# Patient Record
Sex: MALE | Race: WHITE | NOT HISPANIC OR LATINO
[De-identification: names, ages, dates, MRNs, and addresses within clinical notes are randomized per-mention and may not be internally consistent; named-entity substitution may affect disease eponyms.]

---

## 2020-10-28 ENCOUNTER — APPOINTMENT (OUTPATIENT)
Dept: SPINE | Facility: CLINIC | Age: 40
End: 2020-10-28
Payer: COMMERCIAL

## 2020-10-28 VITALS
HEART RATE: 101 BPM | HEIGHT: 65 IN | WEIGHT: 165 LBS | DIASTOLIC BLOOD PRESSURE: 85 MMHG | BODY MASS INDEX: 27.49 KG/M2 | OXYGEN SATURATION: 97 % | TEMPERATURE: 97.3 F | SYSTOLIC BLOOD PRESSURE: 129 MMHG

## 2020-10-28 DIAGNOSIS — Z82.49 FAMILY HISTORY OF ISCHEMIC HEART DISEASE AND OTHER DISEASES OF THE CIRCULATORY SYSTEM: ICD-10-CM

## 2020-10-28 DIAGNOSIS — Z87.891 PERSONAL HISTORY OF NICOTINE DEPENDENCE: ICD-10-CM

## 2020-10-28 DIAGNOSIS — Z86.39 PERSONAL HISTORY OF OTHER ENDOCRINE, NUTRITIONAL AND METABOLIC DISEASE: ICD-10-CM

## 2020-10-28 DIAGNOSIS — Z72.89 OTHER PROBLEMS RELATED TO LIFESTYLE: ICD-10-CM

## 2020-10-28 PROBLEM — Z00.00 ENCOUNTER FOR PREVENTIVE HEALTH EXAMINATION: Status: ACTIVE | Noted: 2020-10-28

## 2020-10-28 PROCEDURE — 99204 OFFICE O/P NEW MOD 45 MIN: CPT

## 2020-10-28 PROCEDURE — 99072 ADDL SUPL MATRL&STAF TM PHE: CPT

## 2020-10-28 RX ORDER — ROSUVASTATIN CALCIUM 10 MG/1
10 TABLET, FILM COATED ORAL
Refills: 0 | Status: ACTIVE | COMMUNITY

## 2020-11-11 ENCOUNTER — TRANSCRIPTION ENCOUNTER (OUTPATIENT)
Age: 40
End: 2020-11-11

## 2020-11-13 ENCOUNTER — NON-APPOINTMENT (OUTPATIENT)
Age: 40
End: 2020-11-13

## 2020-11-13 ENCOUNTER — APPOINTMENT (OUTPATIENT)
Dept: OPHTHALMOLOGY | Facility: CLINIC | Age: 40
End: 2020-11-13
Payer: COMMERCIAL

## 2020-11-13 PROCEDURE — 92004 COMPRE OPH EXAM NEW PT 1/>: CPT

## 2020-11-13 PROCEDURE — 92083 EXTENDED VISUAL FIELD XM: CPT

## 2020-11-13 PROCEDURE — 99072 ADDL SUPL MATRL&STAF TM PHE: CPT

## 2020-11-13 PROCEDURE — 92133 CPTRZD OPH DX IMG PST SGM ON: CPT

## 2020-12-14 ENCOUNTER — APPOINTMENT (OUTPATIENT)
Dept: SPINE | Facility: CLINIC | Age: 40
End: 2020-12-14
Payer: COMMERCIAL

## 2020-12-14 DIAGNOSIS — G89.29 HEADACHE, UNSPECIFIED: ICD-10-CM

## 2020-12-14 DIAGNOSIS — R51.9 HEADACHE, UNSPECIFIED: ICD-10-CM

## 2020-12-14 PROCEDURE — 99213 OFFICE O/P EST LOW 20 MIN: CPT | Mod: 95

## 2020-12-15 NOTE — CONSULT LETTER
[Courtesy Letter:] : I had the pleasure of seeing your patient, [unfilled], in my office today. [FreeTextEntry2] : Dr Allen Phillip [FreeTextEntry1] : Nina Em, MS, ANP-BC, SCRN\par Adult Nurse Practitioner on Behalf of Dr. Virgilio Conway, Neurosurgeon\par Department Neurosurgery\par

## 2020-12-15 NOTE — ASSESSMENT
[FreeTextEntry1] : 40-year-old man comprehensive work-up\par \par Dr. Conway will review images w/ Neuroradiologist\par \par In the meantime patient will need a CTA of the head with and without contrast to rule out cerebral aneurysm.\par Patient will also need an MRA of the head with and without contrast to assess cerebral blood vessels for vascular malformation.\par \par Patient will follow up after studies are completed for additional recommendation

## 2020-12-15 NOTE — HISTORY OF PRESENT ILLNESS
[Home] : at home, [unfilled] , at the time of the visit. [Medical Office: ([facility])___] : at the medical office located in  [Verbal consent obtained from patient] : the patient, [unfilled] [FreeTextEntry4] : Vinayak Em

## 2020-12-15 NOTE — REASON FOR VISIT
[Follow-Up: _____] : a [unfilled] follow-up visit [FreeTextEntry1] : This is a TELEHEALTH VISIT for review of. 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

## 2020-12-30 ENCOUNTER — APPOINTMENT (OUTPATIENT)
Dept: MRI IMAGING | Facility: IMAGING CENTER | Age: 40
End: 2020-12-30
Payer: COMMERCIAL

## 2020-12-30 ENCOUNTER — OUTPATIENT (OUTPATIENT)
Dept: OUTPATIENT SERVICES | Facility: HOSPITAL | Age: 40
LOS: 1 days | End: 2020-12-30
Payer: COMMERCIAL

## 2020-12-30 ENCOUNTER — RESULT REVIEW (OUTPATIENT)
Age: 40
End: 2020-12-30

## 2020-12-30 ENCOUNTER — APPOINTMENT (OUTPATIENT)
Dept: CT IMAGING | Facility: IMAGING CENTER | Age: 40
End: 2020-12-30
Payer: COMMERCIAL

## 2020-12-30 DIAGNOSIS — D35.2 BENIGN NEOPLASM OF PITUITARY GLAND: ICD-10-CM

## 2020-12-30 DIAGNOSIS — R51.9 HEADACHE, UNSPECIFIED: ICD-10-CM

## 2020-12-30 DIAGNOSIS — R93.3 ABNORMAL FINDINGS ON DIAGNOSTIC IMAGING OF OTHER PARTS OF DIGESTIVE TRACT: ICD-10-CM

## 2020-12-30 PROCEDURE — 70546 MR ANGIOGRAPH HEAD W/O&W/DYE: CPT | Mod: 26

## 2020-12-30 PROCEDURE — A9585: CPT

## 2020-12-30 PROCEDURE — 70496 CT ANGIOGRAPHY HEAD: CPT

## 2020-12-30 PROCEDURE — 70496 CT ANGIOGRAPHY HEAD: CPT | Mod: 26

## 2020-12-30 PROCEDURE — 70546 MR ANGIOGRAPH HEAD W/O&W/DYE: CPT

## 2021-01-04 ENCOUNTER — APPOINTMENT (OUTPATIENT)
Dept: SPINE | Facility: CLINIC | Age: 41
End: 2021-01-04
Payer: COMMERCIAL

## 2021-01-04 PROCEDURE — 99213 OFFICE O/P EST LOW 20 MIN: CPT | Mod: 95

## 2021-01-05 NOTE — REASON FOR VISIT
[Follow-Up: _____] : a [unfilled] follow-up visit [FreeTextEntry1] : The patient has a new MRI of the brain and sella with and without contrast.

## 2021-01-05 NOTE — RESULTS/DATA
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 The CT angiogram of the intracranial is normal. There is no evidence of aneurysm.  The MRA reveals that the evaluation of the [place] appears unremarkable.

## 2021-01-05 NOTE — ASSESSMENT
[FreeTextEntry1] : These images and findings were reviewed and discussed with the patient.  There is no evidence of aneurysm.  It was recommended that he have a new MRI of the brain and sella with and without contrast in February for surveillance of the sella mass.

## 2021-01-05 NOTE — DATA REVIEWED
[de-identified] : CTA Angio of the brain w and w/o contrast done on 12/30/20 at [facility] [de-identified] : Brain and sella with and without contrast [de-identified] : Brain with and without contrast done on 12/30/2020 at [facility].

## 2021-01-05 NOTE — HISTORY OF PRESENT ILLNESS
[Home] : at home, [unfilled] , at the time of the visit. [Medical Office: ([facility])___] : at the medical office located in  [Verbal consent obtained from patient] : the patient, [unfilled] [FreeTextEntry4] : Jackie Sierra [FreeTextEntry1] : JANNETTE GONZALEZ is a 40 year old gentleman was being evaluated for headaches and as a part of that had an MRI that showed a cell mass. He had a workup by Dr. Miner but which was normal. He saw Dr. Alex Farah and was found to have full visual fields. His MRI of the brain and sella which was viewed on December 14 revealed what appeared to be a low signal flow void in the upper right portion of the mass and which was thought may have been associated with the A1 segment of the anterior cerebral artery. 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  The patient stated that his headaches are getting better. He gets them about twice a week.\par

## 2021-01-05 NOTE — PHYSICAL EXAM
[FreeTextEntry1] : The exam is limited due to this being a telehealth appointment.  The patient is alert and oriented to person, place and time.  Higher cortical functions are intact.  Face is symmetrical and speech is clear.  \par \par \par \par \par \par \par \par \par

## 2021-02-08 ENCOUNTER — APPOINTMENT (OUTPATIENT)
Dept: SPINE | Facility: CLINIC | Age: 41
End: 2021-02-08
Payer: COMMERCIAL

## 2021-02-08 ENCOUNTER — APPOINTMENT (OUTPATIENT)
Dept: SPINE | Facility: CLINIC | Age: 41
End: 2021-02-08

## 2021-02-08 PROCEDURE — 99212 OFFICE O/P EST SF 10 MIN: CPT | Mod: 95

## 2021-02-08 NOTE — PHYSICAL EXAM
[FreeTextEntry1] : The neurological exam is limited due to this being a telehealth appointment.  The patient is alert and oriented to person, place and time.  Higher cortical functions are intact.  His face is symmetrical and speech is clear and fluent.  The patient appears to move all extremities well.  \par

## 2021-02-08 NOTE — REASON FOR VISIT
[Follow-Up: _____] : a [unfilled] follow-up visit [FreeTextEntry1] : The patient has a new MRI of the brain and sella with and without contrast for review.

## 2021-02-08 NOTE — DATA REVIEWED
[de-identified] : Brain and sella with and without contrast done at on 2/3/2021 and compared to images done on 11/24/2020.

## 2021-02-08 NOTE — HISTORY OF PRESENT ILLNESS
[Home] : at home, [unfilled] , at the time of the visit. [Medical Office: ([facility])___] : at the medical office located in  [Verbal consent obtained from patient] : the patient, [unfilled] [FreeTextEntry4] : SHANTHI Hinson. 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  He had a work up by endocrinologist, Dr. Isidra Dao which was normal.  He also had a neuro opthalmic workup by Dr. Alex Farah and was found to have full visual fields.  He has headaches about once a week and is being followed by Dr. Allen Phillip.  The patient is here with a new MRI of the brain and sella with and without contrast for review.\par

## 2021-02-08 NOTE — ASSESSMENT
[FreeTextEntry1] : The images and findings were reviewed and discussed with the patient.  He is to return to the office with a new MRI of the brain with and without contrast in November for review.  It was also advised that he follow up with Dr. Alex Paredes for a new visual field exam.

## 2021-03-13 ENCOUNTER — OUTPATIENT (OUTPATIENT)
Dept: OUTPATIENT SERVICES | Facility: HOSPITAL | Age: 41
LOS: 1 days | End: 2021-03-13
Payer: COMMERCIAL

## 2021-03-13 DIAGNOSIS — Z11.52 ENCOUNTER FOR SCREENING FOR COVID-19: ICD-10-CM

## 2021-03-13 LAB — SARS-COV-2 RNA SPEC QL NAA+PROBE: SIGNIFICANT CHANGE UP

## 2021-03-13 PROCEDURE — U0003: CPT

## 2021-03-13 PROCEDURE — C9803: CPT

## 2021-03-13 PROCEDURE — U0005: CPT

## 2021-03-31 ENCOUNTER — APPOINTMENT (OUTPATIENT)
Dept: DISASTER EMERGENCY | Facility: OTHER | Age: 41
End: 2021-03-31
Payer: COMMERCIAL

## 2021-03-31 PROCEDURE — 0001A: CPT

## 2021-04-21 ENCOUNTER — APPOINTMENT (OUTPATIENT)
Dept: DISASTER EMERGENCY | Facility: OTHER | Age: 41
End: 2021-04-21
Payer: COMMERCIAL

## 2021-04-21 PROCEDURE — 0002A: CPT

## 2021-10-18 ENCOUNTER — APPOINTMENT (OUTPATIENT)
Dept: OPHTHALMOLOGY | Facility: CLINIC | Age: 41
End: 2021-10-18
Payer: COMMERCIAL

## 2021-10-18 ENCOUNTER — NON-APPOINTMENT (OUTPATIENT)
Age: 41
End: 2021-10-18

## 2021-10-18 PROCEDURE — 92083 EXTENDED VISUAL FIELD XM: CPT

## 2021-10-18 PROCEDURE — 92015 DETERMINE REFRACTIVE STATE: CPT

## 2021-10-18 PROCEDURE — 92133 CPTRZD OPH DX IMG PST SGM ON: CPT

## 2021-10-18 PROCEDURE — 99214 OFFICE O/P EST MOD 30 MIN: CPT

## 2021-11-03 ENCOUNTER — APPOINTMENT (OUTPATIENT)
Dept: SPINE | Facility: CLINIC | Age: 41
End: 2021-11-03
Payer: COMMERCIAL

## 2021-11-03 VITALS
DIASTOLIC BLOOD PRESSURE: 76 MMHG | WEIGHT: 180 LBS | HEIGHT: 65 IN | SYSTOLIC BLOOD PRESSURE: 107 MMHG | OXYGEN SATURATION: 99 % | BODY MASS INDEX: 29.99 KG/M2 | HEART RATE: 75 BPM

## 2021-11-03 PROCEDURE — 99213 OFFICE O/P EST LOW 20 MIN: CPT

## 2021-11-03 RX ORDER — NORTRIPTYLINE HYDROCHLORIDE 25 MG/1
25 CAPSULE ORAL
Refills: 0 | Status: DISCONTINUED | COMMUNITY
End: 2021-11-03

## 2022-10-18 ENCOUNTER — NON-APPOINTMENT (OUTPATIENT)
Age: 42
End: 2022-10-18

## 2022-10-18 ENCOUNTER — APPOINTMENT (OUTPATIENT)
Dept: OPHTHALMOLOGY | Facility: CLINIC | Age: 42
End: 2022-10-18

## 2022-10-18 PROCEDURE — 92083 EXTENDED VISUAL FIELD XM: CPT

## 2022-10-18 PROCEDURE — 99214 OFFICE O/P EST MOD 30 MIN: CPT

## 2022-10-18 PROCEDURE — 92133 CPTRZD OPH DX IMG PST SGM ON: CPT

## 2022-12-07 ENCOUNTER — APPOINTMENT (OUTPATIENT)
Dept: SPINE | Facility: CLINIC | Age: 42
End: 2022-12-07

## 2022-12-07 VITALS
HEIGHT: 65 IN | WEIGHT: 180 LBS | OXYGEN SATURATION: 98 % | SYSTOLIC BLOOD PRESSURE: 102 MMHG | HEART RATE: 90 BPM | BODY MASS INDEX: 29.99 KG/M2 | DIASTOLIC BLOOD PRESSURE: 68 MMHG

## 2022-12-07 PROCEDURE — 99213 OFFICE O/P EST LOW 20 MIN: CPT

## 2023-10-17 ENCOUNTER — APPOINTMENT (OUTPATIENT)
Dept: OPHTHALMOLOGY | Facility: CLINIC | Age: 43
End: 2023-10-17
Payer: COMMERCIAL

## 2023-10-17 ENCOUNTER — NON-APPOINTMENT (OUTPATIENT)
Age: 43
End: 2023-10-17

## 2023-10-17 PROCEDURE — 92133 CPTRZD OPH DX IMG PST SGM ON: CPT

## 2023-10-17 PROCEDURE — 99214 OFFICE O/P EST MOD 30 MIN: CPT

## 2023-10-17 PROCEDURE — 92083 EXTENDED VISUAL FIELD XM: CPT

## 2023-12-13 ENCOUNTER — APPOINTMENT (OUTPATIENT)
Dept: SPINE | Facility: CLINIC | Age: 43
End: 2023-12-13
Payer: COMMERCIAL

## 2023-12-13 VITALS
BODY MASS INDEX: 29.66 KG/M2 | OXYGEN SATURATION: 98 % | HEIGHT: 65 IN | HEART RATE: 75 BPM | DIASTOLIC BLOOD PRESSURE: 88 MMHG | WEIGHT: 178 LBS | SYSTOLIC BLOOD PRESSURE: 127 MMHG

## 2023-12-13 PROCEDURE — 99213 OFFICE O/P EST LOW 20 MIN: CPT

## 2024-01-05 ENCOUNTER — OUTPATIENT (OUTPATIENT)
Dept: OUTPATIENT SERVICES | Facility: HOSPITAL | Age: 44
LOS: 1 days | End: 2024-01-05
Payer: COMMERCIAL

## 2024-01-05 ENCOUNTER — APPOINTMENT (OUTPATIENT)
Dept: CT IMAGING | Facility: IMAGING CENTER | Age: 44
End: 2024-01-05
Payer: COMMERCIAL

## 2024-01-05 ENCOUNTER — APPOINTMENT (OUTPATIENT)
Dept: MRI IMAGING | Facility: IMAGING CENTER | Age: 44
End: 2024-01-05
Payer: COMMERCIAL

## 2024-01-05 DIAGNOSIS — D35.2 BENIGN NEOPLASM OF PITUITARY GLAND: ICD-10-CM

## 2024-01-05 PROCEDURE — 70553 MRI BRAIN STEM W/O & W/DYE: CPT | Mod: 26

## 2024-01-05 PROCEDURE — A9585: CPT

## 2024-01-05 PROCEDURE — 70450 CT HEAD/BRAIN W/O DYE: CPT | Mod: 26

## 2024-01-05 PROCEDURE — 70450 CT HEAD/BRAIN W/O DYE: CPT

## 2024-01-05 PROCEDURE — 70553 MRI BRAIN STEM W/O & W/DYE: CPT

## 2024-01-18 ENCOUNTER — OUTPATIENT (OUTPATIENT)
Dept: OUTPATIENT SERVICES | Facility: HOSPITAL | Age: 44
LOS: 1 days | End: 2024-01-18
Payer: COMMERCIAL

## 2024-01-18 VITALS
HEIGHT: 65 IN | OXYGEN SATURATION: 99 % | DIASTOLIC BLOOD PRESSURE: 88 MMHG | SYSTOLIC BLOOD PRESSURE: 122 MMHG | RESPIRATION RATE: 18 BRPM | TEMPERATURE: 98 F | WEIGHT: 179.02 LBS | HEART RATE: 84 BPM

## 2024-01-18 DIAGNOSIS — Z01.818 ENCOUNTER FOR OTHER PREPROCEDURAL EXAMINATION: ICD-10-CM

## 2024-01-18 DIAGNOSIS — Z92.89 PERSONAL HISTORY OF OTHER MEDICAL TREATMENT: Chronic | ICD-10-CM

## 2024-01-18 DIAGNOSIS — D44.4 NEOPLASM OF UNCERTAIN BEHAVIOR OF CRANIOPHARYNGEAL DUCT: ICD-10-CM

## 2024-01-18 DIAGNOSIS — Z29.9 ENCOUNTER FOR PROPHYLACTIC MEASURES, UNSPECIFIED: ICD-10-CM

## 2024-01-18 DIAGNOSIS — D49.7 NEOPLASM OF UNSPECIFIED BEHAVIOR OF ENDOCRINE GLANDS AND OTHER PARTS OF NERVOUS SYSTEM: ICD-10-CM

## 2024-01-18 LAB
ANION GAP SERPL CALC-SCNC: 11 MMOL/L — SIGNIFICANT CHANGE UP (ref 5–17)
BLD GP AB SCN SERPL QL: NEGATIVE — SIGNIFICANT CHANGE UP
BUN SERPL-MCNC: 13 MG/DL — SIGNIFICANT CHANGE UP (ref 7–23)
CALCIUM SERPL-MCNC: 10.1 MG/DL — SIGNIFICANT CHANGE UP (ref 8.4–10.5)
CHLORIDE SERPL-SCNC: 101 MMOL/L — SIGNIFICANT CHANGE UP (ref 96–108)
CO2 SERPL-SCNC: 27 MMOL/L — SIGNIFICANT CHANGE UP (ref 22–31)
CREAT SERPL-MCNC: 0.93 MG/DL — SIGNIFICANT CHANGE UP (ref 0.5–1.3)
EGFR: 104 ML/MIN/1.73M2 — SIGNIFICANT CHANGE UP
GLUCOSE SERPL-MCNC: 85 MG/DL — SIGNIFICANT CHANGE UP (ref 70–99)
HCT VFR BLD CALC: 42.9 % — SIGNIFICANT CHANGE UP (ref 39–50)
HGB BLD-MCNC: 14.6 G/DL — SIGNIFICANT CHANGE UP (ref 13–17)
MCHC RBC-ENTMCNC: 28.9 PG — SIGNIFICANT CHANGE UP (ref 27–34)
MCHC RBC-ENTMCNC: 34 GM/DL — SIGNIFICANT CHANGE UP (ref 32–36)
MCV RBC AUTO: 84.8 FL — SIGNIFICANT CHANGE UP (ref 80–100)
NRBC # BLD: 0 /100 WBCS — SIGNIFICANT CHANGE UP (ref 0–0)
PLATELET # BLD AUTO: 226 K/UL — SIGNIFICANT CHANGE UP (ref 150–400)
POTASSIUM SERPL-MCNC: 3.9 MMOL/L — SIGNIFICANT CHANGE UP (ref 3.5–5.3)
POTASSIUM SERPL-SCNC: 3.9 MMOL/L — SIGNIFICANT CHANGE UP (ref 3.5–5.3)
RBC # BLD: 5.06 M/UL — SIGNIFICANT CHANGE UP (ref 4.2–5.8)
RBC # FLD: 13.1 % — SIGNIFICANT CHANGE UP (ref 10.3–14.5)
RH IG SCN BLD-IMP: POSITIVE — SIGNIFICANT CHANGE UP
SODIUM SERPL-SCNC: 139 MMOL/L — SIGNIFICANT CHANGE UP (ref 135–145)
WBC # BLD: 6.62 K/UL — SIGNIFICANT CHANGE UP (ref 3.8–10.5)
WBC # FLD AUTO: 6.62 K/UL — SIGNIFICANT CHANGE UP (ref 3.8–10.5)

## 2024-01-18 PROCEDURE — 86850 RBC ANTIBODY SCREEN: CPT

## 2024-01-18 PROCEDURE — 86900 BLOOD TYPING SEROLOGIC ABO: CPT

## 2024-01-18 PROCEDURE — 80048 BASIC METABOLIC PNL TOTAL CA: CPT

## 2024-01-18 PROCEDURE — 85027 COMPLETE CBC AUTOMATED: CPT

## 2024-01-18 PROCEDURE — G0463: CPT

## 2024-01-18 PROCEDURE — 86901 BLOOD TYPING SEROLOGIC RH(D): CPT

## 2024-01-18 NOTE — H&P PST ADULT - HISTORY OF PRESENT ILLNESS
This is a 43 year old male with PMH HLD. 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 Today presenting to [facility] for scheduled Endoscopic transphenoidal removal craniopharyngioma on 2/1/24 with Dr. Conway.

## 2024-01-18 NOTE — H&P PST ADULT - ASSESSMENT
DASI Score: 9  DASI Activity: able to go up one flight of stairs or walk 1-2 blocks with out difficulty  Loose or removable teeth: denies    CAPRINI SCORE    AGE RELATED RISK FACTORS                                                       MOBILITY RELATED FACTORS  [x ] Age 41-60 years                                            (1 Point)                  [ ] Bed rest                                                        (1 Point)  [ ] Age: 61-74 years                                           (2 Points)                [ ] Plaster cast                                                   (2 Points)  [ ] Age= 75 years                                              (3 Points)                 [ ] Bed bound for more than 72 hours                   (2 Points)    DISEASE RELATED RISK FACTORS                                               GENDER SPECIFIC FACTORS  [ ] Edema in the lower extremities                       (1 Point)                  [ ] Pregnancy                                                     (1 Point)  [ ] Varicose veins                                               (1 Point)                  [ ] Post-partum < 6 weeks                                   (1 Point)             [ ] BMI > 25 Kg/m2                                            (1 Point)                  [ ] Hormonal therapy  or oral contraception            (1 Point)                 [ ] Sepsis (in the previous month)                        (1 Point)                  [ ] History of pregnancy complications  [ ] Pneumonia or serious lung disease                                               [ ] Unexplained or recurrent                       (1 Point)           (in the previous month)                               (1 Point)  [ ] Abnormal pulmonary function test                     (1 Point)                 SURGERY RELATED RISK FACTORS  [ ] Acute myocardial infarction                              (1 Point)                 [ ]  Section                                            (1 Point)  [ ] Congestive heart failure (in the previous month)  (1 Point)                 [ ] Minor surgery                                                 (1 Point)   [ ] Inflammatory bowel disease                             (1 Point)                 [ ] Arthroscopic surgery                                        (2 Points)  [ ] Central venous access                                    (2 Points)                [x ] General surgery lasting more than 45 minutes   (2 Points)       [ ] Stroke (in the previous month)                          (5 Points)               [ ] Elective arthroplasty                                        (5 Points)                                                                                                                                               HEMATOLOGY RELATED FACTORS                                                 TRAUMA RELATED RISK FACTORS  [ ] Prior episodes of VTE                                     (3 Points)                 [ ] Fracture of the hip, pelvis, or leg                       (5 Points)  [ ] Positive family history for VTE                         (3 Points)                 [ ] Acute spinal cord injury (in the previous month)  (5 Points)  [ ] Prothrombin 90884 A                                      (3 Points)                 [ ] Paralysis  (less than 1 month)                          (5 Points)  [ ] Factor V Leiden                                             (3 Points)                 [ ] Multiple Trauma within 1 month                         (5 Points)  [ ] Lupus anticoagulants                                     (3 Points)                                                           [ ] Anticardiolipin antibodies                                (3 Points)                                                       [ ] High homocysteine in the blood                      (3 Points)                                             [ ] Other congenital or acquired thrombophilia       (3 Points)                                                [ ] Heparin induced thrombocytopenia                  (3 Points)                                          Total Score [ 4  ]

## 2024-01-22 ENCOUNTER — APPOINTMENT (OUTPATIENT)
Dept: OTOLARYNGOLOGY | Facility: CLINIC | Age: 44
End: 2024-01-22
Payer: COMMERCIAL

## 2024-01-22 VITALS
WEIGHT: 176 LBS | OXYGEN SATURATION: 99 % | DIASTOLIC BLOOD PRESSURE: 79 MMHG | BODY MASS INDEX: 29.32 KG/M2 | SYSTOLIC BLOOD PRESSURE: 113 MMHG | HEIGHT: 65 IN | HEART RATE: 76 BPM

## 2024-01-22 PROCEDURE — 99205 OFFICE O/P NEW HI 60 MIN: CPT | Mod: 25

## 2024-01-22 PROCEDURE — 31231 NASAL ENDOSCOPY DX: CPT

## 2024-01-22 NOTE — END OF VISIT
[FreeTextEntry3] : I personally saw and examined Mr. JANNETTE GONZALEZ  in detail this visit today. I personally reviewed the HPI, PMH, FH, SH, ROS and medications/allergies. I have spoken to JAQCUELINE Oconnor regarding the history and have personally determined the assessment and plan of care, and documented this myself. I was present and participated in all key portions of the encounter and all procedures noted above. I have made changes in the body of the note where appropriate.  Attesting Faculty: See Attending Signature Below

## 2024-01-22 NOTE — ASSESSMENT
[FreeTextEntry1] : Mr. GONZALEZ 43 year M presents for presurgical consult for TSRP with Dr. Conway. Complains of nasal congestion no relief with sinus rinse.   DNS/ Nasal congestion: - MRI Brain showed small right maxillary sinus polyp, explained to patient that its an incidental finding, no intervention needed at this time.  Pituitary Macroadenoma: - discussed my role in the surgery including the nasal and sinus procedures that I will be performing in order to provide the neurosurgeon access to the pituitary tumor - expected post-op hospital course discussed including need for septal flap for repair of spinal fluid leak and close observation for about a week - post-op care consisting of nasal saline irrigations was reviewed, and he and his wife are familiar with Neilmed sinus rinse - discussed post-operative issues including nasal congestion, loss of sense of smell which should be temporary but can be permanent, bloody nasal drainage, facial pressure/headaches, bleeding, and infection - all questions answered and patient will call if any further concerns - plan to see patient on AM of surgery

## 2024-01-22 NOTE — PROCEDURE
[FreeTextEntry6] : Flexible scope #38 was used. Right nasal passage with hypertrophy of inferior, middle and superior turbinates. Nasal passage patent with clear middle meatus and sphenoethmoid recess. Left nasal passage with hypertrophy of inferior, middle and superior turbinates. Nasal passage was patent with clear middle meatus and sphenoethmoid recess. No mucopurulence or polyps appreciated. Nasopharynx clear. Right Deviated nasal septum

## 2024-01-22 NOTE — CONSULT LETTER
[Dear  ___] : Dear  [unfilled], [Consult Letter:] : I had the pleasure of evaluating your patient, [unfilled]. [Please see my note below.] : Please see my note below. [Consult Closing:] : Thank you very much for allowing me to participate in the care of this patient.  If you have any questions, please do not hesitate to contact me. [Sincerely,] : Sincerely, [FreeTextEntry3] : Nery Brown MD Otolaryngology and Cranial Base Surgery Attending Physician - Department of Otolaryngology and Head & Neck Surgery [facility]  [employer] [facility]  [DrHarshil  ___] : Dr. IVEY

## 2024-01-22 NOTE — HISTORY OF PRESENT ILLNESS
[de-identified] : Patient presents for presurgical consult for endoscopic craniopharyngioma resection with Dr. Conway. He complains of chronic nasal congestion aways has difficulty breathing from the nose. He had MRI of the brain; it showed an incidental finding of right maxillary sinus polyp. He denies hx of sinus infections, sinus pain and pressure.

## 2024-01-22 NOTE — PHYSICAL EXAM
[Nasal Endoscopy Performed] : nasal endoscopy was performed, see procedure section for findings [] : septum deviated to the right [Normal] : mucosa is normal [Midline] : trachea located in midline position

## 2024-01-31 ENCOUNTER — TRANSCRIPTION ENCOUNTER (OUTPATIENT)
Age: 44
End: 2024-01-31

## 2024-02-01 ENCOUNTER — APPOINTMENT (OUTPATIENT)
Dept: SPINE | Facility: HOSPITAL | Age: 44
End: 2024-02-01

## 2024-02-01 ENCOUNTER — TRANSCRIPTION ENCOUNTER (OUTPATIENT)
Age: 44
End: 2024-02-01

## 2024-02-01 ENCOUNTER — RESULT REVIEW (OUTPATIENT)
Age: 44
End: 2024-02-01

## 2024-02-01 ENCOUNTER — APPOINTMENT (OUTPATIENT)
Dept: OTOLARYNGOLOGY | Facility: HOSPITAL | Age: 44
End: 2024-02-01

## 2024-02-01 ENCOUNTER — INPATIENT (INPATIENT)
Facility: HOSPITAL | Age: 44
LOS: 6 days | Discharge: HOME | DRG: 614 | End: 2024-02-08
Attending: NEUROLOGICAL SURGERY | Admitting: NEUROLOGICAL SURGERY
Payer: COMMERCIAL

## 2024-02-01 VITALS
OXYGEN SATURATION: 97 % | SYSTOLIC BLOOD PRESSURE: 113 MMHG | WEIGHT: 179.02 LBS | HEART RATE: 80 BPM | RESPIRATION RATE: 18 BRPM | HEIGHT: 65 IN | TEMPERATURE: 98 F | DIASTOLIC BLOOD PRESSURE: 75 MMHG

## 2024-02-01 DIAGNOSIS — Z92.89 PERSONAL HISTORY OF OTHER MEDICAL TREATMENT: Chronic | ICD-10-CM

## 2024-02-01 DIAGNOSIS — D44.4 NEOPLASM OF UNCERTAIN BEHAVIOR OF CRANIOPHARYNGEAL DUCT: ICD-10-CM

## 2024-02-01 LAB
ANION GAP SERPL CALC-SCNC: 12 MMOL/L — SIGNIFICANT CHANGE UP (ref 5–17)
ANION GAP SERPL CALC-SCNC: 15 MMOL/L — SIGNIFICANT CHANGE UP (ref 5–17)
BUN SERPL-MCNC: 11 MG/DL — SIGNIFICANT CHANGE UP (ref 7–23)
BUN SERPL-MCNC: 8 MG/DL — SIGNIFICANT CHANGE UP (ref 7–23)
CALCIUM SERPL-MCNC: 9.1 MG/DL — SIGNIFICANT CHANGE UP (ref 8.4–10.5)
CALCIUM SERPL-MCNC: 9.2 MG/DL — SIGNIFICANT CHANGE UP (ref 8.4–10.5)
CHLORIDE SERPL-SCNC: 112 MMOL/L — HIGH (ref 96–108)
CHLORIDE SERPL-SCNC: 116 MMOL/L — HIGH (ref 96–108)
CO2 SERPL-SCNC: 21 MMOL/L — LOW (ref 22–31)
CO2 SERPL-SCNC: 22 MMOL/L — SIGNIFICANT CHANGE UP (ref 22–31)
CREAT SERPL-MCNC: 0.76 MG/DL — SIGNIFICANT CHANGE UP (ref 0.5–1.3)
CREAT SERPL-MCNC: 0.79 MG/DL — SIGNIFICANT CHANGE UP (ref 0.5–1.3)
EGFR: 113 ML/MIN/1.73M2 — SIGNIFICANT CHANGE UP
EGFR: 114 ML/MIN/1.73M2 — SIGNIFICANT CHANGE UP
GLUCOSE SERPL-MCNC: 154 MG/DL — HIGH (ref 70–99)
GLUCOSE SERPL-MCNC: 205 MG/DL — HIGH (ref 70–99)
HCT VFR BLD CALC: 41.6 % — SIGNIFICANT CHANGE UP (ref 39–50)
HGB BLD-MCNC: 14.4 G/DL — SIGNIFICANT CHANGE UP (ref 13–17)
MAGNESIUM SERPL-MCNC: 2.2 MG/DL — SIGNIFICANT CHANGE UP (ref 1.6–2.6)
MAGNESIUM SERPL-MCNC: 2.4 MG/DL — SIGNIFICANT CHANGE UP (ref 1.6–2.6)
MCHC RBC-ENTMCNC: 29 PG — SIGNIFICANT CHANGE UP (ref 27–34)
MCHC RBC-ENTMCNC: 34.6 GM/DL — SIGNIFICANT CHANGE UP (ref 32–36)
MCV RBC AUTO: 83.9 FL — SIGNIFICANT CHANGE UP (ref 80–100)
NRBC # BLD: 0 /100 WBCS — SIGNIFICANT CHANGE UP (ref 0–0)
OSMOLALITY SERPL: 313 MOSMOL/KG — HIGH (ref 275–300)
OSMOLALITY UR: 84 MOS/KG — LOW (ref 300–900)
PHOSPHATE SERPL-MCNC: 2.7 MG/DL — SIGNIFICANT CHANGE UP (ref 2.5–4.5)
PHOSPHATE SERPL-MCNC: 3.3 MG/DL — SIGNIFICANT CHANGE UP (ref 2.5–4.5)
PLATELET # BLD AUTO: 289 K/UL — SIGNIFICANT CHANGE UP (ref 150–400)
POTASSIUM SERPL-MCNC: 3.5 MMOL/L — SIGNIFICANT CHANGE UP (ref 3.5–5.3)
POTASSIUM SERPL-MCNC: 3.9 MMOL/L — SIGNIFICANT CHANGE UP (ref 3.5–5.3)
POTASSIUM SERPL-SCNC: 3.5 MMOL/L — SIGNIFICANT CHANGE UP (ref 3.5–5.3)
POTASSIUM SERPL-SCNC: 3.9 MMOL/L — SIGNIFICANT CHANGE UP (ref 3.5–5.3)
RBC # BLD: 4.96 M/UL — SIGNIFICANT CHANGE UP (ref 4.2–5.8)
RBC # FLD: 12.8 % — SIGNIFICANT CHANGE UP (ref 10.3–14.5)
RH IG SCN BLD-IMP: POSITIVE — SIGNIFICANT CHANGE UP
SODIUM SERPL-SCNC: 148 MMOL/L — HIGH (ref 135–145)
SODIUM SERPL-SCNC: 150 MMOL/L — HIGH (ref 135–145)
SP GR UR STRIP: <1.005 — LOW (ref 1–1.03)
WBC # BLD: 11.84 K/UL — HIGH (ref 3.8–10.5)
WBC # FLD AUTO: 11.84 K/UL — HIGH (ref 3.8–10.5)

## 2024-02-01 PROCEDURE — 93970 EXTREMITY STUDY: CPT | Mod: 26

## 2024-02-01 PROCEDURE — 62165 REMOVE PITUIT TUMOR W/SCOPE: CPT | Mod: 62

## 2024-02-01 PROCEDURE — 61782 SCAN PROC CRANIAL EXTRA: CPT

## 2024-02-01 PROCEDURE — 61781 SCAN PROC CRANIAL INTRA: CPT

## 2024-02-01 PROCEDURE — 15740 ISLAND PEDICLE FLAP GRAFT: CPT

## 2024-02-01 PROCEDURE — 88307 TISSUE EXAM BY PATHOLOGIST: CPT | Mod: 26

## 2024-02-01 PROCEDURE — 88342 IMHCHEM/IMCYTCHM 1ST ANTB: CPT | Mod: 26

## 2024-02-01 PROCEDURE — 99232 SBSQ HOSP IP/OBS MODERATE 35: CPT

## 2024-02-01 PROCEDURE — 88311 DECALCIFY TISSUE: CPT | Mod: 26

## 2024-02-01 PROCEDURE — 99291 CRITICAL CARE FIRST HOUR: CPT

## 2024-02-01 PROCEDURE — 62272 THER SPI PNXR DRG CSF: CPT

## 2024-02-01 DEVICE — SURGICEL 2 X 14": Type: IMPLANTABLE DEVICE | Status: FUNCTIONAL

## 2024-02-01 DEVICE — CATH EDM LUMBAR CLOSED TIP BARIUM IMPREGNATED 80CM: Type: IMPLANTABLE DEVICE | Status: FUNCTIONAL

## 2024-02-01 DEVICE — KIT CATH RADIAL ARTERY: Type: IMPLANTABLE DEVICE | Status: FUNCTIONAL

## 2024-02-01 DEVICE — SURGIFOAM PAD 8CM X 12.5CM X 10MM (100): Type: IMPLANTABLE DEVICE | Status: FUNCTIONAL

## 2024-02-01 DEVICE — SURGIFLO MATRIX WITH THROMBIN KIT: Type: IMPLANTABLE DEVICE | Status: FUNCTIONAL

## 2024-02-01 DEVICE — TACHOSIL 9.5 X 4.8CM: Type: IMPLANTABLE DEVICE | Status: FUNCTIONAL

## 2024-02-01 DEVICE — MAYFIELD SKULL PIN ADULT STEEL: Type: IMPLANTABLE DEVICE | Status: FUNCTIONAL

## 2024-02-01 DEVICE — DVC ADHERUS AUTOSPRAY W/ DURAL SEALANT EXT TIP: Type: IMPLANTABLE DEVICE | Status: FUNCTIONAL

## 2024-02-01 RX ORDER — ONDANSETRON 8 MG/1
4 TABLET, FILM COATED ORAL ONCE
Refills: 0 | Status: COMPLETED | OUTPATIENT
Start: 2024-02-01 | End: 2024-02-02

## 2024-02-01 RX ORDER — DESMOPRESSIN ACETATE 0.1 MG/1
1 TABLET ORAL ONCE
Refills: 0 | Status: COMPLETED | OUTPATIENT
Start: 2024-02-01 | End: 2024-02-01

## 2024-02-01 RX ORDER — ROSUVASTATIN CALCIUM 5 MG/1
1 TABLET ORAL
Refills: 0 | DISCHARGE

## 2024-02-01 RX ORDER — OXYCODONE HYDROCHLORIDE 5 MG/1
5 TABLET ORAL ONCE
Refills: 0 | Status: DISCONTINUED | OUTPATIENT
Start: 2024-02-01 | End: 2024-02-01

## 2024-02-01 RX ORDER — POTASSIUM CHLORIDE 20 MEQ
40 PACKET (EA) ORAL EVERY 4 HOURS
Refills: 0 | Status: DISCONTINUED | OUTPATIENT
Start: 2024-02-01 | End: 2024-02-01

## 2024-02-01 RX ORDER — SODIUM CHLORIDE 9 MG/ML
1000 INJECTION, SOLUTION INTRAVENOUS
Refills: 0 | Status: DISCONTINUED | OUTPATIENT
Start: 2024-02-01 | End: 2024-02-02

## 2024-02-01 RX ORDER — SODIUM CHLORIDE 9 MG/ML
3 INJECTION INTRAMUSCULAR; INTRAVENOUS; SUBCUTANEOUS EVERY 8 HOURS
Refills: 0 | Status: DISCONTINUED | OUTPATIENT
Start: 2024-02-01 | End: 2024-02-01

## 2024-02-01 RX ORDER — ACETAMINOPHEN 500 MG
1000 TABLET ORAL ONCE
Refills: 0 | Status: COMPLETED | OUTPATIENT
Start: 2024-02-01 | End: 2024-02-01

## 2024-02-01 RX ORDER — HYDROMORPHONE HYDROCHLORIDE 2 MG/ML
0.5 INJECTION INTRAMUSCULAR; INTRAVENOUS; SUBCUTANEOUS
Refills: 0 | Status: DISCONTINUED | OUTPATIENT
Start: 2024-02-01 | End: 2024-02-01

## 2024-02-01 RX ORDER — SODIUM CHLORIDE 9 MG/ML
500 INJECTION, SOLUTION INTRAVENOUS
Refills: 0 | Status: DISCONTINUED | OUTPATIENT
Start: 2024-02-01 | End: 2024-02-02

## 2024-02-01 RX ORDER — POLYETHYLENE GLYCOL 3350 17 G/17G
17 POWDER, FOR SOLUTION ORAL DAILY
Refills: 0 | Status: DISCONTINUED | OUTPATIENT
Start: 2024-02-01 | End: 2024-02-04

## 2024-02-01 RX ORDER — SENNA PLUS 8.6 MG/1
2 TABLET ORAL AT BEDTIME
Refills: 0 | Status: DISCONTINUED | OUTPATIENT
Start: 2024-02-01 | End: 2024-02-08

## 2024-02-01 RX ORDER — DESMOPRESSIN ACETATE 0.1 MG/1
0.05 TABLET ORAL EVERY 12 HOURS
Refills: 0 | Status: DISCONTINUED | OUTPATIENT
Start: 2024-02-01 | End: 2024-02-02

## 2024-02-01 RX ORDER — OXYCODONE HYDROCHLORIDE 5 MG/1
5 TABLET ORAL EVERY 4 HOURS
Refills: 0 | Status: DISCONTINUED | OUTPATIENT
Start: 2024-02-01 | End: 2024-02-03

## 2024-02-01 RX ORDER — METOCLOPRAMIDE HCL 10 MG
10 TABLET ORAL ONCE
Refills: 0 | Status: COMPLETED | OUTPATIENT
Start: 2024-02-01 | End: 2024-02-01

## 2024-02-01 RX ORDER — CEFAZOLIN SODIUM 1 G
2000 VIAL (EA) INJECTION EVERY 8 HOURS
Refills: 0 | Status: COMPLETED | OUTPATIENT
Start: 2024-02-01 | End: 2024-02-02

## 2024-02-01 RX ORDER — ACETAMINOPHEN 500 MG
650 TABLET ORAL EVERY 6 HOURS
Refills: 0 | Status: DISCONTINUED | OUTPATIENT
Start: 2024-02-01 | End: 2024-02-01

## 2024-02-01 RX ORDER — ATORVASTATIN CALCIUM 80 MG/1
40 TABLET, FILM COATED ORAL AT BEDTIME
Refills: 0 | Status: DISCONTINUED | OUTPATIENT
Start: 2024-02-01 | End: 2024-02-08

## 2024-02-01 RX ORDER — HYDROMORPHONE HYDROCHLORIDE 2 MG/ML
0.5 INJECTION INTRAMUSCULAR; INTRAVENOUS; SUBCUTANEOUS ONCE
Refills: 0 | Status: DISCONTINUED | OUTPATIENT
Start: 2024-02-01 | End: 2024-02-01

## 2024-02-01 RX ORDER — CEFAZOLIN SODIUM 1 G
2000 VIAL (EA) INJECTION ONCE
Refills: 0 | Status: COMPLETED | OUTPATIENT
Start: 2024-02-01 | End: 2024-02-01

## 2024-02-01 RX ORDER — POTASSIUM CHLORIDE 20 MEQ
10 PACKET (EA) ORAL
Refills: 0 | Status: DISCONTINUED | OUTPATIENT
Start: 2024-02-01 | End: 2024-02-01

## 2024-02-01 RX ORDER — SODIUM CHLORIDE 9 MG/ML
1000 INJECTION INTRAMUSCULAR; INTRAVENOUS; SUBCUTANEOUS
Refills: 0 | Status: DISCONTINUED | OUTPATIENT
Start: 2024-02-01 | End: 2024-02-01

## 2024-02-01 RX ORDER — ERGOCALCIFEROL 1.25 MG/1
1 CAPSULE ORAL
Refills: 0 | DISCHARGE

## 2024-02-01 RX ORDER — CEFAZOLIN SODIUM 1 G
2000 VIAL (EA) INJECTION EVERY 8 HOURS
Refills: 0 | Status: DISCONTINUED | OUTPATIENT
Start: 2024-02-01 | End: 2024-02-01

## 2024-02-01 RX ORDER — LIDOCAINE HCL 20 MG/ML
0.2 VIAL (ML) INJECTION ONCE
Refills: 0 | Status: DISCONTINUED | OUTPATIENT
Start: 2024-02-01 | End: 2024-02-01

## 2024-02-01 RX ORDER — SODIUM CHLORIDE 0.65 %
2 AEROSOL, SPRAY (ML) NASAL
Refills: 0 | Status: DISCONTINUED | OUTPATIENT
Start: 2024-02-02 | End: 2024-02-08

## 2024-02-01 RX ORDER — OXYCODONE HYDROCHLORIDE 5 MG/1
10 TABLET ORAL EVERY 4 HOURS
Refills: 0 | Status: DISCONTINUED | OUTPATIENT
Start: 2024-02-01 | End: 2024-02-03

## 2024-02-01 RX ORDER — SODIUM,POTASSIUM PHOSPHATES 278-250MG
2 POWDER IN PACKET (EA) ORAL ONCE
Refills: 0 | Status: COMPLETED | OUTPATIENT
Start: 2024-02-01 | End: 2024-02-01

## 2024-02-01 RX ADMIN — OXYCODONE HYDROCHLORIDE 5 MILLIGRAM(S): 5 TABLET ORAL at 17:20

## 2024-02-01 RX ADMIN — SODIUM CHLORIDE 75 MILLILITER(S): 9 INJECTION INTRAMUSCULAR; INTRAVENOUS; SUBCUTANEOUS at 13:44

## 2024-02-01 RX ADMIN — OXYCODONE HYDROCHLORIDE 5 MILLIGRAM(S): 5 TABLET ORAL at 15:45

## 2024-02-01 RX ADMIN — Medication 400 MILLIGRAM(S): at 18:15

## 2024-02-01 RX ADMIN — OXYCODONE HYDROCHLORIDE 10 MILLIGRAM(S): 5 TABLET ORAL at 23:27

## 2024-02-01 RX ADMIN — DESMOPRESSIN ACETATE 1 MICROGRAM(S): 0.1 TABLET ORAL at 21:31

## 2024-02-01 RX ADMIN — Medication 10 MILLIGRAM(S): at 14:07

## 2024-02-01 RX ADMIN — Medication 1000 MILLIGRAM(S): at 18:30

## 2024-02-01 RX ADMIN — DESMOPRESSIN ACETATE 1 MICROGRAM(S): 0.1 TABLET ORAL at 16:38

## 2024-02-01 RX ADMIN — OXYCODONE HYDROCHLORIDE 10 MILLIGRAM(S): 5 TABLET ORAL at 23:57

## 2024-02-01 RX ADMIN — HYDROMORPHONE HYDROCHLORIDE 0.5 MILLIGRAM(S): 2 INJECTION INTRAMUSCULAR; INTRAVENOUS; SUBCUTANEOUS at 13:30

## 2024-02-01 RX ADMIN — Medication 100 MILLIGRAM(S): at 21:30

## 2024-02-01 RX ADMIN — HYDROMORPHONE HYDROCHLORIDE 0.5 MILLIGRAM(S): 2 INJECTION INTRAMUSCULAR; INTRAVENOUS; SUBCUTANEOUS at 13:45

## 2024-02-01 RX ADMIN — OXYCODONE HYDROCHLORIDE 5 MILLIGRAM(S): 5 TABLET ORAL at 15:15

## 2024-02-01 RX ADMIN — SENNA PLUS 2 TABLET(S): 8.6 TABLET ORAL at 21:31

## 2024-02-01 RX ADMIN — OXYCODONE HYDROCHLORIDE 5 MILLIGRAM(S): 5 TABLET ORAL at 16:50

## 2024-02-01 RX ADMIN — OXYCODONE HYDROCHLORIDE 5 MILLIGRAM(S): 5 TABLET ORAL at 22:49

## 2024-02-01 RX ADMIN — OXYCODONE HYDROCHLORIDE 5 MILLIGRAM(S): 5 TABLET ORAL at 22:19

## 2024-02-01 NOTE — PROGRESS NOTE ADULT - SUBJECTIVE AND OBJECTIVE BOX
ENT ISSUE/POD: s/p endoscopic transphenoidal removal craniopharyngioma POD #0    HPI: 43 year old male with PMH HLD, s/p endoscopic transphenoidal removal craniopharyngioma POD #0. Complaining of headache relieved with pain meds, minimal bleeding. Denies salty or metallic taste.       PAST MEDICAL & SURGICAL HISTORY:  HLD (hyperlipidemia)      History of pituitary tumor      Deviated septum      History of dental surgery        Allergies    Allergy Status Unknown    Intolerances      MEDICATIONS  (STANDING):  atorvastatin 40 milliGRAM(s) Oral at bedtime  ceFAZolin   IVPB 2000 milliGRAM(s) IV Intermittent every 8 hours  desmopressin 0.05 milliGRAM(s) Oral every 12 hours  desmopressin Injectable 1 MICROGram(s) IV Push once  polyethylene glycol 3350 17 Gram(s) Oral daily  potassium chloride    Tablet ER 40 milliEquivalent(s) Oral every 4 hours  potassium phosphate / sodium phosphate Powder (PHOS-NaK) 2 Packet(s) Oral once  senna 2 Tablet(s) Oral at bedtime  sodium chloride 0.9%. 1000 milliLiter(s) (75 mL/Hr) IV Continuous <Continuous>    MEDICATIONS  (PRN):  ondansetron Injectable 4 milliGRAM(s) IV Push once PRN Nausea and/or Vomiting  oxyCODONE    IR 10 milliGRAM(s) Oral every 4 hours PRN Severe Pain (7 - 10)  oxyCODONE    IR 5 milliGRAM(s) Oral every 4 hours PRN Moderate Pain (4 - 6)      Social History: see consult    Family history: see consult    ROS:   ENT: all negative except as noted in HPI   Pulm: denies SOB, cough, hemoptysis  Neuro: denies numbness/tingling, loss of sensation  Endo: denies heat/cold intolerance, excessive sweating      Vital Signs Last 24 Hrs  T(C): 36.5 (01 Feb 2024 15:00), Max: 36.9 (01 Feb 2024 06:24)  T(F): 97.7 (01 Feb 2024 15:00), Max: 98.4 (01 Feb 2024 06:24)  HR: 92 (01 Feb 2024 20:00) (71 - 103)  BP: 113/75 (01 Feb 2024 07:15) (113/75 - 113/75)  BP(mean): --  RR: 14 (01 Feb 2024 20:00) (12 - 18)  SpO2: 96% (01 Feb 2024 20:00) (94% - 100%)    Parameters below as of 01 Feb 2024 13:20  Patient On (Oxygen Delivery Method): room air                              14.4   11.84 )-----------( 289      ( 01 Feb 2024 15:03 )             41.6    02-01    148<H>  |  112<H>  |  11  ----------------------------<  205<H>  3.5   |  21<L>  |  0.79    Ca    9.2      01 Feb 2024 15:03  Phos  2.7     02-01  Mg     2.4     02-01         PHYSICAL EXAM:  Gen: NAD  Skin: No rashes, bruises, or lesions  Head: Normocephalic, Atraumatic  Face: no edema, erythema, or fluctuance. Parotid glands soft without mass  Eyes: no scleral injection  Nose: B/L nasopore in place, R nare oozing.   Mouth: Minimal dry blood in posterior pharynx. No Stridor / Drooling / Trismus.  Mucosa moist, tongue/uvula midline, oropharynx clear  Neck: Flat, supple, no lymphadenopathy, trachea midline, no masses  Lymphatic: No lymphadenopathy  Resp: breathing easily, no stridor  Neuro: facial nerve intact, no facial droop

## 2024-02-01 NOTE — PRE-ANESTHESIA EVALUATION ADULT - BSA (M2)
Notified of results, would like script sent to [facility]-, she will have labs done here in 6 weeks. She was instructed on the correct way to take her Synthroid. 1.89

## 2024-02-01 NOTE — PROGRESS NOTE ADULT - ASSESSMENT
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, POD 0    NEURO:  CT Head in AM  MRI post-op  Tylenol and oxycodone for pain   LD per neurosurgery drain 5cc/hr  Activity: [x] OOB as tolerated [] Bedrest [x] PT [x] OT [] PMNR    PULM:  Pituitary precautions (no incentive spirometry, no straws, no BIPAP)    CV:  -160mmHg  d/c a-line in AM if hemodynamically stable    RENAL:  Nunez for strict I+Os  IVF until good PO intake  Drink to thirst    GI:  Diet: Dysphagia screen and then advance diet as tolerated  GI prophylaxis [x] not indicated [] PPI [] other:  Bowel regimen [x] senna [x] other: miralax     ENDO:   Goal euglycemia (-180)  Pituitary labs  Monitor for DI    HEME/ONC:  VTE prophylaxis: [x] SCDs [] chemoprophylaxis [x] hold chemoprophylaxis due to: fresh post op [] high risk of DVT/PE on admission due to:    ID:  Oanh-op antibiotics    MISC:    SOCIAL/FAMILY:  [x] awaiting [] updated at bedside [] family meeting    CODE STATUS:  [x] Full Code [] DNR [] DNI [] Palliative/Comfort Care    DISPOSITION:  [x] ICU [] Stroke Unit [] Floor [] EMU [] RCU [] PCU    [x] Patient is at high risk of neurologic deterioration/death due to: post op hemorrhage, DI   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, POD 0    NEURO:  neuro checks q1 h   CT Head in AM  MRI post-op  Tylenol and oxycodone for pain   LD per neurosurgery drain 5cc/hr  Activity: [x] OOB as tolerated [] Bedrest [x] PT [x] OT [] PMNR    PULM:  Pituitary precautions (no incentive spirometry, no straws, no BIPAP)    CV:  -160mmHg  d/c a-line in AM if hemodynamically stable    RENAL:  Nunez for strict I+Os  IVF until good PO intake  Drink to thirst    GI:  Diet: Dysphagia screen and then advance diet as tolerated  GI prophylaxis [x] not indicated [] PPI [] other:  Bowel regimen [x] senna [x] other: miralax     ENDO:   Goal euglycemia (-180)  Pituitary labs  Monitor for DI    HEME/ONC:  VTE prophylaxis: [x] SCDs [] chemoprophylaxis [x] hold chemoprophylaxis due to: fresh post op [] high risk of DVT/PE on admission due to:    ID:  Oanh-op antibiotics    MISC:    SOCIAL/FAMILY:  [x] awaiting [] updated at bedside [] family meeting    CODE STATUS:  [x] Full Code [] DNR [] DNI [] Palliative/Comfort Care    DISPOSITION:  [x] ICU [] Stroke Unit [] Floor [] EMU [] RCU [] PCU    [x] Patient is at high risk of neurologic deterioration/death due to: post op hemorrhage, DI

## 2024-02-01 NOTE — PRE-OP CHECKLIST - TYPE OF SOLUTION
[facility] [address] [place]           lock

## 2024-02-01 NOTE — PROGRESS NOTE ADULT - ASSESSMENT
43 year old male with PMH HLD, s/p endoscopic transphenoidal removal craniopharyngioma POD #0. Complaining of headache relieved with pain meds. Denies salty or metallic taste. LD in place. B/L nasopore in place, minimal oozing from R nare.

## 2024-02-01 NOTE — PROGRESS NOTE ADULT - PROBLEM SELECTOR PLAN 1
- TSRP precautions (no straws, incentive spirometry, bipap)   - Monitor for CSF leak / epistaxis   - Start nasal saline tomorrow  - LD per NSCU  - ENT will continue to follow  - Call with questions or concerns

## 2024-02-01 NOTE — PROGRESS NOTE ADULT - SUBJECTIVE AND OBJECTIVE BOX
HOSPITAL COURSE:  This is a 43 year old man with PMH HLD. 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     Adm NSCU s/p TSP for tumor resection, expected CSF leak, LD in place       Allergies    Allergy Status Unknown    Intolerances        REVIEW OF SYSTEMS: [ ] Unable to Assess due to neurologic exam   [ x] All ROS addressed below are non-contributory, except:  Neuro: [ x] Headache [ ] Back pain [ ] Numbness [ ] Weakness [ ] Ataxia [ ] Dizziness [ ] Aphasia [ ] Dysarthria [ ] Visual disturbance  Resp: [ ] Shortness of breath/dyspnea, [ ] Orthopnea [ ] Cough  CV: [ ] Chest pain [ ] Palpitation [ ] Lightheadedness [ ] Syncope  Renal: [ ] Thirst [ ] Edema  GI: [x ] Nausea [ ] Emesis [ ] Abdominal pain [ ] Constipation [ ] Diarrhea  Hem: [ ] Hematemesis [ ] bright red blood per rectum  ID: [ ] Fever [ ] Chills [ ] Dysuria  ENT: [ ] Rhinorrhea      DEVICES:   [ ] Restraints [ ] ET tube [ ] central line [ ] arterial line [ ] carlson [ ] NGT/OGT [ ] EVD [x ] LD [ ] LUCINDA/HMV [ ] Trach [ ] PEG [ ] Chest Tube     VITALS:   Vital Signs Last 24 Hrs  T(C): 36 (01 Feb 2024 13:20), Max: 36.9 (01 Feb 2024 06:24)  T(F): 96.8 (01 Feb 2024 13:20), Max: 98.4 (01 Feb 2024 06:24)  HR: 90 (01 Feb 2024 14:00) (78 - 90)  BP: 113/75 (01 Feb 2024 07:15) (113/75 - 113/75)  BP(mean): --  RR: 12 (01 Feb 2024 14:00) (12 - 18)  SpO2: 94% (01 Feb 2024 14:00) (94% - 100%)    Parameters below as of 01 Feb 2024 13:20  Patient On (Oxygen Delivery Method): room air      CAPILLARY BLOOD GLUCOSE      I&O's Summary    01 Feb 2024 07:01  -  01 Feb 2024 14:18  --------------------------------------------------------  IN: 150 mL / OUT: 555 mL / NET: -405 mL    IVF FLUIDS/MEDICATIONS:   MEDICATIONS  (STANDING):  atorvastatin 40 milliGRAM(s) Oral at bedtime  ceFAZolin   IVPB 2000 milliGRAM(s) IV Intermittent every 8 hours  polyethylene glycol 3350 17 Gram(s) Oral daily  senna 2 Tablet(s) Oral at bedtime  sodium chloride 0.9%. 1000 milliLiter(s) (75 mL/Hr) IV Continuous <Continuous>    MEDICATIONS  (PRN):  acetaminophen     Tablet .. 650 milliGRAM(s) Oral every 6 hours PRN Temp greater or equal to 38C (100.4F), Mild Pain (1 - 3)  ondansetron Injectable 4 milliGRAM(s) IV Push once PRN Nausea and/or Vomiting  oxyCODONE    IR 5 milliGRAM(s) Oral every 4 hours PRN Moderate Pain (4 - 6)  oxyCODONE    IR 10 milliGRAM(s) Oral every 4 hours PRN Severe Pain (7 - 10)    IMAGING:    PHYSICAL EXAM:    Constitutional: minimal distress 2/2 pain    Neurological: alert, c/o HA, nausea, but able to participate in exam, fully oriented, EOMI, VF grossly intact, FS, full strength throughout without drift     Pulmonary: Clear to Auscultation, No rales, No rhonchi, No wheezes     Cardiovascular: S1, S2, Regular rate and rhythm     Gastrointestinal: Soft, Non-tender, Non-distended     Extremities: No calf tenderness     Incision:

## 2024-02-01 NOTE — PROGRESS NOTE ADULT - SUBJECTIVE AND OBJECTIVE BOX
HOSPITAL COURSE:  This is a 43 year old man with PMH HLD. 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     Adm NSCU s/p TSP for tumor resection on 2/1/24, expected CSF leak, LD in place       Allergies    Allergy Status Unknown    Intolerances        REVIEW OF SYSTEMS: [ ] Unable to Assess due to neurologic exam   [ x] All ROS addressed below are non-contributory, except:  Neuro: [ x] Headache [ ] Back pain [ ] Numbness [ ] Weakness [ ] Ataxia [ ] Dizziness [ ] Aphasia [ ] Dysarthria [ ] Visual disturbance  Resp: [ ] Shortness of breath/dyspnea, [ ] Orthopnea [ ] Cough  CV: [ ] Chest pain [ ] Palpitation [ ] Lightheadedness [ ] Syncope  Renal: [ ] Thirst [ ] Edema  GI: [x ] Nausea [ ] Emesis [ ] Abdominal pain [ ] Constipation [ ] Diarrhea  Hem: [ ] Hematemesis [ ] bright red blood per rectum  ID: [ ] Fever [ ] Chills [ ] Dysuria  ENT: [ ] Rhinorrhea      DEVICES:   [ ] Restraints [ ] ET tube [ ] central line [ ] arterial line [ ] carlson [ ] NGT/OGT [ ] EVD [x ] LD [ ] LUCINDA/HMV [ ] Trach [ ] PEG [ ] Chest Tube     VITALS:   ICU Vital Signs Last 24 Hrs  T(C): 36.5 (01 Feb 2024 15:00), Max: 36.9 (01 Feb 2024 06:24)  T(F): 97.7 (01 Feb 2024 15:00), Max: 98.4 (01 Feb 2024 06:24)  HR: 97 (01 Feb 2024 18:00) (71 - 103)  BP: 113/75 (01 Feb 2024 07:15) (113/75 - 113/75)  BP(mean): --  ABP: 131/73 (01 Feb 2024 18:00) (108/61 - 143/80)  ABP(mean): 94 (01 Feb 2024 18:00) (79 - 106)  RR: 13 (01 Feb 2024 18:00) (12 - 18)  SpO2: 96% (01 Feb 2024 18:00) (94% - 100%)    O2 Parameters below as of 01 Feb 2024 13:20  Patient On (Oxygen Delivery Method): room air      I&O's Summary    01 Feb 2024 07:01  -  01 Feb 2024 19:43  --------------------------------------------------------  IN: 615 mL / OUT: 2871 mL / NET: -2256 mL        MEDICATIONS  (STANDING):  atorvastatin 40 milliGRAM(s) Oral at bedtime  ceFAZolin   IVPB 2000 milliGRAM(s) IV Intermittent every 8 hours  desmopressin 0.05 milliGRAM(s) Oral every 12 hours  desmopressin Injectable 1 MICROGram(s) IV Push once  polyethylene glycol 3350 17 Gram(s) Oral daily  senna 2 Tablet(s) Oral at bedtime  sodium chloride 0.9%. 1000 milliLiter(s) (75 mL/Hr) IV Continuous <Continuous>    MEDICATIONS  (PRN):  ondansetron Injectable 4 milliGRAM(s) IV Push once PRN Nausea and/or Vomiting  oxyCODONE    IR 5 milliGRAM(s) Oral every 4 hours PRN Moderate Pain (4 - 6)  oxyCODONE    IR 10 milliGRAM(s) Oral every 4 hours PRN Severe Pain (7 - 10)      PHYSICAL EXAM:    Constitutional: minimal distress 2/2 pain    Neurological: alert, c/o HA, nausea, but able to participate in exam, fully oriented, EOMI, VF grossly intact, FS, full strength throughout without drift     Pulmonary: Clear to Auscultation, No rales, No rhonchi, No wheezes     Cardiovascular: S1, S2, Regular rate and rhythm     Gastrointestinal: Soft, Non-tender, Non-distended     Extremities: No calf tenderness      HOSPITAL COURSE:  This is a 43 year old man with PMH HLD. 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     Adm NSCU s/p TSP for tumor resection on 2/1/24, expected CSF leak, LD in place       Allergies    Allergy Status Unknown    Intolerances        REVIEW OF SYSTEMS: [ ] Unable to Assess due to neurologic exam   [ x] All ROS addressed below are non-contributory, except:  Neuro: [ x] Headache [ ] Back pain [ ] Numbness [ ] Weakness [ ] Ataxia [ ] Dizziness [ ] Aphasia [ ] Dysarthria [ ] Visual disturbance  Resp: [ ] Shortness of breath/dyspnea, [ ] Orthopnea [ ] Cough  CV: [ ] Chest pain [ ] Palpitation [ ] Lightheadedness [ ] Syncope  Renal: [ ] Thirst [ ] Edema  GI: [x ] Nausea [ ] Emesis [ ] Abdominal pain [ ] Constipation [ ] Diarrhea  Hem: [ ] Hematemesis [ ] bright red blood per rectum  ID: [ ] Fever [ ] Chills [ ] Dysuria  ENT: [ ] Rhinorrhea      DEVICES:   [ ] Restraints [ ] ET tube [ ] central line [x] arterial line [ ] carlson [ ] NGT/OGT [ ] EVD [x ] LD [ ] LUCINDA/HMV [ ] Trach [ ] PEG [ ] Chest Tube     VITALS:   ICU Vital Signs Last 24 Hrs  T(C): 36.5 (01 Feb 2024 15:00), Max: 36.9 (01 Feb 2024 06:24)  T(F): 97.7 (01 Feb 2024 15:00), Max: 98.4 (01 Feb 2024 06:24)  HR: 97 (01 Feb 2024 18:00) (71 - 103)  BP: 113/75 (01 Feb 2024 07:15) (113/75 - 113/75)  BP(mean): --  ABP: 131/73 (01 Feb 2024 18:00) (108/61 - 143/80)  ABP(mean): 94 (01 Feb 2024 18:00) (79 - 106)  RR: 13 (01 Feb 2024 18:00) (12 - 18)  SpO2: 96% (01 Feb 2024 18:00) (94% - 100%)    O2 Parameters below as of 01 Feb 2024 13:20  Patient On (Oxygen Delivery Method): room air      I&O's Summary    01 Feb 2024 07:01  -  01 Feb 2024 19:43  --------------------------------------------------------  IN: 615 mL / OUT: 2871 mL / NET: -2256 mL        MEDICATIONS  (STANDING):  atorvastatin 40 milliGRAM(s) Oral at bedtime  ceFAZolin   IVPB 2000 milliGRAM(s) IV Intermittent every 8 hours  desmopressin 0.05 milliGRAM(s) Oral every 12 hours  desmopressin Injectable 1 MICROGram(s) IV Push once  polyethylene glycol 3350 17 Gram(s) Oral daily  senna 2 Tablet(s) Oral at bedtime  sodium chloride 0.9%. 1000 milliLiter(s) (75 mL/Hr) IV Continuous <Continuous>    MEDICATIONS  (PRN):  ondansetron Injectable 4 milliGRAM(s) IV Push once PRN Nausea and/or Vomiting  oxyCODONE    IR 5 milliGRAM(s) Oral every 4 hours PRN Moderate Pain (4 - 6)  oxyCODONE    IR 10 milliGRAM(s) Oral every 4 hours PRN Severe Pain (7 - 10)      PHYSICAL EXAM:    Constitutional: minimal distress 2/2 pain    Neurological: alert, c/o HA, nausea, but able to participate in exam, fully oriented, EOMI, VF grossly intact, FS, full strength throughout without drift     Pulmonary: Clear to Auscultation, No rales, No rhonchi, No wheezes     Cardiovascular: S1, S2, Regular rate and rhythm     Gastrointestinal: Soft, Non-tender, Non-distended     Extremities: No calf tenderness      HOSPITAL COURSE:  This is a 43 year old man with PMH HLD. 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     Adm NSCU s/p TSP for tumor resection on 2/1/24, expected CSF leak, LD in place       Allergies    Allergy Status Unknown    Intolerances        REVIEW OF SYSTEMS: [ ] Unable to Assess due to neurologic exam   [ x] All ROS addressed below are non-contributory, except:  Neuro: [ x] Headache [ ] Back pain [ ] Numbness [ ] Weakness [ ] Ataxia [ ] Dizziness [ ] Aphasia [ ] Dysarthria [ ] Visual disturbance  Resp: [ ] Shortness of breath/dyspnea, [ ] Orthopnea [ ] Cough  CV: [ ] Chest pain [ ] Palpitation [ ] Lightheadedness [ ] Syncope  Renal: [ ] Thirst [ ] Edema  GI: [] Nausea [ ] Emesis [ ] Abdominal pain [ ] Constipation [ ] Diarrhea  Hem: [ ] Hematemesis [ ] bright red blood per rectum  ID: [ ] Fever [ ] Chills [ ] Dysuria  ENT: [ ] Rhinorrhea      DEVICES:   [ ] Restraints [ ] ET tube [ ] central line [x] arterial line [ ] carlson [ ] NGT/OGT [ ] EVD [x ] LD [ ] LUCINDA/HMV [ ] Trach [ ] PEG [ ] Chest Tube     VITALS:   ICU Vital Signs Last 24 Hrs  T(C): 36.5 (01 Feb 2024 15:00), Max: 36.9 (01 Feb 2024 06:24)  T(F): 97.7 (01 Feb 2024 15:00), Max: 98.4 (01 Feb 2024 06:24)  HR: 97 (01 Feb 2024 18:00) (71 - 103)  BP: 113/75 (01 Feb 2024 07:15) (113/75 - 113/75)  BP(mean): --  ABP: 131/73 (01 Feb 2024 18:00) (108/61 - 143/80)  ABP(mean): 94 (01 Feb 2024 18:00) (79 - 106)  RR: 13 (01 Feb 2024 18:00) (12 - 18)  SpO2: 96% (01 Feb 2024 18:00) (94% - 100%)    O2 Parameters below as of 01 Feb 2024 13:20  Patient On (Oxygen Delivery Method): room air      I&O's Summary    01 Feb 2024 07:01  -  01 Feb 2024 19:43  --------------------------------------------------------  IN: 615 mL / OUT: 2871 mL / NET: -2256 mL        MEDICATIONS  (STANDING):  atorvastatin 40 milliGRAM(s) Oral at bedtime  ceFAZolin   IVPB 2000 milliGRAM(s) IV Intermittent every 8 hours  desmopressin 0.05 milliGRAM(s) Oral every 12 hours  desmopressin Injectable 1 MICROGram(s) IV Push once  polyethylene glycol 3350 17 Gram(s) Oral daily  senna 2 Tablet(s) Oral at bedtime  sodium chloride 0.9%. 1000 milliLiter(s) (75 mL/Hr) IV Continuous <Continuous>    MEDICATIONS  (PRN):  ondansetron Injectable 4 milliGRAM(s) IV Push once PRN Nausea and/or Vomiting  oxyCODONE    IR 5 milliGRAM(s) Oral every 4 hours PRN Moderate Pain (4 - 6)  oxyCODONE    IR 10 milliGRAM(s) Oral every 4 hours PRN Severe Pain (7 - 10)      PHYSICAL EXAM:    Constitutional: no acute distress    Neurological: alert, o x 3, EOMI, PERRL VF grossly intact, FC, full strength throughout without drift, SILT    Pulmonary: Clear to Auscultation, No rales, No rhonchi, No wheezes     Cardiovascular: S1, S2, Regular rate and rhythm     Gastrointestinal: Soft, Non-tender, Non-distended     Extremities: No calf tenderness

## 2024-02-01 NOTE — PROGRESS NOTE ADULT - ASSESSMENT
ASSESSMENT: TSP resection of pituitary adenoma, POD 0    NEURO:  CT Head in AM  MRI post-op  Tylenol and oxycodone for pain   Activity: [x] OOB as tolerated [] Bedrest [x] PT x[] OT [] PMNR  a1c:  lipid:    PULM:  Pituitary precautions (no incentive spirometry, no straws, no BIPAP)    CV:  -160mmHg  d/c a-line in AM if hemodynamically stable    RENAL:  Nunez for strict I+Os  IVF until good PO intake  Drink to thirst    GI:  Diet: Dysphagia screen and then advance diet as tolerated  GI prophylaxis [x] not indicated [] PPI [] other:  Bowel regimen [x] senna [x] other: miralax     ENDO:   Goal euglycemia (-180)  Pituitary labs  Monitor for DI    HEME/ONC:  VTE prophylaxis: [x] SCDs [] chemoprophylaxis [x] hold chemoprophylaxis due to: fresh post op [] high risk of DVT/PE on admission due to:    ID:  Oanh-op antibiotics    MISC:    SOCIAL/FAMILY:  [x] awaiting [] updated at bedside [] family meeting    CODE STATUS:  [x] Full Code [] DNR [] DNI [] Palliative/Comfort Care    DISPOSITION:  [x] ICU [] Stroke Unit [] Floor [] EMU [] RCU [] PCU    [x] Patient is at high risk of neurologic deterioration/death due to: post op hemorrhage, DI   ASSESSMENT: TSP resection of craniopharyngioma, POD 0    NEURO:  CT Head in AM  MRI post-op  Tylenol and oxycodone for pain   LD per neurosurgery drain 5cc/hr  Activity: [x] OOB as tolerated [] Bedrest [x] PT x[] OT [] PMNR  a1c:  lipid:    PULM:  Pituitary precautions (no incentive spirometry, no straws, no BIPAP)    CV:  -160mmHg  d/c a-line in AM if hemodynamically stable    RENAL:  Nunez for strict I+Os  IVF until good PO intake  Drink to thirst    GI:  Diet: Dysphagia screen and then advance diet as tolerated  GI prophylaxis [x] not indicated [] PPI [] other:  Bowel regimen [x] senna [x] other: miralax     ENDO:   Goal euglycemia (-180)  Pituitary labs  Monitor for DI    HEME/ONC:  VTE prophylaxis: [x] SCDs [] chemoprophylaxis [x] hold chemoprophylaxis due to: fresh post op [] high risk of DVT/PE on admission due to:    ID:  Oanh-op antibiotics    MISC:    SOCIAL/FAMILY:  [x] awaiting [] updated at bedside [] family meeting    CODE STATUS:  [x] Full Code [] DNR [] DNI [] Palliative/Comfort Care    DISPOSITION:  [x] ICU [] Stroke Unit [] Floor [] EMU [] RCU [] PCU    [x] Patient is at high risk of neurologic deterioration/death due to: post op hemorrhage, DI

## 2024-02-01 NOTE — BRIEF OPERATIVE NOTE - NSICDXBRIEFPROCEDURE_GEN_ALL_CORE_FT
PROCEDURES:  Endoscopic transphenoidal or transnasal resection of pituitary tumor 01-Feb-2024 12:33:27  Rogelio Morley

## 2024-02-02 DIAGNOSIS — E29.1 TESTICULAR HYPOFUNCTION: ICD-10-CM

## 2024-02-02 DIAGNOSIS — D44.4 NEOPLASM OF UNCERTAIN BEHAVIOR OF CRANIOPHARYNGEAL DUCT: ICD-10-CM

## 2024-02-02 DIAGNOSIS — E23.2 DIABETES INSIPIDUS: ICD-10-CM

## 2024-02-02 LAB
ACTH SER-ACNC: 3.5 PG/ML — LOW (ref 7.2–63.3)
ANION GAP SERPL CALC-SCNC: 11 MMOL/L — SIGNIFICANT CHANGE UP (ref 5–17)
ANION GAP SERPL CALC-SCNC: 12 MMOL/L — SIGNIFICANT CHANGE UP (ref 5–17)
BUN SERPL-MCNC: 7 MG/DL — SIGNIFICANT CHANGE UP (ref 7–23)
BUN SERPL-MCNC: 8 MG/DL — SIGNIFICANT CHANGE UP (ref 7–23)
CALCIUM SERPL-MCNC: 8.8 MG/DL — SIGNIFICANT CHANGE UP (ref 8.4–10.5)
CALCIUM SERPL-MCNC: 8.9 MG/DL — SIGNIFICANT CHANGE UP (ref 8.4–10.5)
CHLORIDE SERPL-SCNC: 105 MMOL/L — SIGNIFICANT CHANGE UP (ref 96–108)
CHLORIDE SERPL-SCNC: 111 MMOL/L — HIGH (ref 96–108)
CO2 SERPL-SCNC: 23 MMOL/L — SIGNIFICANT CHANGE UP (ref 22–31)
CO2 SERPL-SCNC: 23 MMOL/L — SIGNIFICANT CHANGE UP (ref 22–31)
CREAT SERPL-MCNC: 0.69 MG/DL — SIGNIFICANT CHANGE UP (ref 0.5–1.3)
CREAT SERPL-MCNC: 0.78 MG/DL — SIGNIFICANT CHANGE UP (ref 0.5–1.3)
EGFR: 113 ML/MIN/1.73M2 — SIGNIFICANT CHANGE UP
EGFR: 118 ML/MIN/1.73M2 — SIGNIFICANT CHANGE UP
FSH SERPL-MCNC: 1.2 IU/L — LOW (ref 1.5–12.4)
GLUCOSE SERPL-MCNC: 118 MG/DL — HIGH (ref 70–99)
GLUCOSE SERPL-MCNC: 145 MG/DL — HIGH (ref 70–99)
HCT VFR BLD CALC: 37.5 % — LOW (ref 39–50)
HGB BLD-MCNC: 12.8 G/DL — LOW (ref 13–17)
LH SERPL-ACNC: 0.4 IU/L — SIGNIFICANT CHANGE UP
MAGNESIUM SERPL-MCNC: 2.2 MG/DL — SIGNIFICANT CHANGE UP (ref 1.6–2.6)
MAGNESIUM SERPL-MCNC: 2.2 MG/DL — SIGNIFICANT CHANGE UP (ref 1.6–2.6)
MCHC RBC-ENTMCNC: 29.4 PG — SIGNIFICANT CHANGE UP (ref 27–34)
MCHC RBC-ENTMCNC: 34.1 GM/DL — SIGNIFICANT CHANGE UP (ref 32–36)
MCV RBC AUTO: 86 FL — SIGNIFICANT CHANGE UP (ref 80–100)
NRBC # BLD: 0 /100 WBCS — SIGNIFICANT CHANGE UP (ref 0–0)
OSMOLALITY SERPL: 303 MOSMOL/KG — HIGH (ref 275–300)
OSMOLALITY SERPL: 308 MOSMOL/KG — HIGH (ref 275–300)
OSMOLALITY UR: 368 MOS/KG — SIGNIFICANT CHANGE UP (ref 300–900)
OSMOLALITY UR: 801 MOS/KG — SIGNIFICANT CHANGE UP (ref 300–900)
PHOSPHATE SERPL-MCNC: 2.5 MG/DL — SIGNIFICANT CHANGE UP (ref 2.5–4.5)
PHOSPHATE SERPL-MCNC: 3.4 MG/DL — SIGNIFICANT CHANGE UP (ref 2.5–4.5)
PLATELET # BLD AUTO: 236 K/UL — SIGNIFICANT CHANGE UP (ref 150–400)
POTASSIUM SERPL-MCNC: 3.6 MMOL/L — SIGNIFICANT CHANGE UP (ref 3.5–5.3)
POTASSIUM SERPL-MCNC: 3.8 MMOL/L — SIGNIFICANT CHANGE UP (ref 3.5–5.3)
POTASSIUM SERPL-SCNC: 3.6 MMOL/L — SIGNIFICANT CHANGE UP (ref 3.5–5.3)
POTASSIUM SERPL-SCNC: 3.8 MMOL/L — SIGNIFICANT CHANGE UP (ref 3.5–5.3)
RBC # BLD: 4.36 M/UL — SIGNIFICANT CHANGE UP (ref 4.2–5.8)
RBC # FLD: 13.2 % — SIGNIFICANT CHANGE UP (ref 10.3–14.5)
SODIUM SERPL-SCNC: 140 MMOL/L — SIGNIFICANT CHANGE UP (ref 135–145)
SODIUM SERPL-SCNC: 145 MMOL/L — SIGNIFICANT CHANGE UP (ref 135–145)
SP GR UR STRIP: 1.01 — SIGNIFICANT CHANGE UP (ref 1–1.03)
SP GR UR STRIP: 1.03 — SIGNIFICANT CHANGE UP (ref 1–1.03)
T3 SERPL-MCNC: 54 NG/DL — LOW (ref 80–200)
T4 AB SER-ACNC: 5.2 UG/DL — SIGNIFICANT CHANGE UP (ref 4.6–12)
TSH SERPL-MCNC: 0.27 UIU/ML — SIGNIFICANT CHANGE UP (ref 0.27–4.2)
WBC # BLD: 14.95 K/UL — HIGH (ref 3.8–10.5)
WBC # FLD AUTO: 14.95 K/UL — HIGH (ref 3.8–10.5)

## 2024-02-02 PROCEDURE — 99222 1ST HOSP IP/OBS MODERATE 55: CPT

## 2024-02-02 PROCEDURE — 99233 SBSQ HOSP IP/OBS HIGH 50: CPT

## 2024-02-02 PROCEDURE — 70450 CT HEAD/BRAIN W/O DYE: CPT | Mod: 26

## 2024-02-02 PROCEDURE — 99232 SBSQ HOSP IP/OBS MODERATE 35: CPT

## 2024-02-02 PROCEDURE — 99221 1ST HOSP IP/OBS SF/LOW 40: CPT

## 2024-02-02 RX ORDER — CHLORHEXIDINE GLUCONATE 213 G/1000ML
1 SOLUTION TOPICAL EVERY 24 HOURS
Refills: 0 | Status: DISCONTINUED | OUTPATIENT
Start: 2024-02-02 | End: 2024-02-08

## 2024-02-02 RX ORDER — ACETAMINOPHEN 500 MG
1000 TABLET ORAL ONCE
Refills: 0 | Status: COMPLETED | OUTPATIENT
Start: 2024-02-02 | End: 2024-02-02

## 2024-02-02 RX ORDER — SALIVA SUBSTITUTE COMB NO.11 351 MG
15 POWDER IN PACKET (EA) MUCOUS MEMBRANE ONCE
Refills: 0 | Status: COMPLETED | OUTPATIENT
Start: 2024-02-02 | End: 2024-02-02

## 2024-02-02 RX ORDER — POTASSIUM CHLORIDE 20 MEQ
10 PACKET (EA) ORAL
Refills: 0 | Status: COMPLETED | OUTPATIENT
Start: 2024-02-02 | End: 2024-02-02

## 2024-02-02 RX ORDER — ENOXAPARIN SODIUM 100 MG/ML
40 INJECTION SUBCUTANEOUS
Refills: 0 | Status: DISCONTINUED | OUTPATIENT
Start: 2024-02-02 | End: 2024-02-08

## 2024-02-02 RX ORDER — DESMOPRESSIN ACETATE 0.1 MG/1
0.05 TABLET ORAL DAILY
Refills: 0 | Status: DISCONTINUED | OUTPATIENT
Start: 2024-02-02 | End: 2024-02-02

## 2024-02-02 RX ORDER — POTASSIUM CHLORIDE 20 MEQ
40 PACKET (EA) ORAL ONCE
Refills: 0 | Status: COMPLETED | OUTPATIENT
Start: 2024-02-02 | End: 2024-02-02

## 2024-02-02 RX ORDER — SODIUM CHLORIDE 9 MG/ML
500 INJECTION, SOLUTION INTRAVENOUS
Refills: 0 | Status: COMPLETED | OUTPATIENT
Start: 2024-02-02 | End: 2024-02-02

## 2024-02-02 RX ORDER — SODIUM CHLORIDE 9 MG/ML
1000 INJECTION, SOLUTION INTRAVENOUS ONCE
Refills: 0 | Status: COMPLETED | OUTPATIENT
Start: 2024-02-02 | End: 2024-02-02

## 2024-02-02 RX ORDER — POTASSIUM PHOSPHATE, MONOBASIC POTASSIUM PHOSPHATE, DIBASIC 236; 224 MG/ML; MG/ML
30 INJECTION, SOLUTION INTRAVENOUS ONCE
Refills: 0 | Status: COMPLETED | OUTPATIENT
Start: 2024-02-02 | End: 2024-02-02

## 2024-02-02 RX ORDER — HYDROMORPHONE HYDROCHLORIDE 2 MG/ML
0.5 INJECTION INTRAMUSCULAR; INTRAVENOUS; SUBCUTANEOUS ONCE
Refills: 0 | Status: DISCONTINUED | OUTPATIENT
Start: 2024-02-02 | End: 2024-02-02

## 2024-02-02 RX ORDER — DESMOPRESSIN ACETATE 0.1 MG/1
0.05 TABLET ORAL
Refills: 0 | Status: DISCONTINUED | OUTPATIENT
Start: 2024-02-02 | End: 2024-02-04

## 2024-02-02 RX ORDER — TRAMADOL HYDROCHLORIDE 50 MG/1
25 TABLET ORAL ONCE
Refills: 0 | Status: DISCONTINUED | OUTPATIENT
Start: 2024-02-02 | End: 2024-02-02

## 2024-02-02 RX ADMIN — Medication 40 MILLIEQUIVALENT(S): at 12:07

## 2024-02-02 RX ADMIN — Medication 2 SPRAY(S): at 17:51

## 2024-02-02 RX ADMIN — CHLORHEXIDINE GLUCONATE 1 APPLICATION(S): 213 SOLUTION TOPICAL at 12:14

## 2024-02-02 RX ADMIN — HYDROMORPHONE HYDROCHLORIDE 0.5 MILLIGRAM(S): 2 INJECTION INTRAMUSCULAR; INTRAVENOUS; SUBCUTANEOUS at 16:23

## 2024-02-02 RX ADMIN — DESMOPRESSIN ACETATE 0.05 MILLIGRAM(S): 0.1 TABLET ORAL at 05:41

## 2024-02-02 RX ADMIN — Medication 2 SPRAY(S): at 12:06

## 2024-02-02 RX ADMIN — SENNA PLUS 2 TABLET(S): 8.6 TABLET ORAL at 21:36

## 2024-02-02 RX ADMIN — Medication 400 MILLIGRAM(S): at 12:05

## 2024-02-02 RX ADMIN — Medication 1000 MILLIGRAM(S): at 02:04

## 2024-02-02 RX ADMIN — Medication 400 MILLIGRAM(S): at 21:36

## 2024-02-02 RX ADMIN — Medication 1000 MILLIGRAM(S): at 22:06

## 2024-02-02 RX ADMIN — ONDANSETRON 4 MILLIGRAM(S): 8 TABLET, FILM COATED ORAL at 08:15

## 2024-02-02 RX ADMIN — DESMOPRESSIN ACETATE 0.05 MILLIGRAM(S): 0.1 TABLET ORAL at 17:01

## 2024-02-02 RX ADMIN — Medication 40 MILLIEQUIVALENT(S): at 17:01

## 2024-02-02 RX ADMIN — ATORVASTATIN CALCIUM 40 MILLIGRAM(S): 80 TABLET, FILM COATED ORAL at 21:35

## 2024-02-02 RX ADMIN — OXYCODONE HYDROCHLORIDE 5 MILLIGRAM(S): 5 TABLET ORAL at 08:30

## 2024-02-02 RX ADMIN — POTASSIUM PHOSPHATE, MONOBASIC POTASSIUM PHOSPHATE, DIBASIC 83.33 MILLIMOLE(S): 236; 224 INJECTION, SOLUTION INTRAVENOUS at 17:00

## 2024-02-02 RX ADMIN — OXYCODONE HYDROCHLORIDE 5 MILLIGRAM(S): 5 TABLET ORAL at 08:00

## 2024-02-02 RX ADMIN — POLYETHYLENE GLYCOL 3350 17 GRAM(S): 17 POWDER, FOR SOLUTION ORAL at 12:05

## 2024-02-02 RX ADMIN — SODIUM CHLORIDE 500 MILLILITER(S): 9 INJECTION, SOLUTION INTRAVENOUS at 02:00

## 2024-02-02 RX ADMIN — SODIUM CHLORIDE 1000 MILLILITER(S): 9 INJECTION, SOLUTION INTRAVENOUS at 12:05

## 2024-02-02 RX ADMIN — Medication 100 MILLIGRAM(S): at 04:10

## 2024-02-02 RX ADMIN — TRAMADOL HYDROCHLORIDE 25 MILLIGRAM(S): 50 TABLET ORAL at 05:41

## 2024-02-02 RX ADMIN — HYDROMORPHONE HYDROCHLORIDE 0.5 MILLIGRAM(S): 2 INJECTION INTRAMUSCULAR; INTRAVENOUS; SUBCUTANEOUS at 16:40

## 2024-02-02 RX ADMIN — Medication 1000 MILLIGRAM(S): at 12:15

## 2024-02-02 RX ADMIN — ENOXAPARIN SODIUM 40 MILLIGRAM(S): 100 INJECTION SUBCUTANEOUS at 17:51

## 2024-02-02 RX ADMIN — Medication 400 MILLIGRAM(S): at 01:34

## 2024-02-02 RX ADMIN — TRAMADOL HYDROCHLORIDE 25 MILLIGRAM(S): 50 TABLET ORAL at 06:11

## 2024-02-02 RX ADMIN — Medication 100 MILLIEQUIVALENT(S): at 01:34

## 2024-02-02 NOTE — PHYSICAL THERAPY INITIAL EVALUATION ADULT - PERTINENT HX OF CURRENT PROBLEM, REHAB EVAL
PMHx: HLD. 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 s/p TSP for tumor resection (craniopharyngioma), expected CSF leak, LD in place 2/1

## 2024-02-02 NOTE — PROGRESS NOTE ADULT - ASSESSMENT
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, POD 0    NEURO:  neuro checks q4h   CT Head expected postop changes  MRI post-op  Tylenol and oxycodone for pain   LD per neurosurgery drain 5cc/hr  Pituitary precautions (no incentive spirometry, no straws, no BIPAP)  Activity: [x] OOB as tolerated [] Bedrest [x] PT [x] OT [] PMNR    PULM:  RA     CV:  -160mmHg  d/c a-line after repeat labs    RENAL:  Nunez for strict I+Os  IVF until good PO intake  Drink to thirst  BMP q6h     GI:  Diet: regular diet  GI prophylaxis [x] not indicated [] PPI [] other:  Bowel regimen [x] senna [x] other: miralax   Last BM PTA    ENDO:   Goal euglycemia (-180)  am cortisol   Monitor for DI  stading ddavp 0.05 po   endocrine consult     HEME/ONC:  VTE prophylaxis: [x] SCDs [x] chemoprophylaxis    ID:  Aonh-op antibiotics    MISC:    SOCIAL/FAMILY:  [] awaiting [x] updated at bedside [] family meeting    CODE STATUS:  [x] Full Code [] DNR [] DNI [] Palliative/Comfort Care    DISPOSITION:  [x] ICU [] Stroke Unit [] Floor [] EMU [] RCU [] PCU    [x] Patient is at high risk of neurologic deterioration/death due to: post op hemorrhage, DI

## 2024-02-02 NOTE — PHYSICAL THERAPY INITIAL EVALUATION ADULT - GENERAL OBSERVATIONS, REHAB EVAL
received supine with head of bed elevated +iv, +james, +carlson, +mustache bandage c/d/i, +bilateral sequential compression devices, wife at bedside

## 2024-02-02 NOTE — CONSULT NOTE ADULT - SUBJECTIVE AND OBJECTIVE BOX
Gautam Swann MD   |   PGY-4  Endocrinology Fellow  Available on [employer] Teams    HPI:  This is a 43 year old male with PMH HLD. 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 Today presenting to [facility] for scheduled Endoscopic transphenoidal removal craniopharyngioma on 2/1/24 with Dr. Conway.        (18 Jan 2024 08:29)      Endocrine History:  42 y/o male. PMH HLD hx of suprasellar cystic mass since 2020, on surveillance imaging since found tumor has increased in size s/p TSP on 2/1/24 c/b hypernatremia started on DDDAVP. Endocrine consulted for DI monitoring and r/o panhypopit.    2020 labs: TSH 2.5, FT4 0.8, FSH 2.5, LH 1.3, prolactin 4.8, ACTH 9, GH <0.1, IGF1 99, Testosterone 13 (low)     MRI 1/2024: Abnormal enhancing lesion involving the suprasellar region measures approximately 2.1 x 2.3 cm and previously measured approximately 2.1 x 1.4 cm.     Per patient, pt had an endocrine workup in the past with Dr. Isidra Dao, says he was told his testosterone was low but was not offered treatment. Pt does report hx of low libido, decreased erection, decreased in muscle mass.      Before surgery patient noted increased urination, headaches and low energy. After surgery patient endorses decreased energy, good appetite, +N/V and less thirsty than normal. Urinary output has increased with desmopressin.           PAST MEDICAL & SURGICAL HISTORY:  HLD (hyperlipidemia)      History of pituitary tumor      Deviated septum      History of dental surgery          FAMILY HISTORY:  Family history of breast cancer in mother        Home Medications:  Crestor 10 mg oral tablet: 1 tab(s) orally once a day (at bedtime) (01 Feb 2024 06:01)  ergocalciferol 1.25 mg (50,000 intl units) oral capsule: 1 cap(s) orally every 7 days (01 Feb 2024 06:01)  ibuprofen 400 mg oral tablet: 1 tab(s) orally once a day as needed for  headache (01 Feb 2024 06:01)      MEDICATIONS  (STANDING):  atorvastatin 40 milliGRAM(s) Oral at bedtime  chlorhexidine 4% Liquid 1 Application(s) Topical every 24 hours  desmopressin 0.05 milliGRAM(s) Oral daily  enoxaparin Injectable 40 milliGRAM(s) SubCutaneous <User Schedule>  polyethylene glycol 3350 17 Gram(s) Oral daily  potassium chloride    Tablet ER 40 milliEquivalent(s) Oral once  potassium phosphate IVPB 30 milliMole(s) IV Intermittent once  senna 2 Tablet(s) Oral at bedtime  sodium chloride 0.65% Nasal 2 Spray(s) Both Nostrils four times a day    MEDICATIONS  (PRN):  oxyCODONE    IR 10 milliGRAM(s) Oral every 4 hours PRN Severe Pain (7 - 10)  oxyCODONE    IR 5 milliGRAM(s) Oral every 4 hours PRN Moderate Pain (4 - 6)      Allergies    No Known Allergies    Intolerances      Review of Systems:  Constitutional: No fever, +fatigue  Eyes: No blurry vision  Neuro: No tremors  HEENT: No pain  Cardiovascular: No chest pain, palpitations  Respiratory: No SOB, no cough  GI: +nausea, +vomiting  : No dysuria  Skin: no rash  Psych: no depression  Endocrine: no polyuria, polydipsia  Hem/lymph: no swelling  Osteoporosis: no fractures    ===================PHYSICAL EXAM=======================  VITALS: T(C): 37.2 (02-02-24 @ 15:00)  T(F): 99 (02-02-24 @ 15:00), Max: 99 (02-02-24 @ 15:00)  HR: 61 (02-02-24 @ 15:00) (60 - 108)  BP: --  RR:  (12 - 17)  SpO2:  (93% - 100%)  Wt(kg): --  GENERAL: NAD  EYES: No proptosis, no lid lag, anicteric  THYROID: Normal size, no palpable nodules  RESPIRATORY: Clear to auscultation bilaterally  CARDIOVASCULAR: Regular rate and rhythm  GI: Soft, nontender, non distended  EXT: b/l feet without wounds; 2+ pulses  PSYCH: Alert and oriented x 3, reactive mood  ======================================================                            12.8   14.95 )-----------( 236      ( 02 Feb 2024 04:26 )             37.5       02-02    140  |  105  |  7   ----------------------------<  118<H>  3.8   |  23  |  0.69    eGFR: 118    Ca    8.8      02-02  Mg     2.2     02-02  Phos  2.5     02-02        Thyroid Function Tests:              Radiology:

## 2024-02-02 NOTE — PHYSICAL THERAPY INITIAL EVALUATION ADULT - ADDITIONAL COMMENTS
lives with family in private house with 3 steps to enter with bilateral rails (far apart), 1 flight inside with 1 rail, wears glasses for distance, right hand dominant

## 2024-02-02 NOTE — PROGRESS NOTE ADULT - REASON FOR ADMISSION
Endoscopic transphenoidal or transnasal resection of pituitary tumor Endoscopic transphenoidal or transnasal resection of pituitary tumor on 2/1/24

## 2024-02-02 NOTE — PHYSICAL THERAPY INITIAL EVALUATION ADULT - GAIT DEVIATIONS NOTED, PT EVAL
wide base of support and increased lateral sway due to irritation from carlson/decreased adam/decreased step length

## 2024-02-02 NOTE — PROGRESS NOTE ADULT - SUBJECTIVE AND OBJECTIVE BOX
Patient seen and examined at bedside.    --Anticoagulation--    T(C): 36.5 (02-01-24 @ 15:00), Max: 36.9 (02-01-24 @ 06:24)  HR: 97 (02-01-24 @ 23:00) (71 - 108)  BP: 113/75 (02-01-24 @ 07:15) (113/75 - 113/75)  RR: 15 (02-01-24 @ 23:00) (12 - 18)  SpO2: 94% (02-01-24 @ 23:00) (94% - 100%)  Wt(kg): --    Exam: AOx3, PERRL, EOMI, VFF, no facial, no drift, RED 5/5, SILT

## 2024-02-02 NOTE — CONSULT NOTE ADULT - ATTENDING COMMENTS
Agree with assessment and plan as above by Dr. Swann. Reviewed all pertinent labs, glucose values, and imaging studies. Modifications made as indicated above. Pt. with enlarging craniopharyngioma now s/p resection with secondary hypogonadism prior to surgery now with post-op DI started on DDAVP BID. Will monitor urine output, serum sodium, and urine osm. Check Free T4 r/o secondary hypothyroidism. Check am cortisol and ACTH. Can address low testosterone as outpatient. Follow-up pathology. Outpatient optho follow-up.    Lazaro Hicks D.O  [phone]

## 2024-02-02 NOTE — PHYSICAL THERAPY INITIAL EVALUATION ADULT - PRECAUTIONS/LIMITATIONS, REHAB EVAL
no use of straws, do not blow nose, do not use incentive spirometer, no excessive bending forward, no COVID nasal swabs/fall precautions

## 2024-02-02 NOTE — PROGRESS NOTE ADULT - SUBJECTIVE AND OBJECTIVE BOX
ENT ISSUE/POD: s/p endoscopic transphenoidal removal craniopharyngioma POD #1    HPI: 43 year old male with PMH HLD, s/p endoscopic transphenoidal removal craniopharyngioma POD #1. Pt seen and examined at bedside. No acute events overnight. Pt c/o mild headache relieved with pain meds, minimal bleeding. Pt denies salty/metallic taste, fever, n/v, SOB, dysphagia, odynophagia, hemoptysis, hoarseness.      PAST MEDICAL & SURGICAL HISTORY:  HLD (hyperlipidemia)      History of pituitary tumor      Deviated septum      History of dental surgery        Allergies    Allergy Status Unknown    Intolerances      MEDICATIONS  (STANDING):  atorvastatin 40 milliGRAM(s) Oral at bedtime  desmopressin 0.05 milliGRAM(s) Oral every 12 hours  polyethylene glycol 3350 17 Gram(s) Oral daily  senna 2 Tablet(s) Oral at bedtime  sodium chloride 0.65% Nasal 2 Spray(s) Both Nostrils four times a day    MEDICATIONS  (PRN):  ondansetron Injectable 4 milliGRAM(s) IV Push once PRN Nausea and/or Vomiting  oxyCODONE    IR 5 milliGRAM(s) Oral every 4 hours PRN Moderate Pain (4 - 6)  oxyCODONE    IR 10 milliGRAM(s) Oral every 4 hours PRN Severe Pain (7 - 10)      Social History: see consult note    Family history: see consult note    ROS:   ENT: all negative except as noted in HPI   Pulm: denies SOB, cough, hemoptysis  Neuro: denies numbness/tingling, loss of sensation  Endo: denies heat/cold intolerance, excessive sweating      Vital Signs Last 24 Hrs  T(C): 37.2 (02 Feb 2024 03:00), Max: 37.2 (02 Feb 2024 03:00)  T(F): 98.9 (02 Feb 2024 03:00), Max: 98.9 (02 Feb 2024 03:00)  HR: 79 (02 Feb 2024 06:00) (71 - 108)  BP: 113/75 (01 Feb 2024 07:15) (113/75 - 113/75)  BP(mean): --  RR: 14 (02 Feb 2024 06:00) (12 - 18)  SpO2: 96% (02 Feb 2024 06:00) (93% - 100%)    Parameters below as of 01 Feb 2024 13:20  Patient On (Oxygen Delivery Method): room air                              12.8   14.95 )-----------( 236      ( 02 Feb 2024 04:26 )             37.5    02-02    145  |  111<H>  |  8   ----------------------------<  145<H>  3.6   |  23  |  0.78    Ca    8.9      02 Feb 2024 04:26  Phos  3.4     02-02  Mg     2.2     02-02         PHYSICAL EXAM:  Gen: NAD  Skin: No rashes, bruises, or lesions  Head: Normocephalic, Atraumatic  Face: no edema, erythema, or fluctuance. Parotid glands soft without mass  Eyes: no scleral injection  Nose: B/L nasopore in place, serosanguinous secretions from right nare, no active bleeding.   Mouth: Minimal dry blood in posterior pharynx. No Stridor / Drooling / Trismus.  Mucosa moist, tongue/uvula midline, oropharynx clear  Neck: Flat, supple, no lymphadenopathy, trachea midline, no masses  Lymphatic: No lymphadenopathy  Resp: breathing easily, no stridor  Neuro: facial nerve intact, no facial droop

## 2024-02-02 NOTE — PROGRESS NOTE ADULT - ASSESSMENT
43 year old male with PMH HLD, s/p endoscopic transphenoidal removal craniopharyngioma POD #1. Pt seen and examined at bedside. No acute events overnight. Pt c/o mild headache relieved with pain meds, minimal bleeding. Pt denies salty/metallic taste, fever, n/v, SOB, dysphagia, odynophagia, hemoptysis, hoarseness. 43 year old male with PMH HLD, s/p endoscopic transphenoidal removal craniopharyngioma POD #1. Pt c/o mild headache relieved with pain meds, minimal bleeding. Pt denies salty/metallic taste, fever, n/v, SOB, dysphagia, odynophagia, hemoptysis, hoarseness. No evidence of CSF leak or epistaxis on exam.

## 2024-02-02 NOTE — PROGRESS NOTE ADULT - SUBJECTIVE AND OBJECTIVE BOX
HOSPITAL COURSE:  This is a 43 year old man with PMH HLD. 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     Adm NSCU s/p TSP for tumor resection on 2/1/24, expected CSF leak, LD in place     Last 12h   blood tinged CSF, positional headache, LD clamped at 6 PM.    Allergies    Allergy Status Unknown    Intolerances        REVIEW OF SYSTEMS: [ ] Unable to Assess due to neurologic exam   [ x] All ROS addressed below are non-contributory, except:  Neuro: [ x] Headache [ ] Back pain [ ] Numbness [ ] Weakness [ ] Ataxia [ ] Dizziness [ ] Aphasia [ ] Dysarthria [ ] Visual disturbance  Resp: [ ] Shortness of breath/dyspnea, [ ] Orthopnea [ ] Cough  CV: [ ] Chest pain [ ] Palpitation [ ] Lightheadedness [ ] Syncope  Renal: [ ] Thirst [ ] Edema  GI: [] Nausea [ ] Emesis [ ] Abdominal pain [ ] Constipation [ ] Diarrhea  Hem: [ ] Hematemesis [ ] bright red blood per rectum  ID: [ ] Fever [ ] Chills [ ] Dysuria  ENT: [ ] Rhinorrhea      DEVICES:   [ ] Restraints [ ] ET tube [ ] central line [x] arterial line [ ] carlson [ ] NGT/OGT [ ] EVD [x ] LD [ ] LUCINDA/HMV [ ] Trach [ ] PEG [ ] Chest Tube     PHYSICAL EXAM:    Constitutional: no acute distress    Neurological: alert, o x 3, EOMI, PERRL VF grossly intact, FC, full strength throughout without drift with exception to R hand HG 4/5 due to a-line, SILT    Pulmonary: Clear to Auscultation, No rales, No rhonchi, No wheezes     Cardiovascular: S1, S2, Regular rate and rhythm     Gastrointestinal: Soft, Non-tender, Non-distended     Extremities: No calf tenderness     ICU Vital Signs Last 24 Hrs  T(C): 36.6 (02 Feb 2024 19:00), Max: 37.2 (02 Feb 2024 03:00)  T(F): 97.9 (02 Feb 2024 19:00), Max: 99 (02 Feb 2024 15:00)  HR: 60 (02 Feb 2024 19:00) (60 - 108)  BP: --  BP(mean): --  ABP: 126/70 (02 Feb 2024 19:00) (114/63 - 133/72)  ABP(mean): 93 (02 Feb 2024 19:00) (81 - 96)  RR: 14 (02 Feb 2024 19:00) (12 - 17)  SpO2: 98% (02 Feb 2024 19:00) (93% - 100%)    O2 Parameters below as of 02 Feb 2024 13:10  Patient On (Oxygen Delivery Method): room air      I&O's Summary    01 Feb 2024 07:01  -  02 Feb 2024 07:00  --------------------------------------------------------  IN: 3055 mL / OUT: 4186 mL / NET: -1131 mL    02 Feb 2024 07:01  -  02 Feb 2024 21:10  --------------------------------------------------------  IN: 1300 mL / OUT: 1180 mL / NET: 120 mL        MEDICATIONS  (STANDING):  atorvastatin 40 milliGRAM(s) Oral at bedtime  chlorhexidine 4% Liquid 1 Application(s) Topical every 24 hours  desmopressin 0.05 milliGRAM(s) Oral <User Schedule>  enoxaparin Injectable 40 milliGRAM(s) SubCutaneous <User Schedule>  polyethylene glycol 3350 17 Gram(s) Oral daily  senna 2 Tablet(s) Oral at bedtime  sodium chloride 0.65% Nasal 2 Spray(s) Both Nostrils four times a day    MEDICATIONS  (PRN):  oxyCODONE    IR 5 milliGRAM(s) Oral every 4 hours PRN Moderate Pain (4 - 6)  oxyCODONE    IR 10 milliGRAM(s) Oral every 4 hours PRN Severe Pain (7 - 10)

## 2024-02-02 NOTE — PROGRESS NOTE ADULT - PROBLEM SELECTOR PLAN 1
- TSRP precautions (no straws, incentive spirometry, bipap)   - Monitor for CSF leak / epistaxis   - Start nasal saline   - LD per NSCU  - ENT will continue to follow  - Call with questions or concerns. - TSRP precautions (no straws, incentive spirometry, bipap)   - Monitor for CSF leak / epistaxis   - Start nasal saline 2 sprays to b/l nares tid  - LD per NSCU  - ENT will continue to follow  - Call with questions or concerns.

## 2024-02-02 NOTE — CONSULT NOTE ADULT - ASSESSMENT
42 y/o male. PMH HLD hx of suprasellar cystic mass since 2020, on surveillance imaging since found tumor has increased in size s/p TSP on 2/1/24 c/b hypernatremia started on DDDAVP. Endocrine consulted for DI monitoring and r/o panhypopit.     #Central DI  #R/o Panhypopit  #Central hypogonadism  - likely Central DI post op since UOP >500cc/hr, USG and UOsm low and hypernatremia  PLAN  - agree with treatment with desmopressin, patient ucrrently on DDAVP 0.05mg daily  - please check these labs at 8am tomorrow: ACTH, cortisol AM, TSH, Free T4  - patient will follow up with endocrinologist Dr. Isidra Dao outpatient  - strict I&Os, daily BMPs  *DI WATCH*:  - Goal Na 140-145  - DI is suspected if UOP is >250cc/hr x 2 consecutive hours or if >500cc/hr x 1 hr - if this occurs please check STAT serum Na, urine osm, urine specific gravity  - if specific gravity <1.005/Urine Osm <300 and Na is rising - likely DI, please dose DDAVP 0.05mg PO x1 (or DDAVP 0.5mcg IV if no PO access) and bolus 1/2 NS to match half the output (for example, if net negative 2Liters can give 1Liter 1/2NS if pt is unable to keep up with output with PO intake) - continue DI watch  - If Na continues to increase despite a a DDAVP dose and 1/2NS fluid bolus please inform endocrine   - Please make sure pt has access to water and encourage her to drink to thirst     Gautam Swann MD  Endocrine Fellow  Can be reached via [employer] teams.    For follow up questions, discharge recommendations, or new consults, please email [email or web address] ([facility]) or [email or web address] ([email or web address]) or call answering service at [phone] (weekdays); [phone] (nights/weekends).  For emergiencies please page fellow on call.

## 2024-02-02 NOTE — PROVIDER CONTACT NOTE (OTHER) - ACTION/TREATMENT ORDERED:
JACQUELINE Edwards to call neurosurgery, MD Sanchez assessed at bedside. Do not drain for 4 hours from 1800 per MD Sanchez (neurosurgery) JACQUELINE Edwards to call neurosurgery, MD Sanchez assessed at bedside. Do not drain and keep clamped until morning. per MD Millard and MD Sanchez (neurosurgery)

## 2024-02-02 NOTE — PROGRESS NOTE ADULT - SUBJECTIVE AND OBJECTIVE BOX
HOSPITAL COURSE:  This is a 43 year old man with PMH HLD. 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     Adm NSCU s/p TSP for tumor resection on 2/1/24, expected CSF leak, LD in place     Last 12h   received ddavp overnight for increased UOP     Allergies    Allergy Status Unknown    Intolerances        REVIEW OF SYSTEMS: [ ] Unable to Assess due to neurologic exam   [ x] All ROS addressed below are non-contributory, except:  Neuro: [ x] Headache [ ] Back pain [ ] Numbness [ ] Weakness [ ] Ataxia [ ] Dizziness [ ] Aphasia [ ] Dysarthria [ ] Visual disturbance  Resp: [ ] Shortness of breath/dyspnea, [ ] Orthopnea [ ] Cough  CV: [ ] Chest pain [ ] Palpitation [ ] Lightheadedness [ ] Syncope  Renal: [ ] Thirst [ ] Edema  GI: [] Nausea [ ] Emesis [ ] Abdominal pain [ ] Constipation [ ] Diarrhea  Hem: [ ] Hematemesis [ ] bright red blood per rectum  ID: [ ] Fever [ ] Chills [ ] Dysuria  ENT: [ ] Rhinorrhea      DEVICES:   [ ] Restraints [ ] ET tube [ ] central line [x] arterial line [ ] carlson [ ] NGT/OGT [ ] EVD [x ] LD [ ] LUCINDA/HMV [ ] Trach [ ] PEG [ ] Chest Tube     PHYSICAL EXAM:    Constitutional: no acute distress    Neurological: alert, o x 3, EOMI, PERRL VF grossly intact, FC, full strength throughout without drift, SILT    Pulmonary: Clear to Auscultation, No rales, No rhonchi, No wheezes     Cardiovascular: S1, S2, Regular rate and rhythm     Gastrointestinal: Soft, Non-tender, Non-distended     Extremities: No calf tenderness     ICU Vital Signs Last 24 Hrs  T(C): 37.1 (02 Feb 2024 07:00), Max: 37.2 (02 Feb 2024 03:00)  T(F): 98.7 (02 Feb 2024 07:00), Max: 98.9 (02 Feb 2024 03:00)  HR: 74 (02 Feb 2024 07:00) (71 - 108)  BP: --  BP(mean): --  ABP: 122/69 (02 Feb 2024 07:00) (108/61 - 143/80)  ABP(mean): 87 (02 Feb 2024 07:00) (79 - 106)  RR: 13 (02 Feb 2024 07:00) (12 - 17)  SpO2: 96% (02 Feb 2024 07:00) (93% - 100%)    O2 Parameters below as of 02 Feb 2024 07:00  Patient On (Oxygen Delivery Method): ventilator            I&O's Summary    01 Feb 2024 07:01  -  02 Feb 2024 07:00  --------------------------------------------------------  IN: 3055 mL / OUT: 4181 mL / NET: -1126 mL    02 Feb 2024 07:01  -  02 Feb 2024 11:08  --------------------------------------------------------  IN: 300 mL / OUT: 200 mL / NET: 100 mL    MEDICATIONS  (STANDING):  atorvastatin 40 milliGRAM(s) Oral at bedtime  desmopressin 0.05 milliGRAM(s) Oral every 12 hours  polyethylene glycol 3350 17 Gram(s) Oral daily  potassium chloride    Tablet ER 40 milliEquivalent(s) Oral once  senna 2 Tablet(s) Oral at bedtime  sodium chloride 0.65% Nasal 2 Spray(s) Both Nostrils four times a day    MEDICATIONS  (PRN):  ondansetron Injectable 4 milliGRAM(s) IV Push once PRN Nausea and/or Vomiting  oxyCODONE    IR 5 milliGRAM(s) Oral every 4 hours PRN Moderate Pain (4 - 6)  oxyCODONE    IR 10 milliGRAM(s) Oral every 4 hours PRN Severe Pain (7 - 10)

## 2024-02-02 NOTE — PROGRESS NOTE ADULT - ASSESSMENT
-160  Was in crista DI 1mcg DDAVPx2, urine output has slowed precipitously, repeat BMP w/Na 150, 500cc plasmalyte given 75 maintenance will likely need to increase following repeat BMP  CTH AM   Pituitary labs in AM ( Cortisol, LH, FSH, ACTH, Thyroid panel)  MRI w&w/o when LD is dc'd   LED - p  Endo following

## 2024-02-02 NOTE — PROGRESS NOTE ADULT - ASSESSMENT
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, POD 1    NEURO:  neuro checks q4h   CT Head expected postop changes  MRI post-op  Tylenol and oxycodone for pain   LD clamped until AM  Pituitary precautions (no incentive spirometry, no straws, no BIPAP)  Activity: [x] OOB as tolerated [] Bedrest [x] PT [x] OT [] PMNR    PULM:  RA     CV:  -160mmHg      RENAL:  Nunez removed, voiding on his own  Drink to thirst      GI:  Diet: regular diet  GI prophylaxis [x] not indicated [] PPI [] other:  Bowel regimen [x] senna [x] other: miralax   Last BM PTA    ENDO:   Goal euglycemia (-180)  am cortisol, acth, tsh, and free T4  Monitor for DI  standing ddavp 0.05 qd   endocrine consult     HEME/ONC:  VTE prophylaxis: [x] SCDs [x] chemoprophylaxis  lovenox 40 mg    ID:  afebrile    MISC:    SOCIAL/FAMILY:  [] awaiting [x] updated at bedside [] family meeting    CODE STATUS:  [x] Full Code [] DNR [] DNI [] Palliative/Comfort Care    DISPOSITION:  [x] ICU [] Stroke Unit [] Floor [] EMU [] RCU [] PCU    [x] Patient is at high risk of neurologic deterioration/death due to: post op hemorrhage, DI   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, POD 1    NEURO:  neuro checks q4h   CT Head expected postop changes  MRI post-op  Tylenol and oxycodone for pain   LD clamped until AM  Pituitary precautions (no incentive spirometry, no straws, no BIPAP)  Activity: [x] OOB as tolerated [] Bedrest [x] PT [x] OT [] PMNR    PULM:  RA   O2 sat >92    CV:  -160mmHg      RENAL:  Nunez removed, voiding on his own  Drink to thirst      GI:  Diet: regular diet  GI prophylaxis [x] not indicated [] PPI [] other:  Bowel regimen [x] senna [x] other: miralax   Last BM PTA    ENDO:   Goal euglycemia (-180)  am cortisol, acth, tsh, and free T4  Monitor for DI  standing ddavp 0.05 qd   endocrine consult     HEME/ONC:  VTE prophylaxis: [x] SCDs [x] chemoprophylaxis  lovenox 40 mg    ID:  afebrile    MISC:    SOCIAL/FAMILY:  [] awaiting [x] updated at bedside [] family meeting    CODE STATUS:  [x] Full Code [] DNR [] DNI [] Palliative/Comfort Care    DISPOSITION:  [x] ICU [] Stroke Unit [] Floor [] EMU [] RCU [] PCU    [x] Patient is at high risk of neurologic deterioration/death due to: post op hemorrhage, DI

## 2024-02-03 DIAGNOSIS — E27.49 OTHER ADRENOCORTICAL INSUFFICIENCY: ICD-10-CM

## 2024-02-03 LAB
ACTH SER-ACNC: 2.3 PG/ML — LOW (ref 7.2–63.3)
ANION GAP SERPL CALC-SCNC: 10 MMOL/L — SIGNIFICANT CHANGE UP (ref 5–17)
ANION GAP SERPL CALC-SCNC: 12 MMOL/L — SIGNIFICANT CHANGE UP (ref 5–17)
BUN SERPL-MCNC: 8 MG/DL — SIGNIFICANT CHANGE UP (ref 7–23)
BUN SERPL-MCNC: 8 MG/DL — SIGNIFICANT CHANGE UP (ref 7–23)
CALCIUM SERPL-MCNC: 8.9 MG/DL — SIGNIFICANT CHANGE UP (ref 8.4–10.5)
CALCIUM SERPL-MCNC: 9.1 MG/DL — SIGNIFICANT CHANGE UP (ref 8.4–10.5)
CHLORIDE SERPL-SCNC: 102 MMOL/L — SIGNIFICANT CHANGE UP (ref 96–108)
CHLORIDE SERPL-SCNC: 97 MMOL/L — SIGNIFICANT CHANGE UP (ref 96–108)
CO2 SERPL-SCNC: 24 MMOL/L — SIGNIFICANT CHANGE UP (ref 22–31)
CO2 SERPL-SCNC: 26 MMOL/L — SIGNIFICANT CHANGE UP (ref 22–31)
CORTIS AM PEAK SERPL-MCNC: 0.5 UG/DL — LOW (ref 6–18.4)
CREAT SERPL-MCNC: 0.63 MG/DL — SIGNIFICANT CHANGE UP (ref 0.5–1.3)
CREAT SERPL-MCNC: 0.66 MG/DL — SIGNIFICANT CHANGE UP (ref 0.5–1.3)
EGFR: 119 ML/MIN/1.73M2 — SIGNIFICANT CHANGE UP
EGFR: 121 ML/MIN/1.73M2 — SIGNIFICANT CHANGE UP
GLUCOSE SERPL-MCNC: 100 MG/DL — HIGH (ref 70–99)
GLUCOSE SERPL-MCNC: 95 MG/DL — SIGNIFICANT CHANGE UP (ref 70–99)
MAGNESIUM SERPL-MCNC: 1.9 MG/DL — SIGNIFICANT CHANGE UP (ref 1.6–2.6)
MAGNESIUM SERPL-MCNC: 2.1 MG/DL — SIGNIFICANT CHANGE UP (ref 1.6–2.6)
PHOSPHATE SERPL-MCNC: 3.1 MG/DL — SIGNIFICANT CHANGE UP (ref 2.5–4.5)
PHOSPHATE SERPL-MCNC: 3.2 MG/DL — SIGNIFICANT CHANGE UP (ref 2.5–4.5)
POTASSIUM SERPL-MCNC: 3.8 MMOL/L — SIGNIFICANT CHANGE UP (ref 3.5–5.3)
POTASSIUM SERPL-MCNC: 3.8 MMOL/L — SIGNIFICANT CHANGE UP (ref 3.5–5.3)
POTASSIUM SERPL-SCNC: 3.8 MMOL/L — SIGNIFICANT CHANGE UP (ref 3.5–5.3)
POTASSIUM SERPL-SCNC: 3.8 MMOL/L — SIGNIFICANT CHANGE UP (ref 3.5–5.3)
SODIUM SERPL-SCNC: 133 MMOL/L — LOW (ref 135–145)
SODIUM SERPL-SCNC: 138 MMOL/L — SIGNIFICANT CHANGE UP (ref 135–145)
T4 FREE SERPL-MCNC: 0.8 NG/DL — LOW (ref 0.9–1.8)
TSH SERPL-MCNC: 0.15 UIU/ML — LOW (ref 0.27–4.2)

## 2024-02-03 PROCEDURE — 99232 SBSQ HOSP IP/OBS MODERATE 35: CPT

## 2024-02-03 RX ORDER — HYDROCORTISONE 20 MG
10 TABLET ORAL ONCE
Refills: 0 | Status: COMPLETED | OUTPATIENT
Start: 2024-02-03 | End: 2024-02-03

## 2024-02-03 RX ORDER — HYDROCORTISONE 20 MG
5 TABLET ORAL
Refills: 0 | Status: COMPLETED | OUTPATIENT
Start: 2024-02-03 | End: 2024-02-03

## 2024-02-03 RX ORDER — LEVOTHYROXINE SODIUM 125 MCG
50 TABLET ORAL DAILY
Refills: 0 | Status: DISCONTINUED | OUTPATIENT
Start: 2024-02-03 | End: 2024-02-08

## 2024-02-03 RX ORDER — HYDROCORTISONE 20 MG
10 TABLET ORAL
Refills: 0 | Status: DISCONTINUED | OUTPATIENT
Start: 2024-02-04 | End: 2024-02-05

## 2024-02-03 RX ORDER — ACETAMINOPHEN 500 MG
975 TABLET ORAL EVERY 6 HOURS
Refills: 0 | Status: DISCONTINUED | OUTPATIENT
Start: 2024-02-03 | End: 2024-02-08

## 2024-02-03 RX ORDER — POTASSIUM CHLORIDE 20 MEQ
40 PACKET (EA) ORAL ONCE
Refills: 0 | Status: COMPLETED | OUTPATIENT
Start: 2024-02-03 | End: 2024-02-03

## 2024-02-03 RX ORDER — MAGNESIUM SULFATE 500 MG/ML
1 VIAL (ML) INJECTION ONCE
Refills: 0 | Status: COMPLETED | OUTPATIENT
Start: 2024-02-03 | End: 2024-02-03

## 2024-02-03 RX ORDER — TRAMADOL HYDROCHLORIDE 50 MG/1
25 TABLET ORAL EVERY 4 HOURS
Refills: 0 | Status: DISCONTINUED | OUTPATIENT
Start: 2024-02-03 | End: 2024-02-04

## 2024-02-03 RX ORDER — HYDROMORPHONE HYDROCHLORIDE 2 MG/ML
0.25 INJECTION INTRAMUSCULAR; INTRAVENOUS; SUBCUTANEOUS ONCE
Refills: 0 | Status: DISCONTINUED | OUTPATIENT
Start: 2024-02-03 | End: 2024-02-03

## 2024-02-03 RX ORDER — POTASSIUM CHLORIDE 20 MEQ
20 PACKET (EA) ORAL ONCE
Refills: 0 | Status: COMPLETED | OUTPATIENT
Start: 2024-02-03 | End: 2024-02-03

## 2024-02-03 RX ORDER — ACETAMINOPHEN 500 MG
1000 TABLET ORAL ONCE
Refills: 0 | Status: COMPLETED | OUTPATIENT
Start: 2024-02-03 | End: 2024-02-03

## 2024-02-03 RX ORDER — HYDROCORTISONE 20 MG
5 TABLET ORAL
Refills: 0 | Status: DISCONTINUED | OUTPATIENT
Start: 2024-02-03 | End: 2024-02-03

## 2024-02-03 RX ORDER — HYDROCORTISONE 20 MG
5 TABLET ORAL
Refills: 0 | Status: DISCONTINUED | OUTPATIENT
Start: 2024-02-04 | End: 2024-02-05

## 2024-02-03 RX ADMIN — ENOXAPARIN SODIUM 40 MILLIGRAM(S): 100 INJECTION SUBCUTANEOUS at 17:10

## 2024-02-03 RX ADMIN — OXYCODONE HYDROCHLORIDE 10 MILLIGRAM(S): 5 TABLET ORAL at 01:50

## 2024-02-03 RX ADMIN — SENNA PLUS 2 TABLET(S): 8.6 TABLET ORAL at 21:36

## 2024-02-03 RX ADMIN — OXYCODONE HYDROCHLORIDE 10 MILLIGRAM(S): 5 TABLET ORAL at 02:20

## 2024-02-03 RX ADMIN — Medication 20 MILLIEQUIVALENT(S): at 17:10

## 2024-02-03 RX ADMIN — Medication 50 MICROGRAM(S): at 14:57

## 2024-02-03 RX ADMIN — OXYCODONE HYDROCHLORIDE 10 MILLIGRAM(S): 5 TABLET ORAL at 08:45

## 2024-02-03 RX ADMIN — OXYCODONE HYDROCHLORIDE 10 MILLIGRAM(S): 5 TABLET ORAL at 08:00

## 2024-02-03 RX ADMIN — HYDROMORPHONE HYDROCHLORIDE 0.25 MILLIGRAM(S): 2 INJECTION INTRAMUSCULAR; INTRAVENOUS; SUBCUTANEOUS at 06:30

## 2024-02-03 RX ADMIN — TRAMADOL HYDROCHLORIDE 25 MILLIGRAM(S): 50 TABLET ORAL at 20:53

## 2024-02-03 RX ADMIN — TRAMADOL HYDROCHLORIDE 25 MILLIGRAM(S): 50 TABLET ORAL at 20:23

## 2024-02-03 RX ADMIN — Medication 5 MILLIGRAM(S): at 18:02

## 2024-02-03 RX ADMIN — Medication 40 MILLIEQUIVALENT(S): at 06:00

## 2024-02-03 RX ADMIN — Medication 2 SPRAY(S): at 13:11

## 2024-02-03 RX ADMIN — Medication 2 SPRAY(S): at 17:09

## 2024-02-03 RX ADMIN — Medication 1000 MILLIGRAM(S): at 22:06

## 2024-02-03 RX ADMIN — ATORVASTATIN CALCIUM 40 MILLIGRAM(S): 80 TABLET, FILM COATED ORAL at 21:36

## 2024-02-03 RX ADMIN — Medication 975 MILLIGRAM(S): at 14:00

## 2024-02-03 RX ADMIN — Medication 975 MILLIGRAM(S): at 15:04

## 2024-02-03 RX ADMIN — Medication 102 GRAM(S): at 17:10

## 2024-02-03 RX ADMIN — CHLORHEXIDINE GLUCONATE 1 APPLICATION(S): 213 SOLUTION TOPICAL at 13:11

## 2024-02-03 RX ADMIN — OXYCODONE HYDROCHLORIDE 10 MILLIGRAM(S): 5 TABLET ORAL at 13:10

## 2024-02-03 RX ADMIN — OXYCODONE HYDROCHLORIDE 10 MILLIGRAM(S): 5 TABLET ORAL at 13:55

## 2024-02-03 RX ADMIN — HYDROMORPHONE HYDROCHLORIDE 0.25 MILLIGRAM(S): 2 INJECTION INTRAMUSCULAR; INTRAVENOUS; SUBCUTANEOUS at 02:25

## 2024-02-03 RX ADMIN — HYDROMORPHONE HYDROCHLORIDE 0.25 MILLIGRAM(S): 2 INJECTION INTRAMUSCULAR; INTRAVENOUS; SUBCUTANEOUS at 02:55

## 2024-02-03 RX ADMIN — Medication 400 MILLIGRAM(S): at 21:36

## 2024-02-03 RX ADMIN — Medication 10 MILLIGRAM(S): at 14:53

## 2024-02-03 RX ADMIN — Medication 2 SPRAY(S): at 06:00

## 2024-02-03 RX ADMIN — HYDROMORPHONE HYDROCHLORIDE 0.25 MILLIGRAM(S): 2 INJECTION INTRAMUSCULAR; INTRAVENOUS; SUBCUTANEOUS at 06:00

## 2024-02-03 NOTE — PROGRESS NOTE ADULT - SUBJECTIVE AND OBJECTIVE BOX
HOSPITAL COURSE:  This is a 43 year old man with PMH HLD. 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     Adm NSCU s/p TSP for tumor resection on 2/1/24, expected CSF leak, LD in place     Interval Events  No acute interval events    Allergies    Allergy Status Unknown    Intolerances    REVIEW OF SYSTEMS: [ ] Unable to Assess due to neurologic exam   [ x] All ROS addressed below are non-contributory, except:  Neuro: [ x] Headache [ ] Back pain [ ] Numbness [ ] Weakness [ ] Ataxia [ ] Dizziness [ ] Aphasia [ ] Dysarthria [ ] Visual disturbance  Resp: [ ] Shortness of breath/dyspnea, [ ] Orthopnea [ ] Cough  CV: [ ] Chest pain [ ] Palpitation [ ] Lightheadedness [ ] Syncope  Renal: [ ] Thirst [ ] Edema  GI: [] Nausea [ ] Emesis [ ] Abdominal pain [ ] Constipation [ ] Diarrhea  Hem: [ ] Hematemesis [ ] bright red blood per rectum  ID: [ ] Fever [ ] Chills [ ] Dysuria  ENT: [ ] Rhinorrhea    ICU Vital Signs Last 24 Hrs  T(C): 36.4 (03 Feb 2024 15:00), Max: 36.8 (02 Feb 2024 23:00)  T(F): 97.5 (03 Feb 2024 15:00), Max: 98.3 (02 Feb 2024 23:00)  HR: 55 (03 Feb 2024 15:00) (53 - 63)  BP: --  BP(mean): --  ABP: 128/77 (03 Feb 2024 15:00) (118/65 - 142/83)  ABP(mean): 97 (03 Feb 2024 15:00) (85 - 108)  RR: 10 (03 Feb 2024 15:00) (10 - 20)  SpO2: 97% (03 Feb 2024 15:00) (97% - 99%)    O2 Parameters below as of 03 Feb 2024 09:45  Patient On (Oxygen Delivery Method): room air      I&O's Summary    02 Feb 2024 07:01  -  03 Feb 2024 07:00  --------------------------------------------------------  IN: 1500 mL / OUT: 2230 mL / NET: -730 mL    03 Feb 2024 07:01  -  03 Feb 2024 18:50  --------------------------------------------------------  IN: 460 mL / OUT: 1575 mL / NET: -1115 mL      MEDICATIONS  (STANDING):  atorvastatin 40 milliGRAM(s) Oral at bedtime  chlorhexidine 4% Liquid 1 Application(s) Topical every 24 hours  desmopressin 0.05 milliGRAM(s) Oral <User Schedule>  enoxaparin Injectable 40 milliGRAM(s) SubCutaneous <User Schedule>  levothyroxine 50 MICROGram(s) Oral daily  polyethylene glycol 3350 17 Gram(s) Oral daily  senna 2 Tablet(s) Oral at bedtime  sodium chloride 0.65% Nasal 2 Spray(s) Both Nostrils four times a day    MEDICATIONS  (PRN):  acetaminophen     Tablet .. 975 milliGRAM(s) Oral every 6 hours PRN Temp greater or equal to 38C (100.4F), Mild Pain (1 - 3)  oxyCODONE    IR 5 milliGRAM(s) Oral every 4 hours PRN Moderate Pain (4 - 6)  oxyCODONE    IR 10 milliGRAM(s) Oral every 4 hours PRN Severe Pain (7 - 10)                          12.8   14.95 )-----------( 236      ( 02 Feb 2024 04:26 )             37.5   02-03    133<L>  |  97  |  8   ----------------------------<  95  3.8   |  24  |  0.63    Ca    8.9      03 Feb 2024 16:03  Phos  3.2     02-03  Mg     1.9     02-03        PHYSICAL EXAM:    Constitutional: no acute distress    Neurological: alert, o x 3, EOMI, PERRL VF grossly intact, FC, RED 5/5    Pulmonary: Clear to Auscultation, No rales, No rhonchi, No wheezes     Cardiovascular: S1, S2, Regular rate and rhythm     Gastrointestinal: Soft, Non-tender, Non-distended     Extremities: No calf tenderness

## 2024-02-03 NOTE — PROGRESS NOTE ADULT - SUBJECTIVE AND OBJECTIVE BOX
Gautam Swann MD, PGY-4  Endocrinology fellow  Available on [employer] Teams    Interval Events: No acute overnight events. Pt seen and examined. Tolerating PO, mild nausea, no more vomitting. Decreaed energy. No hypoglycemia symptoms. Denies fevers, chills, CP, SOB, Abdominal pain, constipation, Diarrhea.      MEDICATIONS  (STANDING):  atorvastatin 40 milliGRAM(s) Oral at bedtime  chlorhexidine 4% Liquid 1 Application(s) Topical every 24 hours  desmopressin 0.05 milliGRAM(s) Oral <User Schedule>  enoxaparin Injectable 40 milliGRAM(s) SubCutaneous <User Schedule>  polyethylene glycol 3350 17 Gram(s) Oral daily  senna 2 Tablet(s) Oral at bedtime  sodium chloride 0.65% Nasal 2 Spray(s) Both Nostrils four times a day    MEDICATIONS  (PRN):  acetaminophen     Tablet .. 975 milliGRAM(s) Oral every 6 hours PRN Temp greater or equal to 38C (100.4F), Mild Pain (1 - 3)  oxyCODONE    IR 5 milliGRAM(s) Oral every 4 hours PRN Moderate Pain (4 - 6)  oxyCODONE    IR 10 milliGRAM(s) Oral every 4 hours PRN Severe Pain (7 - 10)      Allergies    No Known Allergies    Intolerances          ================PHYSICAL EXAM======================  VITALS: T(C): 36.6 (02-03-24 @ 11:00)  T(F): 97.9 (02-03-24 @ 11:00), Max: 99 (02-02-24 @ 15:00)  HR: 56 (02-03-24 @ 11:00) (53 - 63)  BP: --  RR:  (12 - 20)  SpO2:  (97% - 99%)  Wt(kg): --  GENERAL: NAD, appears comfortable  EYES: No proptosis, no lid lag, anicteric  HEENT:  Atraumatic, Normocephalic, moist mucous membranes  RESPIRATORY: nonlabored respirations, no wheezing  PSYCH: Alert and awake  ===================================================    CAPILLARY BLOOD GLUCOSE          02-03    138  |  102  |  8   ----------------------------<  100<H>  3.8   |  26  |  0.66    eGFR: 119    Ca    9.1      02-03  Mg     2.1     02-03  Phos  3.1     02-03            Thyroid Function Tests:  02-02 @ 11:35 TSH 0.27 FreeT4 -- T3 54 Anti TPO -- Anti Thyroglobulin Ab -- TSI --

## 2024-02-03 NOTE — OCCUPATIONAL THERAPY INITIAL EVALUATION ADULT - PERTINENT HX OF CURRENT PROBLEM, REHAB EVAL
43 year old man with PMH HLD. 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

## 2024-02-03 NOTE — PROGRESS NOTE ADULT - ASSESSMENT
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, POD 0    NEURO:  neuro checks q4h   CT Head expected postop changes  MRI post-op  Tylenol and oxycodone for pain   LD per neurosurgery drain 5cc/hr  Pituitary precautions (no incentive spirometry, no straws, no BIPAP)  Activity: [x] OOB as tolerated [] Bedrest [x] PT [x] OT [] PMNR    PULM:  RA     CV:  -160mmHg  d/c a-line after repeat labs    RENAL:  Nunez for strict I+Os  IVF until good PO intake  Drink to thirst  BMP q6h     GI:  Diet: regular diet  GI prophylaxis [x] not indicated [] PPI [] other:  Bowel regimen [x] senna [x] other: miralax   Last BM PTA    ENDO:   Goal euglycemia (-180)  am cortisol   Monitor for DI  stading ddavp 0.05 po   endocrine consult     HEME/ONC:  VTE prophylaxis: [x] SCDs [x] chemoprophylaxis    ID:  Oanh-op antibiotics    MISC:    SOCIAL/FAMILY:  [] awaiting [x] updated at bedside [] family meeting    CODE STATUS:  [x] Full Code [] DNR [] DNI [] Palliative/Comfort Care    DISPOSITION:  [x] ICU [] Stroke Unit [] Floor [] EMU [] RCU [] PCU    [x] Patient is at high risk of neurologic deterioration/death due to: post op hemorrhage, DI   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, POD 0    NEURO:  neuro checks q4h   MRI post-op  Tylenol and oxycodone for pain   LD per neurosurgery drain 5cc/2hr  Pituitary precautions (no incentive spirometry, no straws, no BIPAP)  Activity: [x] OOB as tolerated [] Bedrest [x] PT [x] OT [] PMNR    PULM:  RA     CV:  -160mmHg  d/c a-line after repeat labs    RENAL:  IVF until good PO intake  Drink to thirst  BMP q6h     GI:  Diet: regular diet  GI prophylaxis [x] not indicated [] PPI [] other:  Bowel regimen [x] senna [x] other: miralax   Last BM PTA    ENDO:   Goal euglycemia (-180)  am cortisol   Monitor for DI  ddavp 0.05 po bid  endocrine consult     HEME/ONC:  VTE prophylaxis: [x] SCDs [x] chemoprophylaxis    ID:  Oanh-op antibiotics      SOCIAL/FAMILY:  [] awaiting [x] updated at bedside [] family meeting    CODE STATUS:  [x] Full Code [] DNR [] DNI [] Palliative/Comfort Care    DISPOSITION:  [x] ICU [] Stroke Unit [] Floor [] EMU [] RCU [] PCU

## 2024-02-03 NOTE — PROGRESS NOTE ADULT - SUBJECTIVE AND OBJECTIVE BOX
ENT ISSUE/POD: s/p endoscopic transphenoidal removal craniopharyngioma POD #2    HPI: 43 year old male with PMH HLD, s/p endoscopic transphenoidal removal craniopharyngioma POD #2. Pt seen and examined at bedside. No acute events overnight. Pt c/o mild headache relieved with pain meds, minimal bleeding. Pt denies salty/metallic taste, fever, n/v, SOB, dysphagia, odynophagia, hemoptysis, hoarseness.        PAST MEDICAL & SURGICAL HISTORY:  HLD (hyperlipidemia)      History of pituitary tumor      Deviated septum      History of dental surgery        Allergies    No Known Allergies    Intolerances      MEDICATIONS  (STANDING):  atorvastatin 40 milliGRAM(s) Oral at bedtime  chlorhexidine 4% Liquid 1 Application(s) Topical every 24 hours  desmopressin 0.05 milliGRAM(s) Oral <User Schedule>  enoxaparin Injectable 40 milliGRAM(s) SubCutaneous <User Schedule>  polyethylene glycol 3350 17 Gram(s) Oral daily  senna 2 Tablet(s) Oral at bedtime  sodium chloride 0.65% Nasal 2 Spray(s) Both Nostrils four times a day    MEDICATIONS  (PRN):  oxyCODONE    IR 5 milliGRAM(s) Oral every 4 hours PRN Moderate Pain (4 - 6)  oxyCODONE    IR 10 milliGRAM(s) Oral every 4 hours PRN Severe Pain (7 - 10)      Social History: see consult    Family history: see consult    ROS:   ENT: all negative except as noted in HPI   Pulm: denies SOB, cough, hemoptysis  Neuro: denies numbness/tingling, loss of sensation  Endo: denies heat/cold intolerance, excessive sweating      Vital Signs Last 24 Hrs  T(C): 36.6 (03 Feb 2024 03:00), Max: 37.2 (02 Feb 2024 15:00)  T(F): 97.8 (03 Feb 2024 03:00), Max: 99 (02 Feb 2024 15:00)  HR: 60 (03 Feb 2024 06:00) (56 - 68)  BP: --  BP(mean): --  RR: 20 (03 Feb 2024 06:00) (12 - 20)  SpO2: 99% (03 Feb 2024 06:00) (97% - 100%)    Parameters below as of 03 Feb 2024 03:00  Patient On (Oxygen Delivery Method): room air                              12.8   14.95 )-----------( 236      ( 02 Feb 2024 04:26 )             37.5    02-03    138  |  102  |  8   ----------------------------<  100<H>  3.8   |  26  |  0.66    Ca    9.1      03 Feb 2024 04:15  Phos  3.1     02-03  Mg     2.1     02-03         PHYSICAL EXAM:  Gen: NAD  Skin: No rashes, bruises, or lesions  Head: Normocephalic, Atraumatic  Face: no edema, erythema, or fluctuance. Parotid glands soft without mass  Eyes: no scleral injection  Nose: B/L nasopore in place, serosanguinous secretions from right nare, no active bleeding.   Mouth: Minimal dry blood in posterior pharynx. No Stridor / Drooling / Trismus.  Mucosa moist, tongue/uvula midline, oropharynx clear  Neck: Flat, supple, no lymphadenopathy, trachea midline, no masses  Lymphatic: No lymphadenopathy  Resp: breathing easily, no stridor  Neuro: facial nerve intact, no facial droop

## 2024-02-03 NOTE — OCCUPATIONAL THERAPY INITIAL EVALUATION ADULT - FINE MOTOR COORDINATION, RIGHT HAND, MANIPULATE OBJECTS, OT EVAL
group home, [facility]. TIA Freedman.

## 2024-02-03 NOTE — PROGRESS NOTE ADULT - ASSESSMENT
42 y/o male. PMH HLD hx of suprasellar cystic mass since 2020, on surveillance imaging since found tumor has increased in size s/p TSP on 2/1/24 c/b hypernatremia started on DDDAVP. Endocrine consulted for DI monitoring and r/o panhypopit.     #Secondary Adrenal insufficiency  #Central DI  #R/o Panhypopit  #Central hypogonadism  - likely Central DI post op since UOP >500cc/hr, USG and UOsm low and hypernatremia  - AM cortisol 0.5 (LOW), ACTH 3.5 (LOW) - consistent with secondary adrenal insufficiency  - FSH/LH low, prior labs showing secondary hypogonaidism  PLAN  - c/w DDAVP 0.05mg daily  - start hydrocortisone 10mg at 8am and 5mg at 3pm for secondary adrenal insufficiency - for today can give 10mg STAT and 5mg at 7pm  - if patient becomes hemodynamcailly unstable, please start stress dose steroids (hydrocortisone 50mg IV q8h) since patient is now diagnosed with secondary adrenal insufficiency  - f/u TSH/Free T4 - may need thyroid replacement  - patient will follow up with endocrinologist Dr. Isidra Dao outpatient  - strict I&Os, daily BMPs  *DI WATCH*:  - Goal Na 140-145  - DI is suspected if UOP is >250cc/hr x 2 consecutive hours or if >500cc/hr x 1 hr - if this occurs please check STAT serum Na, urine osm, urine specific gravity  - if specific gravity <1.005/Urine Osm <300 and Na is rising - likely DI, please dose DDAVP 0.05mg PO x1 (or DDAVP 0.5mcg IV if no PO access) and bolus 1/2 NS to match half the output (for example, if net negative 2Liters can give 1Liter 1/2NS if pt is unable to keep up with output with PO intake) - continue DI watch  - If Na continues to increase despite a a DDAVP dose and 1/2NS fluid bolus please inform endocrine   - Please make sure pt has access to water and encourage her to drink to thirst     Gautam Swann MD  Endocrine Fellow  Can be reached via [employer] teams.    For follow up questions, discharge recommendations, or new consults, please email [email or web address] ([email or web address]) or [email or web address] ([facility]) or call answering service at [phone] (weekdays); [phone] (nights/weekends).  For emergiencies please page fellow on call.     44 y/o male. PMH HLD hx of suprasellar cystic mass since 2020, on surveillance imaging since found tumor has increased in size s/p TSP on 2/1/24 c/b hypernatremia started on DDDAVP. Endocrine consulted for DI monitoring and r/o panhypopit.     #Secondary Adrenal insufficiency  #Central DI  #R/o Panhypopit  #Central hypogonadism  - likely Central DI post op since UOP >500cc/hr, USG and UOsm low and hypernatremia  - AM cortisol 0.5 (LOW), ACTH 3.5 (LOW) - consistent with secondary adrenal insufficiency  - TSH 0.15, FT4 0.8 - consistent with secondary hypothyroidism  - FSH/LH low, prior labs showing secondary hypogonadism  PLAN  - c/w DDAVP 0.05mg daily  - start hydrocortisone 10mg at 8am and 5mg at 3pm for secondary adrenal insufficiency - for today can give 10mg STAT and 5mg at 7pm  - if patient becomes hemodynamcailly unstable, please start stress dose steroids (hydrocortisone 50mg IV q8h) since patient is now diagnosed with secondary adrenal insufficiency  - start levothyroxine 50mcg daily for central hypothyroidism - recheck TSH and Free T4 5 days (2/8/24) if still hospitalized otherwhise recheck outpatient in 4 week  - patient will follow up with endocrinologist Dr. Isidra Dao outpatient  - strict I&Os, daily BMPs    *DI WATCH*:  - Goal Na 140-145  - DI is suspected if UOP is >250cc/hr x 2 consecutive hours or if >500cc/hr x 1 hr - if this occurs please check STAT serum Na, urine osm, urine specific gravity  - if specific gravity <1.005/Urine Osm <300 and Na is rising - likely DI, please dose DDAVP 0.05mg PO x1 (or DDAVP 0.5mcg IV if no PO access) and bolus 1/2 NS to match half the output (for example, if net negative 2Liters can give 1Liter 1/2NS if pt is unable to keep up with output with PO intake) - continue DI watch  - If Na continues to increase despite a a DDAVP dose and 1/2NS fluid bolus please inform endocrine   - Please make sure pt has access to water and encourage her to drink to thirst     Gautam Swann MD  Endocrine Fellow  Can be reached via [employer] teams.    For follow up questions, discharge recommendations, or new consults, please email [email or web address] ([email or web address]) or [email or web address] ([email or web address]) or call answering service at [phone] (weekdays); [phone] (nights/weekends).  For emergiencies please page fellow on call.

## 2024-02-03 NOTE — PROGRESS NOTE ADULT - PROBLEM SELECTOR PLAN 1
- TSRP precautions (no straws, incentive spirometry, bipap)   - Monitor for CSF leak / epistaxis   - Nasal saline 2 sprays to b/l nares qid  - LD per NSCU  - ENT will continue to follow  - Call with questions or concerns.

## 2024-02-03 NOTE — PROGRESS NOTE ADULT - SUBJECTIVE AND OBJECTIVE BOX
HOSPITAL COURSE:  This is a 43 year old man with PMH HLD. 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     Adm NSCU s/p TSP for tumor resection on 2/1/24, expected CSF leak, LD in place     Last 12h   received ddavp overnight for increased UOP     Allergies    Allergy Status Unknown    Intolerances        REVIEW OF SYSTEMS: [ ] Unable to Assess due to neurologic exam   [ x] All ROS addressed below are non-contributory, except:  Neuro: [ x] Headache [ ] Back pain [ ] Numbness [ ] Weakness [ ] Ataxia [ ] Dizziness [ ] Aphasia [ ] Dysarthria [ ] Visual disturbance  Resp: [ ] Shortness of breath/dyspnea, [ ] Orthopnea [ ] Cough  CV: [ ] Chest pain [ ] Palpitation [ ] Lightheadedness [ ] Syncope  Renal: [ ] Thirst [ ] Edema  GI: [] Nausea [ ] Emesis [ ] Abdominal pain [ ] Constipation [ ] Diarrhea  Hem: [ ] Hematemesis [ ] bright red blood per rectum  ID: [ ] Fever [ ] Chills [ ] Dysuria  ENT: [ ] Rhinorrhea      DEVICES:   [ ] Restraints [ ] ET tube [ ] central line [x] arterial line [ ] carlson [ ] NGT/OGT [ ] EVD [x ] LD [ ] LUCINDA/HMV [ ] Trach [ ] PEG [ ] Chest Tube     PHYSICAL EXAM:    Constitutional: no acute distress    Neurological: alert, o x 3, EOMI, PERRL VF grossly intact, FC, full strength throughout without drift, SILT    Pulmonary: Clear to Auscultation, No rales, No rhonchi, No wheezes     Cardiovascular: S1, S2, Regular rate and rhythm     Gastrointestinal: Soft, Non-tender, Non-distended     Extremities: No calf tenderness     ICU Vital Signs Last 24 Hrs  T(C): 37.1 (02 Feb 2024 07:00), Max: 37.2 (02 Feb 2024 03:00)  T(F): 98.7 (02 Feb 2024 07:00), Max: 98.9 (02 Feb 2024 03:00)  HR: 74 (02 Feb 2024 07:00) (71 - 108)  BP: --  BP(mean): --  ABP: 122/69 (02 Feb 2024 07:00) (108/61 - 143/80)  ABP(mean): 87 (02 Feb 2024 07:00) (79 - 106)  RR: 13 (02 Feb 2024 07:00) (12 - 17)  SpO2: 96% (02 Feb 2024 07:00) (93% - 100%)    O2 Parameters below as of 02 Feb 2024 07:00  Patient On (Oxygen Delivery Method): ventilator            I&O's Summary    01 Feb 2024 07:01  -  02 Feb 2024 07:00  --------------------------------------------------------  IN: 3055 mL / OUT: 4181 mL / NET: -1126 mL    02 Feb 2024 07:01  -  02 Feb 2024 11:08  --------------------------------------------------------  IN: 300 mL / OUT: 200 mL / NET: 100 mL    MEDICATIONS  (STANDING):  atorvastatin 40 milliGRAM(s) Oral at bedtime  desmopressin 0.05 milliGRAM(s) Oral every 12 hours  polyethylene glycol 3350 17 Gram(s) Oral daily  potassium chloride    Tablet ER 40 milliEquivalent(s) Oral once  senna 2 Tablet(s) Oral at bedtime  sodium chloride 0.65% Nasal 2 Spray(s) Both Nostrils four times a day    MEDICATIONS  (PRN):  ondansetron Injectable 4 milliGRAM(s) IV Push once PRN Nausea and/or Vomiting  oxyCODONE    IR 5 milliGRAM(s) Oral every 4 hours PRN Moderate Pain (4 - 6)  oxyCODONE    IR 10 milliGRAM(s) Oral every 4 hours PRN Severe Pain (7 - 10)   HOSPITAL COURSE:  This is a 43 year old man with PMH HLD. 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     Adm NSCU s/p TSP for tumor resection on 2/1/24, expected CSF leak, LD in place     Last 12h   LD clamped, reopened for 5cc/2h, ddavp    Allergies    Allergy Status Unknown    Intolerances        REVIEW OF SYSTEMS: [ ] Unable to Assess due to neurologic exam   [ x] All ROS addressed below are non-contributory, except:  Neuro: [ x] Headache [ ] Back pain [ ] Numbness [ ] Weakness [ ] Ataxia [ ] Dizziness [ ] Aphasia [ ] Dysarthria [ ] Visual disturbance  Resp: [ ] Shortness of breath/dyspnea, [ ] Orthopnea [ ] Cough  CV: [ ] Chest pain [ ] Palpitation [ ] Lightheadedness [ ] Syncope  Renal: [ ] Thirst [ ] Edema  GI: [] Nausea [ ] Emesis [ ] Abdominal pain [ ] Constipation [ ] Diarrhea  Hem: [ ] Hematemesis [ ] bright red blood per rectum  ID: [ ] Fever [ ] Chills [ ] Dysuria  ENT: [ ] Rhinorrhea      DEVICES:   [ ] Restraints [ ] ET tube [ ] central line [x] arterial line [ ] carlson [ ] NGT/OGT [ ] EVD [x ] LD [ ] LUCINDA/HMV [ ] Trach [ ] PEG [ ] Chest Tube     PHYSICAL EXAM:    Constitutional: no acute distress    Neurological: alert, o x 3, EOMI, PERRL VF grossly intact, FC, full strength throughout without drift, SILT    Pulmonary: Clear to Auscultation, No rales, No rhonchi, No wheezes     Cardiovascular: S1, S2, Regular rate and rhythm     Gastrointestinal: Soft, Non-tender, Non-distended     ICU Vital Signs Last 24 Hrs  T(C): 36.4 (03 Feb 2024 07:00), Max: 37.2 (02 Feb 2024 15:00)  T(F): 97.5 (03 Feb 2024 07:00), Max: 99 (02 Feb 2024 15:00)  HR: 53 (03 Feb 2024 07:00) (53 - 68)  BP: --  BP(mean): --  ABP: 129/74 (03 Feb 2024 07:00) (116/65 - 142/83)  ABP(mean): 97 (03 Feb 2024 07:00) (85 - 108)  RR: 16 (03 Feb 2024 07:00) (12 - 20)  SpO2: 97% (03 Feb 2024 07:00) (97% - 100%)    O2 Parameters below as of 03 Feb 2024 07:00  Patient On (Oxygen Delivery Method): room air        I&O's Summary    02 Feb 2024 07:01  -  03 Feb 2024 07:00  --------------------------------------------------------  IN: 1500 mL / OUT: 2230 mL / NET: -730 mL    03 Feb 2024 07:01  -  03 Feb 2024 10:18  --------------------------------------------------------  IN: 240 mL / OUT: 5 mL / NET: 235 mL    MEDICATIONS  (STANDING):  atorvastatin 40 milliGRAM(s) Oral at bedtime  chlorhexidine 4% Liquid 1 Application(s) Topical every 24 hours  desmopressin 0.05 milliGRAM(s) Oral <User Schedule>  enoxaparin Injectable 40 milliGRAM(s) SubCutaneous <User Schedule>  polyethylene glycol 3350 17 Gram(s) Oral daily  senna 2 Tablet(s) Oral at bedtime  sodium chloride 0.65% Nasal 2 Spray(s) Both Nostrils four times a day    MEDICATIONS  (PRN):  oxyCODONE    IR 5 milliGRAM(s) Oral every 4 hours PRN Moderate Pain (4 - 6)  oxyCODONE    IR 10 milliGRAM(s) Oral every 4 hours PRN Severe Pain (7 - 10)

## 2024-02-03 NOTE — PROGRESS NOTE ADULT - ASSESSMENT
43 year old male with PMH HLD, s/p endoscopic transphenoidal removal craniopharyngioma POD #2. Pt c/o mild headache relieved with pain meds, minimal bleeding. Pt denies salty/metallic taste, fever, n/v, SOB, dysphagia, odynophagia, hemoptysis, hoarseness. No evidence of CSF leak or epistaxis on exam.

## 2024-02-03 NOTE — PROGRESS NOTE ADULT - ASSESSMENT
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, POD 1    NEURO:  - neuro checks q4h   - CT Head expected postop changes  - MRI when LD out  - Tylenol and oxycodone for pain   - LD to drain 5cc q2 hours  - Skull base precautions  - Activity: [x] OOB as tolerated [] Bedrest [x] PT [x] OT [] PMNR    PULM:  - RA   - O2 sat >92    CV:  - -160mmHg  - Lipitor 40mg daily    RENAL:  - I&O strict  - target euvolemia and normonatremica  - monitor BUN/Cr  - replete lytes PRN      GI:  - Diet: regular diet  - GI prophylaxis [x] not indicated [] PPI [] other:  - Bowel regimen [x] senna [x] other: miralax   - Last BM    ENDO:   - Goal euglycemia (-180)  - standing ddavp 0.05 qd   - TSH 0.27 (2/2), 0.15 (2/3) representing downtrending 2/2 to likely central hypothyroidism, Free T4 0.8  - ACTH 3.5 on (2/2), 2.3 (2/3), Cortisol 0.5 (2/3) representing 2/2 adrenal insufficieny   - synthroid 50mcg daily  - hydrocortisone 10mg AM, 5mg PM, stress dose steroids 50q8 if hemodynamics unstable  - aprreciate endo recs     HEME/ONC:  - VTE prophylaxis: [x] SCDs [x] chemoprophylaxis  - lovenox 40 mg    ID:  - afebrile    MISC:    SOCIAL/FAMILY:  [] awaiting [x] updated at bedside [] family meeting    CODE STATUS:  [x] Full Code [] DNR [] DNI [] Palliative/Comfort Care    DISPOSITION:  [x] ICU [] Stroke Unit [] Floor [] EMU [] RCU [] PCU    [x] Patient is at high risk of neurologic deterioration/death due to: post op hemorrhage, DI

## 2024-02-03 NOTE — PROGRESS NOTE ADULT - ASSESSMENT
-160  Pause LD drainage 2/2 positional HA then resume 5cc q2h  Na improving, on DDAVP qd  Pituitary labs in AM ( Cortisol, LH, FSH, ACTH, Thyroid panel)  MRI w&w/o when LD is dc'd   LED - p  Endo following

## 2024-02-04 LAB
ANION GAP SERPL CALC-SCNC: 13 MMOL/L — SIGNIFICANT CHANGE UP (ref 5–17)
ANION GAP SERPL CALC-SCNC: 13 MMOL/L — SIGNIFICANT CHANGE UP (ref 5–17)
BUN SERPL-MCNC: 8 MG/DL — SIGNIFICANT CHANGE UP (ref 7–23)
BUN SERPL-MCNC: 9 MG/DL — SIGNIFICANT CHANGE UP (ref 7–23)
CALCIUM SERPL-MCNC: 9.7 MG/DL — SIGNIFICANT CHANGE UP (ref 8.4–10.5)
CALCIUM SERPL-MCNC: 9.7 MG/DL — SIGNIFICANT CHANGE UP (ref 8.4–10.5)
CHLORIDE SERPL-SCNC: 96 MMOL/L — SIGNIFICANT CHANGE UP (ref 96–108)
CHLORIDE SERPL-SCNC: 96 MMOL/L — SIGNIFICANT CHANGE UP (ref 96–108)
CO2 SERPL-SCNC: 26 MMOL/L — SIGNIFICANT CHANGE UP (ref 22–31)
CO2 SERPL-SCNC: 26 MMOL/L — SIGNIFICANT CHANGE UP (ref 22–31)
CREAT SERPL-MCNC: 0.72 MG/DL — SIGNIFICANT CHANGE UP (ref 0.5–1.3)
CREAT SERPL-MCNC: 0.82 MG/DL — SIGNIFICANT CHANGE UP (ref 0.5–1.3)
EGFR: 112 ML/MIN/1.73M2 — SIGNIFICANT CHANGE UP
EGFR: 116 ML/MIN/1.73M2 — SIGNIFICANT CHANGE UP
GLUCOSE SERPL-MCNC: 106 MG/DL — HIGH (ref 70–99)
GLUCOSE SERPL-MCNC: 99 MG/DL — SIGNIFICANT CHANGE UP (ref 70–99)
MAGNESIUM SERPL-MCNC: 2.2 MG/DL — SIGNIFICANT CHANGE UP (ref 1.6–2.6)
PHOSPHATE SERPL-MCNC: 4.2 MG/DL — SIGNIFICANT CHANGE UP (ref 2.5–4.5)
POTASSIUM SERPL-MCNC: 4.1 MMOL/L — SIGNIFICANT CHANGE UP (ref 3.5–5.3)
POTASSIUM SERPL-MCNC: 4.2 MMOL/L — SIGNIFICANT CHANGE UP (ref 3.5–5.3)
POTASSIUM SERPL-SCNC: 4.1 MMOL/L — SIGNIFICANT CHANGE UP (ref 3.5–5.3)
POTASSIUM SERPL-SCNC: 4.2 MMOL/L — SIGNIFICANT CHANGE UP (ref 3.5–5.3)
SODIUM SERPL-SCNC: 135 MMOL/L — SIGNIFICANT CHANGE UP (ref 135–145)
SODIUM SERPL-SCNC: 135 MMOL/L — SIGNIFICANT CHANGE UP (ref 135–145)

## 2024-02-04 PROCEDURE — 70450 CT HEAD/BRAIN W/O DYE: CPT | Mod: 26

## 2024-02-04 PROCEDURE — 99232 SBSQ HOSP IP/OBS MODERATE 35: CPT

## 2024-02-04 RX ORDER — HYDROMORPHONE HYDROCHLORIDE 2 MG/ML
0.25 INJECTION INTRAMUSCULAR; INTRAVENOUS; SUBCUTANEOUS ONCE
Refills: 0 | Status: DISCONTINUED | OUTPATIENT
Start: 2024-02-04 | End: 2024-02-04

## 2024-02-04 RX ORDER — OXYCODONE HYDROCHLORIDE 5 MG/1
10 TABLET ORAL EVERY 4 HOURS
Refills: 0 | Status: DISCONTINUED | OUTPATIENT
Start: 2024-02-04 | End: 2024-02-08

## 2024-02-04 RX ORDER — OXYCODONE HYDROCHLORIDE 5 MG/1
5 TABLET ORAL ONCE
Refills: 0 | Status: DISCONTINUED | OUTPATIENT
Start: 2024-02-04 | End: 2024-02-04

## 2024-02-04 RX ORDER — POLYETHYLENE GLYCOL 3350 17 G/17G
17 POWDER, FOR SOLUTION ORAL EVERY 12 HOURS
Refills: 0 | Status: DISCONTINUED | OUTPATIENT
Start: 2024-02-04 | End: 2024-02-08

## 2024-02-04 RX ORDER — LANOLIN ALCOHOL/MO/W.PET/CERES
5 CREAM (GRAM) TOPICAL AT BEDTIME
Refills: 0 | Status: DISCONTINUED | OUTPATIENT
Start: 2024-02-04 | End: 2024-02-08

## 2024-02-04 RX ORDER — OXYCODONE HYDROCHLORIDE 5 MG/1
5 TABLET ORAL EVERY 4 HOURS
Refills: 0 | Status: DISCONTINUED | OUTPATIENT
Start: 2024-02-04 | End: 2024-02-08

## 2024-02-04 RX ADMIN — OXYCODONE HYDROCHLORIDE 10 MILLIGRAM(S): 5 TABLET ORAL at 13:00

## 2024-02-04 RX ADMIN — SENNA PLUS 2 TABLET(S): 8.6 TABLET ORAL at 22:17

## 2024-02-04 RX ADMIN — ATORVASTATIN CALCIUM 40 MILLIGRAM(S): 80 TABLET, FILM COATED ORAL at 22:17

## 2024-02-04 RX ADMIN — Medication 975 MILLIGRAM(S): at 03:44

## 2024-02-04 RX ADMIN — Medication 10 MILLIGRAM(S): at 08:19

## 2024-02-04 RX ADMIN — ENOXAPARIN SODIUM 40 MILLIGRAM(S): 100 INJECTION SUBCUTANEOUS at 17:46

## 2024-02-04 RX ADMIN — Medication 2 SPRAY(S): at 00:47

## 2024-02-04 RX ADMIN — Medication 2 SPRAY(S): at 17:46

## 2024-02-04 RX ADMIN — OXYCODONE HYDROCHLORIDE 5 MILLIGRAM(S): 5 TABLET ORAL at 08:20

## 2024-02-04 RX ADMIN — HYDROMORPHONE HYDROCHLORIDE 0.25 MILLIGRAM(S): 2 INJECTION INTRAMUSCULAR; INTRAVENOUS; SUBCUTANEOUS at 00:34

## 2024-02-04 RX ADMIN — OXYCODONE HYDROCHLORIDE 5 MILLIGRAM(S): 5 TABLET ORAL at 05:30

## 2024-02-04 RX ADMIN — TRAMADOL HYDROCHLORIDE 25 MILLIGRAM(S): 50 TABLET ORAL at 04:30

## 2024-02-04 RX ADMIN — Medication 975 MILLIGRAM(S): at 03:14

## 2024-02-04 RX ADMIN — OXYCODONE HYDROCHLORIDE 5 MILLIGRAM(S): 5 TABLET ORAL at 05:00

## 2024-02-04 RX ADMIN — POLYETHYLENE GLYCOL 3350 17 GRAM(S): 17 POWDER, FOR SOLUTION ORAL at 17:46

## 2024-02-04 RX ADMIN — Medication 2 SPRAY(S): at 11:52

## 2024-02-04 RX ADMIN — TRAMADOL HYDROCHLORIDE 25 MILLIGRAM(S): 50 TABLET ORAL at 04:00

## 2024-02-04 RX ADMIN — CHLORHEXIDINE GLUCONATE 1 APPLICATION(S): 213 SOLUTION TOPICAL at 22:26

## 2024-02-04 RX ADMIN — OXYCODONE HYDROCHLORIDE 10 MILLIGRAM(S): 5 TABLET ORAL at 12:18

## 2024-02-04 RX ADMIN — Medication 50 MICROGRAM(S): at 05:05

## 2024-02-04 RX ADMIN — HYDROMORPHONE HYDROCHLORIDE 0.25 MILLIGRAM(S): 2 INJECTION INTRAMUSCULAR; INTRAVENOUS; SUBCUTANEOUS at 01:04

## 2024-02-04 RX ADMIN — Medication 2 SPRAY(S): at 05:04

## 2024-02-04 RX ADMIN — Medication 5 MILLIGRAM(S): at 22:23

## 2024-02-04 RX ADMIN — OXYCODONE HYDROCHLORIDE 5 MILLIGRAM(S): 5 TABLET ORAL at 07:50

## 2024-02-04 RX ADMIN — Medication 5 MILLIGRAM(S): at 15:10

## 2024-02-04 NOTE — PROGRESS NOTE ADULT - SUBJECTIVE AND OBJECTIVE BOX
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 Conway.     12hr EVENTS:  - LD in place, manual drainage of 5cc q3h    ROS: All other systems negative except as above    ----------------------------------------------------------------------------------------------------  PHYSICAL EXAM:  Constitutional: laying comfortably in bed    Neurological: alert, o x 3,   EOMI, PERRL VF grossly intact, FC,   full strength throughout without drift, SILT    Pulmonary: no accessory muscle use, normal respiratory effort  Cardiovascular: Regular rate and rhythm  Gastrointestinal: Soft, Non-tender, Non-distended   Extremities: warm/dry, no edema    -----------------------------------------------------------------------------------------------------  ICU Vital Signs Last 24 Hrs  T(C): 36.4 (04 Feb 2024 15:00), Max: 36.8 (04 Feb 2024 11:00)  T(F): 97.6 (04 Feb 2024 15:00), Max: 98.3 (04 Feb 2024 11:00)  HR: 56 (04 Feb 2024 15:00) (52 - 56)  BP: 116/89 (04 Feb 2024 15:00) (116/89 - 128/85)  BP(mean): 98 (04 Feb 2024 15:00) (93 - 100)  ABP: 120/81 (04 Feb 2024 03:00) (120/81 - 126/86)  ABP(mean): 99 (03 Feb 2024 23:00) (99 - 99)  RR: 14 (04 Feb 2024 15:00) (12 - 14)  SpO2: 95% (04 Feb 2024 15:00) (95% - 97%)    O2 Parameters below as of 04 Feb 2024 07:00  Patient On (Oxygen Delivery Method): room air            I&O's Summary    03 Feb 2024 07:01  -  04 Feb 2024 07:00  --------------------------------------------------------  IN: 700 mL / OUT: 3910 mL / NET: -3210 mL    04 Feb 2024 07:01  -  04 Feb 2024 19:00  --------------------------------------------------------  IN: 500 mL / OUT: 720 mL / NET: -220 mL        MEDICATIONS  (STANDING):  atorvastatin 40 milliGRAM(s) Oral at bedtime  chlorhexidine 4% Liquid 1 Application(s) Topical every 24 hours  enoxaparin Injectable 40 milliGRAM(s) SubCutaneous <User Schedule>  hydrocortisone 10 milliGRAM(s) Oral <User Schedule>  hydrocortisone 5 milliGRAM(s) Oral <User Schedule>  levothyroxine 50 MICROGram(s) Oral daily  polyethylene glycol 3350 17 Gram(s) Oral every 12 hours  senna 2 Tablet(s) Oral at bedtime  sodium chloride 0.65% Nasal 2 Spray(s) Both Nostrils four times a day    IMAGING:   Recent imaging studies were reviewed.    LAB RESULTS:    02-04    135  |  96  |  9   ----------------------------<  106<H>  4.2   |  26  |  0.82    Ca    9.7      04 Feb 2024 16:35  Phos  4.2     02-04  Mg     2.2     02-04    -----------------------------------------------------------------------------------------------------------------------------------------------------------------------------------                     NSICU Night Intensivist Note    Historical Review:   43M with h/o HLD, chronic headaches, suprasellar cystic mass (d/x 2020, 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 Conway.     12hr EVENTS:  - persistent HAs, LD in place, manual drainage of 5cc q3h -> improved head pressure  - holding ddAVP for relative hyponatremia    ROS: All other systems negative except as above    ----------------------------------------------------------------------------------------------------  PHYSICAL EXAM:  Constitutional: laying comfortably in bed    Neurological: alert, o x 3,   EOMI, PERRL VF grossly intact, FC,   full strength throughout without drift, SILT    Pulmonary: no accessory muscle use, normal respiratory effort  Cardiovascular: Regular rate and rhythm  Gastrointestinal: Soft, Non-tender, Non-distended   Extremities: warm/dry, no edema    -----------------------------------------------------------------------------------------------------  ICU Vital Signs Last 24 Hrs  T(C): 36.4 (04 Feb 2024 15:00), Max: 36.8 (04 Feb 2024 11:00)  T(F): 97.6 (04 Feb 2024 15:00), Max: 98.3 (04 Feb 2024 11:00)  HR: 56 (04 Feb 2024 15:00) (52 - 56)  BP: 116/89 (04 Feb 2024 15:00) (116/89 - 128/85)  BP(mean): 98 (04 Feb 2024 15:00) (93 - 100)  ABP: 120/81 (04 Feb 2024 03:00) (120/81 - 126/86)  ABP(mean): 99 (03 Feb 2024 23:00) (99 - 99)  RR: 14 (04 Feb 2024 15:00) (12 - 14)  SpO2: 95% (04 Feb 2024 15:00) (95% - 97%)    O2 Parameters below as of 04 Feb 2024 07:00  Patient On (Oxygen Delivery Method): room air            I&O's Summary    03 Feb 2024 07:01  -  04 Feb 2024 07:00  --------------------------------------------------------  IN: 700 mL / OUT: 3910 mL / NET: -3210 mL    04 Feb 2024 07:01  -  04 Feb 2024 19:00  --------------------------------------------------------  IN: 500 mL / OUT: 720 mL / NET: -220 mL        MEDICATIONS  (STANDING):  atorvastatin 40 milliGRAM(s) Oral at bedtime  chlorhexidine 4% Liquid 1 Application(s) Topical every 24 hours  enoxaparin Injectable 40 milliGRAM(s) SubCutaneous <User Schedule>  hydrocortisone 10 milliGRAM(s) Oral <User Schedule>  hydrocortisone 5 milliGRAM(s) Oral <User Schedule>  levothyroxine 50 MICROGram(s) Oral daily  polyethylene glycol 3350 17 Gram(s) Oral every 12 hours  senna 2 Tablet(s) Oral at bedtime  sodium chloride 0.65% Nasal 2 Spray(s) Both Nostrils four times a day    IMAGING:   Recent imaging studies were reviewed.    LAB RESULTS:    02-04    135  |  96  |  9   ----------------------------<  106<H>  4.2   |  26  |  0.82    Ca    9.7      04 Feb 2024 16:35  Phos  4.2     02-04  Mg     2.2     02-04    -----------------------------------------------------------------------------------------------------------------------------------------------------------------------------------

## 2024-02-04 NOTE — PROGRESS NOTE ADULT - ASSESSMENT
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

## 2024-02-04 NOTE — PROGRESS NOTE ADULT - ASSESSMENT
-160  2/1 1mcg DDAVPx2, severe headaches, drain clamped for 4 hours  LDrain @ 5cc/2hour - keep through the weekend   MRI ww/o/sella protocol when LD is dc'd   Endo-f, stress dose hydrocort if needed 50q8  fu PT recs

## 2024-02-04 NOTE — PROGRESS NOTE ADULT - SUBJECTIVE AND OBJECTIVE BOX
Gautam Swann MD, PGY-4  Endocrinology fellow  Available on [employer] Teams    Interval Events: No acute overnight events. Pt seen and examined. Tolerating PO, no nausea/vomitting. Reports improved energy after starting hydrocortisone. No hypoglycemia symptoms. Denies fevers, chills, CP, SOB, Abdominal pain, constipation, Diarrhea.      MEDICATIONS  (STANDING):  atorvastatin 40 milliGRAM(s) Oral at bedtime  chlorhexidine 4% Liquid 1 Application(s) Topical every 24 hours  enoxaparin Injectable 40 milliGRAM(s) SubCutaneous <User Schedule>  hydrocortisone 10 milliGRAM(s) Oral <User Schedule>  hydrocortisone 5 milliGRAM(s) Oral <User Schedule>  levothyroxine 50 MICROGram(s) Oral daily  polyethylene glycol 3350 17 Gram(s) Oral every 12 hours  senna 2 Tablet(s) Oral at bedtime  sodium chloride 0.65% Nasal 2 Spray(s) Both Nostrils four times a day    MEDICATIONS  (PRN):  acetaminophen     Tablet .. 975 milliGRAM(s) Oral every 6 hours PRN Temp greater or equal to 38C (100.4F), Mild Pain (1 - 3)  oxyCODONE    IR 10 milliGRAM(s) Oral every 4 hours PRN Severe Pain (7 - 10)  oxyCODONE    IR 5 milliGRAM(s) Oral every 4 hours PRN Moderate Pain (4 - 6)      Allergies    No Known Allergies    Intolerances          ================PHYSICAL EXAM======================  VITALS: T(C): 36.4 (02-04-24 @ 15:00)  T(F): 97.6 (02-04-24 @ 15:00), Max: 98.3 (02-04-24 @ 11:00)  HR: 56 (02-04-24 @ 15:00) (52 - 57)  BP: 116/89 (02-04-24 @ 15:00) (116/89 - 128/85)  RR:  (12 - 14)  SpO2:  (95% - 99%)  Wt(kg): --  GENERAL: NAD, appears comfortable  EYES: No proptosis, no lid lag, anicteric  HEENT:  Atraumatic, Normocephalic, moist mucous membranes  RESPIRATORY: nonlabored respirations, no wheezing  PSYCH: Alert and awake  ===================================================    CAPILLARY BLOOD GLUCOSE          02-04    135  |  96  |  9   ----------------------------<  106<H>  4.2   |  26  |  0.82    eGFR: 112    Ca    9.7      02-04  Mg     2.2     02-04  Phos  4.2     02-04            Thyroid Function Tests:  02-03 @ 08:37 TSH 0.15 FreeT4 0.8 T3 -- Anti TPO -- Anti Thyroglobulin Ab -- TSI --  02-02 @ 11:35 TSH 0.27 FreeT4 -- T3 54 Anti TPO -- Anti Thyroglobulin Ab -- TSI --

## 2024-02-04 NOTE — PROGRESS NOTE ADULT - ASSESSMENT
43 year old male with PMH HLD, s/p endoscopic transphenoidal removal craniopharyngioma POD #3. Pt c/o mild headache relieved with pain meds, minimal bleeding. Pt denies salty/metallic taste, fever, n/v, SOB, dysphagia, odynophagia, hemoptysis, hoarseness. No evidence of CSF leak or epistaxis on exam.

## 2024-02-04 NOTE — PROGRESS NOTE ADULT - ASSESSMENT
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, POD 0    NEURO:  neuro checks q4h   MRI post-op  Tylenol and oxycodone for pain   LD per neurosurgery drain 5cc/2hr  Pituitary precautions (no incentive spirometry, no straws, no BIPAP)  Activity: [x] OOB as tolerated [] Bedrest [x] PT [x] OT [] PMNR    PULM:  RA     CV:  -160mmHg  d/c a-line after repeat labs    RENAL:  IVF until good PO intake  Drink to thirst  BMP q6h     GI:  Diet: regular diet  GI prophylaxis [x] not indicated [] PPI [] other:  Bowel regimen [x] senna [x] other: miralax   Last BM PTA    ENDO:   pan hypopituitarism  On hydrocortisone, thyrosine replacement   DDAVP for UO > 250ml/h for two consecutive hours   endocrine consult     HEME/ONC:  VTE prophylaxis: [x] SCDs [x] chemoprophylaxis    ID:  Oanh-op antibiotics

## 2024-02-04 NOTE — PROGRESS NOTE ADULT - SUBJECTIVE AND OBJECTIVE BOX
ENT ISSUE/POD: s/p endoscopic transphenoidal removal craniopharyngioma POD #3    HPI: 43 year old male with PMH HLD, s/p endoscopic transphenoidal removal craniopharyngioma POD #3. Pt seen and examined at bedside. No acute events overnight. Pt c/o mild headache relieved with pain meds, minimal bleeding. Pt denies salty/metallic taste, fever, n/v, SOB, dysphagia, odynophagia, hemoptysis, hoarseness.        PAST MEDICAL & SURGICAL HISTORY:  HLD (hyperlipidemia)      History of pituitary tumor      Deviated septum      History of dental surgery        Allergies    No Known Allergies    Intolerances      MEDICATIONS  (STANDING):  atorvastatin 40 milliGRAM(s) Oral at bedtime  chlorhexidine 4% Liquid 1 Application(s) Topical every 24 hours  desmopressin 0.05 milliGRAM(s) Oral <User Schedule>  enoxaparin Injectable 40 milliGRAM(s) SubCutaneous <User Schedule>  hydrocortisone 10 milliGRAM(s) Oral <User Schedule>  hydrocortisone 5 milliGRAM(s) Oral <User Schedule>  levothyroxine 50 MICROGram(s) Oral daily  polyethylene glycol 3350 17 Gram(s) Oral daily  senna 2 Tablet(s) Oral at bedtime  sodium chloride 0.65% Nasal 2 Spray(s) Both Nostrils four times a day    MEDICATIONS  (PRN):  acetaminophen     Tablet .. 975 milliGRAM(s) Oral every 6 hours PRN Temp greater or equal to 38C (100.4F), Mild Pain (1 - 3)  oxyCODONE    IR 10 milliGRAM(s) Oral every 4 hours PRN Severe Pain (7 - 10)  oxyCODONE    IR 5 milliGRAM(s) Oral every 4 hours PRN Moderate Pain (4 - 6)      Social History: see consult    Family history: see consult    ROS:   ENT: all negative except as noted in HPI   Pulm: denies SOB, cough, hemoptysis  Neuro: denies numbness/tingling, loss of sensation  Endo: denies heat/cold intolerance, excessive sweating      Vital Signs Last 24 Hrs  T(C): 36.7 (04 Feb 2024 07:00), Max: 36.7 (04 Feb 2024 07:00)  T(F): 98 (04 Feb 2024 07:00), Max: 98 (04 Feb 2024 07:00)  HR: 53 (04 Feb 2024 07:00) (52 - 57)  BP: 128/85 (04 Feb 2024 07:00) (128/85 - 128/85)  BP(mean): 100 (04 Feb 2024 07:00) (100 - 100)  RR: 13 (04 Feb 2024 07:00) (10 - 15)  SpO2: 96% (04 Feb 2024 07:00) (95% - 99%)    Parameters below as of 04 Feb 2024 07:00  Patient On (Oxygen Delivery Method): room air         02-04    135  |  96  |  8   ----------------------------<  99  4.1   |  26  |  0.72    Ca    9.7      04 Feb 2024 05:57  Phos  4.2     02-04  Mg     2.2     02-04         PHYSICAL EXAM:  Gen: NAD  Skin: No rashes, bruises, or lesions  Head: Normocephalic, Atraumatic  Face: no edema, erythema, or fluctuance. Parotid glands soft without mass  Eyes: no scleral injection  Nose: B/L nasopore in place, serosanguinous secretions from right nare, no active bleeding.   Mouth: Minimal dry blood in posterior pharynx. No Stridor / Drooling / Trismus.  Mucosa moist, tongue/uvula midline, oropharynx clear  Neck: Flat, supple, no lymphadenopathy, trachea midline, no masses  Lymphatic: No lymphadenopathy  Resp: breathing easily, no stridor  Neuro: facial nerve intact, no facial droop

## 2024-02-04 NOTE — PROGRESS NOTE ADULT - ASSESSMENT
44 y/o male. PMH HLD hx of suprasellar cystic mass since 2020, on surveillance imaging since found tumor has increased in size s/p TSP on 2/1/24 c/b hypernatremia started on DDDAVP. Endocrine consulted for DI monitoring and r/o panhypopit.     #Secondary Adrenal insufficiency  #Central DI  #R/o Panhypopit  #Central hypogonadism  - likely Central DI post op since UOP >500cc/hr, USG and UOsm low and hypernatremia  - AM cortisol 0.5 (LOW), ACTH 3.5 (LOW) - consistent with secondary adrenal insufficiency  - TSH 0.15, FT4 0.8 - consistent with secondary hypothyroidism  - FSH/LH low, prior labs showing secondary hypogonadism  - 2/3/24: Na 133 and urine output decreasing - DDAVP held  PLAN  - continue to HOLD DDAVP 0.05mg daily d/t hyponatremia and normal Urine output  - c/w hydrocortisone 10mg at 8am and 5mg at 3pm for secondary adrenal insufficiency  - if patient becomes hemodynamically unstable, please start stress dose steroids (hydrocortisone 50mg IV q8h) since patient is now diagnosed with secondary adrenal insufficiency  - c/w levothyroxine 50mcg daily for central hypothyroidism   - recheck TSH and Free T4 5 days (2/8/24) if still hospitalized otherwhise recheck outpatient in 4 week  - patient will follow up with endocrinologist Dr. Isidra Dao outpatient  - strict I&Os, daily BMPs    *DI WATCH*:  - Goal Na 140-145  - DI is suspected if UOP is >250cc/hr x 2 consecutive hours or if >500cc/hr x 1 hr - if this occurs please check STAT serum Na, urine osm, urine specific gravity  - if specific gravity <1.005/Urine Osm <300 and Na is rising - likely DI, please dose DDAVP 0.05mg PO x1 (or DDAVP 0.5mcg IV if no PO access) and bolus 1/2 NS to match half the output (for example, if net negative 2Liters can give 1Liter 1/2NS if pt is unable to keep up with output with PO intake) - continue DI watch  - If Na continues to increase despite a a DDAVP dose and 1/2NS fluid bolus please inform endocrine   - Please make sure pt has access to water and encourage her to drink to thirst     Gautam Swann MD  Endocrine Fellow  Can be reached via [employer].    For follow up questions, discharge recommendations, or new consults, please email [email or web address] or [email or web address] ([email or web address]) or call answering service at [phone] (weekdays); [phone] (nights/weekends).  For emergiencies please page fellow on call.

## 2024-02-04 NOTE — PROGRESS NOTE ADULT - SUBJECTIVE AND OBJECTIVE BOX
Patient seen and examined at bedside.    --Anticoagulation--  enoxaparin Injectable 40 milliGRAM(s) SubCutaneous <User Schedule>    T(C): 36.4 (02-04-24 @ 03:00), Max: 36.6 (02-03-24 @ 11:00)  HR: 52 (02-04-24 @ 03:00) (52 - 60)  BP: --  RR: 12 (02-04-24 @ 03:00) (10 - 20)  SpO2: 95% (02-04-24 @ 03:00) (95% - 99%)  Wt(kg): --    Exam:  Intact

## 2024-02-04 NOTE — PROGRESS NOTE ADULT - SUBJECTIVE AND OBJECTIVE BOX
HOSPITAL COURSE:  This is a 43 year old man with PMH HLD. 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     Adm NSCU s/p TSP for tumor resection on 2/1/24, expected CSF leak, LD in place     Last 12h   LD clamped, reopened for 5cc/2h, ddavp    Allergies    Allergy Status Unknown    Intolerances        REVIEW OF SYSTEMS: [ ] Unable to Assess due to neurologic exam   [ x] All ROS addressed below are non-contributory, except:  Neuro: [ x] Headache [ ] Back pain [ ] Numbness [ ] Weakness [ ] Ataxia [ ] Dizziness [ ] Aphasia [ ] Dysarthria [ ] Visual disturbance  Resp: [ ] Shortness of breath/dyspnea, [ ] Orthopnea [ ] Cough  CV: [ ] Chest pain [ ] Palpitation [ ] Lightheadedness [ ] Syncope  Renal: [ ] Thirst [ ] Edema  GI: [] Nausea [ ] Emesis [ ] Abdominal pain [ ] Constipation [ ] Diarrhea  Hem: [ ] Hematemesis [ ] bright red blood per rectum  ID: [ ] Fever [ ] Chills [ ] Dysuria  ENT: [ ] Rhinorrhea      DEVICES:   [ ] Restraints [ ] ET tube [ ] central line [x] arterial line [ ] carlson [ ] NGT/OGT [ ] EVD [x ] LD [ ] LUCINDA/HMV [ ] Trach [ ] PEG [ ] Chest Tube     T(C): 36.6 (02-03-24 @ 23:00), Max: 36.6 (02-03-24 @ 03:00)  HR: 55 (02-03-24 @ 23:00) (53 - 63)  BP: --  BP(mean): --  RR: 14 (02-03-24 @ 23:00) (10 - 20)  SpO2: 97% (02-03-24 @ 23:00) (97% - 99%)    acetaminophen     Tablet .. 975 milliGRAM(s) Oral every 6 hours PRN  atorvastatin 40 milliGRAM(s) Oral at bedtime  chlorhexidine 4% Liquid 1 Application(s) Topical every 24 hours  desmopressin 0.05 milliGRAM(s) Oral <User Schedule>  enoxaparin Injectable 40 milliGRAM(s) SubCutaneous <User Schedule>  hydrocortisone 10 milliGRAM(s) Oral <User Schedule>  hydrocortisone 5 milliGRAM(s) Oral <User Schedule>  levothyroxine 50 MICROGram(s) Oral daily  polyethylene glycol 3350 17 Gram(s) Oral daily  senna 2 Tablet(s) Oral at bedtime  sodium chloride 0.65% Nasal 2 Spray(s) Both Nostrils four times a day  traMADol 25 milliGRAM(s) Oral every 4 hours PRN        02-02-24 @ 07:01  -  02-03-24 @ 07:00  --------------------------------------------------------  IN: 1500 mL / OUT: 2230 mL / NET: -730 mL    02-03-24 @ 07:01  -  02-04-24 @ 02:08  --------------------------------------------------------  IN: 700 mL / OUT: 3045 mL / NET: -2345 mL      PHYSICAL EXAM:    Constitutional: no acute distress    Neurological: alert, o x 3, EOMI, PERRL VF grossly intact, FC, full strength throughout without drift, SILT    Pulmonary: Clear to Auscultation, No rales, No rhonchi, No wheezes     Cardiovascular: S1, S2, Regular rate and rhythm     Gastrointestinal: Soft, Non-tender, Non-distended

## 2024-02-05 DIAGNOSIS — E03.8 OTHER SPECIFIED HYPOTHYROIDISM: ICD-10-CM

## 2024-02-05 DIAGNOSIS — E23.0 HYPOPITUITARISM: ICD-10-CM

## 2024-02-05 LAB
ANION GAP SERPL CALC-SCNC: 12 MMOL/L — SIGNIFICANT CHANGE UP (ref 5–17)
ANION GAP SERPL CALC-SCNC: 14 MMOL/L — SIGNIFICANT CHANGE UP (ref 5–17)
ANION GAP SERPL CALC-SCNC: 15 MMOL/L — SIGNIFICANT CHANGE UP (ref 5–17)
BUN SERPL-MCNC: 10 MG/DL — SIGNIFICANT CHANGE UP (ref 7–23)
BUN SERPL-MCNC: 9 MG/DL — SIGNIFICANT CHANGE UP (ref 7–23)
BUN SERPL-MCNC: 9 MG/DL — SIGNIFICANT CHANGE UP (ref 7–23)
CALCIUM SERPL-MCNC: 10.5 MG/DL — SIGNIFICANT CHANGE UP (ref 8.4–10.5)
CALCIUM SERPL-MCNC: 10.6 MG/DL — HIGH (ref 8.4–10.5)
CALCIUM SERPL-MCNC: 9.7 MG/DL — SIGNIFICANT CHANGE UP (ref 8.4–10.5)
CHLORIDE SERPL-SCNC: 95 MMOL/L — LOW (ref 96–108)
CHLORIDE SERPL-SCNC: 96 MMOL/L — SIGNIFICANT CHANGE UP (ref 96–108)
CHLORIDE SERPL-SCNC: 97 MMOL/L — SIGNIFICANT CHANGE UP (ref 96–108)
CO2 SERPL-SCNC: 19 MMOL/L — LOW (ref 22–31)
CO2 SERPL-SCNC: 26 MMOL/L — SIGNIFICANT CHANGE UP (ref 22–31)
CO2 SERPL-SCNC: 26 MMOL/L — SIGNIFICANT CHANGE UP (ref 22–31)
CREAT SERPL-MCNC: 0.58 MG/DL — SIGNIFICANT CHANGE UP (ref 0.5–1.3)
CREAT SERPL-MCNC: 0.71 MG/DL — SIGNIFICANT CHANGE UP (ref 0.5–1.3)
CREAT SERPL-MCNC: 0.75 MG/DL — SIGNIFICANT CHANGE UP (ref 0.5–1.3)
EGFR: 115 ML/MIN/1.73M2 — SIGNIFICANT CHANGE UP
EGFR: 117 ML/MIN/1.73M2 — SIGNIFICANT CHANGE UP
EGFR: 124 ML/MIN/1.73M2 — SIGNIFICANT CHANGE UP
GLUCOSE SERPL-MCNC: 103 MG/DL — HIGH (ref 70–99)
GLUCOSE SERPL-MCNC: 116 MG/DL — HIGH (ref 70–99)
GLUCOSE SERPL-MCNC: 98 MG/DL — SIGNIFICANT CHANGE UP (ref 70–99)
HCT VFR BLD CALC: 41 % — SIGNIFICANT CHANGE UP (ref 39–50)
HCT VFR BLD CALC: 43.7 % — SIGNIFICANT CHANGE UP (ref 39–50)
HGB BLD-MCNC: 14.1 G/DL — SIGNIFICANT CHANGE UP (ref 13–17)
HGB BLD-MCNC: 14.6 G/DL — SIGNIFICANT CHANGE UP (ref 13–17)
MAGNESIUM SERPL-MCNC: 2.2 MG/DL — SIGNIFICANT CHANGE UP (ref 1.6–2.6)
MCHC RBC-ENTMCNC: 29.3 PG — SIGNIFICANT CHANGE UP (ref 27–34)
MCHC RBC-ENTMCNC: 29.4 PG — SIGNIFICANT CHANGE UP (ref 27–34)
MCHC RBC-ENTMCNC: 33.4 GM/DL — SIGNIFICANT CHANGE UP (ref 32–36)
MCHC RBC-ENTMCNC: 34.4 GM/DL — SIGNIFICANT CHANGE UP (ref 32–36)
MCV RBC AUTO: 85.1 FL — SIGNIFICANT CHANGE UP (ref 80–100)
MCV RBC AUTO: 88.1 FL — SIGNIFICANT CHANGE UP (ref 80–100)
NRBC # BLD: 0 /100 WBCS — SIGNIFICANT CHANGE UP (ref 0–0)
NRBC # BLD: 0 /100 WBCS — SIGNIFICANT CHANGE UP (ref 0–0)
PHOSPHATE SERPL-MCNC: 4.4 MG/DL — SIGNIFICANT CHANGE UP (ref 2.5–4.5)
PHOSPHATE SERPL-MCNC: 5.4 MG/DL — HIGH (ref 2.5–4.5)
PHOSPHATE SERPL-MCNC: 5.5 MG/DL — HIGH (ref 2.5–4.5)
PHOSPHATE SERPL-MCNC: 5.7 MG/DL — HIGH (ref 2.5–4.5)
PLATELET # BLD AUTO: 267 K/UL — SIGNIFICANT CHANGE UP (ref 150–400)
PLATELET # BLD AUTO: 303 K/UL — SIGNIFICANT CHANGE UP (ref 150–400)
POTASSIUM SERPL-MCNC: 3.9 MMOL/L — SIGNIFICANT CHANGE UP (ref 3.5–5.3)
POTASSIUM SERPL-MCNC: 4.3 MMOL/L — SIGNIFICANT CHANGE UP (ref 3.5–5.3)
POTASSIUM SERPL-MCNC: 5.5 MMOL/L — HIGH (ref 3.5–5.3)
POTASSIUM SERPL-SCNC: 3.9 MMOL/L — SIGNIFICANT CHANGE UP (ref 3.5–5.3)
POTASSIUM SERPL-SCNC: 4.3 MMOL/L — SIGNIFICANT CHANGE UP (ref 3.5–5.3)
POTASSIUM SERPL-SCNC: 5.5 MMOL/L — HIGH (ref 3.5–5.3)
RBC # BLD: 4.82 M/UL — SIGNIFICANT CHANGE UP (ref 4.2–5.8)
RBC # BLD: 4.96 M/UL — SIGNIFICANT CHANGE UP (ref 4.2–5.8)
RBC # FLD: 12.4 % — SIGNIFICANT CHANGE UP (ref 10.3–14.5)
RBC # FLD: 12.6 % — SIGNIFICANT CHANGE UP (ref 10.3–14.5)
SODIUM SERPL-SCNC: 131 MMOL/L — LOW (ref 135–145)
SODIUM SERPL-SCNC: 134 MMOL/L — LOW (ref 135–145)
SODIUM SERPL-SCNC: 135 MMOL/L — SIGNIFICANT CHANGE UP (ref 135–145)
WBC # BLD: 8.49 K/UL — SIGNIFICANT CHANGE UP (ref 3.8–10.5)
WBC # BLD: 8.85 K/UL — SIGNIFICANT CHANGE UP (ref 3.8–10.5)
WBC # FLD AUTO: 8.49 K/UL — SIGNIFICANT CHANGE UP (ref 3.8–10.5)
WBC # FLD AUTO: 8.85 K/UL — SIGNIFICANT CHANGE UP (ref 3.8–10.5)

## 2024-02-05 PROCEDURE — 99221 1ST HOSP IP/OBS SF/LOW 40: CPT

## 2024-02-05 PROCEDURE — 99233 SBSQ HOSP IP/OBS HIGH 50: CPT

## 2024-02-05 RX ORDER — HYDROCORTISONE 20 MG
5 TABLET ORAL ONCE
Refills: 0 | Status: COMPLETED | OUTPATIENT
Start: 2024-02-05 | End: 2024-02-05

## 2024-02-05 RX ORDER — POTASSIUM CHLORIDE 20 MEQ
20 PACKET (EA) ORAL ONCE
Refills: 0 | Status: COMPLETED | OUTPATIENT
Start: 2024-02-05 | End: 2024-02-06

## 2024-02-05 RX ORDER — HYDROCORTISONE 20 MG
20 TABLET ORAL
Refills: 0 | Status: DISCONTINUED | OUTPATIENT
Start: 2024-02-06 | End: 2024-02-08

## 2024-02-05 RX ORDER — HYDROCORTISONE 20 MG
10 TABLET ORAL
Refills: 0 | Status: DISCONTINUED | OUTPATIENT
Start: 2024-02-06 | End: 2024-02-08

## 2024-02-05 RX ADMIN — SENNA PLUS 2 TABLET(S): 8.6 TABLET ORAL at 21:16

## 2024-02-05 RX ADMIN — Medication 975 MILLIGRAM(S): at 08:16

## 2024-02-05 RX ADMIN — Medication 5 MILLIGRAM(S): at 17:14

## 2024-02-05 RX ADMIN — Medication 2 SPRAY(S): at 11:42

## 2024-02-05 RX ADMIN — Medication 975 MILLIGRAM(S): at 21:15

## 2024-02-05 RX ADMIN — Medication 975 MILLIGRAM(S): at 21:45

## 2024-02-05 RX ADMIN — Medication 10 MILLIGRAM(S): at 08:12

## 2024-02-05 RX ADMIN — Medication 50 MICROGRAM(S): at 05:33

## 2024-02-05 RX ADMIN — POLYETHYLENE GLYCOL 3350 17 GRAM(S): 17 POWDER, FOR SOLUTION ORAL at 05:33

## 2024-02-05 RX ADMIN — ATORVASTATIN CALCIUM 40 MILLIGRAM(S): 80 TABLET, FILM COATED ORAL at 21:16

## 2024-02-05 RX ADMIN — ENOXAPARIN SODIUM 40 MILLIGRAM(S): 100 INJECTION SUBCUTANEOUS at 17:15

## 2024-02-05 RX ADMIN — CHLORHEXIDINE GLUCONATE 1 APPLICATION(S): 213 SOLUTION TOPICAL at 11:42

## 2024-02-05 RX ADMIN — Medication 2 SPRAY(S): at 05:33

## 2024-02-05 RX ADMIN — POLYETHYLENE GLYCOL 3350 17 GRAM(S): 17 POWDER, FOR SOLUTION ORAL at 17:15

## 2024-02-05 RX ADMIN — Medication 2 SPRAY(S): at 17:15

## 2024-02-05 RX ADMIN — Medication 5 MILLIGRAM(S): at 15:42

## 2024-02-05 RX ADMIN — Medication 975 MILLIGRAM(S): at 07:16

## 2024-02-05 RX ADMIN — Medication 5 MILLIGRAM(S): at 11:43

## 2024-02-05 RX ADMIN — Medication 2 SPRAY(S): at 00:50

## 2024-02-05 RX ADMIN — Medication 2 SPRAY(S): at 23:50

## 2024-02-05 NOTE — PROGRESS NOTE ADULT - PROBLEM SELECTOR PLAN 1
·  Plan: - TSRP precautions (no straws, incentive spirometry, bipap)   - Monitor for CSF leak / epistaxis   - Nasal saline 2 sprays to b/l nares qid  - LD per NSCU  - ENT will continue to follow  - Call with questions or concerns.

## 2024-02-05 NOTE — PROGRESS NOTE ADULT - ASSESSMENT
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

## 2024-02-05 NOTE — PROGRESS NOTE ADULT - SUBJECTIVE AND OBJECTIVE BOX
ENDOCRINE FOLLOW UP     Chief Complaint: panhypopituitarism    History:     MEDICATIONS  (STANDING):  atorvastatin 40 milliGRAM(s) Oral at bedtime  chlorhexidine 4% Liquid 1 Application(s) Topical every 24 hours  enoxaparin Injectable 40 milliGRAM(s) SubCutaneous <User Schedule>  hydrocortisone 10 milliGRAM(s) Oral <User Schedule>  hydrocortisone 5 milliGRAM(s) Oral <User Schedule>  levothyroxine 50 MICROGram(s) Oral daily  polyethylene glycol 3350 17 Gram(s) Oral every 12 hours  senna 2 Tablet(s) Oral at bedtime  sodium chloride 0.65% Nasal 2 Spray(s) Both Nostrils four times a day    MEDICATIONS  (PRN):  acetaminophen     Tablet .. 975 milliGRAM(s) Oral every 6 hours PRN Temp greater or equal to 38C (100.4F), Mild Pain (1 - 3)  bisacodyl 5 milliGRAM(s) Oral every 12 hours PRN Constipation  melatonin 5 milliGRAM(s) Oral at bedtime PRN Sleep  oxyCODONE    IR 10 milliGRAM(s) Oral every 4 hours PRN Severe Pain (7 - 10)  oxyCODONE    IR 5 milliGRAM(s) Oral every 4 hours PRN Moderate Pain (4 - 6)      Allergies    No Known Allergies    Intolerances        ROS: All other systems reviewed and negative    PHYSICAL EXAM:  VITALS: T(C): 36.7 (02-05-24 @ 07:00)  T(F): 98 (02-05-24 @ 07:00), Max: 98.8 (02-04-24 @ 23:00)  HR: 67 (02-05-24 @ 07:00) (56 - 67)  BP: 111/81 (02-05-24 @ 07:00) (102/87 - 131/81)  RR:  (12 - 14)  SpO2:  (95% - 97%)  Wt(kg): --  GENERAL: NAD, resting comfortably   EYES: No proptosis,  anicteric  HEENT:  Atraumatic, Normocephalic, moist mucous membranes  RESPIRATORY: Nonlabored respirations on room air, normal rate/effort   CARDIOVASCULAR: Did not appear cyanotic, no lower extremity swelling visualized  GI: did not appear distended  NEURO: Answering questions appropriately, moves extremities spontaneously  PSYCH:  reactive affect, euthymic mood        02-05    134<L>  |  96  |  9   ----------------------------<  98  4.3   |  26  |  0.75    eGFR: 115    Ca    10.6<H>      02-05  Mg     2.2     02-05  Phos  5.5     02-05            Thyroid Stimulating Hormone, Serum: 0.15 uIU/mL (02-03-24 @ 08:37)  Thyroid Stimulating Hormone, Serum: 0.27 uIU/mL (02-02-24 @ 11:35)   ENDOCRINE FOLLOW UP     Chief Complaint: panhypopituitarism    History:   Reports feeling well, denies polyuria, endorses peeing normal amount. He reports feeling a bit lethargic but improved from yesterday, denies nausea and vomiting. He reports eating and drinking okay but not great. He reports constipation.    MEDICATIONS  (STANDING):  atorvastatin 40 milliGRAM(s) Oral at bedtime  chlorhexidine 4% Liquid 1 Application(s) Topical every 24 hours  enoxaparin Injectable 40 milliGRAM(s) SubCutaneous <User Schedule>  hydrocortisone 10 milliGRAM(s) Oral <User Schedule>  hydrocortisone 5 milliGRAM(s) Oral <User Schedule>  levothyroxine 50 MICROGram(s) Oral daily  polyethylene glycol 3350 17 Gram(s) Oral every 12 hours  senna 2 Tablet(s) Oral at bedtime  sodium chloride 0.65% Nasal 2 Spray(s) Both Nostrils four times a day    MEDICATIONS  (PRN):  acetaminophen     Tablet .. 975 milliGRAM(s) Oral every 6 hours PRN Temp greater or equal to 38C (100.4F), Mild Pain (1 - 3)  bisacodyl 5 milliGRAM(s) Oral every 12 hours PRN Constipation  melatonin 5 milliGRAM(s) Oral at bedtime PRN Sleep  oxyCODONE    IR 10 milliGRAM(s) Oral every 4 hours PRN Severe Pain (7 - 10)  oxyCODONE    IR 5 milliGRAM(s) Oral every 4 hours PRN Moderate Pain (4 - 6)      Allergies    No Known Allergies    Intolerances        ROS: All other systems reviewed and negative    PHYSICAL EXAM:  VITALS: T(C): 36.7 (02-05-24 @ 07:00)  T(F): 98 (02-05-24 @ 07:00), Max: 98.8 (02-04-24 @ 23:00)  HR: 67 (02-05-24 @ 07:00) (56 - 67)  BP: 111/81 (02-05-24 @ 07:00) (102/87 - 131/81)  RR:  (12 - 14)  SpO2:  (95% - 97%)  Wt(kg): --  GENERAL: NAD, resting comfortably in bed  EYES: No proptosis,  anicteric  HEENT:  bandage around nose, dry mucous membranes  RESPIRATORY: Nonlabored respirations on room air, normal rate/effort   CARDIOVASCULAR: Did not appear cyanotic, no lower extremity swelling visualized  GI: did not appear distended  NEURO: Answering questions appropriately, moves extremities spontaneously  PSYCH:  reactive affect, euthymic mood        02-05    134<L>  |  96  |  9   ----------------------------<  98  4.3   |  26  |  0.75    eGFR: 115    Ca    10.6<H>      02-05  Mg     2.2     02-05  Phos  5.5     02-05            Thyroid Stimulating Hormone, Serum: 0.15 uIU/mL (02-03-24 @ 08:37)  Thyroid Stimulating Hormone, Serum: 0.27 uIU/mL (02-02-24 @ 11:35)

## 2024-02-05 NOTE — PROGRESS NOTE ADULT - ASSESSMENT
-160  Off DDAVP, holding well  LDrain @ 5cc/3hour - keep through the weekend   MRI ww/o/sella protocol when LD is dc'd   Endo-f, stress dose hydrocort if needed 50q8  fu PT recs

## 2024-02-05 NOTE — PROGRESS NOTE ADULT - SUBJECTIVE AND OBJECTIVE BOX
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 Conway.     12hr EVENTS:  overnight held ddavp for hyponatremia     ROS: All other systems negative except as above    ----------------------------------------------------------------------------------------------------  PHYSICAL EXAM:  Constitutional: laying comfortably in bed    Neurological: alert, o x 3,   EOMI, PERRL VF grossly intact, FC,   full strength throughout without drift, SILT    Pulmonary: no accessory muscle use, normal respiratory effort  Cardiovascular: Regular rate and rhythm  Gastrointestinal: Soft, Non-tender, Non-distended   Extremities: warm/dry, no edema    -----------------------------------------------------------------------------------------------------    ICU Vital Signs Last 24 Hrs  T(C): 36.7 (05 Feb 2024 07:00), Max: 37.1 (04 Feb 2024 23:00)  T(F): 98 (05 Feb 2024 07:00), Max: 98.8 (04 Feb 2024 23:00)  HR: 67 (05 Feb 2024 07:00) (56 - 67)  BP: 111/81 (05 Feb 2024 07:00) (102/87 - 131/81)  BP(mean): 88 (05 Feb 2024 07:00) (88 - 102)  ABP: --  ABP(mean): --  RR: 12 (05 Feb 2024 07:00) (12 - 14)  SpO2: 95% (05 Feb 2024 07:00) (95% - 97%)    O2 Parameters below as of 05 Feb 2024 07:00  Patient On (Oxygen Delivery Method): room air      I&O's Summary    04 Feb 2024 07:01  -  05 Feb 2024 07:00  --------------------------------------------------------  IN: 660 mL / OUT: 1290 mL / NET: -630 mL    05 Feb 2024 07:01  -  05 Feb 2024 09:23  --------------------------------------------------------  IN: 100 mL / OUT: 5 mL / NET: 95 mL    02-05    134<L>  |  96  |  9   ----------------------------<  98  4.3   |  26  |  0.75    Ca    10.6<H>      05 Feb 2024 08:16  Phos  5.5     02-05  Mg     2.2     02-05    MEDICATIONS  (STANDING):  atorvastatin 40 milliGRAM(s) Oral at bedtime  chlorhexidine 4% Liquid 1 Application(s) Topical every 24 hours  enoxaparin Injectable 40 milliGRAM(s) SubCutaneous <User Schedule>  hydrocortisone 10 milliGRAM(s) Oral <User Schedule>  hydrocortisone 5 milliGRAM(s) Oral <User Schedule>  levothyroxine 50 MICROGram(s) Oral daily  polyethylene glycol 3350 17 Gram(s) Oral every 12 hours  senna 2 Tablet(s) Oral at bedtime  sodium chloride 0.65% Nasal 2 Spray(s) Both Nostrils four times a day    MEDICATIONS  (PRN):  acetaminophen     Tablet .. 975 milliGRAM(s) Oral every 6 hours PRN Temp greater or equal to 38C (100.4F), Mild Pain (1 - 3)  bisacodyl 5 milliGRAM(s) Oral every 12 hours PRN Constipation  melatonin 5 milliGRAM(s) Oral at bedtime PRN Sleep  oxyCODONE    IR 5 milliGRAM(s) Oral every 4 hours PRN Moderate Pain (4 - 6)  oxyCODONE    IR 10 milliGRAM(s) Oral every 4 hours PRN Severe Pain (7 - 10)

## 2024-02-05 NOTE — PROGRESS NOTE ADULT - SUBJECTIVE AND OBJECTIVE BOX
Patient seen and examined at bedside.    --Anticoagulation--  enoxaparin Injectable 40 milliGRAM(s) SubCutaneous <User Schedule>    T(C): 36.4 (02-04-24 @ 03:00), Max: 36.6 (02-03-24 @ 11:00)  HR: 52 (02-04-24 @ 03:00) (52 - 60)  BP: --  RR: 12 (02-04-24 @ 03:00) (10 - 20)  SpO2: 95% (02-04-24 @ 03:00) (95% - 99%)  Wt(kg): --    Exam: Intact, no csf leak

## 2024-02-05 NOTE — PROGRESS NOTE ADULT - NS ATTEND AMEND GEN_ALL_CORE FT
The above documentation represents 30min spent reviewing patient's data and my independent medical decision making. Patient continues to require monitoring for hypopituitarism and DI.
S/P TSR Surgery 2/1/24 Doing well. No signs of nasal CSF fluid leak. Patient denies salty taste, no vision changes, no acute epistaxis. Continue using nasal rinse. Care a/p neurosurgery.
drain clamped  no salty taste  minor drainage on nasal tip dressing, will monitor.
The above documentation represents 30min spent reviewing patient's data and my independent medical decision making. Patient continues to require monitoring for hypopituitarism and DI.

## 2024-02-05 NOTE — PROGRESS NOTE ADULT - ASSESSMENT
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

## 2024-02-05 NOTE — PROGRESS NOTE ADULT - SUBJECTIVE AND OBJECTIVE BOX
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 Conway.     12hr EVENTS:  -     ROS: All other systems negative except as above    ----------------------------------------------------------------------------------------------------  PHYSICAL EXAM: ***  Constitutional: sitting up comfortably in bed    Neurological: alert, o x 3,   EOMI, PERRL VF grossly intact, FC,   full strength throughout without drift, SILT    Pulmonary: no accessory muscle use, normal respiratory effort  Cardiovascular: Regular rate and rhythm  Gastrointestinal: Soft, Non-tender, Non-distended   Extremities: warm/dry, no edema    -----------------------------------------------------------------------------------------------------  ICU Vital Signs Last 24 Hrs  T(C): 36.8 (05 Feb 2024 11:00), Max: 37.1 (04 Feb 2024 23:00)  T(F): 98.2 (05 Feb 2024 11:00), Max: 98.8 (04 Feb 2024 23:00)  HR: 63 (05 Feb 2024 19:00) (56 - 67)  BP: 112/83 (05 Feb 2024 19:00) (102/87 - 131/81)  BP(mean): 93 (05 Feb 2024 19:00) (88 - 102)  ABP: --  ABP(mean): --  RR: 18 (05 Feb 2024 19:00) (11 - 18)  SpO2: 95% (05 Feb 2024 19:00) (95% - 97%)    O2 Parameters below as of 05 Feb 2024 19:00  Patient On (Oxygen Delivery Method): room air            I&O's Summary    04 Feb 2024 07:01  -  05 Feb 2024 07:00  --------------------------------------------------------  IN: 660 mL / OUT: 1290 mL / NET: -630 mL    05 Feb 2024 07:01  -  05 Feb 2024 19:10  --------------------------------------------------------  IN: 400 mL / OUT: 1015 mL / NET: -615 mL        MEDICATIONS  (STANDING):  atorvastatin 40 milliGRAM(s) Oral at bedtime  chlorhexidine 4% Liquid 1 Application(s) Topical every 24 hours  enoxaparin Injectable 40 milliGRAM(s) SubCutaneous <User Schedule>  levothyroxine 50 MICROGram(s) Oral daily  polyethylene glycol 3350 17 Gram(s) Oral every 12 hours  senna 2 Tablet(s) Oral at bedtime  sodium chloride 0.65% Nasal 2 Spray(s) Both Nostrils four times a day      RESPIRATORY:        IMAGING:   Recent imaging studies were reviewed.    LAB RESULTS:                          14.6   8.49  )-----------( 267      ( 05 Feb 2024 06:49 )             43.7           02-05    134<L>  |  96  |  9   ----------------------------<  98  4.3   |  26  |  0.75    Ca    10.6<H>      05 Feb 2024 08:16  Phos  5.5     02-05  Mg     2.2     02-05                  -----------------------------------------------------------------------------------------------------------------------------------------------------------------------------------           NSICU Night Intensivist Note    Historical Review:   43M with h/o HLD, chronic headaches, suprasellar cystic mass (d/x 2020, 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 Conway.     12hr EVENTS:  - lumbar drain clamped    ROS: mild neck stiffness, All other systems negative except as above    ----------------------------------------------------------------------------------------------------  PHYSICAL EXAM:   Constitutional: sitting up comfortably in bed    Neurological: alert, o x 3,   EOMI, PERRL VF grossly intact, FC,   full strength throughout without drift, SILT    Pulmonary: no accessory muscle use, normal respiratory effort  Cardiovascular: Regular rate and rhythm  Gastrointestinal: Soft, Non-tender, Non-distended   Extremities: warm/dry, no edema    -----------------------------------------------------------------------------------------------------  ICU Vital Signs Last 24 Hrs  T(C): 36.8 (05 Feb 2024 11:00), Max: 37.1 (04 Feb 2024 23:00)  T(F): 98.2 (05 Feb 2024 11:00), Max: 98.8 (04 Feb 2024 23:00)  HR: 63 (05 Feb 2024 19:00) (56 - 67)  BP: 112/83 (05 Feb 2024 19:00) (102/87 - 131/81)  BP(mean): 93 (05 Feb 2024 19:00) (88 - 102)  ABP: --  ABP(mean): --  RR: 18 (05 Feb 2024 19:00) (11 - 18)  SpO2: 95% (05 Feb 2024 19:00) (95% - 97%)    O2 Parameters below as of 05 Feb 2024 19:00  Patient On (Oxygen Delivery Method): room air            I&O's Summary    04 Feb 2024 07:01  -  05 Feb 2024 07:00  --------------------------------------------------------  IN: 660 mL / OUT: 1290 mL / NET: -630 mL    05 Feb 2024 07:01  -  05 Feb 2024 19:10  --------------------------------------------------------  IN: 400 mL / OUT: 1015 mL / NET: -615 mL        MEDICATIONS  (STANDING):  atorvastatin 40 milliGRAM(s) Oral at bedtime  chlorhexidine 4% Liquid 1 Application(s) Topical every 24 hours  enoxaparin Injectable 40 milliGRAM(s) SubCutaneous <User Schedule>  levothyroxine 50 MICROGram(s) Oral daily  polyethylene glycol 3350 17 Gram(s) Oral every 12 hours  senna 2 Tablet(s) Oral at bedtime  sodium chloride 0.65% Nasal 2 Spray(s) Both Nostrils four times a day    IMAGING:   Recent imaging studies were reviewed.    LAB RESULTS:                          14.6   8.49  )-----------( 267      ( 05 Feb 2024 06:49 )             43.7           02-05    134<L>  |  96  |  9   ----------------------------<  98  4.3   |  26  |  0.75    Ca    10.6<H>      05 Feb 2024 08:16  Phos  5.5     02-05  Mg     2.2     02-05    -----------------------------------------------------------------------------------------------------------------------------------------------------------------------------------

## 2024-02-05 NOTE — PROGRESS NOTE ADULT - ASSESSMENT
44 y/o male. PMH HLD hx of suprasellar cystic mass since 2020, on surveillance imaging since found tumor has increased in size s/p TSP on 2/1/24 c/b hypernatremia started on DDDAVP. Endocrine consulted for DI monitoring and r/o panhypopit.     #Secondary Adrenal insufficiency  #Central DI  #R/o Panhypopit  #Central hypogonadism  - likely Central DI post op since UOP >500cc/hr, USG and UOsm low and hypernatremia  - AM cortisol 0.5 (LOW), ACTH 3.5 (LOW) - consistent with secondary adrenal insufficiency  - TSH 0.15, FT4 0.8 - consistent with secondary hypothyroidism  - FSH/LH low, prior labs showing secondary hypogonadism  - 2/3/24: Na 133 and urine output decreasing - DDAVP held  PLAN  - continue to HOLD DDAVP 0.05mg daily d/t hyponatremia and normal Urine output  - c/w hydrocortisone 10mg at 8am and 5mg at 3pm for secondary adrenal insufficiency  - if patient becomes hemodynamically unstable, please start stress dose steroids (hydrocortisone 50mg IV q8h) since patient is now diagnosed with secondary adrenal insufficiency  - c/w levothyroxine 50mcg daily for central hypothyroidism (maintain on empty stomach (at least 1 hour before meals), separate by 4 hours from PPI or calcium supplementation which can inhibit its absorption)  - recheck TSH and Free T4 5 days (2/8/24) if still hospitalized otherwhise recheck outpatient in 4 week  - patient will follow up with endocrinologist Dr. Isidra Dao outpatient  - strict I&Os, daily BMPs    *DI WATCH*:  - Goal Na 140-145  - DI is suspected if UOP is >250cc/hr x 2 consecutive hours or if >500cc/hr x 1 hr - if this occurs please check STAT serum Na, urine osm, urine specific gravity  - if specific gravity <1.005/Urine Osm <300 and Na is rising - likely DI, please dose DDAVP 0.05mg PO x1 (or DDAVP 0.5mcg IV if no PO access) and bolus 1/2 NS to match half the output (for example, if net negative 2Liters can give 1Liter 1/2NS if pt is unable to keep up with output with PO intake) - continue DI watch  - If Na continues to increase despite a a DDAVP dose and 1/2NS fluid bolus please inform endocrine   - Please make sure pt has access to water and encourage her to drink to thirst     Discussed with primary team.     Thank you for the interesting consult.    Joe Walker MD, Endocrinology Fellow  For non-urgent follow up questions, please email [email or web address] or [email or web address].  Pager [phone] from 9am to 5pm. After hours and on weekends, please call [phone].     42 y/o male. PMH HLD hx of suprasellar cystic mass since 2020, on surveillance imaging since found tumor has increased in size s/p TSP on 2/1/24 c/b hypernatremia started on DDDAVP. Endocrine consulted for DI monitoring and r/o panhypopit.     #Secondary Adrenal insufficiency  #Central DI  #R/o Panhypopit  #Central hypogonadism  - likely Central DI post op since UOP >500cc/hr, USG and UOsm low and hypernatremia  - AM cortisol 0.5 (LOW), ACTH 3.5 (LOW) - consistent with secondary adrenal insufficiency  - TSH 0.15, FT4 0.8 - consistent with secondary hypothyroidism  - FSH/LH low, prior labs showing secondary hypogonadism  - 2/3/24: Na 133 and urine output decreasing - DDAVP held  PLAN  In terms of AI  - c/w hydrocortisone 10mg at 8am and 5mg at 3pm for secondary adrenal insufficiency  - if patient becomes hemodynamically unstable, please start stress dose steroids (hydrocortisone 50mg IV q8h) since patient is now diagnosed with secondary adrenal insufficiency  FOR DISCHARGE  to help with dc planning-  - Please print and give the pt info section for patients under adrenal insufficiency (this will educate them regarding the warning signs of adrenal crisis )  - Patient should take 2-3x of home dose in cases of illness, ever, accidents, and surgery. If unable to take PO, use IM injection as below (Solu-Cortef)  - pt should receive stress dosing (IV hydrocortisone 50mg q8) with any major illness or surgical procedure  - Should pt be unable to tolerate PO and is unable to take hydrocortisone, pt will need an emergency injection. Please discharge with a prescription for 100 mg Solu-Cortef Act-O-Vial and the following instructions:  [email or web address]-[email or web address]-[email or web address]-[email or web address]-[email or web address]-cortef-act-[email or web address]-vial-[email or web address]-chamber-ampul/  - pt should obtain a medical alert bracelet or necklace to inform emergency providers of adrenal insufficiency diagnosis [[email or web address]]  In terms of hypothyroidism  - c/w levothyroxine 50mcg daily for central hypothyroidism (maintain on empty stomach (at least 1 hour before meals), separate by 4 hours from PPI or calcium supplementation which can inhibit its absorption)  - recheck TSH and Free T4 5 days (2/8/24) if still hospitalized otherwhise recheck outpatient in 4 week  - patient will follow up with endocrinologist Dr. Isidra Dao outpatient  In terms of DI  - continue to HOLD DDAVP 0.05mg daily d/t hyponatremia and normal Urine output  - strict I&Os, daily BMPs  *DI WATCH*:  - Goal Na 140-145  - DI is suspected if UOP is >250cc/hr x 2 consecutive hours or if >500cc/hr x 1 hr - if this occurs please check STAT serum Na, urine osm, urine specific gravity  - if specific gravity <1.005/Urine Osm <300 and Na is rising - likely DI, please dose DDAVP 0.05mg PO x1 (or DDAVP 0.5mcg IV if no PO access) and bolus 1/2 NS to match half the output (for example, if net negative 2Liters can give 1Liter 1/2NS if pt is unable to keep up with output with PO intake) - continue DI watch  - If Na continues to increase despite a a DDAVP dose and 1/2NS fluid bolus please inform endocrine   - Please make sure pt has access to water and encourage her to drink to thirst     Discussed with primary team.     Thank you for the interesting consult.    Joe Walker MD, Endocrinology Fellow  For non-urgent follow up questions, please email [email or web address] or [email or web address].  Pager [phone] from 9am to 5pm. After hours and on weekends, please call [phone].     44 y/o male. PMH HLD hx of suprasellar cystic mass since 2020, on surveillance imaging since found tumor has increased in size s/p TSP on 2/1/24 c/b hypernatremia started on DDDAVP. Endocrine consulted for DI monitoring and r/o panhypopit.     #Secondary Adrenal insufficiency  #Central DI  #R/o Panhypopit  #Central hypogonadism  - likely Central DI post op since UOP >500cc/hr, USG and UOsm low and hypernatremia  - AM cortisol 0.5 (LOW), ACTH 3.5 (LOW) - consistent with secondary adrenal insufficiency  - TSH 0.15, FT4 0.8 - consistent with secondary hypothyroidism  - FSH/LH low, prior labs showing secondary hypogonadism  - 2/3/24: Na 133 and urine output decreasing - DDAVP held  PLAN  In terms of AI  - increase to hydrocortisone 20mg at 8am and 10mg at 3pm while in the hospital, can give an extra 5mg this afternoon for total 10mg this afternoon if received dose already, plan to d/c on hydrocortisone 10mg at 8am and 5mg at 3pm  - if patient becomes hemodynamically unstable, please start stress dose steroids (hydrocortisone 50mg IV q8h) since patient is now diagnosed with secondary adrenal insufficiency  FOR DISCHARGE  to help with dc planning-  - Please print and give the pt info section for patients under adrenal insufficiency (this will educate them regarding the warning signs of adrenal crisis )  - Patient should take 2-3x of home dose in cases of illness, ever, accidents, and surgery. If unable to take PO, use IM injection as below (Solu-Cortef)  - pt should receive stress dosing (IV hydrocortisone 50mg q8) with any major illness or surgical procedure  - Should pt be unable to tolerate PO and is unable to take hydrocortisone, pt will need an emergency injection. Please discharge with a prescription for 100 mg Solu-Cortef Act-O-Vial and the following instructions:  [email or web address]/[email or web address]-injection/[email or web address]-injection-[email or web address]-solu-cortef-act-[email or web address]-vial-[email or web address]-chamber-ampul/  - pt should obtain a medical alert bracelet or necklace to inform emergency providers of adrenal insufficiency diagnosis [[email or web address]]  In terms of hypothyroidism  - c/w levothyroxine 50mcg daily for central hypothyroidism (maintain on empty stomach (at least 1 hour before meals), separate by 4 hours from PPI or calcium supplementation which can inhibit its absorption)  - recheck TSH and Free T4 5 days (2/8/24) if still hospitalized otherwhise recheck outpatient in 4 week  - patient will follow up with endocrinologist Dr. Isidra Dao outpatient  In terms of DI  - continue to HOLD DDAVP 0.05mg daily d/t hyponatremia and normal Urine output  - strict I&Os, daily BMPs  *DI WATCH*:  - Goal Na 140-145  - DI is suspected if UOP is >250cc/hr x 2 consecutive hours or if >500cc/hr x 1 hr - if this occurs please check STAT serum Na, urine osm, urine specific gravity  - if specific gravity <1.005/Urine Osm <300 and Na is rising - likely DI, please dose DDAVP 0.05mg PO x1 (or DDAVP 0.5mcg IV if no PO access) and bolus 1/2 NS to match half the output (for example, if net negative 2Liters can give 1Liter 1/2NS if pt is unable to keep up with output with PO intake) - continue DI watch  - If Na continues to increase despite a a DDAVP dose and 1/2NS fluid bolus please inform endocrine   - Please make sure pt has access to water and encourage her to drink to thirst     Discussed with primary team.     Thank you for the interesting consult.    Joe Walker MD, Endocrinology Fellow  For non-urgent follow up questions, please email [email or web address] or [email or web address].  Pager [phone] from 9am to 5pm. After hours and on weekends, please call [phone].     42 y/o male. PMH HLD hx of suprasellar cystic mass since 2020, on surveillance imaging since found tumor has increased in size s/p TSP on 2/1/24 c/b hypernatremia started on DDDAVP. Endocrine consulted for DI monitoring and r/o panhypopit.     #Secondary Adrenal insufficiency  #Central DI  #Panhypopit  #Central hypogonadism  #secondary hypothyroidism  - likely Central DI post op since UOP >500cc/hr, USG and UOsm low and hypernatremia  - AM cortisol 0.5 (LOW), ACTH 3.5 (LOW) - consistent with secondary adrenal insufficiency  - TSH 0.15, FT4 0.8 - consistent with secondary hypothyroidism  - FSH/LH low, prior labs showing secondary hypogonadism  - 2/3/24: Na 133 and urine output decreasing - DDAVP held  PLAN  In terms of AI  - increase to hydrocortisone 20mg at 8am and 10mg at 3pm while in the hospital, can give an extra 5mg this afternoon for total 10mg this afternoon if received dose already, plan to d/c on hydrocortisone 10mg at 8am and 5mg at 3pm  - if patient becomes hemodynamically unstable, please start stress dose steroids (hydrocortisone 50mg IV q8h) since patient is now diagnosed with secondary adrenal insufficiency  FOR DISCHARGE  to help with dc planning-  - Please print and give the pt info section for patients under adrenal insufficiency (this will educate them regarding the warning signs of adrenal crisis )  - Patient should take 2-3x of home dose in cases of illness, ever, accidents, and surgery. If unable to take PO, use IM injection as below (Solu-Cortef)  - pt should receive stress dosing (IV hydrocortisone 50mg q8) with any major illness or surgical procedure  - Should pt be unable to tolerate PO and is unable to take hydrocortisone, pt will need an emergency injection. Please discharge with a prescription for 100 mg Solu-Cortef Act-O-Vial and the following instructions:  [email or web address]/emergency-injection/emergency-injection-cortico-[email or web address]-solu-cortef-act-o-vial-[email or web address]-chamber-ampul/  - pt should obtain a medical alert bracelet or necklace to inform emergency providers of adrenal insufficiency diagnosis [[email or web address]]  In terms of hypothyroidism  - c/w levothyroxine 50mcg daily for central hypothyroidism (maintain on empty stomach (at least 1 hour before meals), separate by 4 hours from PPI or calcium supplementation which can inhibit its absorption)  - recheck TSH and Free T4 5 days (2/8/24) if still hospitalized otherwhise recheck outpatient in 4 week  - patient will follow up with endocrinologist Dr. Isidra Dao outpatient  In terms of DI  - continue to HOLD DDAVP 0.05mg daily d/t hyponatremia and normal Urine output  - strict I&Os, daily BMPs & urine osm   *DI WATCH*:  - Goal Na 140-145  - DI is suspected if UOP is >250cc/hr x 2 consecutive hours or if >500cc/hr x 1 hr - if this occurs please check STAT serum Na, urine osm, urine specific gravity  - if specific gravity <1.005/Urine Osm <300 and Na is rising - likely DI, please dose DDAVP 0.05mg PO x1 (or DDAVP 0.5mcg IV if no PO access) and bolus 1/2 NS to match half the output (for example, if net negative 2Liters can give 1Liter 1/2NS if pt is unable to keep up with output with PO intake) - continue DI watch  - If Na continues to increase despite a a DDAVP dose and 1/2NS fluid bolus please inform endocrine   - Please make sure pt has access to water and encourage her to drink to thirst     Discussed with primary team.     Thank you for the interesting consult.    Joe Walker MD, Endocrinology Fellow  For non-urgent follow up questions, please email [email or web address] or [email or web address].  Pager [phone] from 9am to 5pm. After hours and on weekends, please call [phone].

## 2024-02-05 NOTE — PROGRESS NOTE ADULT - ASSESSMENT
43 year old male with PMH HLD, s/p endoscopic transphenoidal removal craniopharyngioma POD #4. Pt c/o mild headache relieved with pain meds, minimal bleeding. No evidence of CSF leak or epistaxis on exam.

## 2024-02-05 NOTE — PROGRESS NOTE ADULT - SUBJECTIVE AND OBJECTIVE BOX
ENT ISSUE/POD: s/p endoscopic transphenoidal removal craniopharyngioma POD #4    HPI: 43 year old male with PMH HLD, s/p endoscopic transphenoidal removal craniopharyngioma POD #4. Pt seen and examined at bedside. No acute events overnight. Pt c/o mild headache relieved with pain meds, minimal bleeding. Pt denies salty/metallic taste, fever, n/v, SOB, dysphagia, odynophagia, hemoptysis, hoarseness.        PAST MEDICAL & SURGICAL HISTORY:  HLD (hyperlipidemia)      History of pituitary tumor      Deviated septum      History of dental surgery        Allergies    No Known Allergies    Intolerances      MEDICATIONS  (STANDING):  atorvastatin 40 milliGRAM(s) Oral at bedtime  chlorhexidine 4% Liquid 1 Application(s) Topical every 24 hours  enoxaparin Injectable 40 milliGRAM(s) SubCutaneous <User Schedule>  hydrocortisone 10 milliGRAM(s) Oral <User Schedule>  hydrocortisone 5 milliGRAM(s) Oral <User Schedule>  levothyroxine 50 MICROGram(s) Oral daily  polyethylene glycol 3350 17 Gram(s) Oral every 12 hours  senna 2 Tablet(s) Oral at bedtime  sodium chloride 0.65% Nasal 2 Spray(s) Both Nostrils four times a day    MEDICATIONS  (PRN):  acetaminophen     Tablet .. 975 milliGRAM(s) Oral every 6 hours PRN Temp greater or equal to 38C (100.4F), Mild Pain (1 - 3)  bisacodyl 5 milliGRAM(s) Oral every 12 hours PRN Constipation  melatonin 5 milliGRAM(s) Oral at bedtime PRN Sleep  oxyCODONE    IR 10 milliGRAM(s) Oral every 4 hours PRN Severe Pain (7 - 10)  oxyCODONE    IR 5 milliGRAM(s) Oral every 4 hours PRN Moderate Pain (4 - 6)      Social History: see consult    Family history: see consult    ROS:   ENT: all negative except as noted in HPI   Pulm: denies SOB, cough, hemoptysis  Neuro: denies numbness/tingling, loss of sensation  Endo: denies heat/cold intolerance, excessive sweating      Vital Signs Last 24 Hrs  T(C): 36.7 (05 Feb 2024 07:00), Max: 37.1 (04 Feb 2024 23:00)  T(F): 98 (05 Feb 2024 07:00), Max: 98.8 (04 Feb 2024 23:00)  HR: 67 (05 Feb 2024 07:00) (56 - 67)  BP: 111/81 (05 Feb 2024 07:00) (102/87 - 131/81)  BP(mean): 88 (05 Feb 2024 07:00) (88 - 102)  RR: 12 (05 Feb 2024 07:00) (12 - 14)  SpO2: 95% (05 Feb 2024 07:00) (95% - 97%)    Parameters below as of 05 Feb 2024 07:00  Patient On (Oxygen Delivery Method): room air                              14.6   8.49  )-----------( 267      ( 05 Feb 2024 06:49 )             43.7    02-05    134<L>  |  96  |  9   ----------------------------<  98  4.3   |  26  |  0.75    Ca    10.6<H>      05 Feb 2024 08:16  Phos  5.5     02-05  Mg     2.2     02-05         PHYSICAL EXAM:  Gen: NAD  Skin: No rashes, bruises, or lesions  Head: Normocephalic, Atraumatic  Face: no edema, erythema, or fluctuance. Parotid glands soft without mass  Eyes: no scleral injection  Nose: B/L nasopore in place, serosanguinous secretions from right nare, no active bleeding.   Mouth: Minimal dry blood in posterior pharynx. No Stridor / Drooling / Trismus.  Mucosa moist, tongue/uvula midline, oropharynx clear  Neck: Flat, supple, no lymphadenopathy, trachea midline, no masses  Lymphatic: No lymphadenopathy  Resp: breathing easily, no stridor  Neuro: facial nerve intact, no facial droop

## 2024-02-06 DIAGNOSIS — E83.52 HYPERCALCEMIA: ICD-10-CM

## 2024-02-06 LAB
ALBUMIN SERPL ELPH-MCNC: 4.1 G/DL — SIGNIFICANT CHANGE UP (ref 3.3–5)
ANION GAP SERPL CALC-SCNC: 17 MMOL/L — SIGNIFICANT CHANGE UP (ref 5–17)
ANION GAP SERPL CALC-SCNC: 20 MMOL/L — HIGH (ref 5–17)
BUN SERPL-MCNC: 12 MG/DL — SIGNIFICANT CHANGE UP (ref 7–23)
BUN SERPL-MCNC: 13 MG/DL — SIGNIFICANT CHANGE UP (ref 7–23)
CALCIUM SERPL-MCNC: 10.5 MG/DL — SIGNIFICANT CHANGE UP (ref 8.4–10.5)
CALCIUM SERPL-MCNC: 9.8 MG/DL — SIGNIFICANT CHANGE UP (ref 8.4–10.5)
CHLORIDE SERPL-SCNC: 97 MMOL/L — SIGNIFICANT CHANGE UP (ref 96–108)
CHLORIDE SERPL-SCNC: 99 MMOL/L — SIGNIFICANT CHANGE UP (ref 96–108)
CO2 SERPL-SCNC: 19 MMOL/L — LOW (ref 22–31)
CO2 SERPL-SCNC: 20 MMOL/L — LOW (ref 22–31)
CREAT SERPL-MCNC: 0.91 MG/DL — SIGNIFICANT CHANGE UP (ref 0.5–1.3)
CREAT SERPL-MCNC: 0.94 MG/DL — SIGNIFICANT CHANGE UP (ref 0.5–1.3)
EGFR: 103 ML/MIN/1.73M2 — SIGNIFICANT CHANGE UP
EGFR: 107 ML/MIN/1.73M2 — SIGNIFICANT CHANGE UP
GLUCOSE SERPL-MCNC: 102 MG/DL — HIGH (ref 70–99)
GLUCOSE SERPL-MCNC: 106 MG/DL — HIGH (ref 70–99)
MAGNESIUM SERPL-MCNC: 2.1 MG/DL — SIGNIFICANT CHANGE UP (ref 1.6–2.6)
OSMOLALITY SERPL: 280 MOSMOL/KG — SIGNIFICANT CHANGE UP (ref 275–300)
OSMOLALITY UR: 168 MOS/KG — LOW (ref 300–900)
PHOSPHATE SERPL-MCNC: 3.6 MG/DL — SIGNIFICANT CHANGE UP (ref 2.5–4.5)
POTASSIUM SERPL-MCNC: 4.3 MMOL/L — SIGNIFICANT CHANGE UP (ref 3.5–5.3)
POTASSIUM SERPL-MCNC: 4.5 MMOL/L — SIGNIFICANT CHANGE UP (ref 3.5–5.3)
POTASSIUM SERPL-SCNC: 4.3 MMOL/L — SIGNIFICANT CHANGE UP (ref 3.5–5.3)
POTASSIUM SERPL-SCNC: 4.5 MMOL/L — SIGNIFICANT CHANGE UP (ref 3.5–5.3)
SODIUM SERPL-SCNC: 136 MMOL/L — SIGNIFICANT CHANGE UP (ref 135–145)
SODIUM SERPL-SCNC: 136 MMOL/L — SIGNIFICANT CHANGE UP (ref 135–145)
SP GR UR STRIP: 1.01 — SIGNIFICANT CHANGE UP (ref 1–1.03)
SURGICAL PATHOLOGY STUDY: SIGNIFICANT CHANGE UP

## 2024-02-06 PROCEDURE — 99233 SBSQ HOSP IP/OBS HIGH 50: CPT

## 2024-02-06 RX ORDER — LIDOCAINE 4 G/100G
1 CREAM TOPICAL DAILY
Refills: 0 | Status: COMPLETED | OUTPATIENT
Start: 2024-02-06 | End: 2024-02-07

## 2024-02-06 RX ADMIN — Medication 20 MILLIGRAM(S): at 07:57

## 2024-02-06 RX ADMIN — Medication 20 MILLIEQUIVALENT(S): at 05:42

## 2024-02-06 RX ADMIN — LIDOCAINE 1 PATCH: 4 CREAM TOPICAL at 19:38

## 2024-02-06 RX ADMIN — Medication 975 MILLIGRAM(S): at 07:57

## 2024-02-06 RX ADMIN — Medication 10 MILLIGRAM(S): at 14:52

## 2024-02-06 RX ADMIN — Medication 2 SPRAY(S): at 11:46

## 2024-02-06 RX ADMIN — ATORVASTATIN CALCIUM 40 MILLIGRAM(S): 80 TABLET, FILM COATED ORAL at 22:13

## 2024-02-06 RX ADMIN — Medication 2 SPRAY(S): at 18:13

## 2024-02-06 RX ADMIN — Medication 2 SPRAY(S): at 05:41

## 2024-02-06 RX ADMIN — OXYCODONE HYDROCHLORIDE 5 MILLIGRAM(S): 5 TABLET ORAL at 22:43

## 2024-02-06 RX ADMIN — SENNA PLUS 2 TABLET(S): 8.6 TABLET ORAL at 22:13

## 2024-02-06 RX ADMIN — OXYCODONE HYDROCHLORIDE 5 MILLIGRAM(S): 5 TABLET ORAL at 22:13

## 2024-02-06 RX ADMIN — POLYETHYLENE GLYCOL 3350 17 GRAM(S): 17 POWDER, FOR SOLUTION ORAL at 18:13

## 2024-02-06 RX ADMIN — POLYETHYLENE GLYCOL 3350 17 GRAM(S): 17 POWDER, FOR SOLUTION ORAL at 05:42

## 2024-02-06 RX ADMIN — LIDOCAINE 1 PATCH: 4 CREAM TOPICAL at 11:46

## 2024-02-06 RX ADMIN — Medication 975 MILLIGRAM(S): at 08:57

## 2024-02-06 RX ADMIN — Medication 50 MICROGRAM(S): at 05:42

## 2024-02-06 RX ADMIN — CHLORHEXIDINE GLUCONATE 1 APPLICATION(S): 213 SOLUTION TOPICAL at 11:47

## 2024-02-06 RX ADMIN — ENOXAPARIN SODIUM 40 MILLIGRAM(S): 100 INJECTION SUBCUTANEOUS at 18:13

## 2024-02-06 NOTE — PROGRESS NOTE ADULT - ASSESSMENT
-160  Off DDAVP, Na holding well  LDrain clamped x48h if no leaking then removal  MRI ww/o/sella protocol when LD is dc'd   Endo-f, stress dose hydrocort if needed 50q8  fu PT recs

## 2024-02-06 NOTE — PROGRESS NOTE ADULT - SUBJECTIVE AND OBJECTIVE BOX
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 Conway.     24hr EVENTS:  LD drain clamped since yesterday , no CSF leak   ROS: All other systems negative except as above    T(C): 36.6 (02-06-24 @ 07:00), Max: 37.2 (02-06-24 @ 03:00)  HR: 73 (02-06-24 @ 07:00) (61 - 73)  BP: 110/75 (02-06-24 @ 07:00) (104/80 - 118/82)  RR: 11 (02-06-24 @ 07:00) (11 - 18)  SpO2: 98% (02-06-24 @ 07:00) (93% - 98%)  02-05-24 @ 07:01  -  02-06-24 @ 07:00  --------------------------------------------------------  IN: 950 mL / OUT: 1615 mL / NET: -665 mL    acetaminophen     Tablet .. 975 milliGRAM(s) Oral every 6 hours PRN  atorvastatin 40 milliGRAM(s) Oral at bedtime  bisacodyl 5 milliGRAM(s) Oral every 12 hours PRN  chlorhexidine 4% Liquid 1 Application(s) Topical every 24 hours  enoxaparin Injectable 40 milliGRAM(s) SubCutaneous <User Schedule>  hydrocortisone 20 milliGRAM(s) Oral <User Schedule>  hydrocortisone 10 milliGRAM(s) Oral <User Schedule>  levothyroxine 50 MICROGram(s) Oral daily  melatonin 5 milliGRAM(s) Oral at bedtime PRN  oxyCODONE    IR 5 milliGRAM(s) Oral every 4 hours PRN  oxyCODONE    IR 10 milliGRAM(s) Oral every 4 hours PRN  polyethylene glycol 3350 17 Gram(s) Oral every 12 hours  senna 2 Tablet(s) Oral at bedtime  sodium chloride 0.65% Nasal 2 Spray(s) Both Nostrils four times a day    ----------------------------------------------------------------------------------------------------  PHYSICAL EXAM:  Constitutional: laying comfortably in bed    Neurological: alert, o x 3,   EOMI, PERRL VF grossly intact, FC,   full strength throughout without drift, SILT    Pulmonary: no accessory muscle use, normal respiratory effort  Cardiovascular: Regular rate and rhythm  Gastrointestinal: Soft, Non-tender, Non-distended   Extremities: warm/dry, no edema    -----------------------------------------------------------------------------------------------------    LABS:  Na: 135 (02-05 @ 22:14), 134 (02-05 @ 08:16), 131 (02-05 @ 06:49), 135 (02-04 @ 16:35), 135 (02-04 @ 05:57), 133 (02-03 @ 16:03)  K: 3.9 (02-05 @ 22:14), 4.3 (02-05 @ 08:16), 5.5 (02-05 @ 06:49), 4.2 (02-04 @ 16:35), 4.1 (02-04 @ 05:57), 3.8 (02-03 @ 16:03)  Cl: 95 (02-05 @ 22:14), 96 (02-05 @ 08:16), 97 (02-05 @ 06:49), 96 (02-04 @ 16:35), 96 (02-04 @ 05:57), 97 (02-03 @ 16:03)  CO2: 26 (02-05 @ 22:14), 26 (02-05 @ 08:16), 19 (02-05 @ 06:49), 26 (02-04 @ 16:35), 26 (02-04 @ 05:57), 24 (02-03 @ 16:03)  BUN: 9 (02-05 @ 22:14), 9 (02-05 @ 08:16), 10 (02-05 @ 06:49), 9 (02-04 @ 16:35), 8 (02-04 @ 05:57), 8 (02-03 @ 16:03)  Cr: 0.71 (02-05 @ 22:14), 0.75 (02-05 @ 08:16), 0.58 (02-05 @ 06:49), 0.82 (02-04 @ 16:35), 0.72 (02-04 @ 05:57), 0.63 (02-03 @ 16:03)  Glu: 116(02-05 @ 22:14), 98(02-05 @ 08:16), 103(02-05 @ 06:49), 106(02-04 @ 16:35), 99(02-04 @ 05:57), 95(02-03 @ 16:03)    Hgb: 14.1 (02-05 @ 22:14), 14.6 (02-05 @ 06:49)  Hct: 41.0 (02-05 @ 22:14), 43.7 (02-05 @ 06:49)  WBC: 8.85 (02-05 @ 22:14), 8.49 (02-05 @ 06:49)  Plt: 303 (02-05 @ 22:14), 267 (02-05 @ 06:49)    INR:   PTT:             Assessment and Plan:   · Assessment	  Assessment: craniopharyngioma s/p resection with lumbar drain in place    Plan:  neurochecks q4h  pain control  LD clamped since yesterday , no signs of CSF leak   skull base precautions  MRI after LD removed    CV  SBP normotensive 100-160 mmhg     Renal  DI  resolved   monitor UO       endocrine  hydrocrot 20/10, synthroid 50mcg    GI   regular diet   Last BM today   prn dulcolax    heme  lovenox    Dispo NSICU     Not critical

## 2024-02-06 NOTE — PROGRESS NOTE ADULT - ASSESSMENT
43 year old male with PMH HLD, s/p endoscopic transphenoidal removal craniopharyngioma POD #5. Pt c/o mild headache relieved with pain meds, minimal bleeding. No evidence of CSF leak or epistaxis on exam.

## 2024-02-06 NOTE — PROGRESS NOTE ADULT - SUBJECTIVE AND OBJECTIVE BOX
ENT ISSUE/POD: s/p endoscopic transphenoidal removal craniopharyngioma POD #5    HPI: 43 year old male with PMH HLD, s/p endoscopic transphenoidal removal craniopharyngioma POD #5. Pt seen and examined at bedside. No acute events overnight. Pt c/o mild headache relieved with pain meds, minimal bleeding. Pt denies salty/metallic taste, fever, n/v, SOB, dysphagia, odynophagia, hemoptysis, hoarseness.            PAST MEDICAL & SURGICAL HISTORY:  HLD (hyperlipidemia)      History of pituitary tumor      Deviated septum      History of dental surgery        Allergies    No Known Allergies    Intolerances      MEDICATIONS  (STANDING):  atorvastatin 40 milliGRAM(s) Oral at bedtime  chlorhexidine 4% Liquid 1 Application(s) Topical every 24 hours  enoxaparin Injectable 40 milliGRAM(s) SubCutaneous <User Schedule>  hydrocortisone 20 milliGRAM(s) Oral <User Schedule>  hydrocortisone 10 milliGRAM(s) Oral <User Schedule>  levothyroxine 50 MICROGram(s) Oral daily  lidocaine   4% Patch 1 Patch Transdermal daily  polyethylene glycol 3350 17 Gram(s) Oral every 12 hours  senna 2 Tablet(s) Oral at bedtime  sodium chloride 0.65% Nasal 2 Spray(s) Both Nostrils four times a day    MEDICATIONS  (PRN):  acetaminophen     Tablet .. 975 milliGRAM(s) Oral every 6 hours PRN Temp greater or equal to 38C (100.4F), Mild Pain (1 - 3)  bisacodyl 5 milliGRAM(s) Oral every 12 hours PRN Constipation  melatonin 5 milliGRAM(s) Oral at bedtime PRN Sleep  oxyCODONE    IR 5 milliGRAM(s) Oral every 4 hours PRN Moderate Pain (4 - 6)  oxyCODONE    IR 10 milliGRAM(s) Oral every 4 hours PRN Severe Pain (7 - 10)      Social History: see consult    Family history: see consult    ROS:   ENT: all negative except as noted in HPI   Pulm: denies SOB, cough, hemoptysis  Neuro: denies numbness/tingling, loss of sensation  Endo: denies heat/cold intolerance, excessive sweating      Vital Signs Last 24 Hrs  T(C): 36.6 (06 Feb 2024 07:00), Max: 37.2 (06 Feb 2024 03:00)  T(F): 97.9 (06 Feb 2024 07:00), Max: 99 (06 Feb 2024 03:00)  HR: 73 (06 Feb 2024 07:00) (61 - 73)  BP: 110/75 (06 Feb 2024 07:00) (104/80 - 118/82)  BP(mean): 87 (06 Feb 2024 07:00) (87 - 94)  RR: 11 (06 Feb 2024 07:00) (11 - 18)  SpO2: 98% (06 Feb 2024 07:00) (93% - 98%)    Parameters below as of 06 Feb 2024 07:00  Patient On (Oxygen Delivery Method): room air                              14.1   8.85  )-----------( 303      ( 05 Feb 2024 22:14 )             41.0    02-05    135  |  95<L>  |  9   ----------------------------<  116<H>  3.9   |  26  |  0.71    Ca    10.5      05 Feb 2024 22:14  Phos  4.4     02-05  Mg     2.2     02-05         PHYSICAL EXAM:  Gen: NAD  Skin: No rashes, bruises, or lesions  Head: Normocephalic, Atraumatic  Face: no edema, erythema, or fluctuance. Parotid glands soft without mass  Eyes: no scleral injection  Nose: B/L nasopore in place, serosanguinous secretions from right nare, no active bleeding.   Mouth: Minimal dry blood in posterior pharynx. No Stridor / Drooling / Trismus.  Mucosa moist, tongue/uvula midline, oropharynx clear  Neck: Flat, supple, no lymphadenopathy, trachea midline, no masses  Lymphatic: No lymphadenopathy  Resp: breathing easily, no stridor  Neuro: facial nerve intact, no facial droop

## 2024-02-06 NOTE — PROGRESS NOTE ADULT - SUBJECTIVE AND OBJECTIVE BOX
ENDOCRINE FOLLOW UP     Chief Complaint: panhypopituitarism    History:     MEDICATIONS  (STANDING):  atorvastatin 40 milliGRAM(s) Oral at bedtime  chlorhexidine 4% Liquid 1 Application(s) Topical every 24 hours  enoxaparin Injectable 40 milliGRAM(s) SubCutaneous <User Schedule>  hydrocortisone 10 milliGRAM(s) Oral <User Schedule>  hydrocortisone 20 milliGRAM(s) Oral <User Schedule>  levothyroxine 50 MICROGram(s) Oral daily  lidocaine   4% Patch 1 Patch Transdermal daily  polyethylene glycol 3350 17 Gram(s) Oral every 12 hours  senna 2 Tablet(s) Oral at bedtime  sodium chloride 0.65% Nasal 2 Spray(s) Both Nostrils four times a day    MEDICATIONS  (PRN):  acetaminophen     Tablet .. 975 milliGRAM(s) Oral every 6 hours PRN Temp greater or equal to 38C (100.4F), Mild Pain (1 - 3)  bisacodyl 5 milliGRAM(s) Oral every 12 hours PRN Constipation  melatonin 5 milliGRAM(s) Oral at bedtime PRN Sleep  oxyCODONE    IR 10 milliGRAM(s) Oral every 4 hours PRN Severe Pain (7 - 10)  oxyCODONE    IR 5 milliGRAM(s) Oral every 4 hours PRN Moderate Pain (4 - 6)      Allergies    No Known Allergies    Intolerances        ROS: All other systems reviewed and negative    PHYSICAL EXAM:  VITALS: T(C): 36.4 (02-06-24 @ 11:00)  T(F): 97.5 (02-06-24 @ 11:00), Max: 99 (02-06-24 @ 03:00)  HR: 71 (02-06-24 @ 11:00) (61 - 73)  BP: 114/86 (02-06-24 @ 11:00) (104/80 - 118/82)  RR:  (11 - 18)  SpO2:  (93% - 98%)  Wt(kg): --  GENERAL: NAD, resting comfortably   EYES: No proptosis,  anicteric  HEENT:  Atraumatic, Normocephalic, moist mucous membranes  RESPIRATORY: Nonlabored respirations on room air, normal rate/effort   CARDIOVASCULAR: Did not appear cyanotic, no lower extremity swelling visualized  GI: did not appear distended  NEURO: Answering questions appropriately, moves extremities spontaneously  PSYCH:  reactive affect, euthymic mood        02-05    135  |  95<L>  |  9   ----------------------------<  116<H>  3.9   |  26  |  0.71    eGFR: 117    Ca    10.5      02-05  Mg     2.2     02-05  Phos  4.4     02-05            Thyroid Stimulating Hormone, Serum: 0.15 uIU/mL (02-03-24 @ 08:37)  Thyroid Stimulating Hormone, Serum: 0.27 uIU/mL (02-02-24 @ 11:35)   ENDOCRINE FOLLOW UP     Chief Complaint: panhypopituitarism    History:   The patient denies polyuria, polydipsia, reports eating and drinking okay, energy levels are slowly improving. He denies nausea, vomiting, reports had a bowel movement.    MEDICATIONS  (STANDING):  atorvastatin 40 milliGRAM(s) Oral at bedtime  chlorhexidine 4% Liquid 1 Application(s) Topical every 24 hours  enoxaparin Injectable 40 milliGRAM(s) SubCutaneous <User Schedule>  hydrocortisone 10 milliGRAM(s) Oral <User Schedule>  hydrocortisone 20 milliGRAM(s) Oral <User Schedule>  levothyroxine 50 MICROGram(s) Oral daily  lidocaine   4% Patch 1 Patch Transdermal daily  polyethylene glycol 3350 17 Gram(s) Oral every 12 hours  senna 2 Tablet(s) Oral at bedtime  sodium chloride 0.65% Nasal 2 Spray(s) Both Nostrils four times a day    MEDICATIONS  (PRN):  acetaminophen     Tablet .. 975 milliGRAM(s) Oral every 6 hours PRN Temp greater or equal to 38C (100.4F), Mild Pain (1 - 3)  bisacodyl 5 milliGRAM(s) Oral every 12 hours PRN Constipation  melatonin 5 milliGRAM(s) Oral at bedtime PRN Sleep  oxyCODONE    IR 10 milliGRAM(s) Oral every 4 hours PRN Severe Pain (7 - 10)  oxyCODONE    IR 5 milliGRAM(s) Oral every 4 hours PRN Moderate Pain (4 - 6)      Allergies    No Known Allergies    Intolerances        ROS: All other systems reviewed and negative    PHYSICAL EXAM:  VITALS: T(C): 36.4 (02-06-24 @ 11:00)  T(F): 97.5 (02-06-24 @ 11:00), Max: 99 (02-06-24 @ 03:00)  HR: 71 (02-06-24 @ 11:00) (61 - 73)  BP: 114/86 (02-06-24 @ 11:00) (104/80 - 118/82)  RR:  (11 - 18)  SpO2:  (93% - 98%)  Wt(kg): --  GENERAL: NAD, resting comfortably   EYES: No proptosis,  anicteric  HEENT:  Atraumatic, Normocephalic, mildly dry mucous membranes  RESPIRATORY: Nonlabored respirations on room air, normal rate/effort   CARDIOVASCULAR: Did not appear cyanotic, no lower extremity swelling visualized  GI: did not appear distended  NEURO: Answering questions appropriately, moves extremities spontaneously  PSYCH:  reactive affect, euthymic mood        02-05    135  |  95<L>  |  9   ----------------------------<  116<H>  3.9   |  26  |  0.71    eGFR: 117    Ca    10.5      02-05  Mg     2.2     02-05  Phos  4.4     02-05            Thyroid Stimulating Hormone, Serum: 0.15 uIU/mL (02-03-24 @ 08:37)  Thyroid Stimulating Hormone, Serum: 0.27 uIU/mL (02-02-24 @ 11:35)

## 2024-02-06 NOTE — PROGRESS NOTE ADULT - ASSESSMENT
44 y/o male. PMH HLD hx of suprasellar cystic mass since 2020, on surveillance imaging since found tumor has increased in size s/p TSP on 2/1/24 c/b hypernatremia started on DDDAVP. Endocrine consulted for DI monitoring and r/o panhypopit.     #Secondary Adrenal insufficiency  #Central DI  #Panhypopit  #Central hypogonadism  #secondary hypothyroidism  - likely Central DI post op since UOP >500cc/hr, USG and UOsm low and hypernatremia  - AM cortisol 0.5 (LOW), ACTH 3.5 (LOW) - consistent with secondary adrenal insufficiency  - TSH 0.15, FT4 0.8 - consistent with secondary hypothyroidism  - FSH/LH low, prior labs showing secondary hypogonadism  - 2/3/24: Na 133 and urine output decreasing - DDAVP held  PLAN  In terms of AI  - increase to hydrocortisone 20mg at 8am and 10mg at 3pm while in the hospital, can give an extra 5mg this afternoon for total 10mg this afternoon if received dose already, plan to d/c on hydrocortisone 10mg at 8am and 5mg at 3pm  - if patient becomes hemodynamically unstable, please start stress dose steroids (hydrocortisone 50mg IV q8h) since patient is now diagnosed with secondary adrenal insufficiency  FOR DISCHARGE  to help with dc planning-  - Please print and give the pt info section for patients under adrenal insufficiency (this will educate them regarding the warning signs of adrenal crisis )  - Patient should take 2-3x of home dose in cases of illness, ever, accidents, and surgery. If unable to take PO, use IM injection as below (Solu-Cortef)  - pt should receive stress dosing (IV hydrocortisone 50mg q8) with any major illness or surgical procedure  - Should pt be unable to tolerate PO and is unable to take hydrocortisone, pt will need an emergency injection. Please discharge with a prescription for 100 mg Solu-Cortef Act-O-Vial and the following instructions:  [email or web address]-[email or web address]-[email or web address]-act-[email or web address]-vial-[email or web address]-chamber-[email or web address]  - pt should obtain a medical alert bracelet or necklace to inform emergency providers of adrenal insufficiency diagnosis [[email or web address]]  In terms of hypothyroidism  - c/w levothyroxine 50mcg daily for central hypothyroidism (maintain on empty stomach (at least 1 hour before meals), separate by 4 hours from PPI or calcium supplementation which can inhibit its absorption)  - recheck TSH and Free T4 5 days (2/8/24) if still hospitalized otherwhise recheck outpatient in 4 week  - patient will follow up with endocrinologist Dr. Isidra Dao outpatient  In terms of DI  - continue to HOLD DDAVP 0.05mg daily d/t hyponatremia and normal Urine output  - strict I&Os, daily BMPs & urine osm   *DI WATCH*:  - Goal Na 140-145  - DI is suspected if UOP is >250cc/hr x 2 consecutive hours or if >500cc/hr x 1 hr - if this occurs please check STAT serum Na, urine osm, urine specific gravity  - if specific gravity <1.005/Urine Osm <300 and Na is rising - likely DI, please dose DDAVP 0.05mg PO x1 (or DDAVP 0.5mcg IV if no PO access) and bolus 1/2 NS to match half the output (for example, if net negative 2Liters can give 1Liter 1/2NS if pt is unable to keep up with output with PO intake) - continue DI watch  - If Na continues to increase despite a a DDAVP dose and 1/2NS fluid bolus please inform endocrine   - Please make sure pt has access to water and encourage her to drink to thirst     Discussed with primary team.     Thank you for the interesting consult.    Joe Walker MD, Endocrinology Fellow  For non-urgent follow up questions, please email [email or web address] or [email or web address].  Pager [phone] from 9am to 5pm. After hours and on weekends, please call [phone].     44 y/o male. PMH HLD hx of suprasellar cystic mass since 2020, on surveillance imaging since found tumor has increased in size s/p TSP on 2/1/24 c/b hypernatremia started on DDDAVP. Endocrine consulted for DI monitoring and r/o panhypopit.     #Secondary Adrenal insufficiency  #Central DI  #Panhypopit  #Central hypogonadism  #secondary hypothyroidism  - likely Central DI post op since UOP >500cc/hr, USG and UOsm low and hypernatremia  - AM cortisol 0.5 (LOW), ACTH 3.5 (LOW) - consistent with secondary adrenal insufficiency  - TSH 0.15, FT4 0.8 - consistent with secondary hypothyroidism  - FSH/LH low, prior labs showing secondary hypogonadism  - 2/3/24: Na 133 and urine output decreasing - DDAVP held  PLAN  In terms of AI  - increase to hydrocortisone 20mg at 8am and 10mg at 3pm while in the hospital, can give an extra 5mg this afternoon for total 10mg this afternoon if received dose already, plan to d/c on hydrocortisone 10mg at 8am and 5mg at 3pm  - if patient becomes hemodynamically unstable, please start stress dose steroids (hydrocortisone 50mg IV q8h) since patient is now diagnosed with secondary adrenal insufficiency  FOR DISCHARGE  to help with dc planning-  - Please print and give the pt info section for patients under adrenal insufficiency (this will educate them regarding the warning signs of adrenal crisis )  - Patient should take 2-3x of home dose in cases of illness, ever, accidents, and surgery. If unable to take PO, use IM injection as below (Solu-Cortef)  - pt should receive stress dosing (IV hydrocortisone 50mg q8) with any major illness or surgical procedure  - Should pt be unable to tolerate PO and is unable to take hydrocortisone, pt will need an emergency injection. Please discharge with a prescription for 100 mg Solu-Cortef Act-O-Vial and the following instructions:  [email or web address]/[email or web address]-[email or web address]-[email or web address]-[email or web address]-[email or web address]-act-[email or web address]-vial-[email or web address]-chamber-ampul/  - pt should obtain a medical alert bracelet or necklace to inform emergency providers of adrenal insufficiency diagnosis [[email or web address]]  In terms of hypothyroidism  - c/w levothyroxine 50mcg daily for central hypothyroidism (maintain on empty stomach (at least 1 hour before meals), separate by 4 hours from PPI or calcium supplementation which can inhibit its absorption)  - recheck TSH and Free T4 5 days (2/8/24) if still hospitalized otherwise recheck outpatient in 4 week  - patient will follow up with endocrinologist Dr. Isidra Dao outpatient  In terms of DI  - continue to HOLD DDAVP 0.05mg daily d/t hyponatremia and normal Urine output  - strict I&Os, daily BMPs & urine osm   *DI WATCH*:  - Goal Na 140-145  - DI is suspected if UOP is >250cc/hr x 2 consecutive hours or if >500cc/hr x 1 hr - if this occurs please check STAT serum Na, urine osm, urine specific gravity  - if specific gravity <1.005/Urine Osm <300 and Na is rising - likely DI, please dose DDAVP 0.05mg PO x1 (or DDAVP 0.5mcg IV if no PO access) and bolus 1/2 NS to match half the output (for example, if net negative 2Liters can give 1Liter 1/2NS if pt is unable to keep up with output with PO intake) - continue DI watch  - If Na continues to increase despite a a DDAVP dose and 1/2NS fluid bolus please inform endocrine   - Please make sure pt has access to water and encourage her to drink to thirst     Hypercalcemia  - Ca up to 10.6  - likely dehydration with component of immobility  PLAN  - ensure PO hydration as above  - check daily CMP to monitor corrected calcium    Discussed with primary team.     Thank you for the interesting consult.    Joe Walker MD, Endocrinology Fellow  For non-urgent follow up questions, please email [email or web address] or [email or web address].  Pager [phone] from 9am to 5pm. After hours and on weekends, please call [phone].     42 y/o male. PMH HLD hx of suprasellar cystic mass since 2020, on surveillance imaging since found tumor has increased in size s/p TSP on 2/1/24 c/b hypernatremia started on DDDAVP. Endocrine consulted for DI monitoring and r/o panhypopit.     #Secondary Adrenal insufficiency  #Central DI  #Panhypopit  #Central hypogonadism  #secondary hypothyroidism  - likely Central DI post op since UOP >500cc/hr, USG and UOsm low and hypernatremia  - AM cortisol 0.5 (LOW), ACTH 3.5 (LOW) - consistent with secondary adrenal insufficiency  - TSH 0.15, FT4 0.8 - consistent with secondary hypothyroidism  - FSH/LH low, prior labs showing secondary hypogonadism  - 2/3/24: Na 133 and urine output decreasing - DDAVP held  PLAN  In terms of AI  - continue hydrocortisone 20mg at 8am and 10mg at 3pm while in the hospital, plan to d/c on hydrocortisone 10mg at 8am and 5mg at 3pm  - if patient becomes hemodynamically unstable, please start stress dose steroids (hydrocortisone 50mg IV q8h) since patient is now diagnosed with secondary adrenal insufficiency  FOR DISCHARGE  to help with dc planning-  - Please print and give the pt info section for patients under adrenal insufficiency (this will educate them regarding the warning signs of adrenal crisis )  - reviewed sick day rules & adrenal insufficiency with patient today 2/6  - Patient should take 2-3x of home dose in cases of illness, ever, accidents, and surgery. If unable to take PO, use IM injection as below (Solu-Cortef)  - pt should receive stress dosing (IV hydrocortisone 50mg q8) with any major illness or surgical procedure  - Should pt be unable to tolerate PO and is unable to take hydrocortisone, pt will need an emergency injection. Please discharge with a prescription for 100 mg Solu-Cortef Act-O-Vial and the following instructions:  [email or web address]-[email or web address]-injection-[email or web address]-solu-[email or web address]-act-o-vial-[email or web address]-chamber-ampul/  - pt should obtain a medical alert bracelet or necklace to inform emergency providers of adrenal insufficiency diagnosis [[email or web address]]  In terms of hypothyroidism  - c/w levothyroxine 50mcg daily for central hypothyroidism (maintain on empty stomach (at least 1 hour before meals), separate by 4 hours from PPI or calcium supplementation which can inhibit its absorption)  - recheck TSH and Free T4 5 days (2/8/24) if still hospitalized otherwise recheck outpatient in 4 week  - patient will follow up with endocrinologist Dr. Isidra Dao outpatient  In terms of DI  - please check BMP this afternoon, patient reports high volume of urine this afternoon, yellow in color   - continue to HOLD DDAVP 0.05mg daily d/t hyponatremia and normal Urine output  - strict I&Os, daily BMPs & urine osm   *DI WATCH*:  - Goal Na 140-145  - DI is suspected if UOP is >250cc/hr x 2 consecutive hours or if >500cc/hr x 1 hr - if this occurs please check STAT serum Na, urine osm, urine specific gravity  - if specific gravity <1.005/Urine Osm <300 and Na is rising - likely DI, please dose DDAVP 0.05mg PO x1 (or DDAVP 0.5mcg IV if no PO access) and bolus 1/2 NS to match half the output (for example, if net negative 2Liters can give 1Liter 1/2NS if pt is unable to keep up with output with PO intake) - continue DI watch  - If Na continues to increase despite a a DDAVP dose and 1/2NS fluid bolus please inform endocrine   - Please make sure pt has access to water and encourage her to drink to thirst     Hypercalcemia  - Ca up to 10.6  - likely dehydration with component of immobility  PLAN  - ensure PO hydration as above  - check daily BMP/Albumin to monitor corrected calcium  - check PTH intact & Vitamin D 25 OH if remains persistently elevated    Discussed with primary team.     Thank you for the interesting consult.    Joe Walker MD, Endocrinology Fellow  For non-urgent follow up questions, please email [email or web address] or [email or web address].  Pager [phone] from 9am to 5pm. After hours and on weekends, please call [phone].

## 2024-02-06 NOTE — PROGRESS NOTE ADULT - PROBLEM SELECTOR PLAN 1
·  Plan: ·  Plan: - TSRP precautions (no straws, incentive spirometry, bipap)   - Monitor for CSF leak / epistaxis   - Nasal saline 2 sprays to b/l nares qid  - LD per NSCU  - ENT will continue to follow  - Call with questions or concerns.

## 2024-02-06 NOTE — PROGRESS NOTE ADULT - SUBJECTIVE AND OBJECTIVE BOX
doing well. h/a better  vision good  RED well  no evid of CSF leak  LD clamped since yesterdy  d/c LD tomorrow if no leak  post-op MRI   d/c planning

## 2024-02-07 LAB
ANION GAP SERPL CALC-SCNC: 10 MMOL/L — SIGNIFICANT CHANGE UP (ref 5–17)
ANION GAP SERPL CALC-SCNC: 11 MMOL/L — SIGNIFICANT CHANGE UP (ref 5–17)
ANION GAP SERPL CALC-SCNC: 13 MMOL/L — SIGNIFICANT CHANGE UP (ref 5–17)
BUN SERPL-MCNC: 11 MG/DL — SIGNIFICANT CHANGE UP (ref 7–23)
BUN SERPL-MCNC: 12 MG/DL — SIGNIFICANT CHANGE UP (ref 7–23)
BUN SERPL-MCNC: 15 MG/DL — SIGNIFICANT CHANGE UP (ref 7–23)
CALCIUM SERPL-MCNC: 10 MG/DL — SIGNIFICANT CHANGE UP (ref 8.4–10.5)
CALCIUM SERPL-MCNC: 5.7 MG/DL — CRITICAL LOW (ref 8.4–10.5)
CALCIUM SERPL-MCNC: 9.6 MG/DL — SIGNIFICANT CHANGE UP (ref 8.4–10.5)
CHLORIDE SERPL-SCNC: 115 MMOL/L — HIGH (ref 96–108)
CHLORIDE SERPL-SCNC: 96 MMOL/L — SIGNIFICANT CHANGE UP (ref 96–108)
CHLORIDE SERPL-SCNC: 97 MMOL/L — SIGNIFICANT CHANGE UP (ref 96–108)
CO2 SERPL-SCNC: 12 MMOL/L — LOW (ref 22–31)
CO2 SERPL-SCNC: 27 MMOL/L — SIGNIFICANT CHANGE UP (ref 22–31)
CO2 SERPL-SCNC: 28 MMOL/L — SIGNIFICANT CHANGE UP (ref 22–31)
CREAT SERPL-MCNC: 0.5 MG/DL — SIGNIFICANT CHANGE UP (ref 0.5–1.3)
CREAT SERPL-MCNC: 0.76 MG/DL — SIGNIFICANT CHANGE UP (ref 0.5–1.3)
CREAT SERPL-MCNC: 0.91 MG/DL — SIGNIFICANT CHANGE UP (ref 0.5–1.3)
EGFR: 107 ML/MIN/1.73M2 — SIGNIFICANT CHANGE UP
EGFR: 114 ML/MIN/1.73M2 — SIGNIFICANT CHANGE UP
EGFR: 130 ML/MIN/1.73M2 — SIGNIFICANT CHANGE UP
GLUCOSE SERPL-MCNC: 74 MG/DL — SIGNIFICANT CHANGE UP (ref 70–99)
GLUCOSE SERPL-MCNC: 91 MG/DL — SIGNIFICANT CHANGE UP (ref 70–99)
GLUCOSE SERPL-MCNC: 92 MG/DL — SIGNIFICANT CHANGE UP (ref 70–99)
MAGNESIUM SERPL-MCNC: 2.1 MG/DL — SIGNIFICANT CHANGE UP (ref 1.6–2.6)
MAGNESIUM SERPL-MCNC: 2.2 MG/DL — SIGNIFICANT CHANGE UP (ref 1.6–2.6)
OSMOLALITY SERPL: 284 MOSMOL/KG — SIGNIFICANT CHANGE UP (ref 275–300)
OSMOLALITY UR: 102 MOS/KG — LOW (ref 300–900)
PHOSPHATE SERPL-MCNC: 4 MG/DL — SIGNIFICANT CHANGE UP (ref 2.5–4.5)
PHOSPHATE SERPL-MCNC: 4 MG/DL — SIGNIFICANT CHANGE UP (ref 2.5–4.5)
POTASSIUM SERPL-MCNC: 3.4 MMOL/L — LOW (ref 3.5–5.3)
POTASSIUM SERPL-MCNC: 3.7 MMOL/L — SIGNIFICANT CHANGE UP (ref 3.5–5.3)
POTASSIUM SERPL-MCNC: 4.2 MMOL/L — SIGNIFICANT CHANGE UP (ref 3.5–5.3)
POTASSIUM SERPL-SCNC: 3.4 MMOL/L — LOW (ref 3.5–5.3)
POTASSIUM SERPL-SCNC: 3.7 MMOL/L — SIGNIFICANT CHANGE UP (ref 3.5–5.3)
POTASSIUM SERPL-SCNC: 4.2 MMOL/L — SIGNIFICANT CHANGE UP (ref 3.5–5.3)
SODIUM SERPL-SCNC: 134 MMOL/L — LOW (ref 135–145)
SODIUM SERPL-SCNC: 137 MMOL/L — SIGNIFICANT CHANGE UP (ref 135–145)
SODIUM SERPL-SCNC: 138 MMOL/L — SIGNIFICANT CHANGE UP (ref 135–145)
SP GR UR STRIP: <1.005 — LOW (ref 1–1.03)

## 2024-02-07 PROCEDURE — 99233 SBSQ HOSP IP/OBS HIGH 50: CPT

## 2024-02-07 PROCEDURE — 70553 MRI BRAIN STEM W/O & W/DYE: CPT | Mod: 26

## 2024-02-07 RX ORDER — POTASSIUM CHLORIDE 20 MEQ
40 PACKET (EA) ORAL ONCE
Refills: 0 | Status: COMPLETED | OUTPATIENT
Start: 2024-02-07 | End: 2024-02-07

## 2024-02-07 RX ADMIN — Medication 10 MILLIGRAM(S): at 15:58

## 2024-02-07 RX ADMIN — CHLORHEXIDINE GLUCONATE 1 APPLICATION(S): 213 SOLUTION TOPICAL at 13:12

## 2024-02-07 RX ADMIN — SENNA PLUS 2 TABLET(S): 8.6 TABLET ORAL at 21:27

## 2024-02-07 RX ADMIN — POLYETHYLENE GLYCOL 3350 17 GRAM(S): 17 POWDER, FOR SOLUTION ORAL at 05:49

## 2024-02-07 RX ADMIN — OXYCODONE HYDROCHLORIDE 10 MILLIGRAM(S): 5 TABLET ORAL at 01:15

## 2024-02-07 RX ADMIN — LIDOCAINE 1 PATCH: 4 CREAM TOPICAL at 00:00

## 2024-02-07 RX ADMIN — Medication 50 MICROGRAM(S): at 05:49

## 2024-02-07 RX ADMIN — ATORVASTATIN CALCIUM 40 MILLIGRAM(S): 80 TABLET, FILM COATED ORAL at 21:27

## 2024-02-07 RX ADMIN — Medication 2 SPRAY(S): at 11:04

## 2024-02-07 RX ADMIN — Medication 40 MILLIEQUIVALENT(S): at 13:10

## 2024-02-07 RX ADMIN — Medication 2 SPRAY(S): at 05:49

## 2024-02-07 RX ADMIN — Medication 2 SPRAY(S): at 00:45

## 2024-02-07 RX ADMIN — ENOXAPARIN SODIUM 40 MILLIGRAM(S): 100 INJECTION SUBCUTANEOUS at 18:13

## 2024-02-07 RX ADMIN — LIDOCAINE 1 PATCH: 4 CREAM TOPICAL at 11:05

## 2024-02-07 RX ADMIN — Medication 2 SPRAY(S): at 18:13

## 2024-02-07 RX ADMIN — OXYCODONE HYDROCHLORIDE 10 MILLIGRAM(S): 5 TABLET ORAL at 00:45

## 2024-02-07 RX ADMIN — Medication 20 MILLIGRAM(S): at 08:20

## 2024-02-07 NOTE — PROGRESS NOTE ADULT - SUBJECTIVE AND OBJECTIVE BOX
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 Conway.     24hr EVENTS:  LD drain removed. On hormonal replacement.  ROS: All other systems negative except as above    T(C): 37.1 (02-07-24 @ 19:00), Max: 37.1 (02-07-24 @ 19:00)  HR: 81 (02-07-24 @ 19:00) (60 - 96)  BP: 107/77 (02-07-24 @ 19:00) (107/77 - 139/97)  BP(mean): 87 (02-07-24 @ 19:00) (84 - 112)  RR: 18 (02-07-24 @ 19:00) (11 - 18)  SpO2: 95% (02-07-24 @ 19:00) (92% - 97%)    acetaminophen     Tablet .. 975 milliGRAM(s) Oral every 6 hours PRN  atorvastatin 40 milliGRAM(s) Oral at bedtime  bisacodyl 5 milliGRAM(s) Oral every 12 hours PRN  chlorhexidine 4% Liquid 1 Application(s) Topical every 24 hours  enoxaparin Injectable 40 milliGRAM(s) SubCutaneous <User Schedule>  hydrocortisone 20 milliGRAM(s) Oral <User Schedule>  hydrocortisone 10 milliGRAM(s) Oral <User Schedule>  levothyroxine 50 MICROGram(s) Oral daily  melatonin 5 milliGRAM(s) Oral at bedtime PRN  oxyCODONE    IR 10 milliGRAM(s) Oral every 4 hours PRN  oxyCODONE    IR 5 milliGRAM(s) Oral every 4 hours PRN  polyethylene glycol 3350 17 Gram(s) Oral every 12 hours  senna 2 Tablet(s) Oral at bedtime  sodium chloride 0.65% Nasal 2 Spray(s) Both Nostrils four times a day        02-06-24 @ 07:01  -  02-07-24 @ 07:00  --------------------------------------------------------  IN: 3650 mL / OUT: 3800 mL / NET: -150 mL    02-07-24 @ 07:01  -  02-07-24 @ 23:59  --------------------------------------------------------  IN: 1076 mL / OUT: 2720 mL / NET: -1644 mL    02-07    134<L>  |  96  |  15  ----------------------------<  91  4.2   |  27  |  0.91        ----------------------------------------------------------------------------------------------------  PHYSICAL EXAM:  Constitutional: laying comfortably in bed    Neurological: alert, o x 3,   EOMI, PERRL VF grossly intact, FC,   full strength throughout without drift, SILT    Pulmonary: no accessory muscle use, normal respiratory effort  Cardiovascular: Regular rate and rhythm  Gastrointestinal: Soft, Non-tender, Non-distended   Extremities: warm/dry, no edema    -----------------------------------------------------------------------------------------------------

## 2024-02-07 NOTE — PROGRESS NOTE ADULT - NS ATTEST RISK PROBLEM GEN_ALL_CORE FT
POD 5 TSP for craniopharyngioma resection, CSF leak   LD draining 5cc/3hrs mngt per neurosurgery, LD clamped for 48 hrs, no signs of CSF leak, d/c LD, neuro checks q 4hr, MRI brain pending,TSP precautions (no straws, incentive spirometry, bipap), DI resolved, drink to thirst, BMP daily,  panhypopit on hydrocortisone and synthroid , lovenox 40 mg sc qhs

## 2024-02-07 NOTE — PROGRESS NOTE ADULT - ASSESSMENT
43 year old male with PMH HLD, s/p endoscopic transphenoidal removal craniopharyngioma POD #6. Pt c/o mild headache relieved with pain meds, minimal bleeding. No evidence of CSF leak or epistaxis on exam. LD clamped x 48h per NSCU.

## 2024-02-07 NOTE — PROGRESS NOTE ADULT - SUBJECTIVE AND OBJECTIVE BOX
ENT ISSUE/POD: s/p endoscopic transphenoidal removal craniopharyngioma POD #6    HPI: 43 year old male with PMH HLD, s/p endoscopic transphenoidal removal craniopharyngioma POD #6. Pt seen and examined at bedside. No acute events overnight. Pt c/o mild headache relieved with pain meds, minimal bleeding. Pt denies salty/metallic taste, fever, n/v, SOB, dysphagia, odynophagia, hemoptysis, hoarseness.          PAST MEDICAL & SURGICAL HISTORY:  HLD (hyperlipidemia)      History of pituitary tumor      Deviated septum      History of dental surgery        Allergies    No Known Allergies    Intolerances      MEDICATIONS  (STANDING):  atorvastatin 40 milliGRAM(s) Oral at bedtime  chlorhexidine 4% Liquid 1 Application(s) Topical every 24 hours  enoxaparin Injectable 40 milliGRAM(s) SubCutaneous <User Schedule>  hydrocortisone 20 milliGRAM(s) Oral <User Schedule>  hydrocortisone 10 milliGRAM(s) Oral <User Schedule>  levothyroxine 50 MICROGram(s) Oral daily  lidocaine   4% Patch 1 Patch Transdermal daily  polyethylene glycol 3350 17 Gram(s) Oral every 12 hours  senna 2 Tablet(s) Oral at bedtime  sodium chloride 0.65% Nasal 2 Spray(s) Both Nostrils four times a day    MEDICATIONS  (PRN):  acetaminophen     Tablet .. 975 milliGRAM(s) Oral every 6 hours PRN Temp greater or equal to 38C (100.4F), Mild Pain (1 - 3)  bisacodyl 5 milliGRAM(s) Oral every 12 hours PRN Constipation  melatonin 5 milliGRAM(s) Oral at bedtime PRN Sleep  oxyCODONE    IR 10 milliGRAM(s) Oral every 4 hours PRN Severe Pain (7 - 10)  oxyCODONE    IR 5 milliGRAM(s) Oral every 4 hours PRN Moderate Pain (4 - 6)      Social History: see consult note    Family history: see consult note    ROS:   ENT: all negative except as noted in HPI   Pulm: denies SOB, cough, hemoptysis  Neuro: denies numbness/tingling, loss of sensation  Endo: denies heat/cold intolerance, excessive sweating      Vital Signs Last 24 Hrs  T(C): 36.7 (07 Feb 2024 03:00), Max: 37.1 (06 Feb 2024 23:00)  T(F): 98.1 (07 Feb 2024 03:00), Max: 98.8 (06 Feb 2024 23:00)  HR: 78 (07 Feb 2024 03:00) (71 - 79)  BP: 113/93 (07 Feb 2024 03:00) (111/87 - 125/92)  BP(mean): 101 (07 Feb 2024 03:00) (95 - 101)  RR: 13 (07 Feb 2024 03:00) (13 - 16)  SpO2: 92% (07 Feb 2024 03:00) (91% - 96%)    Parameters below as of 06 Feb 2024 19:00  Patient On (Oxygen Delivery Method): room air                              14.1   8.85  )-----------( 303      ( 05 Feb 2024 22:14 )             41.0    02-06    136  |  99  |  13  ----------------------------<  102<H>  4.3   |  20<L>  |  0.91    Ca    9.8      06 Feb 2024 20:48  Phos  3.6     02-06  Mg     2.1     02-06    TPro  x   /  Alb  4.1  /  TBili  x   /  DBili  x   /  AST  x   /  ALT  x   /  AlkPhos  x   02-06       PHYSICAL EXAM:  Gen: NAD  Skin: No rashes, bruises, or lesions  Head: Normocephalic, Atraumatic  Face: no edema, erythema, or fluctuance. Parotid glands soft without mass  Eyes: no scleral injection  Nose: B/L nasopore in place, serosanguinous secretions from right nare, no active bleeding.   Mouth: Minimal dry blood in posterior pharynx. No Stridor / Drooling / Trismus.  Mucosa moist, tongue/uvula midline, oropharynx clear  Neck: Flat, supple, no lymphadenopathy, trachea midline, no masses  Lymphatic: No lymphadenopathy  Resp: breathing easily, no stridor  Neuro: facial nerve intact, no facial droop

## 2024-02-07 NOTE — CHART NOTE - NSCHARTNOTEFT_GEN_A_CORE
CAPRINI SCORE [CLOT]    AGE RELATED RISK FACTORS                                                       MOBILITY RELATED FACTORS  [x ] Age 41-60 years                                            (1 Point)                  [ ] Bed rest                                                        (1 Point)  [ ] Age: 61-74 years                                           (2 Points)                 [ ] Plaster cast                                                   (2 Points)  [ ] Age= 75 years                                              (3 Points)                 [ ] Bed bound for more than 72 hours                 (2 Points)    DISEASE RELATED RISK FACTORS                                               GENDER SPECIFIC FACTORS  [ ] Edema in the lower extremities                       (1 Point)                  [ ] Pregnancy                                                     (1 Point)  [ ] Varicose veins                                               (1 Point)                  [ ] Post-partum < 6 weeks                                   (1 Point)             [x ] BMI > 25 Kg/m2                                            (1 Point)                  [ ] Hormonal therapy  or oral contraception          (1 Point)                 [ ] Sepsis (in the previous month)                        (1 Point)                  [ ] History of pregnancy complications                 (1 point)  [ ] Pneumonia or serious lung disease                                               [ ] Unexplained or recurrent                     (1 Point)           (in the previous month)                               (1 Point)  [ ] Abnormal pulmonary function test                     (1 Point)                 SURGERY RELATED RISK FACTORS  [ ] Acute myocardial infarction                              (1 Point)                 [ ]  Section                                             (1 Point)  [ ] Congestive heart failure (in the previous month)  (1 Point)               [ ] Minor surgery                                                  (1 Point)   [ ] Inflammatory bowel disease                             (1 Point)                 [ ] Arthroscopic surgery                                        (2 Points)  [ ] Central venous access                                      (2 Points)                [x ] General surgery lasting more than 45 minutes   (2 Points)       [ ] Stroke (in the previous month)                          (5 Points)               [ ] Elective arthroplasty                                         (5 Points)                                                                                                                                               HEMATOLOGY RELATED FACTORS                                                 TRAUMA RELATED RISK FACTORS  [ ] Prior episodes of VTE                                     (3 Points)                [ ] Fracture of the hip, pelvis, or leg                       (5 Points)  [ ] Positive family history for VTE                         (3 Points)                 [ ] Acute spinal cord injury (in the previous month)  (5 Points)  [ ] Prothrombin 44838 A                                     (3 Points)                 [ ] Paralysis  (less than 1 month)                             (5 Points)  [ ] Factor V Leiden                                             (3 Points)                  [ ] Multiple Trauma within 1 month                        (5 Points)  [ ] Lupus anticoagulants                                     (3 Points)                                                           [ ] Anticardiolipin antibodies                               (3 Points)                                                       [ ] High homocysteine in the blood                      (3 Points)                                             [ ] Other congenital or acquired thrombophilia      (3 Points)                                                [ ] Heparin induced thrombocytopenia                  (3 Points)                                          Total Score [   4      ]    Caprini Score 0 - 2:  Low Risk, No VTE Prophylaxis required for most patients, encourage ambulation  Caprini Score 3 - 6:  At Risk, pharmacologic VTE prophylaxis is indicated for most patients (in the absence of a contraindication)  Caprini Score Greater than or = 7:  High Risk, pharmacologic VTE prophylaxis is indicated for most patients (in the absence of a contraindication)
Lumbar drain cleared for removal per Dr. Conway. Drain clamped  and removed without complication.  1 absorbable suture applied for hemostasis.  Patient tolerated procedure well.

## 2024-02-07 NOTE — PROGRESS NOTE ADULT - SUBJECTIVE AND OBJECTIVE BOX
ENDOCRINE FOLLOW UP     Chief Complaint: panhypopituitarism    History:     MEDICATIONS  (STANDING):  atorvastatin 40 milliGRAM(s) Oral at bedtime  chlorhexidine 4% Liquid 1 Application(s) Topical every 24 hours  enoxaparin Injectable 40 milliGRAM(s) SubCutaneous <User Schedule>  hydrocortisone 10 milliGRAM(s) Oral <User Schedule>  hydrocortisone 20 milliGRAM(s) Oral <User Schedule>  levothyroxine 50 MICROGram(s) Oral daily  polyethylene glycol 3350 17 Gram(s) Oral every 12 hours  senna 2 Tablet(s) Oral at bedtime  sodium chloride 0.65% Nasal 2 Spray(s) Both Nostrils four times a day    MEDICATIONS  (PRN):  acetaminophen     Tablet .. 975 milliGRAM(s) Oral every 6 hours PRN Temp greater or equal to 38C (100.4F), Mild Pain (1 - 3)  bisacodyl 5 milliGRAM(s) Oral every 12 hours PRN Constipation  melatonin 5 milliGRAM(s) Oral at bedtime PRN Sleep  oxyCODONE    IR 10 milliGRAM(s) Oral every 4 hours PRN Severe Pain (7 - 10)  oxyCODONE    IR 5 milliGRAM(s) Oral every 4 hours PRN Moderate Pain (4 - 6)      Allergies    No Known Allergies    Intolerances        ROS: All other systems reviewed and negative    PHYSICAL EXAM:  VITALS: T(C): 36.6 (02-07-24 @ 07:00)  T(F): 97.9 (02-07-24 @ 07:00), Max: 98.8 (02-06-24 @ 23:00)  HR: 82 (02-07-24 @ 11:00) (60 - 82)  BP: 139/97 (02-07-24 @ 07:00) (111/87 - 139/97)  RR:  (12 - 15)  SpO2:  (91% - 95%)  Wt(kg): --  GENERAL: NAD, resting comfortably   EYES: No proptosis,  anicteric  HEENT:  Atraumatic, Normocephalic, moist mucous membranes  RESPIRATORY: Nonlabored respirations on room air, normal rate/effort   CARDIOVASCULAR: Did not appear cyanotic, no lower extremity swelling visualized  GI: did not appear distended  NEURO: Answering questions appropriately, moves extremities spontaneously  PSYCH:  reactive affect, euthymic mood        02-06    136  |  99  |  13  ----------------------------<  102<H>  4.3   |  20<L>  |  0.91    eGFR: 107    Ca    9.8      02-06  Mg     2.1     02-06  Phos  3.6     02-06    TPro  x   /  Alb  4.1  /  TBili  x   /  DBili  x   /  AST  x   /  ALT  x   /  AlkPhos  x   02-06          Thyroid Stimulating Hormone, Serum: 0.15 uIU/mL (02-03-24 @ 08:37)  Thyroid Stimulating Hormone, Serum: 0.27 uIU/mL (02-02-24 @ 11:35)   ENDOCRINE FOLLOW UP     Chief Complaint: panhypopituitarism    History:   The patient reports feeling well, endorsed dumping urine this am but feeling thirsty and able to drink, no nausea or vomiting.    MEDICATIONS  (STANDING):  atorvastatin 40 milliGRAM(s) Oral at bedtime  chlorhexidine 4% Liquid 1 Application(s) Topical every 24 hours  enoxaparin Injectable 40 milliGRAM(s) SubCutaneous <User Schedule>  hydrocortisone 10 milliGRAM(s) Oral <User Schedule>  hydrocortisone 20 milliGRAM(s) Oral <User Schedule>  levothyroxine 50 MICROGram(s) Oral daily  polyethylene glycol 3350 17 Gram(s) Oral every 12 hours  senna 2 Tablet(s) Oral at bedtime  sodium chloride 0.65% Nasal 2 Spray(s) Both Nostrils four times a day    MEDICATIONS  (PRN):  acetaminophen     Tablet .. 975 milliGRAM(s) Oral every 6 hours PRN Temp greater or equal to 38C (100.4F), Mild Pain (1 - 3)  bisacodyl 5 milliGRAM(s) Oral every 12 hours PRN Constipation  melatonin 5 milliGRAM(s) Oral at bedtime PRN Sleep  oxyCODONE    IR 10 milliGRAM(s) Oral every 4 hours PRN Severe Pain (7 - 10)  oxyCODONE    IR 5 milliGRAM(s) Oral every 4 hours PRN Moderate Pain (4 - 6)      Allergies    No Known Allergies    Intolerances        ROS: All other systems reviewed and negative    PHYSICAL EXAM:  VITALS: T(C): 36.6 (02-07-24 @ 07:00)  T(F): 97.9 (02-07-24 @ 07:00), Max: 98.8 (02-06-24 @ 23:00)  HR: 82 (02-07-24 @ 11:00) (60 - 82)  BP: 139/97 (02-07-24 @ 07:00) (111/87 - 139/97)  RR:  (12 - 15)  SpO2:  (91% - 95%)  Wt(kg): --  GENERAL: NAD, resting comfortably in bed  EYES: No proptosis,  anicteric  HEENT:  Atraumatic, Normocephalic, mildly dry mucous membranes  RESPIRATORY: Nonlabored respirations on room air, normal rate/effort   CARDIOVASCULAR: Did not appear cyanotic, no lower extremity swelling visualized  GI: did not appear distended  NEURO: Answering questions appropriately, moves extremities spontaneously  PSYCH:  reactive affect, euthymic mood        02-06    136  |  99  |  13  ----------------------------<  102<H>  4.3   |  20<L>  |  0.91    eGFR: 107    Ca    9.8      02-06  Mg     2.1     02-06  Phos  3.6     02-06    TPro  x   /  Alb  4.1  /  TBili  x   /  DBili  x   /  AST  x   /  ALT  x   /  AlkPhos  x   02-06          Thyroid Stimulating Hormone, Serum: 0.15 uIU/mL (02-03-24 @ 08:37)  Thyroid Stimulating Hormone, Serum: 0.27 uIU/mL (02-02-24 @ 11:35)

## 2024-02-07 NOTE — PROGRESS NOTE ADULT - SUBJECTIVE AND OBJECTIVE BOX
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 Conway.     24hr EVENTS:  LD drain clamped since yesterday , no CSF leak   ROS: All other systems negative except as above    T(C): 36.6 (02-06-24 @ 07:00), Max: 37.2 (02-06-24 @ 03:00)  HR: 73 (02-06-24 @ 07:00) (61 - 73)  BP: 110/75 (02-06-24 @ 07:00) (104/80 - 118/82)  RR: 11 (02-06-24 @ 07:00) (11 - 18)  SpO2: 98% (02-06-24 @ 07:00) (93% - 98%)  02-05-24 @ 07:01  -  02-06-24 @ 07:00  --------------------------------------------------------  IN: 950 mL / OUT: 1615 mL / NET: -665 mL    acetaminophen     Tablet .. 975 milliGRAM(s) Oral every 6 hours PRN  atorvastatin 40 milliGRAM(s) Oral at bedtime  bisacodyl 5 milliGRAM(s) Oral every 12 hours PRN  chlorhexidine 4% Liquid 1 Application(s) Topical every 24 hours  enoxaparin Injectable 40 milliGRAM(s) SubCutaneous <User Schedule>  hydrocortisone 20 milliGRAM(s) Oral <User Schedule>  hydrocortisone 10 milliGRAM(s) Oral <User Schedule>  levothyroxine 50 MICROGram(s) Oral daily  melatonin 5 milliGRAM(s) Oral at bedtime PRN  oxyCODONE    IR 5 milliGRAM(s) Oral every 4 hours PRN  oxyCODONE    IR 10 milliGRAM(s) Oral every 4 hours PRN  polyethylene glycol 3350 17 Gram(s) Oral every 12 hours  senna 2 Tablet(s) Oral at bedtime  sodium chloride 0.65% Nasal 2 Spray(s) Both Nostrils four times a day    ----------------------------------------------------------------------------------------------------  PHYSICAL EXAM:  Constitutional: laying comfortably in bed    Neurological: alert, o x 3,   EOMI, PERRL VF grossly intact, FC,   full strength throughout without drift, SILT    Pulmonary: no accessory muscle use, normal respiratory effort  Cardiovascular: Regular rate and rhythm  Gastrointestinal: Soft, Non-tender, Non-distended   Extremities: warm/dry, no edema    -----------------------------------------------------------------------------------------------------    LABS:  Na: 135 (02-05 @ 22:14), 134 (02-05 @ 08:16), 131 (02-05 @ 06:49), 135 (02-04 @ 16:35), 135 (02-04 @ 05:57), 133 (02-03 @ 16:03)  K: 3.9 (02-05 @ 22:14), 4.3 (02-05 @ 08:16), 5.5 (02-05 @ 06:49), 4.2 (02-04 @ 16:35), 4.1 (02-04 @ 05:57), 3.8 (02-03 @ 16:03)  Cl: 95 (02-05 @ 22:14), 96 (02-05 @ 08:16), 97 (02-05 @ 06:49), 96 (02-04 @ 16:35), 96 (02-04 @ 05:57), 97 (02-03 @ 16:03)  CO2: 26 (02-05 @ 22:14), 26 (02-05 @ 08:16), 19 (02-05 @ 06:49), 26 (02-04 @ 16:35), 26 (02-04 @ 05:57), 24 (02-03 @ 16:03)  BUN: 9 (02-05 @ 22:14), 9 (02-05 @ 08:16), 10 (02-05 @ 06:49), 9 (02-04 @ 16:35), 8 (02-04 @ 05:57), 8 (02-03 @ 16:03)  Cr: 0.71 (02-05 @ 22:14), 0.75 (02-05 @ 08:16), 0.58 (02-05 @ 06:49), 0.82 (02-04 @ 16:35), 0.72 (02-04 @ 05:57), 0.63 (02-03 @ 16:03)  Glu: 116(02-05 @ 22:14), 98(02-05 @ 08:16), 103(02-05 @ 06:49), 106(02-04 @ 16:35), 99(02-04 @ 05:57), 95(02-03 @ 16:03)    Hgb: 14.1 (02-05 @ 22:14), 14.6 (02-05 @ 06:49)  Hct: 41.0 (02-05 @ 22:14), 43.7 (02-05 @ 06:49)  WBC: 8.85 (02-05 @ 22:14), 8.49 (02-05 @ 06:49)  Plt: 303 (02-05 @ 22:14), 267 (02-05 @ 06:49)    INR:   PTT:             Assessment and Plan:   · Assessment	  Assessment: craniopharyngioma s/p resection with lumbar drain in place    Plan:  neurochecks q4h  pain control  LD clamped remove today then MRI pituitary sequences    skull base precautions  no PT OT needs    CV  SBP normotensive 100-160 mmhg     Renal  DI  resolved   monitor UO       endocrine  hydrocrot 20/10, synthroid 50mcg    GI   regular diet   Last BM 2/6  prn dulcolax    heme  lovenox ppx    Dispo floor after LD removed     Not critical        Historical Review:   43M with h/o HLD, chronic headaches, suprasellar cystic mass (d/x 2020, 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 Conway.     24hr EVENTS:  LD drain clamped since yesterday , no CSF leak   ROS: All other systems negative except as above    T(C): 36.6 (02-07-24 @ 07:00), Max: 37.1 (02-06-24 @ 23:00)  HR: 60 (02-07-24 @ 07:00) (60 - 79)  BP: 139/97 (02-07-24 @ 07:00) (111/87 - 139/97)  RR: 14 (02-07-24 @ 07:00) (13 - 16)  SpO2: 94% (02-07-24 @ 07:00) (91% - 96%)  02-06-24 @ 07:01  -  02-07-24 @ 07:00  --------------------------------------------------------  IN: 3650 mL / OUT: 3800 mL / NET: -150 mL    02-07-24 @ 07:01  -  02-07-24 @ 09:16  --------------------------------------------------------  IN: 236 mL / OUT: 600 mL / NET: -364 mL    acetaminophen     Tablet .. 975 milliGRAM(s) Oral every 6 hours PRN  atorvastatin 40 milliGRAM(s) Oral at bedtime  bisacodyl 5 milliGRAM(s) Oral every 12 hours PRN  chlorhexidine 4% Liquid 1 Application(s) Topical every 24 hours  enoxaparin Injectable 40 milliGRAM(s) SubCutaneous <User Schedule>  hydrocortisone 20 milliGRAM(s) Oral <User Schedule>  hydrocortisone 10 milliGRAM(s) Oral <User Schedule>  levothyroxine 50 MICROGram(s) Oral daily  lidocaine   4% Patch 1 Patch Transdermal daily  melatonin 5 milliGRAM(s) Oral at bedtime PRN  oxyCODONE    IR 10 milliGRAM(s) Oral every 4 hours PRN  oxyCODONE    IR 5 milliGRAM(s) Oral every 4 hours PRN  polyethylene glycol 3350 17 Gram(s) Oral every 12 hours  senna 2 Tablet(s) Oral at bedtime  sodium chloride 0.65% Nasal 2 Spray(s) Both Nostrils four times a day    ----------------------------------------------------------------------------------------------------  PHYSICAL EXAM:  Constitutional: laying comfortably in bed    Neurological: alert, o x 3,   EOMI, PERRL VF grossly intact, FC,   full strength throughout without drift, SILT    Pulmonary: no accessory muscle use, normal respiratory effort  Cardiovascular: Regular rate and rhythm  Gastrointestinal: Soft, Non-tender, Non-distended   Extremities: warm/dry, no edema    -----------------------------------------------------------------------------------------------------      LABS:  Na: 136 (02-06 @ 20:48), 136 (02-06 @ 15:38), 135 (02-05 @ 22:14), 134 (02-05 @ 08:16), 131 (02-05 @ 06:49), 135 (02-04 @ 16:35)  K: 4.3 (02-06 @ 20:48), 4.5 (02-06 @ 15:38), 3.9 (02-05 @ 22:14), 4.3 (02-05 @ 08:16), 5.5 (02-05 @ 06:49), 4.2 (02-04 @ 16:35)  Cl: 99 (02-06 @ 20:48), 97 (02-06 @ 15:38), 95 (02-05 @ 22:14), 96 (02-05 @ 08:16), 97 (02-05 @ 06:49), 96 (02-04 @ 16:35)  CO2: 20 (02-06 @ 20:48), 19 (02-06 @ 15:38), 26 (02-05 @ 22:14), 26 (02-05 @ 08:16), 19 (02-05 @ 06:49), 26 (02-04 @ 16:35)  BUN: 13 (02-06 @ 20:48), 12 (02-06 @ 15:38), 9 (02-05 @ 22:14), 9 (02-05 @ 08:16), 10 (02-05 @ 06:49), 9 (02-04 @ 16:35)  Cr: 0.91 (02-06 @ 20:48), 0.94 (02-06 @ 15:38), 0.71 (02-05 @ 22:14), 0.75 (02-05 @ 08:16), 0.58 (02-05 @ 06:49), 0.82 (02-04 @ 16:35)  Glu: 102(02-06 @ 20:48), 106(02-06 @ 15:38), 116(02-05 @ 22:14), 98(02-05 @ 08:16), 103(02-05 @ 06:49), 106(02-04 @ 16:35)    Hgb: 14.1 (02-05 @ 22:14), 14.6 (02-05 @ 06:49)  Hct: 41.0 (02-05 @ 22:14), 43.7 (02-05 @ 06:49)  WBC: 8.85 (02-05 @ 22:14), 8.49 (02-05 @ 06:49)  Plt: 303 (02-05 @ 22:14), 267 (02-05 @ 06:49)    INR:   PTT:                 Assessment: craniopharyngioma s/p resection with lumbar drain in place    Plan:  neurochecks q4h  pain control  LD clamped remove today then MRI pituitary sequences    skull base precautions  no PT OT needs    CV  SBP normotensive 100-160 mmhg     Renal  DI  resolved   monitor UO       endocrine  hydrocrot 20/10, synthroid 50mcg    GI   regular diet   Last BM 2/6  prn dulcolax    heme  lovenox ppx    Dispo floor after LD removed     Not critical

## 2024-02-07 NOTE — DIETITIAN INITIAL EVALUATION ADULT - ADD RECOMMEND
1) Will continue to monitor PO intake, weight, labs, skin, GI status and diet 2) RD to add Mighty shake at breakfast to aid in meeting nutrient needs

## 2024-02-07 NOTE — DIETITIAN INITIAL EVALUATION ADULT - PERTINENT MEDS FT
MEDICATIONS  (STANDING):  atorvastatin 40 milliGRAM(s) Oral at bedtime  chlorhexidine 4% Liquid 1 Application(s) Topical every 24 hours  enoxaparin Injectable 40 milliGRAM(s) SubCutaneous <User Schedule>  hydrocortisone 20 milliGRAM(s) Oral <User Schedule>  hydrocortisone 10 milliGRAM(s) Oral <User Schedule>  levothyroxine 50 MICROGram(s) Oral daily  lidocaine   4% Patch 1 Patch Transdermal daily  polyethylene glycol 3350 17 Gram(s) Oral every 12 hours  senna 2 Tablet(s) Oral at bedtime  sodium chloride 0.65% Nasal 2 Spray(s) Both Nostrils four times a day    MEDICATIONS  (PRN):  acetaminophen     Tablet .. 975 milliGRAM(s) Oral every 6 hours PRN Temp greater or equal to 38C (100.4F), Mild Pain (1 - 3)  bisacodyl 5 milliGRAM(s) Oral every 12 hours PRN Constipation  melatonin 5 milliGRAM(s) Oral at bedtime PRN Sleep  oxyCODONE    IR 5 milliGRAM(s) Oral every 4 hours PRN Moderate Pain (4 - 6)  oxyCODONE    IR 10 milliGRAM(s) Oral every 4 hours PRN Severe Pain (7 - 10)

## 2024-02-07 NOTE — DIETITIAN INITIAL EVALUATION ADULT - REASON FOR ADMISSION
Per chart: 43 year old male with PMH HLD, s/p endoscopic transphenoidal removal craniopharyngioma POD #6. Pt c/o mild headache relieved with pain meds, minimal bleeding. No evidence of CSF leak or epistaxis on exam. LD clamped x 48h per NSCU.

## 2024-02-07 NOTE — PROGRESS NOTE ADULT - ASSESSMENT
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

## 2024-02-07 NOTE — DIETITIAN INITIAL EVALUATION ADULT - PERTINENT LABORATORY DATA
02-06    136  |  99  |  13  ----------------------------<  102<H>  4.3   |  20<L>  |  0.91    Ca    9.8      06 Feb 2024 20:48  Phos  3.6     02-06  Mg     2.1     02-06    TPro  x   /  Alb  4.1  /  TBili  x   /  DBili  x   /  AST  x   /  ALT  x   /  AlkPhos  x   02-06

## 2024-02-07 NOTE — PROGRESS NOTE ADULT - ASSESSMENT
44 y/o male. PMH HLD hx of suprasellar cystic mass since 2020, on surveillance imaging since found tumor has increased in size s/p TSP on 2/1/24 c/b hypernatremia started on DDDAVP. Endocrine consulted for DI monitoring and r/o panhypopit.     #Secondary Adrenal insufficiency  #Central DI  #Panhypopit  #Central hypogonadism  #secondary hypothyroidism  - likely Central DI post op since UOP >500cc/hr, USG and UOsm low and hypernatremia  - AM cortisol 0.5 (LOW), ACTH 3.5 (LOW) - consistent with secondary adrenal insufficiency  - TSH 0.15, FT4 0.8 - consistent with secondary hypothyroidism  - FSH/LH low, prior labs showing secondary hypogonadism  - 2/3/24: Na 133 and urine output decreasing - DDAVP held  - corrected ca 9.7 on 2/7  PLAN  In terms of AI  - continue hydrocortisone 20mg at 8am and 10mg at 3pm while in the hospital, plan to d/c on hydrocortisone 10mg at 8am and 5mg at 3pm  - if patient becomes hemodynamically unstable, please start stress dose steroids (hydrocortisone 50mg IV q8h) since patient is now diagnosed with secondary adrenal insufficiency  FOR DISCHARGE  to help with dc planning-  - Please print and give the pt info section for patients under adrenal insufficiency (this will educate them regarding the warning signs of adrenal crisis )  - reviewed sick day rules & adrenal insufficiency with patient today 2/6  - Patient should take 2-3x of home dose in cases of illness, ever, accidents, and surgery. If unable to take PO, use IM injection as below (Solu-Cortef)  - pt should receive stress dosing (IV hydrocortisone 50mg q8) with any major illness or surgical procedure  - Should pt be unable to tolerate PO and is unable to take hydrocortisone, pt will need an emergency injection. Please discharge with a prescription for 100 mg Solu-Cortef Act-O-Vial and the following instructions:  [email or web address]-[email or web address]-act-[email or web address]-vial-[email or web address]-chamber-ampul/  - pt should obtain a medical alert bracelet or necklace to inform emergency providers of adrenal insufficiency diagnosis [[email or web address]]  In terms of hypothyroidism  - c/w levothyroxine 50mcg daily for central hypothyroidism (maintain on empty stomach (at least 1 hour before meals), separate by 4 hours from PPI or calcium supplementation which can inhibit its absorption)  - recheck TSH and Free T4 5 days (2/8/24) if still hospitalized otherwise recheck outpatient in 4 week  - patient will follow up with endocrinologist Dr. Isidra Dao outpatient  In terms of DI  - please check BMP this afternoon, patient reports high volume of urine this afternoon, yellow in color   - continue to HOLD DDAVP 0.05mg daily d/t hyponatremia and normal Urine output  - strict I&Os, daily BMPs & urine osm   *DI WATCH*:  - Goal Na 140-145  - DI is suspected if UOP is >250cc/hr x 2 consecutive hours or if >500cc/hr x 1 hr - if this occurs please check STAT serum Na, urine osm, urine specific gravity  - if specific gravity <1.005/Urine Osm <300 and Na is rising - likely DI, please dose DDAVP 0.05mg PO x1 (or DDAVP 0.5mcg IV if no PO access) and bolus 1/2 NS to match half the output (for example, if net negative 2Liters can give 1Liter 1/2NS if pt is unable to keep up with output with PO intake) - continue DI watch  - If Na continues to increase despite a a DDAVP dose and 1/2NS fluid bolus please inform endocrine   - Please make sure pt has access to water and encourage her to drink to thirst     Hypercalcemia  - Ca up to 10.6  - likely dehydration with component of immobility  PLAN  - ensure PO hydration as above  - check daily BMP/Albumin to monitor corrected calcium  - check PTH intact & Vitamin D 25 OH if remains persistently elevated    Discussed with primary team.     Thank you for the interesting consult.    Joe Walker MD, Endocrinology Fellow  For non-urgent follow up questions, please email [email or web address] or [email or web address].  Pager [phone] from 9am to 5pm. After hours and on weekends, please call [phone].     42 y/o male. PMH HLD hx of suprasellar cystic mass since 2020, on surveillance imaging since found tumor has increased in size s/p TSP on 2/1/24 c/b hypernatremia started on DDDAVP. Endocrine consulted for DI monitoring and r/o panhypopit.     #Secondary Adrenal insufficiency  #Central DI  #Panhypopit  #Central hypogonadism  #secondary hypothyroidism  - likely Central DI post op since UOP >500cc/hr, USG and UOsm low and hypernatremia  - AM cortisol 0.5 (LOW), ACTH 3.5 (LOW) - consistent with secondary adrenal insufficiency  - TSH 0.15, FT4 0.8 - consistent with secondary hypothyroidism  - FSH/LH low, prior labs showing secondary hypogonadism  - 2/3/24: Na 133 and urine output decreasing - DDAVP held  - Na 138 on 2/7  PLAN  In terms of AI  - continue hydrocortisone 20mg at 8am and 10mg at 3pm while in the hospital, plan to d/c on hydrocortisone 10mg at 8am and 5mg at 3pm  - if patient becomes hemodynamically unstable, please start stress dose steroids (hydrocortisone 50mg IV q8h) since patient is now diagnosed with secondary adrenal insufficiency  FOR DISCHARGE  to help with dc planning-  - Please print and give the pt info section for patients under adrenal insufficiency (this will educate them regarding the warning signs of adrenal crisis )  - reviewed sick day rules & adrenal insufficiency with patient today 2/6  - Patient should take 2-3x of home dose in cases of illness, ever, accidents, and surgery. If unable to take PO, use IM injection as below (Solu-Cortef)  - pt should receive stress dosing (IV hydrocortisone 50mg q8) with any major illness or surgical procedure  - Should pt be unable to tolerate PO and is unable to take hydrocortisone, pt will need an emergency injection. Please discharge with a prescription for 100 mg Solu-Cortef Act-O-Vial and the following instructions:  [email or web address]-[email or web address]-[email or web address]-act-[email or web address]-vial-[email or web address]-chamber-ampul/  - pt should obtain a medical alert bracelet or necklace to inform emergency providers of adrenal insufficiency diagnosis [[email or web address]]  In terms of hypothyroidism  - c/w levothyroxine 50mcg daily for central hypothyroidism (maintain on empty stomach (at least 1 hour before meals), separate by 4 hours from PPI or calcium supplementation which can inhibit its absorption)  - recheck TSH and Free T4 5 days (2/8/24) if still hospitalized otherwise recheck outpatient in 4 week  - patient will follow up with endocrinologist Dr. Isidra Dao outpatient  In terms of DI  - please check BMP this afternoon, patient reports high volume of urine this afternoon, yellow in color   - continue to HOLD DDAVP 0.05mg daily d/t hyponatremia and normal Urine output  - strict I&Os, daily BMPs & urine osm   *DI WATCH*:  - Goal Na 140-145  - DI is suspected if UOP is >250cc/hr x 2 consecutive hours or if >500cc/hr x 1 hr - if this occurs please check STAT serum Na, urine osm, urine specific gravity  - if specific gravity <1.005/Urine Osm <300 and Na is rising - likely DI, please dose DDAVP 0.05mg PO x1 (or DDAVP 0.5mcg IV if no PO access) and bolus 1/2 NS to match half the output (for example, if net negative 2Liters can give 1Liter 1/2NS if pt is unable to keep up with output with PO intake) - continue DI watch  - If Na continues to increase despite a a DDAVP dose and 1/2NS fluid bolus please inform endocrine   - Please make sure pt has access to water and encourage her to drink to thirst     Hypercalcemia  - Ca up to 10.6 improved to 9.7 on 2.7  - likely dehydration with component of immobility  PLAN  - ensure PO hydration as above  - check daily BMP/Albumin to monitor corrected calcium  - check PTH intact & Vitamin D 25 OH if remains persistently elevated    Discussed with primary team.     Thank you for the interesting consult.    Joe Walker MD, Endocrinology Fellow  For non-urgent follow up questions, please email [email or web address] or [email or web address].  Pager [phone] from 9am to 5pm. After hours and on weekends, please call [phone].     42 y/o male. PMH HLD hx of suprasellar cystic mass since 2020, on surveillance imaging since found tumor has increased in size s/p TSP on 2/1/24 c/b hypernatremia started on DDDAVP. Endocrine consulted for DI monitoring and r/o panhypopit.     #Secondary Adrenal insufficiency  #Central DI  #Panhypopit  #Central hypogonadism  #secondary hypothyroidism  - likely Central DI post op since UOP >500cc/hr, USG and UOsm low and hypernatremia  - AM cortisol 0.5 (LOW), ACTH 3.5 (LOW) - consistent with secondary adrenal insufficiency  - TSH 0.15, FT4 0.8 - consistent with secondary hypothyroidism  - FSH/LH low, prior labs showing secondary hypogonadism  - 2/3/24: Na 133 and urine output decreasing - DDAVP held  - Na 138 on 2/7  PLAN  In terms of AI  - continue hydrocortisone 20mg at 8am and 10mg at 3pm while in the hospital, plan to d/c on hydrocortisone 10mg at 8am and 5mg at 3pm  - if patient becomes hemodynamically unstable, please start stress dose steroids (hydrocortisone 50mg IV q8h) since patient is now diagnosed with secondary adrenal insufficiency  FOR DISCHARGE  to help with dc planning-  - Please print and give the pt info section for patients under adrenal insufficiency (this will educate them regarding the warning signs of adrenal crisis )  - reviewed sick day rules & adrenal insufficiency with patient today 2/6  - Patient should take 2-3x of home dose in cases of illness, ever, accidents, and surgery. If unable to take PO, use IM injection as below (Solu-Cortef)  - pt should receive stress dosing (IV hydrocortisone 50mg q8) with any major illness or surgical procedure  - Should pt be unable to tolerate PO and is unable to take hydrocortisone, pt will need an emergency injection. Please discharge with a prescription for 100 mg Solu-Cortef Act-O-Vial and the following instructions:  [email or web address]-[email or web address]-[email or web address]-act-o-vial-[email or web address]-chamber-ampul/  - pt should obtain a medical alert bracelet or necklace to inform emergency providers of adrenal insufficiency diagnosis [[email or web address]]  In terms of hypothyroidism  - c/w levothyroxine 50mcg daily for central hypothyroidism (maintain on empty stomach (at least 1 hour before meals), separate by 4 hours from PPI or calcium supplementation which can inhibit its absorption)  - recheck TSH and Free T4 5 days (2/8/24)   - patient will follow up with endocrinologist Dr. Isidra Dao outpatient  In terms of DI  - strict I&Os, daily BMPs & urine osm   *DI WATCH*: - now that drain is clamped patient may be more likely to go into overt DI  - Goal Na 140-145  - DI is suspected if UOP is >250cc/hr x 2 consecutive hours or if >500cc/hr x 1 hr - if this occurs please check STAT serum Na, urine osm, urine specific gravity  - if specific gravity <1.005/Urine Osm <300 and Na is rising - likely DI, please dose DDAVP 0.05mg PO x1 (or DDAVP 0.5mcg IV if no PO access) and bolus 1/2 NS to match half the output (for example, if net negative 2Liters can give 1Liter 1/2NS if pt is unable to keep up with output with PO intake) - continue DI watch  - If Na continues to increase despite a a DDAVP dose and 1/2NS fluid bolus please inform endocrine   - Please make sure pt has access to water and encourage her to drink to thirst     Hypercalcemia  - Ca up to 10.6 improved to 9.7 on 2.7  - likely dehydration with component of immobility  PLAN  - ensure PO hydration as above  - check daily BMP/Albumin to monitor corrected calcium  - check PTH intact & Vitamin D 25 OH if remains persistently elevated    Discussed with primary team.     Thank you for the interesting consult.    Joe Walker MD, Endocrinology Fellow  For non-urgent follow up questions, please email [email or web address] or [email or web address].  Pager [phone] from 9am to 5pm. After hours and on weekends, please call [phone].     42 y/o male. PMH HLD hx of suprasellar cystic mass since 2020, on surveillance imaging since found tumor has increased in size s/p TSP on 2/1/24 c/b hypernatremia started on DDDAVP. Endocrine consulted for DI monitoring and r/o panhypopit.     #Secondary Adrenal insufficiency  #Central DI  #Panhypopit  #Central hypogonadism  #secondary hypothyroidism  - likely Central DI post op since UOP >500cc/hr, USG and UOsm low and hypernatremia  - AM cortisol 0.5 (LOW), ACTH 3.5 (LOW) - consistent with secondary adrenal insufficiency  - TSH 0.15, FT4 0.8 - consistent with secondary hypothyroidism  - FSH/LH low, prior labs showing secondary hypogonadism  - 2/3/24: Na 133 and urine output decreasing - DDAVP held  - Na 138 on 2/7  PLAN  In terms of AI  - continue hydrocortisone 20mg at 8am and 10mg at 3pm while in the hospital, plan to d/c on hydrocortisone 10mg at 8am and 5mg at 3pm  - if patient becomes hemodynamically unstable, please start stress dose steroids (hydrocortisone 50mg IV q8h) since patient is now diagnosed with secondary adrenal insufficiency  FOR DISCHARGE  to help with dc planning-  - Please print and give the pt info section for patients under adrenal insufficiency (this will educate them regarding the warning signs of adrenal crisis )  - reviewed sick day rules & adrenal insufficiency with patient today 2/6  - Patient should take 2-3x of home dose in cases of illness, ever, accidents, and surgery. If unable to take PO, use IM injection as below (Solu-Cortef)  - pt should receive stress dosing (IV hydrocortisone 50mg q8) with any major illness or surgical procedure  - Should pt be unable to tolerate PO and is unable to take hydrocortisone, pt will need an emergency injection. Please discharge with a prescription for 100 mg Solu-Cortef Act-O-Vial and the following instructions:  [email or web address]-[email or web address]-act-[email or web address]-vial-[email or web address]-[email or web address]  - pt should obtain a medical alert bracelet or necklace to inform emergency providers of adrenal insufficiency diagnosis [[email or web address]]  In terms of hypothyroidism  - c/w levothyroxine 50mcg daily for central hypothyroidism (maintain on empty stomach (at least 1 hour before meals), separate by 4 hours from PPI or calcium supplementation which can inhibit its absorption)  - recheck TSH and Free T4 5 days (2/8/24)   - patient will follow up with endocrinologist Dr. Isidra Dao outpatient  In terms of DI  - strict I&Os, daily BMPs & urine osm   *DI WATCH*:   - Goal Na 140-145  - DI is suspected if UOP is >250cc/hr x 2 consecutive hours or if >500cc/hr x 1 hr - if this occurs please check STAT serum Na, urine osm, urine specific gravity  - if specific gravity <1.005/Urine Osm <300 and Na is rising - likely DI, please dose DDAVP 0.05mg PO x1 (or DDAVP 0.5mcg IV if no PO access) and bolus 1/2 NS to match half the output (for example, if net negative 2Liters can give 1Liter 1/2NS if pt is unable to keep up with output with PO intake) - continue DI watch  - If Na continues to increase despite a a DDAVP dose and 1/2NS fluid bolus please inform endocrine   - Please make sure pt has access to water and encourage her to drink to thirst     Hypercalcemia, now resolved   - Ca up to 10.6 improved to 9.7 on 2.7  - likely dehydration with component of immobility  PLAN  - ensure PO hydration as above  - check daily BMP/Albumin to monitor corrected calcium      Discussed with primary team.     Thank you for the interesting consult.    Joe Walker MD, Endocrinology Fellow  For non-urgent follow up questions, please email [email or web address] or [email or web address].  Pager [phone] from 9am to 5pm. After hours and on weekends, please call [phone].

## 2024-02-08 ENCOUNTER — TRANSCRIPTION ENCOUNTER (OUTPATIENT)
Age: 44
End: 2024-02-08

## 2024-02-08 VITALS
TEMPERATURE: 98 F | HEART RATE: 78 BPM | SYSTOLIC BLOOD PRESSURE: 122 MMHG | RESPIRATION RATE: 12 BRPM | DIASTOLIC BLOOD PRESSURE: 88 MMHG | OXYGEN SATURATION: 95 %

## 2024-02-08 PROBLEM — E78.5 HYPERLIPIDEMIA, UNSPECIFIED: Chronic | Status: ACTIVE | Noted: 2024-01-18

## 2024-02-08 PROBLEM — J34.2 DEVIATED NASAL SEPTUM: Chronic | Status: ACTIVE | Noted: 2024-01-18

## 2024-02-08 PROBLEM — Z87.898 PERSONAL HISTORY OF OTHER SPECIFIED CONDITIONS: Chronic | Status: ACTIVE | Noted: 2024-01-18

## 2024-02-08 LAB
ANION GAP SERPL CALC-SCNC: 12 MMOL/L — SIGNIFICANT CHANGE UP (ref 5–17)
ANION GAP SERPL CALC-SCNC: 12 MMOL/L — SIGNIFICANT CHANGE UP (ref 5–17)
BUN SERPL-MCNC: 11 MG/DL — SIGNIFICANT CHANGE UP (ref 7–23)
BUN SERPL-MCNC: 13 MG/DL — SIGNIFICANT CHANGE UP (ref 7–23)
CALCIUM SERPL-MCNC: 9.6 MG/DL — SIGNIFICANT CHANGE UP (ref 8.4–10.5)
CALCIUM SERPL-MCNC: 9.7 MG/DL — SIGNIFICANT CHANGE UP (ref 8.4–10.5)
CHLORIDE SERPL-SCNC: 97 MMOL/L — SIGNIFICANT CHANGE UP (ref 96–108)
CHLORIDE SERPL-SCNC: 98 MMOL/L — SIGNIFICANT CHANGE UP (ref 96–108)
CO2 SERPL-SCNC: 26 MMOL/L — SIGNIFICANT CHANGE UP (ref 22–31)
CO2 SERPL-SCNC: 28 MMOL/L — SIGNIFICANT CHANGE UP (ref 22–31)
CREAT SERPL-MCNC: 0.86 MG/DL — SIGNIFICANT CHANGE UP (ref 0.5–1.3)
CREAT SERPL-MCNC: 0.96 MG/DL — SIGNIFICANT CHANGE UP (ref 0.5–1.3)
EGFR: 101 ML/MIN/1.73M2 — SIGNIFICANT CHANGE UP
EGFR: 110 ML/MIN/1.73M2 — SIGNIFICANT CHANGE UP
GLUCOSE SERPL-MCNC: 90 MG/DL — SIGNIFICANT CHANGE UP (ref 70–99)
GLUCOSE SERPL-MCNC: 97 MG/DL — SIGNIFICANT CHANGE UP (ref 70–99)
HCT VFR BLD CALC: 44.1 % — SIGNIFICANT CHANGE UP (ref 39–50)
HGB BLD-MCNC: 14.6 G/DL — SIGNIFICANT CHANGE UP (ref 13–17)
MAGNESIUM SERPL-MCNC: 2.2 MG/DL — SIGNIFICANT CHANGE UP (ref 1.6–2.6)
MCHC RBC-ENTMCNC: 28.9 PG — SIGNIFICANT CHANGE UP (ref 27–34)
MCHC RBC-ENTMCNC: 33.1 GM/DL — SIGNIFICANT CHANGE UP (ref 32–36)
MCV RBC AUTO: 87.3 FL — SIGNIFICANT CHANGE UP (ref 80–100)
NRBC # BLD: 0 /100 WBCS — SIGNIFICANT CHANGE UP (ref 0–0)
OSMOLALITY SERPL: 287 MOSMOL/KG — SIGNIFICANT CHANGE UP (ref 275–300)
PHOSPHATE SERPL-MCNC: 3.9 MG/DL — SIGNIFICANT CHANGE UP (ref 2.5–4.5)
PLATELET # BLD AUTO: 294 K/UL — SIGNIFICANT CHANGE UP (ref 150–400)
POTASSIUM SERPL-MCNC: 3.9 MMOL/L — SIGNIFICANT CHANGE UP (ref 3.5–5.3)
POTASSIUM SERPL-MCNC: 4.2 MMOL/L — SIGNIFICANT CHANGE UP (ref 3.5–5.3)
POTASSIUM SERPL-SCNC: 3.9 MMOL/L — SIGNIFICANT CHANGE UP (ref 3.5–5.3)
POTASSIUM SERPL-SCNC: 4.2 MMOL/L — SIGNIFICANT CHANGE UP (ref 3.5–5.3)
RBC # BLD: 5.05 M/UL — SIGNIFICANT CHANGE UP (ref 4.2–5.8)
RBC # FLD: 12.4 % — SIGNIFICANT CHANGE UP (ref 10.3–14.5)
SODIUM SERPL-SCNC: 136 MMOL/L — SIGNIFICANT CHANGE UP (ref 135–145)
SODIUM SERPL-SCNC: 137 MMOL/L — SIGNIFICANT CHANGE UP (ref 135–145)
SP GR UR STRIP: <1.005 — LOW (ref 1–1.03)
T4 FREE SERPL-MCNC: 0.8 NG/DL — LOW (ref 0.9–1.8)
TSH SERPL-MCNC: 0.05 UIU/ML — LOW (ref 0.27–4.2)
WBC # BLD: 9.21 K/UL — SIGNIFICANT CHANGE UP (ref 3.8–10.5)
WBC # FLD AUTO: 9.21 K/UL — SIGNIFICANT CHANGE UP (ref 3.8–10.5)

## 2024-02-08 PROCEDURE — 70553 MRI BRAIN STEM W/O & W/DYE: CPT

## 2024-02-08 PROCEDURE — 84439 ASSAY OF FREE THYROXINE: CPT

## 2024-02-08 PROCEDURE — 97166 OT EVAL MOD COMPLEX 45 MIN: CPT

## 2024-02-08 PROCEDURE — 85027 COMPLETE CBC AUTOMATED: CPT

## 2024-02-08 PROCEDURE — 70450 CT HEAD/BRAIN W/O DYE: CPT

## 2024-02-08 PROCEDURE — 83930 ASSAY OF BLOOD OSMOLALITY: CPT

## 2024-02-08 PROCEDURE — 88307 TISSUE EXAM BY PATHOLOGIST: CPT

## 2024-02-08 PROCEDURE — 88341 IMHCHEM/IMCYTCHM EA ADD ANTB: CPT

## 2024-02-08 PROCEDURE — 84480 ASSAY TRIIODOTHYRONINE (T3): CPT

## 2024-02-08 PROCEDURE — 83935 ASSAY OF URINE OSMOLALITY: CPT

## 2024-02-08 PROCEDURE — 83001 ASSAY OF GONADOTROPIN (FSH): CPT

## 2024-02-08 PROCEDURE — 82040 ASSAY OF SERUM ALBUMIN: CPT

## 2024-02-08 PROCEDURE — 36415 COLL VENOUS BLD VENIPUNCTURE: CPT

## 2024-02-08 PROCEDURE — 82024 ASSAY OF ACTH: CPT

## 2024-02-08 PROCEDURE — 93970 EXTREMITY STUDY: CPT

## 2024-02-08 PROCEDURE — 99233 SBSQ HOSP IP/OBS HIGH 50: CPT

## 2024-02-08 PROCEDURE — 84443 ASSAY THYROID STIM HORMONE: CPT

## 2024-02-08 PROCEDURE — C1889: CPT

## 2024-02-08 PROCEDURE — A9585: CPT

## 2024-02-08 PROCEDURE — 81005 URINALYSIS: CPT

## 2024-02-08 PROCEDURE — 84436 ASSAY OF TOTAL THYROXINE: CPT

## 2024-02-08 PROCEDURE — 84100 ASSAY OF PHOSPHORUS: CPT

## 2024-02-08 PROCEDURE — 88311 DECALCIFY TISSUE: CPT

## 2024-02-08 PROCEDURE — 83002 ASSAY OF GONADOTROPIN (LH): CPT

## 2024-02-08 PROCEDURE — C9399: CPT

## 2024-02-08 PROCEDURE — 80048 BASIC METABOLIC PNL TOTAL CA: CPT

## 2024-02-08 PROCEDURE — 99232 SBSQ HOSP IP/OBS MODERATE 35: CPT | Mod: GC

## 2024-02-08 PROCEDURE — 82533 TOTAL CORTISOL: CPT

## 2024-02-08 PROCEDURE — C1729: CPT

## 2024-02-08 PROCEDURE — 97161 PT EVAL LOW COMPLEX 20 MIN: CPT

## 2024-02-08 PROCEDURE — C1769: CPT

## 2024-02-08 PROCEDURE — 83735 ASSAY OF MAGNESIUM: CPT

## 2024-02-08 RX ORDER — SODIUM CHLORIDE 0.65 %
0 AEROSOL, SPRAY (ML) NASAL
Qty: 0 | Refills: 0 | DISCHARGE
Start: 2024-02-08

## 2024-02-08 RX ORDER — POLYETHYLENE GLYCOL 3350 17 G/17G
17 POWDER, FOR SOLUTION ORAL
Qty: 0 | Refills: 0 | DISCHARGE
Start: 2024-02-08

## 2024-02-08 RX ORDER — HYDROCORTISONE 20 MG
1 TABLET ORAL
Qty: 30 | Refills: 0
Start: 2024-02-08 | End: 2024-03-08

## 2024-02-08 RX ORDER — IBUPROFEN 200 MG
1 TABLET ORAL
Refills: 0 | DISCHARGE

## 2024-02-08 RX ORDER — LEVOTHYROXINE SODIUM 125 MCG
1 TABLET ORAL
Qty: 30 | Refills: 0
Start: 2024-02-08 | End: 2024-03-08

## 2024-02-08 RX ORDER — POTASSIUM CHLORIDE 20 MEQ
20 PACKET (EA) ORAL ONCE
Refills: 0 | Status: COMPLETED | OUTPATIENT
Start: 2024-02-08 | End: 2024-02-08

## 2024-02-08 RX ORDER — ACETAMINOPHEN 500 MG
3 TABLET ORAL
Qty: 0 | Refills: 0 | DISCHARGE
Start: 2024-02-08

## 2024-02-08 RX ORDER — LEVOTHYROXINE SODIUM 125 MCG
75 TABLET ORAL DAILY
Refills: 0 | Status: DISCONTINUED | OUTPATIENT
Start: 2024-02-08 | End: 2024-02-08

## 2024-02-08 RX ORDER — SENNA PLUS 8.6 MG/1
2 TABLET ORAL
Qty: 0 | Refills: 0 | DISCHARGE
Start: 2024-02-08

## 2024-02-08 RX ORDER — LANOLIN ALCOHOL/MO/W.PET/CERES
1 CREAM (GRAM) TOPICAL
Qty: 0 | Refills: 0 | DISCHARGE
Start: 2024-02-08

## 2024-02-08 RX ADMIN — Medication 2 SPRAY(S): at 00:02

## 2024-02-08 RX ADMIN — POLYETHYLENE GLYCOL 3350 17 GRAM(S): 17 POWDER, FOR SOLUTION ORAL at 06:12

## 2024-02-08 RX ADMIN — Medication 50 MICROGRAM(S): at 06:12

## 2024-02-08 RX ADMIN — CHLORHEXIDINE GLUCONATE 1 APPLICATION(S): 213 SOLUTION TOPICAL at 11:44

## 2024-02-08 RX ADMIN — Medication 20 MILLIEQUIVALENT(S): at 07:57

## 2024-02-08 RX ADMIN — Medication 20 MILLIGRAM(S): at 07:57

## 2024-02-08 RX ADMIN — Medication 10 MILLIGRAM(S): at 15:17

## 2024-02-08 RX ADMIN — Medication 2 SPRAY(S): at 06:12

## 2024-02-08 NOTE — PROGRESS NOTE ADULT - ASSESSMENT
43 year old male with PMH HLD, s/p endoscopic transphenoidal removal craniopharyngioma POD #. Pt c/o mild headache relieved with pain meds, minimal bleeding. No evidence of CSF leak or epistaxis on exam. LD clamped removed.

## 2024-02-08 NOTE — PROGRESS NOTE ADULT - SUBJECTIVE AND OBJECTIVE BOX
ENDOCRINE FOLLOW UP     Chief Complaint: panhypopituitarism    History:     MEDICATIONS  (STANDING):  atorvastatin 40 milliGRAM(s) Oral at bedtime  chlorhexidine 4% Liquid 1 Application(s) Topical every 24 hours  enoxaparin Injectable 40 milliGRAM(s) SubCutaneous <User Schedule>  hydrocortisone 10 milliGRAM(s) Oral <User Schedule>  hydrocortisone 20 milliGRAM(s) Oral <User Schedule>  levothyroxine 50 MICROGram(s) Oral daily  polyethylene glycol 3350 17 Gram(s) Oral every 12 hours  senna 2 Tablet(s) Oral at bedtime  sodium chloride 0.65% Nasal 2 Spray(s) Both Nostrils four times a day    MEDICATIONS  (PRN):  acetaminophen     Tablet .. 975 milliGRAM(s) Oral every 6 hours PRN Temp greater or equal to 38C (100.4F), Mild Pain (1 - 3)  bisacodyl 5 milliGRAM(s) Oral every 12 hours PRN Constipation  melatonin 5 milliGRAM(s) Oral at bedtime PRN Sleep  oxyCODONE    IR 10 milliGRAM(s) Oral every 4 hours PRN Severe Pain (7 - 10)  oxyCODONE    IR 5 milliGRAM(s) Oral every 4 hours PRN Moderate Pain (4 - 6)      Allergies    No Known Allergies    Intolerances        ROS: All other systems reviewed and negative    PHYSICAL EXAM:  VITALS: T(C): 36.7 (02-08-24 @ 08:00)  T(F): 98 (02-08-24 @ 08:00), Max: 98.8 (02-07-24 @ 19:00)  HR: 91 (02-08-24 @ 08:00) (81 - 96)  BP: 119/82 (02-08-24 @ 08:00) (107/77 - 122/94)  RR:  (11 - 18)  SpO2:  (92% - 99%)  Wt(kg): --  GENERAL: NAD, resting comfortably   EYES: No proptosis,  anicteric  HEENT:  Atraumatic, Normocephalic, moist mucous membranes  RESPIRATORY: Nonlabored respirations on room air, normal rate/effort   CARDIOVASCULAR: Did not appear cyanotic, no lower extremity swelling visualized  GI: did not appear distended  NEURO: Answering questions appropriately, moves extremities spontaneously  PSYCH:  reactive affect, euthymic mood        02-08    137  |  97  |  13  ----------------------------<  90  3.9   |  28  |  0.96    eGFR: 101    Ca    9.6      02-08  Mg     2.2     02-08  Phos  3.9     02-08    TPro  x   /  Alb  4.1  /  TBili  x   /  DBili  x   /  AST  x   /  ALT  x   /  AlkPhos  x   02-06          Thyroid Stimulating Hormone, Serum: 0.05 uIU/mL (02-08-24 @ 06:39)  Thyroid Stimulating Hormone, Serum: 0.15 uIU/mL (02-03-24 @ 08:37)  Thyroid Stimulating Hormone, Serum: 0.27 uIU/mL (02-02-24 @ 11:35)   ENDOCRINE FOLLOW UP     Chief Complaint: panhypopituitarism    History:   He reports he is peeing as he is drinking otherwise feels fine.    MEDICATIONS  (STANDING):  atorvastatin 40 milliGRAM(s) Oral at bedtime  chlorhexidine 4% Liquid 1 Application(s) Topical every 24 hours  enoxaparin Injectable 40 milliGRAM(s) SubCutaneous <User Schedule>  hydrocortisone 10 milliGRAM(s) Oral <User Schedule>  hydrocortisone 20 milliGRAM(s) Oral <User Schedule>  levothyroxine 50 MICROGram(s) Oral daily  polyethylene glycol 3350 17 Gram(s) Oral every 12 hours  senna 2 Tablet(s) Oral at bedtime  sodium chloride 0.65% Nasal 2 Spray(s) Both Nostrils four times a day    MEDICATIONS  (PRN):  acetaminophen     Tablet .. 975 milliGRAM(s) Oral every 6 hours PRN Temp greater or equal to 38C (100.4F), Mild Pain (1 - 3)  bisacodyl 5 milliGRAM(s) Oral every 12 hours PRN Constipation  melatonin 5 milliGRAM(s) Oral at bedtime PRN Sleep  oxyCODONE    IR 10 milliGRAM(s) Oral every 4 hours PRN Severe Pain (7 - 10)  oxyCODONE    IR 5 milliGRAM(s) Oral every 4 hours PRN Moderate Pain (4 - 6)      Allergies    No Known Allergies    Intolerances        ROS: All other systems reviewed and negative    PHYSICAL EXAM:  VITALS: T(C): 36.7 (02-08-24 @ 08:00)  T(F): 98 (02-08-24 @ 08:00), Max: 98.8 (02-07-24 @ 19:00)  HR: 91 (02-08-24 @ 08:00) (81 - 96)  BP: 119/82 (02-08-24 @ 08:00) (107/77 - 122/94)  RR:  (11 - 18)  SpO2:  (92% - 99%)  Wt(kg): --  GENERAL: NAD, resting comfortably   EYES: No proptosis,  anicteric  HEENT:  dried blood from nose, moist mucous membranes  RESPIRATORY: Nonlabored respirations on room air, normal rate/effort   CARDIOVASCULAR: Did not appear cyanotic, no lower extremity swelling visualized  GI: did not appear distended  NEURO: Answering questions appropriately, moves extremities spontaneously  PSYCH:  reactive affect, euthymic mood        02-08    137  |  97  |  13  ----------------------------<  90  3.9   |  28  |  0.96    eGFR: 101    Ca    9.6      02-08  Mg     2.2     02-08  Phos  3.9     02-08    TPro  x   /  Alb  4.1  /  TBili  x   /  DBili  x   /  AST  x   /  ALT  x   /  AlkPhos  x   02-06          Thyroid Stimulating Hormone, Serum: 0.05 uIU/mL (02-08-24 @ 06:39)  Thyroid Stimulating Hormone, Serum: 0.15 uIU/mL (02-03-24 @ 08:37)  Thyroid Stimulating Hormone, Serum: 0.27 uIU/mL (02-02-24 @ 11:35)   ENDOCRINE FOLLOW UP     Chief Complaint: panhypopituitarism    History:   He reports he is peeing as he is drinking otherwise feels fine. Reviewed the importance of taking steroids, sick day dosing, what to do in the event of an emergency. Reviewed how to take levothyroxine appropriately and signs/symptoms of over and underactive thyroid.    MEDICATIONS  (STANDING):  atorvastatin 40 milliGRAM(s) Oral at bedtime  chlorhexidine 4% Liquid 1 Application(s) Topical every 24 hours  enoxaparin Injectable 40 milliGRAM(s) SubCutaneous <User Schedule>  hydrocortisone 10 milliGRAM(s) Oral <User Schedule>  hydrocortisone 20 milliGRAM(s) Oral <User Schedule>  levothyroxine 50 MICROGram(s) Oral daily  polyethylene glycol 3350 17 Gram(s) Oral every 12 hours  senna 2 Tablet(s) Oral at bedtime  sodium chloride 0.65% Nasal 2 Spray(s) Both Nostrils four times a day    MEDICATIONS  (PRN):  acetaminophen     Tablet .. 975 milliGRAM(s) Oral every 6 hours PRN Temp greater or equal to 38C (100.4F), Mild Pain (1 - 3)  bisacodyl 5 milliGRAM(s) Oral every 12 hours PRN Constipation  melatonin 5 milliGRAM(s) Oral at bedtime PRN Sleep  oxyCODONE    IR 10 milliGRAM(s) Oral every 4 hours PRN Severe Pain (7 - 10)  oxyCODONE    IR 5 milliGRAM(s) Oral every 4 hours PRN Moderate Pain (4 - 6)      Allergies    No Known Allergies    Intolerances        ROS: All other systems reviewed and negative    PHYSICAL EXAM:  VITALS: T(C): 36.7 (02-08-24 @ 08:00)  T(F): 98 (02-08-24 @ 08:00), Max: 98.8 (02-07-24 @ 19:00)  HR: 91 (02-08-24 @ 08:00) (81 - 96)  BP: 119/82 (02-08-24 @ 08:00) (107/77 - 122/94)  RR:  (11 - 18)  SpO2:  (92% - 99%)  Wt(kg): --  GENERAL: NAD, resting comfortably   EYES: No proptosis,  anicteric  HEENT:  dried blood from nose, moist mucous membranes  RESPIRATORY: Nonlabored respirations on room air, normal rate/effort   CARDIOVASCULAR: Did not appear cyanotic, no lower extremity swelling visualized  GI: did not appear distended  NEURO: Answering questions appropriately, moves extremities spontaneously  PSYCH:  reactive affect, euthymic mood        02-08    137  |  97  |  13  ----------------------------<  90  3.9   |  28  |  0.96    eGFR: 101    Ca    9.6      02-08  Mg     2.2     02-08  Phos  3.9     02-08    TPro  x   /  Alb  4.1  /  TBili  x   /  DBili  x   /  AST  x   /  ALT  x   /  AlkPhos  x   02-06          Thyroid Stimulating Hormone, Serum: 0.05 uIU/mL (02-08-24 @ 06:39)  Thyroid Stimulating Hormone, Serum: 0.15 uIU/mL (02-03-24 @ 08:37)  Thyroid Stimulating Hormone, Serum: 0.27 uIU/mL (02-02-24 @ 11:35)

## 2024-02-08 NOTE — DISCHARGE NOTE PROVIDER - DISCHARGE SERVICE FOR PATIENT
on the discharge service for the patient. I have reviewed and made amendments to the documentation where necessary. Within functional limits Lingual stasis/moderate/Stasis in lateral sulci Decreased anterior-posterior movement of the bolus

## 2024-02-08 NOTE — DISCHARGE NOTE NURSING/CASE MANAGEMENT/SOCIAL WORK - NSDCPEFALRISK_GEN_ALL_CORE
For information on Fall & Injury Prevention, visit: [email or web address] OR  [email or web address] OR  https://www.cdc.gov/steadi/patient.html

## 2024-02-08 NOTE — DISCHARGE NOTE PROVIDER - CARE PROVIDER_API CALL
Virgilio Conway)  Neurosurgery  [address], [address]  [place] [address]  Phone: [phone]  Fax: [phone]  Follow Up Time:     Nery Brown  Otolaryngology  [address], [address]  [place] [address]  Phone: [phone]  Fax: [phone]  Follow Up Time:

## 2024-02-08 NOTE — PROGRESS NOTE ADULT - ATTENDING COMMENTS
POD 3 TSP for craniopharyngioma resection  LD draining 5cc/2hrs mngt per neurosurgery     post op CTH reviewed   Q4 checks  pain control tylenol/oxy prn   post op MRI once LD d/c'ed  pituitary precautions  RA  -160  d/c carlson   Na on lowside after DDAVP held 2/3 6pm, would continue to hold and watch urine output   encourage drink to thirst  regular diet  LBM PTA, cont bowel regimen   IVL  euglycemia  endo consult for pan-hypopit  rec hydrocort 10/5 and synthroid 50mcg  afebrile  lovenox ppx   55min not criticlaly ill but medically complex
POD 1 TSP for craniopharyngioma resection  hypokalemia repleted   LBM PTA  +carlson   +emesis   post op CTH reviewed   Q4 checks  pain control  zofran/reglan prn   LD 5cc/hr per neurosurgery   post op MRI once LD d/c'ed  pituitary precautions  RA  -160  d/c carlson   encourage drink to thirst  regular diet  IVF to maintain euvolemia  euglycemia  now on standing DDAVP  Q6BMP, monitor UOP  endo consult   afebrile  lovenox ppx   not critically ill but medically complex 55min
POD 4 TSP for craniopharyngioma resection, CSF leak   LD draining 5cc/3hrs mngt per neurosurgery, clamp LD later in the afternoon, if no CSF leak will d/c on wednesday, neuro checks q 4hr, MRI brain pending,TSP precautions (no straws, incentive spirometry, bipap) , Monitor for CSF leak / epistaxis., DI was on ddavp, however stopped due to hyponatremia, monitor urine output, drink to thirst, BMP q 12 hr,  panhypopit on hydrocortisone and synthroid , lovenox 40 mg sc qhs     not critical
POD 2 TSP for craniopharyngioma resection  LD clamped till this am, now draining 5cc/2hrs     post op CTH reviewed   Q4 checks  pain control tylenol/oxy prn   post op MRI once LD d/c'ed  pituitary precautions  RA  -160  d/c carlson   encourage drink to thirst  regular diet  LBM PTA, cont bowel regimen   IVL  euglycemia  now on standing DDAVP decreased to daily, normonatremia   endo consult   afebrile  lovenox ppx   40min not criticlaly ill
Agree with assessment and plan as above by Dr. Walker. Reviewed all pertinent labs, glucose values, and imaging studies. Modifications made as indicated above. 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 Will monitor urine output, serum sodium, and urine osm. Can address low testosterone as outpatient. Follow-up pathology. Outpatient neuro-optho & neurosurgery follow-up. Outpatient endocrinology follow up with patient's private endocrinologist Dr. Isidra Dao. Complex patient, high level decision making. Will continue to follow while inpatient.
Agree with assessment and plan as above by Dr. Walker. Reviewed all pertinent labs, glucose values, and imaging studies. Modifications made as indicated above. Pt. with enlarging craniopharyngioma now s/p resection with secondary hypogonadism prior to surgery; post-op with secondary hypothyroidism on LT4, Secondary AI on HC and transient DI, DDAVP now on hold, last Na 135. Will monitor urine output, serum sodium, and urine osm. Recommend to check BMP at least daily & per DI Watch as noted above. Repeat BMP this afternoon. Also noted to have mild hypercalcemia, possibly 2/2 dehydration, check BMP with albumin level. Encourage PO hydration to thirst.   Can address low testosterone as outpatient. Follow-up pathology. Outpatient neuro-optho & neurosurgery follow-up. Outpatient endocrinology follow up with patient's private endocrinologist Dr. Isidra Dao. Complex patient, high level decision making. Will continue to follow while inpatient.      Unique Orellana, DO   Attending Physician   Department of Endocrinology, Diabetes and Metabolism     If before 9AM or after 5PM, or on weekends/holidays, please call the Endocrine answering service for assistance ([phone]).  For nonurgent matters, please email [email or web address] or [email or web address]     Please note that this patient may be followed by different provider tomorrow.
Agree with assessment and plan as above by Dr. Walker. Reviewed all pertinent labs, glucose values, and imaging studies. Modifications made as indicated above. Pt. with enlarging craniopharyngioma now s/p resection with secondary hypogonadism prior to surgery; post-op with secondary hypothyroidism on LT4, Secondary AI on HC and transient DI, DDAVP now on hold, last Na 138. Continue DI watch, monitor urine output, serum sodium, and urine osm. Recommend to check BMP at least daily & per DI Watch as noted above.  Also noted to have mild hypercalcemia, possibly 2/2 dehydration, now in high normal range. Encourage PO hydration to thirst. Can redose DDAVP if Na trends above > 145 & patient also with polyuria per DI watch.   Can address low testosterone as outpatient. Follow-up pathology. Outpatient neuro-optho & neurosurgery follow-up. Outpatient endocrinology follow up with patient's private endocrinologist Dr. Isidra Dao. Complex patient, high level decision making. Will continue to follow while inpatient.      Unique Orellana,    Attending Physician   Department of Endocrinology, Diabetes and Metabolism     If before 9AM or after 5PM, or on weekends/holidays, please call the Endocrine answering service for assistance ([phone]).  For nonurgent matters, please email [email or web address] or [email or web address]     Please note that this patient may be followed by different provider tomorrow.
Agree with assessment and plan as above by Dr. Walker. Reviewed all pertinent labs, glucose values, and imaging studies. Modifications made as indicated above. Pt. with craniopharyngioma s/p resection with panhypopituitarism. Discharge on hydrocortisone 10mg in the morning and 5mg in the afternoon, increase levothyroxine to 75 mcg given Free T4 remains below goal at 0.8, testosterone therapy as outpatient, consider low dose DDAVP at bedtime given symptoms. Monitor sodium. Has appointment with Dr. Isidra Dao in 2 weeks.     Lazaro Hicks D.O  [phone]

## 2024-02-08 NOTE — DISCHARGE NOTE PROVIDER - CARE PROVIDERS DIRECT ADDRESSES
[email or web address]

## 2024-02-08 NOTE — PROGRESS NOTE ADULT - PROBLEM SELECTOR PROBLEM 1
Craniopharyngioma
Pituitary tumor
Craniopharyngioma
Pituitary tumor
Craniopharyngioma
Pituitary tumor
Pituitary tumor
Craniopharyngioma

## 2024-02-08 NOTE — PROGRESS NOTE ADULT - THIS PATIENT HAS THE FOLLOWING CONDITION(S)/DIAGNOSES ON THIS ADMISSION:
Encephalopathy/Cerebral Edema
None
Encephalopathy/Cerebral Edema
None
None
Cerebral Edema

## 2024-02-08 NOTE — PROGRESS NOTE ADULT - PROBLEM SELECTOR PROBLEM 4
Secondary adrenal insufficiency

## 2024-02-08 NOTE — DISCHARGE NOTE NURSING/CASE MANAGEMENT/SOCIAL WORK - PATIENT PORTAL LINK FT
You can access the FollowMyHealth Patient Portal offered by [facility] by registering at the following website: [email or web address]. By joining [employer]’s FollowMyHealth portal, you will also be able to view your health information using other applications (apps) compatible with our system.

## 2024-02-08 NOTE — DISCHARGE NOTE PROVIDER - NSDCFUSCHEDAPPT_GEN_ALL_CORE_FT
no
Nery Brown  [facility]  [facility] [address] [facility] [facility] [address]  Scheduled Appointment: 02/15/2024

## 2024-02-08 NOTE — DISCHARGE NOTE PROVIDER - HOSPITAL COURSE
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 Conway.   s/p TSP for craniopharyngioma on 2/1 with neurosurgeon Dr. Conway and ENT Dr. 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 Conway.   s/p TSP for craniopharyngioma on 2/1 with neurosurgeon Dr. Conway and ENT Dr. Brown, expected CSF leak from surgery. LD placed draining 5cc/hr. Post op Diabetes insipidus treated with DDAVP initially BID dosing then changed to once a day (stopped 2/2 hyponatremia), endocrine consulted patient drinking to thirst. Labs consistent with adrenal insufficiency start hydrocortisone 20mg at 8am and 10mg at 3pm for secondary adrenal insufficiency (while admitted and decrease to 10/5 on discharge). Levothyroxine started for hypothyroid. Lumbar drain discontinued on 2/7. ENT following post op - needs follow up outpatient. MRI performed post op   Physical therapy consult rec home w/ no skilled needs.   On the day of discharge the patient is medically and neurologically stable for discharge to home.    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    Hospital course complicated by Diabetes insipidus treated with DDAVP initially BID dosing then changed to once a day (stopped 2/2 hyponatremia), endocrine consulted patient drinking to thirst. Labs consistent with adrenal insufficiency and started on  hydrocortisone 20mg at 8am and 10mg at 3pm for secondary adrenal insufficiency (while admitted and decrease to 10/5 on discharge). Levothyroxine started for hypothyroid.  Lumbar drain removed on 2/7/2024. No evidence of CSF leak or epistaxis on exam with ENT today 2/8/24. ENT following post op - needs follow up outpatient. MRI performed post op changes and stable.  Physical therapy consult recommendation home w/ no skilled needs.       On the day of discharge the patient is medically and neurologically stable for discharge to home.    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    Hospital course complicated by Diabetes insipidus treated with DDAVP initially BID dosing then changed to once a day (stopped 2/2 hyponatremia), endocrine consulted patient drinking to thirst. Labs consistent with adrenal insufficiency and started on  hydrocortisone 20mg at 8am and 10mg at 3pm for secondary adrenal insufficiency (while admitted and decrease to 10/5 on discharge). Levothyroxine started for hypothyroid.  Lumbar drain removed on 2/7/2024. No evidence of CSF leak or epistaxis on exam with ENT today 2/8/24. ENT following post op - needs follow up outpatient. MRI performed post op changes and stable.  Physical therapy consult recommendation home w/ no skilled needs.     Discussed with patient at length the importance of monitoring urinary output.  patient is not getting discharged with DDAVP due to risk of hyponatremia and increased seizure risk. Na136 at time of discharge and last 3 stable.  patient aware to call neurosurgeon and additionally report to ED if symptomatic of adrenal insufficiency.       On the day of discharge the patient is medically and neurologically stable for discharge to home.

## 2024-02-08 NOTE — PROGRESS NOTE ADULT - PROVIDER SPECIALTY LIST ADULT
ENT
ENT
Endocrinology
NSICU
Neurosurgery
NSICU
ENT
NSICU
Neurosurgery
ENT
ENT
Endocrinology
NSICU
Neurosurgery
ENT
Endocrinology
Endocrinology
NSICU
Neurosurgery
Neurosurgery
ENT
ENT
NSICU
NSICU
Endocrinology
Endocrinology

## 2024-02-08 NOTE — DISCHARGE NOTE PROVIDER - NSDCMRMEDTOKEN_GEN_ALL_CORE_FT
Crestor 10 mg oral tablet: 1 tab(s) orally once a day (at bedtime)  ergocalciferol 1.25 mg (50,000 intl units) oral capsule: 1 cap(s) orally every 7 days  ibuprofen 400 mg oral tablet: 1 tab(s) orally once a day as needed for  headache   acetaminophen 325 mg oral tablet: 3 tab(s) orally every 6 hours As needed Temp greater or equal to 38C (100.4F), Mild Pain (1 - 3)  Crestor 10 mg oral tablet: 1 tab(s) orally once a day (at bedtime)  ergocalciferol 1.25 mg (50,000 intl units) oral capsule: 1 cap(s) orally every 7 days  hydrocortisone 10 mg oral tablet: 1 tab(s) orally once a day (in the morning) at 8 AM  hydrocortisone 5 mg oral tablet: 1 tab(s) orally once a day (in the afternoon) at 3 PM  levothyroxine 75 mcg (0.075 mg) oral tablet: 1 tab(s) orally once a day  melatonin 5 mg oral tablet: 1 tab(s) orally once a day (at bedtime) As needed Sleep  polyethylene glycol 3350 oral powder for reconstitution: 17 gram(s) orally every 12 hours  senna leaf extract oral tablet: 2 tab(s) orally once a day (at bedtime)  sodium chloride 0.65% nasal spray: nasal every 4 hours

## 2024-02-08 NOTE — PROGRESS NOTE ADULT - PROBLEM SELECTOR PROBLEM 2
Diabetes insipidus

## 2024-02-08 NOTE — DISCHARGE NOTE PROVIDER - REASON FOR ADMISSION
Transphenoidal resection for craniopharyngioma  Transphenoidal resection for craniopharyngioma 2/1/2024

## 2024-02-08 NOTE — PROGRESS NOTE ADULT - ASSESSMENT
44 y/o male. PMH HLD hx of suprasellar cystic mass since 2020, on surveillance imaging since found tumor has increased in size s/p TSP on 2/1/24 c/b hypernatremia started on DDDAVP. Endocrine consulted for DI monitoring and r/o panhypopit.     #Secondary Adrenal insufficiency  #Central DI  #Panhypopit  #Central hypogonadism  #secondary hypothyroidism  - likely Central DI post op since UOP >500cc/hr, USG and UOsm low and hypernatremia  - AM cortisol 0.5 (LOW), ACTH 3.5 (LOW) - consistent with secondary adrenal insufficiency  - TSH 0.15, FT4 0.8 - consistent with secondary hypothyroidism  - FSH/LH low, prior labs showing secondary hypogonadism  - 2/3/24: Na 133 and urine output decreasing - DDAVP held  - Na 138 on 2/7  - repeat 2/8 TSH 0.05, FT4 0.8 (cannot use TSH given secondary hypothyroidism)  PLAN  In terms of AI  - continue hydrocortisone 20mg at 8am and 10mg at 3pm while in the hospital, plan to d/c on hydrocortisone 10mg at 8am and 5mg at 3pm  - if patient becomes hemodynamically unstable, please start stress dose steroids (hydrocortisone 50mg IV q8h) since patient is now diagnosed with secondary adrenal insufficiency  FOR DISCHARGE  to help with dc planning-  - Please print and give the pt info section for patients under adrenal insufficiency (this will educate them regarding the warning signs of adrenal crisis )  - reviewed sick day rules & adrenal insufficiency with patient today 2/6  - Patient should take 2-3x of home dose in cases of illness, ever, accidents, and surgery. If unable to take PO, use IM injection as below (Solu-Cortef)  - pt should receive stress dosing (IV hydrocortisone 50mg q8) with any major illness or surgical procedure  - Should pt be unable to tolerate PO and is unable to take hydrocortisone, pt will need an emergency injection. Please discharge with a prescription for 100 mg Solu-Cortef Act-O-Vial and the following instructions:  [email or web address]-[email or web address]-[email or web address]-act-[email or web address]-vial-[email or web address]-chamber-ampul/  - pt should obtain a medical alert bracelet or necklace to inform emergency providers of adrenal insufficiency diagnosis [[email or web address]]  In terms of hypothyroidism  - c/w levothyroxine 50mcg daily for central hypothyroidism (maintain on empty stomach (at least 1 hour before meals), separate by 4 hours from PPI or calcium supplementation which can inhibit its absorption)  - recheck TSH and Free T4 5 days (2/8/24)   - patient will follow up with endocrinologist Dr. Isidra Dao outpatient  In terms of DI  - strict I&Os, daily BMPs & urine osm   *DI WATCH*:   - Goal Na 140-145  - DI is suspected if UOP is >250cc/hr x 2 consecutive hours or if >500cc/hr x 1 hr - if this occurs please check STAT serum Na, urine osm, urine specific gravity  - if specific gravity <1.005/Urine Osm <300 and Na is rising - likely DI, please dose DDAVP 0.05mg PO x1 (or DDAVP 0.5mcg IV if no PO access) and bolus 1/2 NS to match half the output (for example, if net negative 2Liters can give 1Liter 1/2NS if pt is unable to keep up with output with PO intake) - continue DI watch  - If Na continues to increase despite a a DDAVP dose and 1/2NS fluid bolus please inform endocrine   - Please make sure pt has access to water and encourage her to drink to thirst     Hypercalcemia, now resolved   - Ca up to 10.6 improved to 9.7 on 2.7  - likely dehydration with component of immobility  PLAN  - ensure PO hydration as above  - check daily BMP/Albumin to monitor corrected calcium    Discussed with primary team.     Thank you for the interesting consult.    Joe Walker MD, Endocrinology Fellow  For non-urgent follow up questions, please email [email or web address] or [email or web address].  Pager [phone] from 9am to 5pm. After hours and on weekends, please call [phone].     42 y/o male. PMH HLD hx of suprasellar cystic mass since 2020, on surveillance imaging since found tumor has increased in size s/p TSP on 2/1/24 c/b hypernatremia started on DDDAVP. Endocrine consulted for DI monitoring and r/o panhypopit.     #Secondary Adrenal insufficiency  #Central DI  #Panhypopit  #Central hypogonadism  #secondary hypothyroidism  - likely Central DI post op since UOP >500cc/hr, USG and UOsm low and hypernatremia  - AM cortisol 0.5 (LOW), ACTH 3.5 (LOW) - consistent with secondary adrenal insufficiency  - TSH 0.15, FT4 0.8 - consistent with secondary hypothyroidism  - FSH/LH low, prior labs showing secondary hypogonadism  - 2/3/24: Na 133 and urine output decreasing - DDAVP held  - Na 138 on 2/7  - repeat 2/8 TSH 0.05, FT4 0.8 (cannot use TSH given secondary hypothyroidism)  PLAN  In terms of AI  - continue hydrocortisone 20mg at 8am and 10mg at 3pm while in the hospital, plan to d/c on hydrocortisone 10mg at 8am and 5mg at 3pm  - if patient becomes hemodynamically unstable, please start stress dose steroids (hydrocortisone 50mg IV q8h) since patient is now diagnosed with secondary adrenal insufficiency  FOR DISCHARGE  to help with dc planning-  - Please print and give the pt info section for patients under adrenal insufficiency (this will educate them regarding the warning signs of adrenal crisis )  - reviewed sick day rules & adrenal insufficiency with patient today 2/6  - Patient should take 2-3x of home dose in cases of illness, ever, accidents, and surgery. If unable to take PO, use IM injection as below (Solu-Cortef)  - pt should receive stress dosing (IV hydrocortisone 50mg q8) with any major illness or surgical procedure  - Should pt be unable to tolerate PO and is unable to take hydrocortisone, pt will need an emergency injection. Please discharge with a prescription for 100 mg Solu-Cortef Act-O-Vial and the following instructions:  [email or web address]-[email or web address]-injection-[email or web address]-[email or web address]-cortef-act-o-vial-[email or web address]-chamber-ampul/  - pt should obtain a medical alert bracelet or necklace to inform emergency providers of adrenal insufficiency diagnosis [[email or web address]]  In terms of hypothyroidism  - c/w levothyroxine 50mcg daily for central hypothyroidism (maintain on empty stomach (at least 1 hour before meals), separate by 4 hours from PPI or calcium supplementation which can inhibit its absorption)  - recheck TSH and Free T4 5 days (2/8/24)   - patient will follow up with endocrinologist Dr. Isidra Dao outpatient  In terms of DI  - strict I&Os, daily BMPs & urine osm   *DI WATCH*:   - Goal Na 140-145  - DI is suspected if UOP is >250cc/hr x 2 consecutive hours or if >500cc/hr x 1 hr - if this occurs please check STAT serum Na, urine osm, urine specific gravity  -Consider low dose of 0.5mg DDAVP prn bedtime given symptoms  - if specific gravity <1.005/Urine Osm <300 and Na is rising - likely DI, please dose DDAVP 0.05mg PO x1 (or DDAVP 0.5mcg IV if no PO access) and bolus 1/2 NS to match half the output (for example, if net negative 2Liters can give 1Liter 1/2NS if pt is unable to keep up with output with PO intake) - continue DI watch  - If Na continues to increase despite a a DDAVP dose and 1/2NS fluid bolus please inform endocrine   - Please make sure pt has access to water and encourage her to drink to thirst     Hypercalcemia, now resolved   - Ca up to 10.6 improved to 9.7 on 2.7  - likely dehydration with component of immobility  PLAN  - ensure PO hydration as above  - check daily BMP/Albumin to monitor corrected calcium    Discussed with primary team.     Thank you for the interesting consult.    Joe Walker MD, Endocrinology Fellow  For non-urgent follow up questions, please email [email or web address] or [email or web address].  Pager [phone] from 9am to 5pm. After hours and on weekends, please call [phone].     42 y/o male. PMH HLD hx of suprasellar cystic mass since 2020, on surveillance imaging since found tumor has increased in size s/p TSP on 2/1/24 c/b hypernatremia started on DDDAVP. Endocrine consulted for DI monitoring and r/o panhypopit.     #Secondary Adrenal insufficiency  #Central DI  #Panhypopit  #Central hypogonadism  #secondary hypothyroidism  - likely Central DI post op since UOP >500cc/hr, USG and UOsm low and hypernatremia  - AM cortisol 0.5 (LOW), ACTH 3.5 (LOW) - consistent with secondary adrenal insufficiency  - TSH 0.15, FT4 0.8 - consistent with secondary hypothyroidism  - FSH/LH low, prior labs showing secondary hypogonadism  - 2/3/24: Na 133 and urine output decreasing - DDAVP held  - Na 138 on 2/7  - repeat 2/8 TSH 0.05, FT4 0.8 (cannot use TSH given secondary hypothyroidism)  PLAN  In terms of hypogonadism  - outpatient management  In terms of AI  - continue hydrocortisone 20mg at 8am and 10mg at 3pm while in the hospital, plan to d/c on hydrocortisone 10mg at 8am and 5mg at 3pm  - if patient becomes hemodynamically unstable, please start stress dose steroids (hydrocortisone 50mg IV q8h) since patient is now diagnosed with secondary adrenal insufficiency  FOR DISCHARGE  to help with dc planning-  - Please print and give the pt info section for patients under adrenal insufficiency (this will educate them regarding the warning signs of adrenal crisis )  - reviewed sick day rules & adrenal insufficiency with patient today 2/6  - Patient should take 2-3x of home dose in cases of illness, ever, accidents, and surgery. If unable to take PO, use IM injection as below (Solu-Cortef)  - pt should receive stress dosing (IV hydrocortisone 50mg q8) with any major illness or surgical procedure  - Should pt be unable to tolerate PO and is unable to take hydrocortisone, pt will need an emergency injection. Please discharge with a prescription for 100 mg Solu-Cortef Act-O-Vial and the following instructions:  [email or web address]-[email or web address]-[email or web address]-act-o-vial-[email or web address]-chamber-ampul/  - pt should obtain a medical alert bracelet or necklace to inform emergency providers of adrenal insufficiency diagnosis [[email or web address]]  In terms of hypothyroidism  - increase to levothyroxine 75mcg daily for central hypothyroidism (maintain on empty stomach (at least 1 hour before meals), separate by 4 hours from PPI or calcium supplementation which can inhibit its absorption)  - patient will follow up with endocrinologist Dr. Isidra Dao outpatient  In terms of DI  - strict I&Os, daily BMPs & urine osm   *DI WATCH*:   - Goal Na 140-145  - DI is suspected if UOP is >250cc/hr x 2 consecutive hours or if >500cc/hr x 1 hr - if this occurs please check STAT serum Na, urine osm, urine specific gravity  -Consider low dose of 0.5mg DDAVP prn bedtime given symptoms on discharge with repeat CMP in 1 week  - if specific gravity <1.005/Urine Osm <300 and Na is rising - likely DI, please dose DDAVP 0.05mg PO x1 (or DDAVP 0.5mcg IV if no PO access) and bolus 1/2 NS to match half the output (for example, if net negative 2Liters can give 1Liter 1/2NS if pt is unable to keep up with output with PO intake) - continue DI watch  - If Na continues to increase despite a a DDAVP dose and 1/2NS fluid bolus please inform endocrine   - Please make sure pt has access to water and encourage her to drink to thirst     Hypercalcemia, now resolved   - Ca up to 10.6 improved to 9.7 on 2.7  - likely dehydration with component of immobility  PLAN  - ensure PO hydration as above  - check daily BMP/Albumin to monitor corrected calcium    Discussed with primary team.     Thank you for the interesting consult.    Joe Walker MD, Endocrinology Fellow  For non-urgent follow up questions, please email [email or web address] or [email or web address].  Pager [phone] from 9am to 5pm. After hours and on weekends, please call [phone].

## 2024-02-08 NOTE — DISCHARGE NOTE PROVIDER - NSDCCPCAREPLAN_GEN_ALL_CORE_FT
PRINCIPAL DISCHARGE DIAGNOSIS  Diagnosis: Craniopharyngioma  Assessment and Plan of Treatment: You had the following procedures: Transphenoidal resection of craniopharyngioma. Lumbar drain placed and removed on 2/7 w/ Dr. Conway and ENT Dr Brown.   Please follow up with Dr. Conway within 1-2 weeks of discharge.   Please follow up with ENT Dr. Brown within 1-2 weeks of discharge   SKULL BASE PRECAUTIONS:  - Nothing UP THE NOSE, NO COVID or flu swabs   - No CPAP   - No incentive spirometry   - No straws***   - No nasal swabbing  - No nasal trauma  - Avoid sneezing, sneeze with MOUTH OPEN***   Please make all necessary appointments and follow up. Please DO NOT take any Aspirin and NSAIDs (Advil, Aleve, Motrin, Ibuprofen) until cleared by your Neurosurgeon. Please DO NOT do any heavy lifting, bending, twisting and straining. You may shower, but DO NOT BLOW YOUR NOSE. Pat dry only. Please come to the emergency room for any of the following: altered mental status, seizures, pain uncontrolled by pain medications, fevers, leaking / bleeding from surgical site or nose , or previous lumbar drain site, chest pain and shortness of breath        SECONDARY DISCHARGE DIAGNOSES  Diagnosis: Diabetes insipidus  Assessment and Plan of Treatment: Continue to drink to thirst   Please follow up with your endocrinologist within 1-2 weeks of discharge. Dr. Isidra Dao    Diagnosis: Secondary adrenal insufficiency  Assessment and Plan of Treatment: You have been diagnosed with adrenal insufficiency on this admission:   PLEASE CONTINUE TAKING THE FOLLOWING:  - Hydrocortisone 10mg every AM @ 8 AM  - Hydrocortisone 5 mg every day @ 3 PM   People who have adrenal insufficiency  must take hormones to replace the hormones they don't have enough of. But if they get sick, get into an accident, or have surgery, the hormones they normally take are not  enough & have to take extra medicine when they are sick or when they know they are going to have surgery. Double your steroid (hydrocortisone) dose x 48 hrs If not better in 48 hrs continue double dose & call endocrinologist for further advise. Will need IV stress dose steroids prior to surgery  Discharge instructions:   - Patient should take 2-3x of home dose in cases of illness, ever, accidents, and surgery. If unable to take PO, use IM injection as below (Solu-Cortef)  - pt should receive stress dosing (IV hydrocortisone 50mg q8) with any major illness or surgical procedure  - Should pt be unable to tolerate PO and is unable to take hydrocortisone, pt will need an emergency injection  Please follow up with your endocrinolgist within 1-2 weeks of discahrge to discuss medications changes and obtain follow up Dr. Isidra Dao    Diagnosis: Hypothyroidism  Assessment and Plan of Treatment: Continue levothyroxine 50mcg daily for central hypothyroidism (maintain on empty stomach (at least 1 hour before meals), separate by 4 hours from PPI or calcium supplementation which can inhibit its absorption)  Please follow up with your endocrinolgist within 1-2 weeks of discahrge to discuss medications changes and obtain follow up

## 2024-02-08 NOTE — PROGRESS NOTE ADULT - PROBLEM SELECTOR PLAN 1
- TSRP precautions (no straws, incentive spirometry, bipap)   - Monitor for CSF leak / epistaxis   - Nasal saline 2 sprays to b/l nares qid  - ENT will continue to follow  - Call with questions or concerns.

## 2024-02-08 NOTE — PROGRESS NOTE ADULT - SUBJECTIVE AND OBJECTIVE BOX
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 Conway.     24hr EVENTS:  LD discontinued yesterday     T(C): 36.9 (02-08-24 @ 05:00), Max: 37.1 (02-07-24 @ 19:00)  HR: 86 (02-08-24 @ 05:00) (81 - 96)  BP: 122/94 (02-08-24 @ 05:00) (107/77 - 130/62)  RR: 14 (02-08-24 @ 05:00) (11 - 18)  SpO2: 93% (02-08-24 @ 05:00) (92% - 97%)  02-07-24 @ 07:01  -  02-08-24 @ 07:00  --------------------------------------------------------  IN: 2026 mL / OUT: 4070 mL / NET: -2044 mL    acetaminophen     Tablet .. 975 milliGRAM(s) Oral every 6 hours PRN  atorvastatin 40 milliGRAM(s) Oral at bedtime  bisacodyl 5 milliGRAM(s) Oral every 12 hours PRN  chlorhexidine 4% Liquid 1 Application(s) Topical every 24 hours  enoxaparin Injectable 40 milliGRAM(s) SubCutaneous <User Schedule>  hydrocortisone 20 milliGRAM(s) Oral <User Schedule>  hydrocortisone 10 milliGRAM(s) Oral <User Schedule>  levothyroxine 50 MICROGram(s) Oral daily  melatonin 5 milliGRAM(s) Oral at bedtime PRN  oxyCODONE    IR 5 milliGRAM(s) Oral every 4 hours PRN  oxyCODONE    IR 10 milliGRAM(s) Oral every 4 hours PRN  polyethylene glycol 3350 17 Gram(s) Oral every 12 hours  senna 2 Tablet(s) Oral at bedtime  sodium chloride 0.65% Nasal 2 Spray(s) Both Nostrils four times a day    ----------------------------------------------------------------------------------------------------  PHYSICAL EXAM:  Constitutional: laying comfortably in bed    Neurological: alert, o x 3,   EOMI, PERRL VF grossly intact, FC,   full strength throughout without drift, SILT    Pulmonary: no accessory muscle use, normal respiratory effort  Cardiovascular: Regular rate and rhythm  Gastrointestinal: Soft, Non-tender, Non-distended   Extremities: warm/dry, no edema    -----------------------------------------------------------------------------------------------------        LABS:  Na: 137 (02-08 @ 06:39), 134 (02-07 @ 19:55), 137 (02-07 @ 18:37), 138 (02-07 @ 12:17), 136 (02-06 @ 20:48), 136 (02-06 @ 15:38), 135 (02-05 @ 22:14)  K: 3.9 (02-08 @ 06:39), 4.2 (02-07 @ 19:55), 3.4 (02-07 @ 18:37), 3.7 (02-07 @ 12:17), 4.3 (02-06 @ 20:48), 4.5 (02-06 @ 15:38), 3.9 (02-05 @ 22:14)  Cl: 97 (02-08 @ 06:39), 96 (02-07 @ 19:55), 115 (02-07 @ 18:37), 97 (02-07 @ 12:17), 99 (02-06 @ 20:48), 97 (02-06 @ 15:38), 95 (02-05 @ 22:14)  CO2: 28 (02-08 @ 06:39), 27 (02-07 @ 19:55), 12 (02-07 @ 18:37), 28 (02-07 @ 12:17), 20 (02-06 @ 20:48), 19 (02-06 @ 15:38), 26 (02-05 @ 22:14)  BUN: 13 (02-08 @ 06:39), 15 (02-07 @ 19:55), 11 (02-07 @ 18:37), 12 (02-07 @ 12:17), 13 (02-06 @ 20:48), 12 (02-06 @ 15:38), 9 (02-05 @ 22:14)  Cr: 0.96 (02-08 @ 06:39), 0.91 (02-07 @ 19:55), 0.50 (02-07 @ 18:37), 0.76 (02-07 @ 12:17), 0.91 (02-06 @ 20:48), 0.94 (02-06 @ 15:38), 0.71 (02-05 @ 22:14)  Glu: 90(02-08 @ 06:39), 91(02-07 @ 19:55), 74(02-07 @ 18:37), 92(02-07 @ 12:17), 102(02-06 @ 20:48), 106(02-06 @ 15:38), 116(02-05 @ 22:14)    Hgb: 14.6 (02-08 @ 07:37), 14.1 (02-05 @ 22:14)  Hct: 44.1 (02-08 @ 07:37), 41.0 (02-05 @ 22:14)  WBC: 9.21 (02-08 @ 07:37), 8.85 (02-05 @ 22:14)  Plt: 294 (02-08 @ 07:37), 303 (02-05 @ 22:14)    INR:   PTT:                     Assessment: craniopharyngioma s/p resection with lumbar drain in place    Plan:  neurochecks q4h  pain control  LD clamped remove today then MRI pituitary sequences    skull base precautions  no PT OT needs    CV  SBP normotensive 100-160 mmhg     Renal  DI  resolved   monitor UO       endocrine  hydrocrot 20/10, synthroid 50mcg    GI   regular diet   Last BM 2/6  prn dulcolax    heme  lovenox ppx    Dispo floor after LD removed     Not critical          43M with h/o HLD, chronic headaches, suprasellar cystic mass (d/x 2020, 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 Conway.     24hr EVENTS:  LD discontinued yesterday     T(C): 36.9 (02-08-24 @ 05:00), Max: 37.1 (02-07-24 @ 19:00)  HR: 86 (02-08-24 @ 05:00) (81 - 96)  BP: 122/94 (02-08-24 @ 05:00) (107/77 - 130/62)  RR: 14 (02-08-24 @ 05:00) (11 - 18)  SpO2: 93% (02-08-24 @ 05:00) (92% - 97%)  02-07-24 @ 07:01  -  02-08-24 @ 07:00  --------------------------------------------------------  IN: 2026 mL / OUT: 4070 mL / NET: -2044 mL    acetaminophen     Tablet .. 975 milliGRAM(s) Oral every 6 hours PRN  atorvastatin 40 milliGRAM(s) Oral at bedtime  bisacodyl 5 milliGRAM(s) Oral every 12 hours PRN  chlorhexidine 4% Liquid 1 Application(s) Topical every 24 hours  enoxaparin Injectable 40 milliGRAM(s) SubCutaneous <User Schedule>  hydrocortisone 20 milliGRAM(s) Oral <User Schedule>  hydrocortisone 10 milliGRAM(s) Oral <User Schedule>  levothyroxine 50 MICROGram(s) Oral daily  melatonin 5 milliGRAM(s) Oral at bedtime PRN  oxyCODONE    IR 5 milliGRAM(s) Oral every 4 hours PRN  oxyCODONE    IR 10 milliGRAM(s) Oral every 4 hours PRN  polyethylene glycol 3350 17 Gram(s) Oral every 12 hours  senna 2 Tablet(s) Oral at bedtime  sodium chloride 0.65% Nasal 2 Spray(s) Both Nostrils four times a day    ----------------------------------------------------------------------------------------------------  PHYSICAL EXAM:  Constitutional: laying comfortably in bed    Neurological: alert, o x 3,   EOMI, PERRL VF grossly intact, FC,   full strength throughout without drift, SILT    Pulmonary: no accessory muscle use, normal respiratory effort  Cardiovascular: Regular rate and rhythm  Gastrointestinal: Soft, Non-tender, Non-distended   Extremities: warm/dry, no edema    -----------------------------------------------------------------------------------------------------        LABS:  Na: 137 (02-08 @ 06:39), 134 (02-07 @ 19:55), 137 (02-07 @ 18:37), 138 (02-07 @ 12:17), 136 (02-06 @ 20:48), 136 (02-06 @ 15:38), 135 (02-05 @ 22:14)  K: 3.9 (02-08 @ 06:39), 4.2 (02-07 @ 19:55), 3.4 (02-07 @ 18:37), 3.7 (02-07 @ 12:17), 4.3 (02-06 @ 20:48), 4.5 (02-06 @ 15:38), 3.9 (02-05 @ 22:14)  Cl: 97 (02-08 @ 06:39), 96 (02-07 @ 19:55), 115 (02-07 @ 18:37), 97 (02-07 @ 12:17), 99 (02-06 @ 20:48), 97 (02-06 @ 15:38), 95 (02-05 @ 22:14)  CO2: 28 (02-08 @ 06:39), 27 (02-07 @ 19:55), 12 (02-07 @ 18:37), 28 (02-07 @ 12:17), 20 (02-06 @ 20:48), 19 (02-06 @ 15:38), 26 (02-05 @ 22:14)  BUN: 13 (02-08 @ 06:39), 15 (02-07 @ 19:55), 11 (02-07 @ 18:37), 12 (02-07 @ 12:17), 13 (02-06 @ 20:48), 12 (02-06 @ 15:38), 9 (02-05 @ 22:14)  Cr: 0.96 (02-08 @ 06:39), 0.91 (02-07 @ 19:55), 0.50 (02-07 @ 18:37), 0.76 (02-07 @ 12:17), 0.91 (02-06 @ 20:48), 0.94 (02-06 @ 15:38), 0.71 (02-05 @ 22:14)  Glu: 90(02-08 @ 06:39), 91(02-07 @ 19:55), 74(02-07 @ 18:37), 92(02-07 @ 12:17), 102(02-06 @ 20:48), 106(02-06 @ 15:38), 116(02-05 @ 22:14)    Hgb: 14.6 (02-08 @ 07:37), 14.1 (02-05 @ 22:14)  Hct: 44.1 (02-08 @ 07:37), 41.0 (02-05 @ 22:14)  WBC: 9.21 (02-08 @ 07:37), 8.85 (02-05 @ 22:14)  Plt: 294 (02-08 @ 07:37), 303 (02-05 @ 22:14)    INR:   PTT:               Assessment: craniopharyngioma s/p resection with lumbar drain in place, LD DISCONTINUED YESTERDAY     Plan:  neurochecks q4h  pain control  NO csf Leak   skull base precautions  no PT OT needs    CV  SBP normotensive 100-160 mmhg     Renal  DI  resolved   monitor UO       endocrine  hydrocrot 20/10, synthroid 50mcg per endo     GI   regular diet   prn dulcolax    heme  lovenox ppx    Dispo home     Not critical

## 2024-02-08 NOTE — DISCHARGE NOTE PROVIDER - NSDCFUADDAPPT_GEN_ALL_CORE_FT
Patient has endocrinologist Dr. Yadira Dao MD and to follow up in 2 weeks as outpatient for further management of panhypopituitarism.

## 2024-02-08 NOTE — PROGRESS NOTE ADULT - SUBJECTIVE AND OBJECTIVE BOX
ENT ISSUE/POD: s/p endoscopic transphenoidal removal craniopharyngioma POD #7    HPI: 43 year old male with PMH HLD, s/p endoscopic transphenoidal removal craniopharyngioma POD #7. Pt seen and examined at bedside. No acute events overnight. Pt c/o mild headache relieved with pain meds, minimal bleeding. Pt denies salty/metallic taste, fever, n/v, SOB, dysphagia, odynophagia, hemoptysis, hoarseness.      PAST MEDICAL & SURGICAL HISTORY:  HLD (hyperlipidemia)      History of pituitary tumor      Deviated septum      History of dental surgery        Allergies    No Known Allergies    Intolerances      MEDICATIONS  (STANDING):  atorvastatin 40 milliGRAM(s) Oral at bedtime  chlorhexidine 4% Liquid 1 Application(s) Topical every 24 hours  enoxaparin Injectable 40 milliGRAM(s) SubCutaneous <User Schedule>  hydrocortisone 20 milliGRAM(s) Oral <User Schedule>  hydrocortisone 10 milliGRAM(s) Oral <User Schedule>  levothyroxine 50 MICROGram(s) Oral daily  polyethylene glycol 3350 17 Gram(s) Oral every 12 hours  senna 2 Tablet(s) Oral at bedtime  sodium chloride 0.65% Nasal 2 Spray(s) Both Nostrils four times a day    MEDICATIONS  (PRN):  acetaminophen     Tablet .. 975 milliGRAM(s) Oral every 6 hours PRN Temp greater or equal to 38C (100.4F), Mild Pain (1 - 3)  bisacodyl 5 milliGRAM(s) Oral every 12 hours PRN Constipation  melatonin 5 milliGRAM(s) Oral at bedtime PRN Sleep  oxyCODONE    IR 5 milliGRAM(s) Oral every 4 hours PRN Moderate Pain (4 - 6)  oxyCODONE    IR 10 milliGRAM(s) Oral every 4 hours PRN Severe Pain (7 - 10)      social history: see consult     family history: see consult   ROS:   ENT: all negative except as noted in HPI   Pulm: denies SOB, cough, hemoptysis  Neuro: denies numbness/tingling, loss of sensation  Endo: denies heat/cold intolerance, excessive sweating      Vital Signs Last 24 Hrs  T(C): 36.9 (08 Feb 2024 05:00), Max: 37.1 (07 Feb 2024 19:00)  T(F): 98.4 (08 Feb 2024 05:00), Max: 98.8 (07 Feb 2024 19:00)  HR: 86 (08 Feb 2024 05:00) (81 - 96)  BP: 122/94 (08 Feb 2024 05:00) (107/77 - 130/62)  BP(mean): 102 (08 Feb 2024 05:00) (84 - 102)  RR: 14 (08 Feb 2024 05:00) (11 - 18)  SpO2: 93% (08 Feb 2024 05:00) (92% - 97%)    Parameters below as of 07 Feb 2024 19:00  Patient On (Oxygen Delivery Method): room air         02-07    134<L>  |  96  |  15  ----------------------------<  91  4.2   |  27  |  0.91    Ca    9.6      07 Feb 2024 19:55  Phos  4.0     02-07  Mg     2.1     02-07    TPro  x   /  Alb  4.1  /  TBili  x   /  DBili  x   /  AST  x   /  ALT  x   /  AlkPhos  x   02-06       PHYSICAL EXAM:  Gen: NAD  Skin: No rashes, bruises, or lesions  Head: Normocephalic, Atraumatic  Face: no edema, erythema, or fluctuance. Parotid glands soft without mass  Eyes: no scleral injection  Nose: B/L nasopore in place, serosanguinous secretions from right nare, no active bleeding.   Mouth: Minimal dry blood in posterior pharynx. No Stridor / Drooling / Trismus.  Mucosa moist, tongue/uvula midline, oropharynx clear  Neck: Flat, supple, no lymphadenopathy, trachea midline, no masses  Lymphatic: No lymphadenopathy  Resp: breathing easily, no stridor  Neuro: facial nerve intact, no facial droop

## 2024-02-08 NOTE — PROGRESS NOTE ADULT - ASSESSMENT
-160  Off DDAVP, Na holding well  LD removed  MRI ww/o/sella protocol-d looks good  dispo fu PT recs

## 2024-02-08 NOTE — PROGRESS NOTE ADULT - PROBLEM SELECTOR PROBLEM 3
Secondary male hypogonadism

## 2024-02-09 LAB — T3 SERPL-MCNC: 50 NG/DL — LOW (ref 80–200)

## 2024-02-15 ENCOUNTER — APPOINTMENT (OUTPATIENT)
Dept: OTOLARYNGOLOGY | Facility: CLINIC | Age: 44
End: 2024-02-15
Payer: COMMERCIAL

## 2024-02-15 DIAGNOSIS — R93.0 ABNORMAL FINDINGS ON DIAGNOSTIC IMAGING OF SKULL AND HEAD, NOT ELSEWHERE CLASSIFIED: ICD-10-CM

## 2024-02-15 DIAGNOSIS — J33.8 OTHER POLYP OF SINUS: ICD-10-CM

## 2024-02-15 PROCEDURE — 99024 POSTOP FOLLOW-UP VISIT: CPT

## 2024-02-15 PROCEDURE — 31237 NSL/SINS NDSC SURG BX POLYPC: CPT | Mod: 50,58

## 2024-02-15 RX ORDER — LEVOTHYROXINE SODIUM 75 UG/1
75 CAPSULE ORAL
Refills: 0 | Status: ACTIVE | COMMUNITY

## 2024-02-15 NOTE — REASON FOR VISIT
[Post-Operative Visit] : a post-operative visit [FreeTextEntry2] : s/p Endoscopic transphenoidal or transnasal resection of pituitary tumor 02/01/24

## 2024-02-15 NOTE — HISTORY OF PRESENT ILLNESS
[de-identified] : 42 y/o M presents for post op follow up History of chronic nasal congestion  s/p Endoscopic transphenoidal or transnasal resection of pituitary tumor 02/01/24 Reports constant pressure and pain when waking up - resolves on own Occasionally taking acetaminophen PRN  No CSF leakage reported  No vision changes  No recent fevers or infections Pending neuro follow up next week.

## 2024-02-15 NOTE — ASSESSMENT
[FreeTextEntry1] : s/p TSR of craniopharyngioma: - doing well - debrided today - cont irrigations BID - f/u 2-3 weeks

## 2024-02-15 NOTE — PROCEDURE
[FreeTextEntry6] : Afrin and lidocaine were applied with cotton balls. Rigid scope #1 was used. Right nasal passage with mucoid secretions and nasapore that was suctioned clear. Right septal cartilage is exposed. Sphenoidotomy patent with mucoid secretions and dissolving nasopore which were gently suctioned. Left nasal passage with crusting that was debrided with suction. Sphenoidotomy patent with minimal secretions and crusting along septectomy that were carefully debrided. Sphenoid left with nasapore in place. No evidence of CSF leak. Nasopharynx clear.

## 2024-02-19 ENCOUNTER — INPATIENT (INPATIENT)
Facility: HOSPITAL | Age: 44
LOS: 12 days | Discharge: HOME | DRG: 41 | End: 2024-03-03
Attending: NEUROLOGICAL SURGERY | Admitting: NEUROLOGICAL SURGERY
Payer: COMMERCIAL

## 2024-02-19 VITALS
RESPIRATION RATE: 24 BRPM | OXYGEN SATURATION: 97 % | WEIGHT: 169.98 LBS | HEIGHT: 65 IN | TEMPERATURE: 98 F | HEART RATE: 134 BPM | DIASTOLIC BLOOD PRESSURE: 80 MMHG | SYSTOLIC BLOOD PRESSURE: 135 MMHG

## 2024-02-19 DIAGNOSIS — J34.89 OTHER SPECIFIED DISORDERS OF NOSE AND NASAL SINUSES: ICD-10-CM

## 2024-02-19 DIAGNOSIS — Z92.89 PERSONAL HISTORY OF OTHER MEDICAL TREATMENT: Chronic | ICD-10-CM

## 2024-02-19 LAB
A1C WITH ESTIMATED AVERAGE GLUCOSE RESULT: 5.4 % — SIGNIFICANT CHANGE UP (ref 4–5.6)
ALBUMIN SERPL ELPH-MCNC: 4.3 G/DL — SIGNIFICANT CHANGE UP (ref 3.3–5)
ALP SERPL-CCNC: 57 U/L — SIGNIFICANT CHANGE UP (ref 40–120)
ALT FLD-CCNC: 55 U/L — HIGH (ref 10–45)
ANION GAP SERPL CALC-SCNC: -6 MMOL/L — LOW (ref 5–17)
APTT BLD: 36.6 SEC — HIGH (ref 24.5–35.6)
AST SERPL-CCNC: 32 U/L — SIGNIFICANT CHANGE UP (ref 10–40)
BASOPHILS # BLD AUTO: 0.03 K/UL — SIGNIFICANT CHANGE UP (ref 0–0.2)
BASOPHILS NFR BLD AUTO: 0.3 % — SIGNIFICANT CHANGE UP (ref 0–2)
BILIRUB SERPL-MCNC: 0.3 MG/DL — SIGNIFICANT CHANGE UP (ref 0.2–1.2)
BUN SERPL-MCNC: 8 MG/DL — SIGNIFICANT CHANGE UP (ref 7–23)
CALCIUM SERPL-MCNC: 9.8 MG/DL — SIGNIFICANT CHANGE UP (ref 8.4–10.5)
CHLORIDE SERPL-SCNC: 108 MMOL/L — SIGNIFICANT CHANGE UP (ref 96–108)
CO2 SERPL-SCNC: 25 MMOL/L — SIGNIFICANT CHANGE UP (ref 22–31)
CREAT SERPL-MCNC: 0.93 MG/DL — SIGNIFICANT CHANGE UP (ref 0.5–1.3)
EGFR: 104 ML/MIN/1.73M2 — SIGNIFICANT CHANGE UP
EOSINOPHIL # BLD AUTO: 0.07 K/UL — SIGNIFICANT CHANGE UP (ref 0–0.5)
EOSINOPHIL NFR BLD AUTO: 0.6 % — SIGNIFICANT CHANGE UP (ref 0–6)
ESTIMATED AVERAGE GLUCOSE: 108 MG/DL — SIGNIFICANT CHANGE UP (ref 68–114)
FLUAV AG NPH QL: SIGNIFICANT CHANGE UP
FLUBV AG NPH QL: SIGNIFICANT CHANGE UP
GLUCOSE SERPL-MCNC: 117 MG/DL — HIGH (ref 70–99)
HCT VFR BLD CALC: 37.7 % — LOW (ref 39–50)
HGB BLD-MCNC: 12.8 G/DL — LOW (ref 13–17)
IMM GRANULOCYTES NFR BLD AUTO: 0.3 % — SIGNIFICANT CHANGE UP (ref 0–0.9)
INR BLD: 1.14 RATIO — SIGNIFICANT CHANGE UP (ref 0.85–1.18)
LYMPHOCYTES # BLD AUTO: 2.6 K/UL — SIGNIFICANT CHANGE UP (ref 1–3.3)
LYMPHOCYTES # BLD AUTO: 21.8 % — SIGNIFICANT CHANGE UP (ref 13–44)
MAGNESIUM SERPL-MCNC: 2 MG/DL — SIGNIFICANT CHANGE UP (ref 1.6–2.6)
MCHC RBC-ENTMCNC: 29.2 PG — SIGNIFICANT CHANGE UP (ref 27–34)
MCHC RBC-ENTMCNC: 34 GM/DL — SIGNIFICANT CHANGE UP (ref 32–36)
MCV RBC AUTO: 86.1 FL — SIGNIFICANT CHANGE UP (ref 80–100)
MONOCYTES # BLD AUTO: 0.55 K/UL — SIGNIFICANT CHANGE UP (ref 0–0.9)
MONOCYTES NFR BLD AUTO: 4.6 % — SIGNIFICANT CHANGE UP (ref 2–14)
NEUTROPHILS # BLD AUTO: 8.63 K/UL — HIGH (ref 1.8–7.4)
NEUTROPHILS NFR BLD AUTO: 72.4 % — SIGNIFICANT CHANGE UP (ref 43–77)
NRBC # BLD: 0 /100 WBCS — SIGNIFICANT CHANGE UP (ref 0–0)
OSMOLALITY UR: 131 MOS/KG — LOW (ref 300–900)
PLATELET # BLD AUTO: 363 K/UL — SIGNIFICANT CHANGE UP (ref 150–400)
POTASSIUM SERPL-MCNC: 3.8 MMOL/L — SIGNIFICANT CHANGE UP (ref 3.5–5.3)
POTASSIUM SERPL-SCNC: 3.8 MMOL/L — SIGNIFICANT CHANGE UP (ref 3.5–5.3)
PROCALCITONIN SERPL-MCNC: 0.06 NG/ML — SIGNIFICANT CHANGE UP (ref 0.02–0.1)
PROT SERPL-MCNC: 7.7 G/DL — SIGNIFICANT CHANGE UP (ref 6–8.3)
PROTHROM AB SERPL-ACNC: 12.5 SEC — SIGNIFICANT CHANGE UP (ref 9.5–13)
RBC # BLD: 4.38 M/UL — SIGNIFICANT CHANGE UP (ref 4.2–5.8)
RBC # FLD: 12.5 % — SIGNIFICANT CHANGE UP (ref 10.3–14.5)
RSV RNA NPH QL NAA+NON-PROBE: SIGNIFICANT CHANGE UP
SARS-COV-2 RNA SPEC QL NAA+PROBE: SIGNIFICANT CHANGE UP
SODIUM SERPL-SCNC: 127 MMOL/L — LOW (ref 135–145)
SP GR UR STRIP: <1.005 — LOW (ref 1–1.03)
T3 SERPL-MCNC: 76 NG/DL — LOW (ref 80–200)
T4 AB SER-ACNC: 6.8 UG/DL — SIGNIFICANT CHANGE UP (ref 4.6–12)
TSH SERPL-MCNC: 0.03 UIU/ML — LOW (ref 0.27–4.2)
WBC # BLD: 11.92 K/UL — HIGH (ref 3.8–10.5)
WBC # FLD AUTO: 11.92 K/UL — HIGH (ref 3.8–10.5)

## 2024-02-19 PROCEDURE — 99285 EMERGENCY DEPT VISIT HI MDM: CPT

## 2024-02-19 PROCEDURE — 99291 CRITICAL CARE FIRST HOUR: CPT

## 2024-02-19 PROCEDURE — 70486 CT MAXILLOFACIAL W/O DYE: CPT | Mod: 26

## 2024-02-19 PROCEDURE — 70450 CT HEAD/BRAIN W/O DYE: CPT | Mod: 26

## 2024-02-19 RX ORDER — MIDAZOLAM HYDROCHLORIDE 1 MG/ML
10 INJECTION, SOLUTION INTRAMUSCULAR; INTRAVENOUS ONCE
Refills: 0 | Status: DISCONTINUED | OUTPATIENT
Start: 2024-02-19 | End: 2024-02-19

## 2024-02-19 RX ORDER — DEXMEDETOMIDINE HYDROCHLORIDE IN 0.9% SODIUM CHLORIDE 4 UG/ML
0.2 INJECTION INTRAVENOUS
Qty: 200 | Refills: 0 | Status: DISCONTINUED | OUTPATIENT
Start: 2024-02-19 | End: 2024-02-19

## 2024-02-19 RX ORDER — LANOLIN ALCOHOL/MO/W.PET/CERES
5 CREAM (GRAM) TOPICAL AT BEDTIME
Refills: 0 | Status: DISCONTINUED | OUTPATIENT
Start: 2024-02-19 | End: 2024-02-23

## 2024-02-19 RX ORDER — SODIUM CHLORIDE 9 MG/ML
1000 INJECTION, SOLUTION INTRAVENOUS ONCE
Refills: 0 | Status: COMPLETED | OUTPATIENT
Start: 2024-02-19 | End: 2024-02-19

## 2024-02-19 RX ORDER — LEVOTHYROXINE SODIUM 125 MCG
75 TABLET ORAL DAILY
Refills: 0 | Status: DISCONTINUED | OUTPATIENT
Start: 2024-02-19 | End: 2024-02-21

## 2024-02-19 RX ORDER — HYDROCORTISONE 20 MG
10 TABLET ORAL
Refills: 0 | Status: DISCONTINUED | OUTPATIENT
Start: 2024-02-19 | End: 2024-02-20

## 2024-02-19 RX ORDER — ACETAMINOPHEN 500 MG
1000 TABLET ORAL EVERY 6 HOURS
Refills: 0 | Status: DISCONTINUED | OUTPATIENT
Start: 2024-02-19 | End: 2024-02-20

## 2024-02-19 RX ORDER — HYDROCORTISONE 20 MG
10 TABLET ORAL DAILY
Refills: 0 | Status: DISCONTINUED | OUTPATIENT
Start: 2024-02-19 | End: 2024-02-19

## 2024-02-19 RX ORDER — FENTANYL CITRATE 50 UG/ML
100 INJECTION INTRAVENOUS ONCE
Refills: 0 | Status: DISCONTINUED | OUTPATIENT
Start: 2024-02-19 | End: 2024-02-19

## 2024-02-19 RX ORDER — HYDROCORTISONE 20 MG
5 TABLET ORAL DAILY
Refills: 0 | Status: DISCONTINUED | OUTPATIENT
Start: 2024-02-19 | End: 2024-02-19

## 2024-02-19 RX ORDER — DEXMEDETOMIDINE HYDROCHLORIDE IN 0.9% SODIUM CHLORIDE 4 UG/ML
15.4 INJECTION INTRAVENOUS
Qty: 200 | Refills: 0 | Status: DISCONTINUED | OUTPATIENT
Start: 2024-02-19 | End: 2024-02-19

## 2024-02-19 RX ORDER — SENNA PLUS 8.6 MG/1
2 TABLET ORAL AT BEDTIME
Refills: 0 | Status: DISCONTINUED | OUTPATIENT
Start: 2024-02-19 | End: 2024-02-23

## 2024-02-19 RX ORDER — HYDROCORTISONE 20 MG
5 TABLET ORAL
Refills: 0 | Status: DISCONTINUED | OUTPATIENT
Start: 2024-02-19 | End: 2024-02-20

## 2024-02-19 RX ORDER — OXYCODONE HYDROCHLORIDE 5 MG/1
5 TABLET ORAL EVERY 4 HOURS
Refills: 0 | Status: DISCONTINUED | OUTPATIENT
Start: 2024-02-19 | End: 2024-02-23

## 2024-02-19 RX ORDER — HYDROCORTISONE 20 MG
100 TABLET ORAL ONCE
Refills: 0 | Status: COMPLETED | OUTPATIENT
Start: 2024-02-19 | End: 2024-02-19

## 2024-02-19 RX ORDER — SODIUM CHLORIDE 9 MG/ML
1000 INJECTION INTRAMUSCULAR; INTRAVENOUS; SUBCUTANEOUS ONCE
Refills: 0 | Status: COMPLETED | OUTPATIENT
Start: 2024-02-19 | End: 2024-02-19

## 2024-02-19 RX ORDER — POLYETHYLENE GLYCOL 3350 17 G/17G
17 POWDER, FOR SOLUTION ORAL DAILY
Refills: 0 | Status: DISCONTINUED | OUTPATIENT
Start: 2024-02-19 | End: 2024-02-23

## 2024-02-19 RX ORDER — SODIUM CHLORIDE 9 MG/ML
1000 INJECTION INTRAMUSCULAR; INTRAVENOUS; SUBCUTANEOUS
Refills: 0 | Status: DISCONTINUED | OUTPATIENT
Start: 2024-02-19 | End: 2024-02-20

## 2024-02-19 RX ORDER — ONDANSETRON 8 MG/1
4 TABLET, FILM COATED ORAL EVERY 6 HOURS
Refills: 0 | Status: DISCONTINUED | OUTPATIENT
Start: 2024-02-19 | End: 2024-02-23

## 2024-02-19 RX ORDER — MIDAZOLAM HYDROCHLORIDE 1 MG/ML
4 INJECTION, SOLUTION INTRAMUSCULAR; INTRAVENOUS ONCE
Refills: 0 | Status: DISCONTINUED | OUTPATIENT
Start: 2024-02-19 | End: 2024-02-19

## 2024-02-19 RX ORDER — CHLORHEXIDINE GLUCONATE 213 G/1000ML
1 SOLUTION TOPICAL ONCE
Refills: 0 | Status: COMPLETED | OUTPATIENT
Start: 2024-02-19 | End: 2024-02-20

## 2024-02-19 RX ORDER — ERGOCALCIFEROL 1.25 MG/1
50000 CAPSULE ORAL
Refills: 0 | Status: DISCONTINUED | OUTPATIENT
Start: 2024-02-19 | End: 2024-02-23

## 2024-02-19 RX ORDER — ATORVASTATIN CALCIUM 80 MG/1
40 TABLET, FILM COATED ORAL AT BEDTIME
Refills: 0 | Status: DISCONTINUED | OUTPATIENT
Start: 2024-02-19 | End: 2024-02-23

## 2024-02-19 RX ORDER — FENTANYL CITRATE 50 UG/ML
50 INJECTION INTRAVENOUS ONCE
Refills: 0 | Status: DISCONTINUED | OUTPATIENT
Start: 2024-02-19 | End: 2024-02-19

## 2024-02-19 RX ORDER — OXYCODONE HYDROCHLORIDE 5 MG/1
10 TABLET ORAL EVERY 4 HOURS
Refills: 0 | Status: DISCONTINUED | OUTPATIENT
Start: 2024-02-19 | End: 2024-02-23

## 2024-02-19 RX ADMIN — SODIUM CHLORIDE 1000 MILLILITER(S): 9 INJECTION INTRAMUSCULAR; INTRAVENOUS; SUBCUTANEOUS at 19:00

## 2024-02-19 RX ADMIN — FENTANYL CITRATE 50 MICROGRAM(S): 50 INJECTION INTRAVENOUS at 18:45

## 2024-02-19 RX ADMIN — SODIUM CHLORIDE 75 MILLILITER(S): 9 INJECTION INTRAMUSCULAR; INTRAVENOUS; SUBCUTANEOUS at 15:10

## 2024-02-19 RX ADMIN — MIDAZOLAM HYDROCHLORIDE 4 MILLIGRAM(S): 1 INJECTION, SOLUTION INTRAMUSCULAR; INTRAVENOUS at 19:23

## 2024-02-19 RX ADMIN — SENNA PLUS 2 TABLET(S): 8.6 TABLET ORAL at 21:42

## 2024-02-19 RX ADMIN — SODIUM CHLORIDE 2000 MILLILITER(S): 9 INJECTION, SOLUTION INTRAVENOUS at 14:12

## 2024-02-19 RX ADMIN — DEXMEDETOMIDINE HYDROCHLORIDE IN 0.9% SODIUM CHLORIDE 3.86 MICROGRAM(S)/KG/HR: 4 INJECTION INTRAVENOUS at 19:31

## 2024-02-19 RX ADMIN — FENTANYL CITRATE 50 MICROGRAM(S): 50 INJECTION INTRAVENOUS at 18:10

## 2024-02-19 RX ADMIN — ATORVASTATIN CALCIUM 40 MILLIGRAM(S): 80 TABLET, FILM COATED ORAL at 21:43

## 2024-02-19 RX ADMIN — Medication 100 MILLIGRAM(S): at 14:12

## 2024-02-19 NOTE — H&P ADULT - HISTORY OF PRESENT ILLNESS
p (1480)     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 Today, p/f leaking from nostrils (L>R) when he stands up or valsalvas. Reports no h/as. Afebrile per nurse in ED.  WBC 11.92. Last Na (2/8/24 136).    =====================  PAST MEDICAL HISTORY   HLD (hyperlipidemia)    History of pituitary tumor    Deviated septum      PAST SURGICAL HISTORY   History of dental surgery      No Known Allergies      MEDICATIONS:  Antibiotics:    Neuro:  acetaminophen   IVPB .. 1000 milliGRAM(s) IV Intermittent every 6 hours PRN  fentaNYL    Injectable 100 MICROGram(s) IV Push Once  melatonin 5 milliGRAM(s) Oral at bedtime PRN  midazolam Injectable 10 milliGRAM(s) IV Push Once  ondansetron Injectable 4 milliGRAM(s) IV Push every 6 hours PRN  oxyCODONE    IR 5 milliGRAM(s) Oral every 4 hours PRN  oxyCODONE    IR 10 milliGRAM(s) Oral every 4 hours PRN    Other:  atorvastatin 40 milliGRAM(s) Oral at bedtime  ergocalciferol 26845 Unit(s) Oral every week  hydrocortisone 10 milliGRAM(s) Oral daily  hydrocortisone 5 milliGRAM(s) Oral daily  levothyroxine 75 MICROGram(s) Oral daily  multivitamin 1 Tablet(s) Oral daily  polyethylene glycol 3350 17 Gram(s) Oral daily  senna 2 Tablet(s) Oral at bedtime  sodium chloride 0.9%. 1000 milliLiter(s) IV Continuous <Continuous>      SOCIAL HISTORY:   Occupation:   Marital Status:     FAMILY HISTORY:  Family history of breast cancer in mother        ROS: Negative except per HPI    LABS:                          12.8   11.92 )-----------( 363      ( 19 Feb 2024 14:17 )             37.7

## 2024-02-19 NOTE — ED ADULT NURSE NOTE - OBJECTIVE STATEMENT
43Y M AXO4 PMH of pituitary tumor and HLD PSH of craniotomy on Feb 1st, 2024 presented to the ED from home c/o "clear fluid draining from nose" x 1 day. Pt reports he visited son who has a "runny nose and is currently living with grandma yesterday". Pt reports "having multiple episodes of sneezing after saline rinse yesterday." Upon arrival to the ED, the pt is well appearing, has bilateral even and unlabored chest rise, and ambulatory with steady gait. Upon assessment, pt has even and bilateral peripheral pulses, ROM, PERRLA, gross neuro intact, and soft, non-tender, non-distended abdomen. Pt denies fevers, chills, chest pain, SOB, n/v/d, headaches, dizziness, vision changes, double vision, numbness or tingling of extremities, urinary symptoms, and black or bloody stools. IV access obtained, bed in lowest position and side rails up. Comfort and safety provided.

## 2024-02-19 NOTE — ED CLERICAL - BED REQUESTED
19-Feb-2024 15:18 Normal vision: sees adequately in most situations; can see medication labels, newsprint

## 2024-02-19 NOTE — PATIENT PROFILE ADULT - FALL HARM RISK - UNIVERSAL INTERVENTIONS
Bed in lowest position, wheels locked, appropriate side rails in place/Call bell, personal items and telephone in reach/Instruct patient to call for assistance before getting out of bed or chair/Non-slip footwear when patient is out of bed/[place] to call system/Physically safe environment - no spills, clutter or unnecessary equipment/Purposeful Proactive Rounding/Room/bathroom lighting operational, light cord in reach

## 2024-02-19 NOTE — ED ADULT TRIAGE NOTE - NSWEIGHTCALCTOOLDRUG_GEN_A_CORE
· Maddreys score is 1 6 less than 32 need for steroids  · Bilirubin at 2 5 will check ultrasound of the abdomen no pain now  · INR is normal  · Counseled on alcohol cessation  used

## 2024-02-19 NOTE — ED ADULT NURSE NOTE - NSFALLHARMRISKINTERV_ED_ALL_ED

## 2024-02-19 NOTE — H&P ADULT - ASSESSMENT
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 Today, p/f leaking from nostrils (L>R) when he stands up or valsalvas. Reports no h/as. Afebrile per nurse in ED.  WBC 11.92. Last Na (2/8/24 136).  - adm NSCU under Dr. Conway  - [facility] pending  - LD placement today  - MR brain w/wo   - BMP  - [place]

## 2024-02-19 NOTE — ED ADULT NURSE NOTE - NSFALLOOBATTEMPT_ED_ALL_ED
Baby is discharging today.  SW met with parents at bedside.     provided education [facility] [place] [facility].  Family was undecided on need for home visit.  No referral will be made at this time.  Family has this 's contact information should they decide to participate in program.    SHIRLEY Domingo-QUIRINO, PM-C  [facility]   [phone]     No

## 2024-02-19 NOTE — ED PROVIDER NOTE - PROGRESS NOTE DETAILS
Patric, PGY3 -   Notified neurosurgery the patient is in the ER, aware that patient is here, will come see patient. Patric, PGY3 - Neurosurgery accepting patient under Dr. Conway

## 2024-02-19 NOTE — ED PROVIDER NOTE - ATTENDING CONTRIBUTION TO CARE
------------ATTENDING NOTE------------  pt w/ wife c/o clear rhinorrhea since this AM, complicated as post operative day 18 from transphenoidal nasopharyngeal mass resection, no fevers / headaches / visual / hearing changes, complicated as 2nd adrenal deficiency, stress dose steroid, IVF for tachycardia, awaiting labs/imaging and NSGY consult -->  - Raji Villanueva MD   ---------------------------------------------

## 2024-02-19 NOTE — ED PROVIDER NOTE - OBJECTIVE STATEMENT
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 Conway on 2/1/2024, complicated by adrenal insufficiency and diabetes insipidus, discharged on hydrocortisone and levothyroxine, Presenting due to clear drainage from his nostrils.  Patient states that he thinks may be sneezing might have brought it on.

## 2024-02-19 NOTE — ED PROVIDER NOTE - CLINICAL SUMMARY MEDICAL DECISION MAKING FREE TEXT BOX
Patric, PGY3 - Patric, PGY3 - 43-year-old man with PMH suprasellar cystic mass now post resection, presenting due to clear drainage from the nose.  Consider rhinorrhea versus CSF leak however no active drainage at this time.  CT sinus, labs, neurosurgery to be made aware. Will follow-up on the recommendations *The above represents an initial assessment/impression. Please refer to progress notes for potential changes in patient clinical course*

## 2024-02-19 NOTE — ED PROVIDER NOTE - PHYSICAL EXAMINATION
Gen: NAD, AOx3, able to make needs known, non-toxic  Head: NCAT  HEENT: EOMI, normal conjunctiva.   No active drainage at this time, cranial nerves of the face intact.  Lung: no respiratory distress, speaking in full sentences  CV: pulses bilaterally   MSK: no visible bony deformities  Neuro: No focal sensory or motor deficits  Skin: Warm, well perfused, no rash  Psych: normal affect

## 2024-02-19 NOTE — PROGRESS NOTE ADULT - SUBJECTIVE AND OBJECTIVE BOX
HPI:  p (1480)     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 Today, p/f leaking from nostrils (L>R) when he stands up or valsalvas. Reports no h/as. Afebrile per nurse in ED.  WBC 11.92. Last Na (2/8/24 136).    =====================  PAST MEDICAL HISTORY   HLD (hyperlipidemia)    History of pituitary tumor    Deviated septum      PAST SURGICAL HISTORY   History of dental surgery      No Known Allergies      MEDICATIONS:  Antibiotics:    Neuro:  acetaminophen   IVPB .. 1000 milliGRAM(s) IV Intermittent every 6 hours PRN  fentaNYL    Injectable 100 MICROGram(s) IV Push Once  melatonin 5 milliGRAM(s) Oral at bedtime PRN  midazolam Injectable 10 milliGRAM(s) IV Push Once  ondansetron Injectable 4 milliGRAM(s) IV Push every 6 hours PRN  oxyCODONE    IR 5 milliGRAM(s) Oral every 4 hours PRN  oxyCODONE    IR 10 milliGRAM(s) Oral every 4 hours PRN    Other:  atorvastatin 40 milliGRAM(s) Oral at bedtime  ergocalciferol 12680 Unit(s) Oral every week  hydrocortisone 10 milliGRAM(s) Oral daily  hydrocortisone 5 milliGRAM(s) Oral daily  levothyroxine 75 MICROGram(s) Oral daily  multivitamin 1 Tablet(s) Oral daily  polyethylene glycol 3350 17 Gram(s) Oral daily  senna 2 Tablet(s) Oral at bedtime  sodium chloride 0.9%. 1000 milliLiter(s) IV Continuous <Continuous>      SOCIAL HISTORY:   Occupation:   Marital Status:     FAMILY HISTORY:  Family history of breast cancer in mother        ROS: Negative except per HPI    LABS:                          12.8   11.92 )-----------( 363      ( 19 Feb 2024 14:17 )             37.7              (19 Feb 2024 14:45)    SURGERY:       EVENTS:         ICU Vital Signs Last 24 Hrs  T(C): 36.8 (19 Feb 2024 16:52), Max: 36.8 (19 Feb 2024 16:52)  T(F): 98.3 (19 Feb 2024 16:52), Max: 98.3 (19 Feb 2024 16:52)  HR: 105 (19 Feb 2024 19:45) (101 - 134)  BP: 97/56 (19 Feb 2024 19:45) (80/42 - 135/80)  BP(mean): 73 (19 Feb 2024 19:45) (56 - 97)  ABP: --  ABP(mean): --  RR: 19 (19 Feb 2024 19:45) (10 - 45)  SpO2: 95% (19 Feb 2024 19:45) (92% - 99%)    O2 Parameters below as of 19 Feb 2024 18:25  Patient On (Oxygen Delivery Method): mask, nonrebreather    O2 Concentration (%): 100       02-19 @ 07:01  -  02-19 @ 19:45  --------------------------------------------------------  IN: 1150 mL / OUT: 0 mL / NET: 1150 mL                             12.8   11.92 )-----------( 363      ( 19 Feb 2024 14:17 )             37.7    02-19    127<L>  |  108  |  8   ----------------------------<  117<H>  3.8   |  25  |  0.93    Ca    9.8      19 Feb 2024 14:17  Mg     2.0     02-19    TPro  7.7  /  Alb  4.3  /  TBili  0.3  /  DBili  x   /  AST  32  /  ALT  55<H>  /  AlkPhos  57  02-19            PHYSICAL EXAM:    General: No Acute Distress     Neurological: Awake, alert oriented to person, place and time, Following Commands, PERRL, EOMI, Face Symmetrical, Speech Fluent, Moving all extremities, Muscle Strength normal in all four extremities, No Drift, Sensation to Light Touch Intact    Pulmonary: Clear to Auscultation, No Rales, No Rhonchi, No Wheezes     Cardiovascular: S1, S2, Regular Rate and Rhythm     Gastrointestinal: Soft, Nontender, Nondistended     Extremities: No calf tenderness     Incision:       MEDICATIONS:  Antibiotics:      Neurological:   acetaminophen   IVPB .. 1000 milliGRAM(s) IV Intermittent every 6 hours PRN  dexMEDEtomidine Infusion 0.2 MICROgram(s)/kG/Hr IV Continuous <Continuous>  melatonin 5 milliGRAM(s) Oral at bedtime PRN  ondansetron Injectable 4 milliGRAM(s) IV Push every 6 hours PRN  oxyCODONE    IR 5 milliGRAM(s) Oral every 4 hours PRN  oxyCODONE    IR 10 milliGRAM(s) Oral every 4 hours PRN    Cardiac:     Pulm:    Heme:     Other:   atorvastatin 40 milliGRAM(s) Oral at bedtime  ergocalciferol 82567 Unit(s) Oral every week  hydrocortisone 10 milliGRAM(s) Oral daily  hydrocortisone 5 milliGRAM(s) Oral daily  levothyroxine 75 MICROGram(s) Oral daily  multivitamin 1 Tablet(s) Oral daily  polyethylene glycol 3350 17 Gram(s) Oral daily  senna 2 Tablet(s) Oral at bedtime  sodium chloride 0.9%. 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concern for CSF leak   Neuro: neuro checks q 2 hr  Possible LD placement per NS    CT head   Respiratory: RA, pituitary precaution    CV: -150 mmhg   Endocrine: panhypopit  hydrocortisone 10 milliGRAM(s) Oral daily  hydrocortisone 5 milliGRAM(s) Oral daily  levothyroxine 75 MICROGram(s) Oral daily  on hydrocortisone   DVT ppx: hold chemoprophylaxis    Renal: hyponatremia   ID: afebrile   GI: regular diet   Social/Family:   Discharge planning:     Code Status: [] Full Code [] DNR [] DNI [] Goals of Care:   Disposition: [] ICU [] Stroke Unit [] RCU []PCU []Floor [] Discharge Home     Patient at high risk for neurologic deterioration, critical care time, excluding procedures: 45 minutes                    HPI:  p (1480)     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 Today, p/f leaking from nostrils (L>R) when he stands up or valsalvas. Reports no h/as. Afebrile per nurse in ED.  WBC 11.92. Last Na (2/8/24 136).    =====================  PAST MEDICAL HISTORY   HLD (hyperlipidemia)    History of pituitary tumor    Deviated septum      PAST SURGICAL HISTORY   History of dental surgery      No Known Allergies      MEDICATIONS:  Antibiotics:    Neuro:  acetaminophen   IVPB .. 1000 milliGRAM(s) IV Intermittent every 6 hours PRN  fentaNYL    Injectable 100 MICROGram(s) IV Push Once  melatonin 5 milliGRAM(s) Oral at bedtime PRN  midazolam Injectable 10 milliGRAM(s) IV Push Once  ondansetron Injectable 4 milliGRAM(s) IV Push every 6 hours PRN  oxyCODONE    IR 5 milliGRAM(s) Oral every 4 hours PRN  oxyCODONE    IR 10 milliGRAM(s) Oral every 4 hours PRN    Other:  atorvastatin 40 milliGRAM(s) Oral at bedtime  ergocalciferol 18505 Unit(s) Oral every week  hydrocortisone 10 milliGRAM(s) Oral daily  hydrocortisone 5 milliGRAM(s) Oral daily  levothyroxine 75 MICROGram(s) Oral daily  multivitamin 1 Tablet(s) Oral daily  polyethylene glycol 3350 17 Gram(s) Oral daily  senna 2 Tablet(s) Oral at bedtime  sodium chloride 0.9%. 1000 milliLiter(s) IV Continuous <Continuous>      SOCIAL HISTORY:   Occupation:   Marital Status:     FAMILY HISTORY:  Family history of breast cancer in mother        ROS: Negative except per HPI    LABS:                          12.8   11.92 )-----------( 363      ( 19 Feb 2024 14:17 )             37.7              (19 Feb 2024 14:45)    SURGERY:       EVENTS:         ICU Vital Signs Last 24 Hrs  T(C): 36.8 (19 Feb 2024 16:52), Max: 36.8 (19 Feb 2024 16:52)  T(F): 98.3 (19 Feb 2024 16:52), Max: 98.3 (19 Feb 2024 16:52)  HR: 105 (19 Feb 2024 19:45) (101 - 134)  BP: 97/56 (19 Feb 2024 19:45) (80/42 - 135/80)  BP(mean): 73 (19 Feb 2024 19:45) (56 - 97)  ABP: --  ABP(mean): --  RR: 19 (19 Feb 2024 19:45) (10 - 45)  SpO2: 95% (19 Feb 2024 19:45) (92% - 99%)    O2 Parameters below as of 19 Feb 2024 18:25  Patient On (Oxygen Delivery Method): mask, nonrebreather    O2 Concentration (%): 100       02-19 @ 07:01  -  02-19 @ 19:45  --------------------------------------------------------  IN: 1150 mL / OUT: 0 mL / NET: 1150 mL                             12.8   11.92 )-----------( 363      ( 19 Feb 2024 14:17 )             37.7    02-19    127<L>  |  108  |  8   ----------------------------<  117<H>  3.8   |  25  |  0.93    Ca    9.8      19 Feb 2024 14:17  Mg     2.0     02-19    TPro  7.7  /  Alb  4.3  /  TBili  0.3  /  DBili  x   /  AST  32  /  ALT  55<H>  /  AlkPhos  57  02-19            PHYSICAL EXAM:    General: No Acute Distress     Neurological: Awake, alert oriented to person, place and time, Following Commands, PERRL, EOMI, Face Symmetrical, Speech Fluent, Moving all extremities, Muscle Strength normal in all four extremities, No Drift, Sensation to Light Touch Intact    Pulmonary: Clear to Auscultation, No Rales, No Rhonchi, No Wheezes     Cardiovascular: S1, S2, Regular Rate and Rhythm     Gastrointestinal: Soft, Nontender, Nondistended     Extremities: No calf tenderness     Incision:       MEDICATIONS:  Antibiotics:      Neurological:   acetaminophen   IVPB .. 1000 milliGRAM(s) IV Intermittent every 6 hours PRN  dexMEDEtomidine Infusion 0.2 MICROgram(s)/kG/Hr IV Continuous <Continuous>  melatonin 5 milliGRAM(s) Oral at bedtime PRN  ondansetron Injectable 4 milliGRAM(s) IV Push every 6 hours PRN  oxyCODONE    IR 5 milliGRAM(s) Oral every 4 hours PRN  oxyCODONE    IR 10 milliGRAM(s) Oral every 4 hours PRN    Cardiac:     Pulm:    Heme:     Other:   atorvastatin 40 milliGRAM(s) Oral at bedtime  ergocalciferol 19583 Unit(s) Oral every week  hydrocortisone 10 milliGRAM(s) Oral daily  hydrocortisone 5 milliGRAM(s) Oral daily  levothyroxine 75 MICROGram(s) Oral daily  multivitamin 1 Tablet(s) Oral daily  polyethylene glycol 3350 17 Gram(s) Oral daily  senna 2 Tablet(s) Oral at bedtime  sodium chloride 0.9%. 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concern for CSF leak   Neuro: neuro checks q 2 hr  LD placement by IR   CT head   Respiratory: RA, pituitary precaution    CV: -150 mmhg   Endocrine: panhypopit  hydrocortisone 10 milliGRAM(s) Oral daily  hydrocortisone 5 milliGRAM(s) Oral daily  levothyroxine 75 MICROGram(s) Oral daily  on hydrocortisone   DVT ppx: hold chemoprophylaxis    Renal: hyponatremia   ID: afebrile   GI: regular diet   Social/Family:   Discharge planning:     Code Status: [] Full Code [] DNR [] DNI [] Goals of Care:   Disposition: [] ICU [] Stroke Unit [] RCU []PCU []Floor [] Discharge Home     Patient at high risk for neurologic deterioration, critical care time, excluding procedures: 45 minutes                    HPI:  p (1480)     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 Today, p/f leaking from nostrils (L>R) when he stands up or valsalvas. Reports no h/as. Afebrile per nurse in ED.  WBC 11.92. Last Na (2/8/24 136).    =====================  PAST MEDICAL HISTORY   HLD (hyperlipidemia)    History of pituitary tumor    Deviated septum      PAST SURGICAL HISTORY   History of dental surgery      No Known Allergies      MEDICATIONS:  Antibiotics:    Neuro:  acetaminophen   IVPB .. 1000 milliGRAM(s) IV Intermittent every 6 hours PRN  fentaNYL    Injectable 100 MICROGram(s) IV Push Once  melatonin 5 milliGRAM(s) Oral at bedtime PRN  midazolam Injectable 10 milliGRAM(s) IV Push Once  ondansetron Injectable 4 milliGRAM(s) IV Push every 6 hours PRN  oxyCODONE    IR 5 milliGRAM(s) Oral every 4 hours PRN  oxyCODONE    IR 10 milliGRAM(s) Oral every 4 hours PRN    Other:  atorvastatin 40 milliGRAM(s) Oral at bedtime  ergocalciferol 27774 Unit(s) Oral every week  hydrocortisone 10 milliGRAM(s) Oral daily  hydrocortisone 5 milliGRAM(s) Oral daily  levothyroxine 75 MICROGram(s) Oral daily  multivitamin 1 Tablet(s) Oral daily  polyethylene glycol 3350 17 Gram(s) Oral daily  senna 2 Tablet(s) Oral at bedtime  sodium chloride 0.9%. 1000 milliLiter(s) IV Continuous <Continuous>      SOCIAL HISTORY:   Occupation:   Marital Status:     FAMILY HISTORY:  Family history of breast cancer in mother        ROS: Negative except per HPI    LABS:                          12.8   11.92 )-----------( 363      ( 19 Feb 2024 14:17 )             37.7              (19 Feb 2024 14:45)    SURGERY:       EVENTS:         ICU Vital Signs Last 24 Hrs  T(C): 36.8 (19 Feb 2024 16:52), Max: 36.8 (19 Feb 2024 16:52)  T(F): 98.3 (19 Feb 2024 16:52), Max: 98.3 (19 Feb 2024 16:52)  HR: 105 (19 Feb 2024 19:45) (101 - 134)  BP: 97/56 (19 Feb 2024 19:45) (80/42 - 135/80)  BP(mean): 73 (19 Feb 2024 19:45) (56 - 97)  ABP: --  ABP(mean): --  RR: 19 (19 Feb 2024 19:45) (10 - 45)  SpO2: 95% (19 Feb 2024 19:45) (92% - 99%)    O2 Parameters below as of 19 Feb 2024 18:25  Patient On (Oxygen Delivery Method): mask, nonrebreather    O2 Concentration (%): 100       02-19 @ 07:01  -  02-19 @ 19:45  --------------------------------------------------------  IN: 1150 mL / OUT: 0 mL / NET: 1150 mL                             12.8   11.92 )-----------( 363      ( 19 Feb 2024 14:17 )             37.7    02-19    127<L>  |  108  |  8   ----------------------------<  117<H>  3.8   |  25  |  0.93    Ca    9.8      19 Feb 2024 14:17  Mg     2.0     02-19    TPro  7.7  /  Alb  4.3  /  TBili  0.3  /  DBili  x   /  AST  32  /  ALT  55<H>  /  AlkPhos  57  02-19            PHYSICAL EXAM:    General: No Acute Distress     Neurological: Awake, alert oriented to person, place and time, Following Commands, PERRL, EOMI, Face Symmetrical, Speech Fluent, Moving all extremities, Muscle Strength normal in all four extremities, No Drift, Sensation to Light Touch Intact    Pulmonary: Clear to Auscultation, No Rales, No Rhonchi, No Wheezes     Cardiovascular: S1, S2, Regular Rate and Rhythm     Gastrointestinal: Soft, Nontender, Nondistended     Extremities: No calf tenderness     Incision:       MEDICATIONS:  Antibiotics:      Neurological:   acetaminophen   IVPB .. 1000 milliGRAM(s) IV Intermittent every 6 hours PRN  dexMEDEtomidine Infusion 0.2 MICROgram(s)/kG/Hr IV Continuous <Continuous>  melatonin 5 milliGRAM(s) Oral at bedtime PRN  ondansetron Injectable 4 milliGRAM(s) IV Push every 6 hours PRN  oxyCODONE    IR 5 milliGRAM(s) Oral every 4 hours PRN  oxyCODONE    IR 10 milliGRAM(s) Oral every 4 hours PRN    Cardiac:     Pulm:    Heme:     Other:   atorvastatin 40 milliGRAM(s) Oral at bedtime  ergocalciferol 22120 Unit(s) Oral every week  hydrocortisone 10 milliGRAM(s) Oral daily  hydrocortisone 5 milliGRAM(s) Oral daily  levothyroxine 75 MICROGram(s) Oral daily  multivitamin 1 Tablet(s) Oral daily  polyethylene glycol 3350 17 Gram(s) Oral daily  senna 2 Tablet(s) Oral at bedtime  sodium chloride 0.9%. 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concern for CSF leak rhinorrhea   Neuro: neuro checks q 2 hr  LD placement by IR   CT head   Respiratory: RA, pituitary precaution    CV: -150 mmhg   Endocrine: panhypopit  hydrocortisone 10 milliGRAM(s) Oral daily  hydrocortisone 5 milliGRAM(s) Oral daily  levothyroxine 75 MICROGram(s) Oral daily  DVT ppx: hold chemoprophylaxis    Renal: hyponatremia , will send serum and urine osmolarity   ID: afebrile   GI: regular diet   Social/Family:   Discharge planning:     Code Status: [x] Full Code [] DNR [] DNI [] Goals of Care:   Disposition: [x] ICU [] Stroke Unit [] RCU []PCU []Floor [] Discharge Home     Patient at high risk for neurologic deterioration, critical care time, excluding procedures: 30 minutes, csf rhinorrhea at risk for meningitis, brain sag, subdural hematoma

## 2024-02-20 DIAGNOSIS — E89.89 OTHER POSTPROCEDURAL ENDOCRINE AND METABOLIC COMPLICATIONS AND DISORDERS: ICD-10-CM

## 2024-02-20 DIAGNOSIS — E27.49 OTHER ADRENOCORTICAL INSUFFICIENCY: ICD-10-CM

## 2024-02-20 DIAGNOSIS — D44.4 NEOPLASM OF UNCERTAIN BEHAVIOR OF CRANIOPHARYNGEAL DUCT: ICD-10-CM

## 2024-02-20 DIAGNOSIS — E03.8 OTHER SPECIFIED HYPOTHYROIDISM: ICD-10-CM

## 2024-02-20 DIAGNOSIS — E29.1 TESTICULAR HYPOFUNCTION: ICD-10-CM

## 2024-02-20 LAB
ADD ON TEST-SPECIMEN IN LAB: SIGNIFICANT CHANGE UP
ANION GAP SERPL CALC-SCNC: 11 MMOL/L — SIGNIFICANT CHANGE UP (ref 5–17)
ANION GAP SERPL CALC-SCNC: 12 MMOL/L — SIGNIFICANT CHANGE UP (ref 5–17)
ANION GAP SERPL CALC-SCNC: 14 MMOL/L — SIGNIFICANT CHANGE UP (ref 5–17)
BUN SERPL-MCNC: 5 MG/DL — LOW (ref 7–23)
BUN SERPL-MCNC: 7 MG/DL — SIGNIFICANT CHANGE UP (ref 7–23)
BUN SERPL-MCNC: 7 MG/DL — SIGNIFICANT CHANGE UP (ref 7–23)
CALCIUM SERPL-MCNC: 9 MG/DL — SIGNIFICANT CHANGE UP (ref 8.4–10.5)
CALCIUM SERPL-MCNC: 9.1 MG/DL — SIGNIFICANT CHANGE UP (ref 8.4–10.5)
CALCIUM SERPL-MCNC: 9.7 MG/DL — SIGNIFICANT CHANGE UP (ref 8.4–10.5)
CHLORIDE SERPL-SCNC: 106 MMOL/L — SIGNIFICANT CHANGE UP (ref 96–108)
CO2 SERPL-SCNC: 23 MMOL/L — SIGNIFICANT CHANGE UP (ref 22–31)
CO2 SERPL-SCNC: 24 MMOL/L — SIGNIFICANT CHANGE UP (ref 22–31)
CO2 SERPL-SCNC: 24 MMOL/L — SIGNIFICANT CHANGE UP (ref 22–31)
CREAT SERPL-MCNC: 0.83 MG/DL — SIGNIFICANT CHANGE UP (ref 0.5–1.3)
CREAT SERPL-MCNC: 0.85 MG/DL — SIGNIFICANT CHANGE UP (ref 0.5–1.3)
CREAT SERPL-MCNC: 0.89 MG/DL — SIGNIFICANT CHANGE UP (ref 0.5–1.3)
EGFR: 109 ML/MIN/1.73M2 — SIGNIFICANT CHANGE UP
EGFR: 111 ML/MIN/1.73M2 — SIGNIFICANT CHANGE UP
EGFR: 111 ML/MIN/1.73M2 — SIGNIFICANT CHANGE UP
GLUCOSE BLDC GLUCOMTR-MCNC: 116 MG/DL — HIGH (ref 70–99)
GLUCOSE BLDC GLUCOMTR-MCNC: 120 MG/DL — HIGH (ref 70–99)
GLUCOSE BLDC GLUCOMTR-MCNC: 131 MG/DL — HIGH (ref 70–99)
GLUCOSE BLDC GLUCOMTR-MCNC: 76 MG/DL — SIGNIFICANT CHANGE UP (ref 70–99)
GLUCOSE SERPL-MCNC: 100 MG/DL — HIGH (ref 70–99)
GLUCOSE SERPL-MCNC: 103 MG/DL — HIGH (ref 70–99)
GLUCOSE SERPL-MCNC: 181 MG/DL — HIGH (ref 70–99)
HCT VFR BLD CALC: 33.6 % — LOW (ref 39–50)
HGB BLD-MCNC: 11.2 G/DL — LOW (ref 13–17)
MAGNESIUM SERPL-MCNC: 2 MG/DL — SIGNIFICANT CHANGE UP (ref 1.6–2.6)
MCHC RBC-ENTMCNC: 28.9 PG — SIGNIFICANT CHANGE UP (ref 27–34)
MCHC RBC-ENTMCNC: 33.3 GM/DL — SIGNIFICANT CHANGE UP (ref 32–36)
MCV RBC AUTO: 86.8 FL — SIGNIFICANT CHANGE UP (ref 80–100)
NRBC # BLD: 0 /100 WBCS — SIGNIFICANT CHANGE UP (ref 0–0)
OSMOLALITY SERPL: 296 MOSMOL/KG — SIGNIFICANT CHANGE UP (ref 275–300)
PHOSPHATE SERPL-MCNC: 3.6 MG/DL — SIGNIFICANT CHANGE UP (ref 2.5–4.5)
PLATELET # BLD AUTO: 314 K/UL — SIGNIFICANT CHANGE UP (ref 150–400)
POTASSIUM SERPL-MCNC: 3.5 MMOL/L — SIGNIFICANT CHANGE UP (ref 3.5–5.3)
POTASSIUM SERPL-MCNC: 3.7 MMOL/L — SIGNIFICANT CHANGE UP (ref 3.5–5.3)
POTASSIUM SERPL-MCNC: 4 MMOL/L — SIGNIFICANT CHANGE UP (ref 3.5–5.3)
POTASSIUM SERPL-SCNC: 3.5 MMOL/L — SIGNIFICANT CHANGE UP (ref 3.5–5.3)
POTASSIUM SERPL-SCNC: 3.7 MMOL/L — SIGNIFICANT CHANGE UP (ref 3.5–5.3)
POTASSIUM SERPL-SCNC: 4 MMOL/L — SIGNIFICANT CHANGE UP (ref 3.5–5.3)
RBC # BLD: 3.87 M/UL — LOW (ref 4.2–5.8)
RBC # FLD: 12.6 % — SIGNIFICANT CHANGE UP (ref 10.3–14.5)
SODIUM SERPL-SCNC: 141 MMOL/L — SIGNIFICANT CHANGE UP (ref 135–145)
SODIUM SERPL-SCNC: 142 MMOL/L — SIGNIFICANT CHANGE UP (ref 135–145)
SODIUM SERPL-SCNC: 143 MMOL/L — SIGNIFICANT CHANGE UP (ref 135–145)
SP GR UR STRIP: <1.005 — LOW (ref 1–1.03)
WBC # BLD: 7.36 K/UL — SIGNIFICANT CHANGE UP (ref 3.8–10.5)
WBC # FLD AUTO: 7.36 K/UL — SIGNIFICANT CHANGE UP (ref 3.8–10.5)

## 2024-02-20 PROCEDURE — 99223 1ST HOSP IP/OBS HIGH 75: CPT

## 2024-02-20 PROCEDURE — 62329 THER SPI PNXR CSF FLUOR/CT: CPT

## 2024-02-20 PROCEDURE — 99232 SBSQ HOSP IP/OBS MODERATE 35: CPT

## 2024-02-20 PROCEDURE — 93970 EXTREMITY STUDY: CPT | Mod: 26

## 2024-02-20 RX ORDER — POTASSIUM CHLORIDE 20 MEQ
40 PACKET (EA) ORAL ONCE
Refills: 0 | Status: COMPLETED | OUTPATIENT
Start: 2024-02-20 | End: 2024-02-20

## 2024-02-20 RX ORDER — SODIUM CHLORIDE 9 MG/ML
1000 INJECTION, SOLUTION INTRAVENOUS
Refills: 0 | Status: COMPLETED | OUTPATIENT
Start: 2024-02-20 | End: 2024-02-21

## 2024-02-20 RX ORDER — INSULIN LISPRO 100/ML
VIAL (ML) SUBCUTANEOUS
Refills: 0 | Status: DISCONTINUED | OUTPATIENT
Start: 2024-02-20 | End: 2024-02-23

## 2024-02-20 RX ORDER — HYDROCORTISONE 20 MG
10 TABLET ORAL
Refills: 0 | Status: DISCONTINUED | OUTPATIENT
Start: 2024-02-21 | End: 2024-02-23

## 2024-02-20 RX ORDER — DESMOPRESSIN ACETATE 0.1 MG/1
0.05 TABLET ORAL ONCE
Refills: 0 | Status: COMPLETED | OUTPATIENT
Start: 2024-02-20 | End: 2024-02-20

## 2024-02-20 RX ORDER — SODIUM CHLORIDE 9 MG/ML
1000 INJECTION, SOLUTION INTRAVENOUS
Refills: 0 | Status: DISCONTINUED | OUTPATIENT
Start: 2024-02-20 | End: 2024-02-20

## 2024-02-20 RX ORDER — ACETAMINOPHEN 500 MG
650 TABLET ORAL EVERY 6 HOURS
Refills: 0 | Status: DISCONTINUED | OUTPATIENT
Start: 2024-02-20 | End: 2024-02-23

## 2024-02-20 RX ORDER — HYDROCORTISONE 20 MG
20 TABLET ORAL
Refills: 0 | Status: DISCONTINUED | OUTPATIENT
Start: 2024-02-21 | End: 2024-02-23

## 2024-02-20 RX ORDER — HYDROCORTISONE 20 MG
25 TABLET ORAL ONCE
Refills: 0 | Status: COMPLETED | OUTPATIENT
Start: 2024-02-20 | End: 2024-02-20

## 2024-02-20 RX ADMIN — POLYETHYLENE GLYCOL 3350 17 GRAM(S): 17 POWDER, FOR SOLUTION ORAL at 05:40

## 2024-02-20 RX ADMIN — Medication 400 MILLIGRAM(S): at 11:16

## 2024-02-20 RX ADMIN — Medication 25 MILLIGRAM(S): at 15:20

## 2024-02-20 RX ADMIN — CHLORHEXIDINE GLUCONATE 1 APPLICATION(S): 213 SOLUTION TOPICAL at 05:08

## 2024-02-20 RX ADMIN — SODIUM CHLORIDE 70 MILLILITER(S): 9 INJECTION, SOLUTION INTRAVENOUS at 05:07

## 2024-02-20 RX ADMIN — Medication 1000 MILLIGRAM(S): at 11:46

## 2024-02-20 RX ADMIN — SENNA PLUS 2 TABLET(S): 8.6 TABLET ORAL at 21:18

## 2024-02-20 RX ADMIN — Medication 75 MICROGRAM(S): at 05:07

## 2024-02-20 RX ADMIN — Medication 40 MILLIEQUIVALENT(S): at 05:33

## 2024-02-20 RX ADMIN — Medication 10 MILLIGRAM(S): at 07:54

## 2024-02-20 RX ADMIN — SODIUM CHLORIDE 70 MILLILITER(S): 9 INJECTION, SOLUTION INTRAVENOUS at 05:40

## 2024-02-20 RX ADMIN — DESMOPRESSIN ACETATE 0.05 MILLIGRAM(S): 0.1 TABLET ORAL at 17:58

## 2024-02-20 RX ADMIN — Medication 1 TABLET(S): at 11:16

## 2024-02-20 RX ADMIN — ERGOCALCIFEROL 50000 UNIT(S): 1.25 CAPSULE ORAL at 13:59

## 2024-02-20 RX ADMIN — Medication 40 MILLIEQUIVALENT(S): at 02:22

## 2024-02-20 RX ADMIN — ATORVASTATIN CALCIUM 40 MILLIGRAM(S): 80 TABLET, FILM COATED ORAL at 21:18

## 2024-02-20 NOTE — CONSULT NOTE ADULT - ASSESSMENT
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 Today, p/f leaking from nostrils (L>R) when he stands up or valsalvas. Endocrinology consulted for management of panhypopituitarism. 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 Today, p/f leaking from nostrils (L>R) when he stands up or valsalvas. Endocrinology consulted for management of panhypopituitarism.    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 MRI 1/24 showed abnormal enhancing lesion involving the suprasellar region measuring approximately 2.1x2.3cm and previously measured 2.1x1.4cm. Patient had an endocrine workup in the past with Dr. Isidra Dao and was told he had a low testosterone level but not offered treatment prior to surgery. Had TSR 2/1/24. Post op TSH 0.15, free thyroxine 0.8, consistent with secondary hypothyroidism. ACTH 3.5, am cortisol 0.5, consistent with secondary AI. 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 The patient was discharged on levothyroxine 75mcg daily, hydrocortisone 10mg/5mg and DDAVP 0.5mg prn.  - patient dumping urine, Na 142, U osm <1.005  - received solucortef 100mg IV on 2/19 2pm  PLAN  In terms of DIGoal Na 140-145  Recommendations:   - DI Watch: Please monitor strict I/O, sodium levels q8hrs (monitor for hypo or hypernatremia).   If urine output more than 500ml/h or 250ml/h for 2 consecutive hours get stat Na, if Na is increasing again compared to prior than bolus 1/2 NS to match half the output (for example, if net negative 2Liters can give 1Liter 1/2NS if patient is unable to keep up with output with PO intake)  - If Na > increases to 140 or higher would dose DDAVP 0.05mg and continue DI watch as above e   - Please make sure pt has access to water and encourage patient to drink to thirst     In terms of secondary AI    In terms of secondary hypothyroidism    In terms of hypogonadism  - outpatient management    Discussed with primary team.     Thank you for the interesting consult.    Joe Walker MD, Endocrinology Fellow  For non-urgent follow up questions, please email [email or web address] or [email or web address]  Pager [phone] from 9am to 5pm. After hours and on weekends, please call [phone]. 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 Today, p/f leaking from nostrils (L>R) when he stands up or valsalvas. Endocrinology consulted for management of panhypopituitarism.    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 MRI 1/24 showed abnormal enhancing lesion involving the suprasellar region measuring approximately 2.1x2.3cm and previously measured 2.1x1.4cm. Patient had an endocrine workup in the past with Dr. Isidra Dao and was told he had a low testosterone level but not offered treatment prior to surgery. Had TSR 2/1/24. Post op TSH 0.15, free thyroxine 0.8, consistent with secondary hypothyroidism. ACTH 3.5, am cortisol 0.5, consistent with secondary AI. LH 0.4, FSH 1.2. 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 The patient was discharged on levothyroxine 75mcg daily, hydrocortisone 10mg/5mg and DDAVP 0.5mg prn.  - patient dumping urine, Na 142, U osm <1.005  - received solucortef 100mg IV on 2/19 2pm  PLAN  In terms of DIGoal Na 140-145  Recommendations:   - DI Watch: Please monitor strict I/O, sodium levels q8hrs (monitor for hypo or hypernatremia).   If urine output more than 500ml/h or 250ml/h for 2 consecutive hours get stat Na, if Na is increasing again compared to prior than bolus 1/2 NS to match half the output (for example, if net negative 2Liters can give 1Liter 1/2NS if patient is unable to keep up with output with PO intake)  - If Na > increases to 140 or higher would dose DDAVP 0.05mg and continue DI watch as above e   - Please make sure pt has access to water and encourage patient to drink to thirst     In terms of secondary AI    In terms of secondary hypothyroidism    In terms of hypogonadism  - outpatient management    Discussed with primary team.     Thank you for the interesting consult.    Joe Walker MD, Endocrinology Fellow  For non-urgent follow up questions, please email [email or web address] or [email or web address].  Pager [phone] from 9am to 5pm. After hours and on weekends, please call [phone]. 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 Today, p/f leaking from nostrils (L>R) when he stands up or valsalvas. Endocrinology consulted for management of panhypopituitarism.    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 MRI 1/24 showed abnormal enhancing lesion involving the suprasellar region measuring approximately 2.1x2.3cm and previously measured 2.1x1.4cm. Patient had an endocrine workup in the past with Dr. Isidra Dao and was told he had a low testosterone level but not offered treatment prior to surgery. Had TSR 2/1/24. Post op TSH 0.15, free thyroxine 0.8, consistent with secondary hypothyroidism. ACTH 3.5, am cortisol 0.5, consistent with secondary AI. LH 0.4, FSH 1.2. 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 The patient was discharged on levothyroxine 75mcg daily, hydrocortisone 10mg/5mg and DDAVP 0.5mg prn. Reports did not take desmopressin  - patient dumping urine, Na 142, U osm <1.005  - received solucortef 100mg IV on 2/19 2pm  PLAN  In terms of DIGoal Na 140-145  Recommendations:   - DI Watch: Please monitor strict I/O, sodium levels q8hrs (monitor for hypo or hypernatremia).   If urine output more than 500ml/h or 250ml/h for 2 consecutive hours get stat Na, if Na is increasing again compared to prior than bolus 1/2 NS to match half the output (for example, if net negative 2Liters can give 1Liter 1/2NS if patient is unable to keep up with output with PO intake)  - If Na > increases to 140 or higher would dose DDAVP 0.05mg and continue DI watch as above e   - Please make sure pt has access to water and encourage patient to drink to thirst     In terms of secondary AI    In terms of secondary hypothyroidism    In terms of hypogonadism  - outpatient management    Discussed with primary team.     Thank you for the interesting consult.    Joe Walker MD, Endocrinology Fellow  For non-urgent follow up questions, please email [email or web address] or [email or web address].  Pager [phone] from 9am to 5pm. After hours and on weekends, please call [phone]. 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 Today, p/f leaking from nostrils (L>R) when he stands up or valsalvas. Endocrinology consulted for management of panhypopituitarism.    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 MRI 1/24 showed abnormal enhancing lesion involving the suprasellar region measuring approximately 2.1x2.3cm and previously measured 2.1x1.4cm. Patient had an endocrine workup in the past with Dr. Isidra Dao and was told he had a low testosterone level but not offered treatment prior to surgery. Had TSR 2/1/24. Post op TSH 0.15, free thyroxine 0.8, consistent with secondary hypothyroidism. ACTH 3.5, am cortisol 0.5, consistent with secondary AI. LH 0.4, FSH 1.2. 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 The patient was discharged on levothyroxine 75mcg daily, hydrocortisone 10mg/5mg and DDAVP 0.5mg prn. Reports did not take desmopressin  - patient dumping urine, Na 142, U osm <1.005 concerning for compensated DI  - received solucortef 100mg IV on 2/19 2pm  PLAN  In terms of DI:   Goal Na 140-145  - would give one dose of PO DDAVP 0.05mg x1 now given dumping 800cc-1000cc per hour  Recommendations:   - DI Watch: Please monitor strict I/O, sodium levels q8hrs (monitor for hypo or hypernatremia).   If urine output more than 500ml/h or 250ml/h for 2 consecutive hours get stat Na, if Na is increasing again compared to prior than bolus 1/2 NS to match half the output (for example, if net negative 2Liters can give 1Liter 1/2NS if patient is unable to keep up with output with PO intake)  - If Na > increases to 140 or higher would dose DDAVP 0.05mg and continue DI watch as above e   - Please make sure pt has access to water and encourage patient to drink to thirst   In terms of secondary AI  - would give one dose of 25mg IV hydrocortisone on call to lumbar drain procedure as no anesthesia, if undergoing general anesthesia would give 50mg IV hydrocortisone  - on 2/21, start hydrocortisone PO 20mg at 8am and 10mg PO at 3pm  In terms of secondary hypothyroidism  - check free thyroxine  - continue levothyroxine 75mcg once daily  In terms of hypogonadism  - outpatient management  - check IGF-1    Discussed with primary team.     Thank you for the interesting consult.    Joe Walker MD, Endocrinology Fellow  For non-urgent follow up questions, please email [email or web address] or [email or web address].  Pager [phone] from 9am to 5pm. After hours and on weekends, please call [phone]. 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 Today, p/f leaking from nostrils (L>R) when he stands up or valsalvas. Endocrinology consulted for management of panhypopituitarism.    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 MRI 1/24 showed abnormal enhancing lesion involving the suprasellar region measuring approximately 2.1x2.3cm and previously measured 2.1x1.4cm. Patient had an endocrine workup in the past with Dr. Isidra Dao and was told he had a low testosterone level but not offered treatment prior to surgery. Had TSR 2/1/24. Post op TSH 0.15, free thyroxine 0.8, consistent with secondary hypothyroidism. ACTH 3.5, am cortisol 0.5, consistent with secondary AI. LH 0.4, FSH 1.2. 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 The patient was discharged on levothyroxine 75mcg daily, hydrocortisone 10mg/5mg and DDAVP 0.5mg prn. Reports did not take desmopressin  - patient dumping urine, Na 142, U osm <1.005 concerning for compensated DI  - received solucortef 100mg IV on 2/19 2pm  - pathology consistent with craniopharyngioma  - follows with Dr. Isidra Dao  PLAN  In terms of DI:   Goal Na 140-145  - would give one dose of PO DDAVP 0.05mg x1 now given dumping 800cc-1000cc per hour  Recommendations:   - DI Watch: Please monitor strict I/O, sodium levels q8hrs (monitor for hypo or hypernatremia).   If urine output more than 500ml/h or 250ml/h for 2 consecutive hours get stat Na, if Na is increasing again compared to prior than bolus 1/2 NS to match half the output (for example, if net negative 2Liters can give 1Liter 1/2NS if patient is unable to keep up with output with PO intake)  - If Na > increases to 140 or higher would dose DDAVP 0.05mg and continue DI watch as above e   - Please make sure pt has access to water and encourage patient to drink to thirst   In terms of secondary AI  - would give one dose of 25mg IV hydrocortisone on call to lumbar drain procedure as no anesthesia, if undergoing general anesthesia would give 50mg IV hydrocortisone  - on 2/21, start hydrocortisone PO 20mg at 8am and 10mg PO at 3pm  In terms of secondary hypothyroidism  - check free thyroxine  - continue levothyroxine 75mcg once daily (maintain on empty stomach (at least 1 hour before meals), separate by 4 hours from PPI or calcium supplementation which can inhibit its absorption)  In terms of hypogonadism  - outpatient management  - check IGF-1    Discussed with primary team.     Thank you for the interesting consult.    Joe Walker MD, Endocrinology Fellow  For non-urgent follow up questions, please email [email or web address] or [email or web address].  Pager [phone] from 9am to 5pm. After hours and on weekends, please call [phone]. 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 Today, p/f leaking from nostrils (L>R) when he stands up or valsalvas. Endocrinology consulted for management of panhypopituitarism.    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 MRI 1/24 showed abnormal enhancing lesion involving the suprasellar region measuring approximately 2.1x2.3cm and previously measured 2.1x1.4cm. Patient had an endocrine workup in the past with Dr. Isidra Dao and was told he had a low testosterone level but not offered treatment prior to surgery. Had TSR 2/1/24. Post op TSH 0.15, free thyroxine 0.8, consistent with secondary hypothyroidism. ACTH 3.5, am cortisol 0.5, consistent with secondary AI. LH 0.4, FSH 1.2. 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 The patient was discharged on levothyroxine 75mcg daily, hydrocortisone 10mg/5mg and DDAVP 0.5mg prn. Reports did not take desmopressin  - patient dumping urine, Na 142, U osm <1.005 concerning for compensated DI  - received solucortef 100mg IV on 2/19 2pm  - pathology consistent with craniopharyngioma  - follows with Dr. Isidra Dao  PLAN  In terms of DI:   Goal Na 140-145  - q12h BMP  - would give one dose of PO DDAVP 0.05mg x1 now given dumping 800cc-1000cc per hour  Recommendations:   - DI Watch: Please monitor strict I/O, sodium levels q8hrs (monitor for hypo or hypernatremia).   If urine output more than 500ml/h or 250ml/h for 2 consecutive hours get stat Na, if Na is increasing again compared to prior than bolus 1/2 NS to match half the output (for example, if net negative 2Liters can give 1Liter 1/2NS if patient is unable to keep up with output with PO intake)  - If Na > increases to 140 or higher would dose DDAVP 0.05mg and continue DI watch as above e   - Please make sure pt has access to water and encourage patient to drink to thirst   In terms of secondary AI  - would give one dose of 25mg IV hydrocortisone on call to lumbar drain procedure as no anesthesia, if undergoing general anesthesia would give 50mg IV hydrocortisone  - on 2/21, start hydrocortisone PO 20mg at 8am and 10mg PO at 3pm  In terms of secondary hypothyroidism  - check free thyroxine  - continue levothyroxine 75mcg once daily (maintain on empty stomach (at least 1 hour before meals), separate by 4 hours from PPI or calcium supplementation which can inhibit its absorption)  In terms of hypogonadism  - outpatient management  - check IGF-1    Discussed with primary team.     Thank you for the interesting consult.    Joe Walker MD, Endocrinology Fellow  For non-urgent follow up questions, please email [email or web address] or [email or web address].  Pager [phone] from 9am to 5pm. After hours and on weekends, please call [phone].

## 2024-02-20 NOTE — CONSULT NOTE ADULT - ATTENDING COMMENTS
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 Today, p/f leaking from nostrils (L>R) when he stands up or valsalvas. Endocrinology consulted for management of panhypopituitarism.  Agree with giving one dose of low dose DDAVP to help with patient's urine output. Patient to get HC 25mg before drain placement, will check thyroxine after.     Patient is high risk and high level decision making, requiring ICU level of care.    Gretta Rodriguez,    Attending Physician   Department of Endocrinology, Diabetes and Metabolism     If before 9AM or after 5PM, or on weekends/holidays, please call the Endocrine answering service for assistance ([phone]).  For nonurgent matters, please email [email or web address] for assistance.

## 2024-02-20 NOTE — CONSULT NOTE ADULT - SUBJECTIVE AND OBJECTIVE BOX
ENDOCRINE INITIAL CONSULT - panhypopituitarism    HPI:  p (1480)     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 Today, p/f leaking from nostrils (L>R) when he stands up or valsalvas. Reports no h/as. Afebrile per nurse in ED.  WBC 11.92. Last Na (2/8/24 136).    ENDOCRINE HISTORY   Patient known to our service. 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 MRI 1/24 showed abnormal enhancing lesion involving the suprasellar region measuring approximately 2.1x2.3cm and previously measured 2.1x1.4cm. Patient had an endocrine workup in the past     Endocrinologist:  Social History:  Family History:  Surgical History:  Insurance:  Resides In:    PAST MEDICAL & SURGICAL HISTORY:  HLD (hyperlipidemia)      History of pituitary tumor      Deviated septum      History of dental surgery          FAMILY HISTORY:  Family history of breast cancer in mother        Social History:      Home Medications:  acetaminophen 325 mg oral tablet: 3 tab(s) orally every 6 hours As needed Temp greater or equal to 38C (100.4F), Mild Pain (1 - 3) (08 Feb 2024 14:20)  Crestor 10 mg oral tablet: 1 tab(s) orally once a day (at bedtime) (01 Feb 2024 06:01)  ergocalciferol 1.25 mg (50,000 intl units) oral capsule: 1 cap(s) orally every 7 days (01 Feb 2024 06:01)  melatonin 5 mg oral tablet: 1 tab(s) orally once a day (at bedtime) As needed Sleep (08 Feb 2024 14:20)  polyethylene glycol 3350 oral powder for reconstitution: 17 gram(s) orally every 12 hours (08 Feb 2024 14:20)  senna leaf extract oral tablet: 2 tab(s) orally once a day (at bedtime) (08 Feb 2024 14:20)  sodium chloride 0.65% nasal spray: nasal every 4 hours (08 Feb 2024 14:20)      MEDICATIONS  (STANDING):  atorvastatin 40 milliGRAM(s) Oral at bedtime  ergocalciferol 53623 Unit(s) Oral every week  hydrocortisone 10 milliGRAM(s) Oral <User Schedule>  hydrocortisone 5 milliGRAM(s) Oral <User Schedule>  insulin lispro (ADMELOG) corrective regimen sliding scale   SubCutaneous Before meals and at bedtime  lactated ringers. 1000 milliLiter(s) (70 mL/Hr) IV Continuous <Continuous>  levothyroxine 75 MICROGram(s) Oral daily  multivitamin 1 Tablet(s) Oral daily  polyethylene glycol 3350 17 Gram(s) Oral daily  senna 2 Tablet(s) Oral at bedtime    MEDICATIONS  (PRN):  acetaminophen   IVPB .. 1000 milliGRAM(s) IV Intermittent every 6 hours PRN Severe Pain (7 - 10)  melatonin 5 milliGRAM(s) Oral at bedtime PRN Insomnia  ondansetron Injectable 4 milliGRAM(s) IV Push every 6 hours PRN Nausea and/or Vomiting  oxyCODONE    IR 5 milliGRAM(s) Oral every 4 hours PRN Moderate Pain (4 - 6)  oxyCODONE    IR 10 milliGRAM(s) Oral every 4 hours PRN Severe Pain (7 - 10)      Allergies    No Known Allergies    Intolerances        REVIEW OF SYSTEMS  Constitutional: No fever  Eyes: No blurry vision  Neuro: No tremors  HEENT: No pain  Cardiovascular: No chest pain, palpitations  Respiratory: No SOB, no cough  GI: No nausea, vomiting, abdominal pain  : No dysuria  Skin: no rash  Psych: no depression  Endocrine: no polyuria, polydipsia  Hem/lymph: no swelling  Osteoporosis: no fractures  ALL OTHER SYSTEMS REVIEWED AND NEGATIVE     UNABLE TO OBTAIN     PHYSICAL EXAM   Vital Signs Last 24 Hrs  T(C): 37.2 (20 Feb 2024 07:00), Max: 37.2 (20 Feb 2024 07:00)  T(F): 99 (20 Feb 2024 07:00), Max: 99 (20 Feb 2024 07:00)  HR: 98 (20 Feb 2024 08:00) (92 - 134)  BP: 109/79 (20 Feb 2024 08:00) (80/42 - 135/80)  BP(mean): 90 (20 Feb 2024 08:00) (56 - 97)  RR: 13 (20 Feb 2024 08:00) (8 - 45)  SpO2: 99% (20 Feb 2024 08:00) (92% - 100%)    Parameters below as of 20 Feb 2024 07:00  Patient On (Oxygen Delivery Method): room air      GENERAL: NAD, well-groomed, well-developed  EYES: No proptosis, no lid lag, anicteric  HEENT:  Atraumatic, Normocephalic, moist mucous membranes  THYROID: Normal size, no palpable nodules  RESPIRATORY: Clear to auscultation bilaterally; No rales, rhonchi, wheezing  CARDIOVASCULAR: Regular rate and rhythm; No murmurs  GI: Soft, nontender, non distended, normal bowel sounds  SKIN: Dry, intact  MUSCULOSKELETAL: Moving extremities spontaneously, no peripheral edema  NEURO: sensation intact, extraocular movements intact, no tremor  PSYCH: Answering questions appropriately, normal affect, normal mood  CUSHING'S SIGNS: no striae, no acanthosis, no dorsocervical fatpad    CAPILLARY BLOOD GLUCOSE      POCT Blood Glucose.: 76 mg/dL (20 Feb 2024 07:38)      A1C with Estimated Average Glucose Result: 5.4 % (02-19-24 @ 15:14)                            11.2   7.36  )-----------( 314      ( 20 Feb 2024 01:09 )             33.6       02-20    142  |  106  |  7   ----------------------------<  100<H>  3.5   |  24  |  0.83    Ca    9.0      20 Feb 2024 04:50  Phos  3.6     02-19  Mg     2.0     02-19    TPro  7.7  /  Alb  4.3  /  TBili  0.3  /  DBili  x   /  AST  32  /  ALT  55<H>  /  AlkPhos  57  02-19      Thyroid Stimulating Hormone, Serum: 0.03 uIU/mL (02-19-24 @ 14:17)  Thyroid Stimulating Hormone, Serum: 0.05 uIU/mL (02-08-24 @ 06:39)  Thyroid Stimulating Hormone, Serum: 0.15 uIU/mL (02-03-24 @ 08:37)  Thyroid Stimulating Hormone, Serum: 0.27 uIU/mL (02-02-24 @ 11:35)      LIPIDS    RADIOLOGY ENDOCRINE INITIAL CONSULT - panhypopituitarism    HPI:  p (1480)     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 Today, p/f leaking from nostrils (L>R) when he stands up or valsalvas. Reports no h/as. Afebrile per nurse in ED.  WBC 11.92. Last Na (2/8/24 136).    ENDOCRINE HISTORY   Patient known to our service. 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 MRI 1/24 showed abnormal enhancing lesion involving the suprasellar region measuring approximately 2.1x2.3cm and previously measured 2.1x1.4cm. Patient had an endocrine workup in the past with Dr. Isidra Dao and was told he had a low testosterone level but not offered treatment prior to surgery. Had TSR 2/1/24. Post op TSH 0.15, free thyroxine 0.8, consistent with secondary hypothyroidism. ACTH 3.5, am cortisol 0.5, consistent with secondary AI. 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 The patient was discharged on levothyroxine 75mcg daily, hydrocortisone 10mg/5mg and DDAVP 0.5mg prn.    Endocrinologist: Dr. Isidra Dao  Social History:  Family History:  Surgical History: as above      PAST MEDICAL & SURGICAL HISTORY:  HLD (hyperlipidemia)      History of pituitary tumor      Deviated septum      History of dental surgery          FAMILY HISTORY:  Family history of breast cancer in mother        Social History:      Home Medications:  acetaminophen 325 mg oral tablet: 3 tab(s) orally every 6 hours As needed Temp greater or equal to 38C (100.4F), Mild Pain (1 - 3) (08 Feb 2024 14:20)  Crestor 10 mg oral tablet: 1 tab(s) orally once a day (at bedtime) (01 Feb 2024 06:01)  ergocalciferol 1.25 mg (50,000 intl units) oral capsule: 1 cap(s) orally every 7 days (01 Feb 2024 06:01)  melatonin 5 mg oral tablet: 1 tab(s) orally once a day (at bedtime) As needed Sleep (08 Feb 2024 14:20)  polyethylene glycol 3350 oral powder for reconstitution: 17 gram(s) orally every 12 hours (08 Feb 2024 14:20)  senna leaf extract oral tablet: 2 tab(s) orally once a day (at bedtime) (08 Feb 2024 14:20)  sodium chloride 0.65% nasal spray: nasal every 4 hours (08 Feb 2024 14:20)      MEDICATIONS  (STANDING):  atorvastatin 40 milliGRAM(s) Oral at bedtime  ergocalciferol 95742 Unit(s) Oral every week  hydrocortisone 10 milliGRAM(s) Oral <User Schedule>  hydrocortisone 5 milliGRAM(s) Oral <User Schedule>  insulin lispro (ADMELOG) corrective regimen sliding scale   SubCutaneous Before meals and at bedtime  lactated ringers. 1000 milliLiter(s) (70 mL/Hr) IV Continuous <Continuous>  levothyroxine 75 MICROGram(s) Oral daily  multivitamin 1 Tablet(s) Oral daily  polyethylene glycol 3350 17 Gram(s) Oral daily  senna 2 Tablet(s) Oral at bedtime    MEDICATIONS  (PRN):  acetaminophen   IVPB .. 1000 milliGRAM(s) IV Intermittent every 6 hours PRN Severe Pain (7 - 10)  melatonin 5 milliGRAM(s) Oral at bedtime PRN Insomnia  ondansetron Injectable 4 milliGRAM(s) IV Push every 6 hours PRN Nausea and/or Vomiting  oxyCODONE    IR 5 milliGRAM(s) Oral every 4 hours PRN Moderate Pain (4 - 6)  oxyCODONE    IR 10 milliGRAM(s) Oral every 4 hours PRN Severe Pain (7 - 10)      Allergies    No Known Allergies    Intolerances        REVIEW OF SYSTEMS  Constitutional: No fever  Eyes: No blurry vision  Neuro: No tremors  HEENT: No pain  Cardiovascular: No chest pain, palpitations  Respiratory: No SOB, no cough  GI: No nausea, vomiting, abdominal pain  : No dysuria  Skin: no rash  Psych: no depression  Endocrine: no polyuria, polydipsia  Hem/lymph: no swelling  Osteoporosis: no fractures  ALL OTHER SYSTEMS REVIEWED AND NEGATIVE     UNABLE TO OBTAIN     PHYSICAL EXAM   Vital Signs Last 24 Hrs  T(C): 37.2 (20 Feb 2024 07:00), Max: 37.2 (20 Feb 2024 07:00)  T(F): 99 (20 Feb 2024 07:00), Max: 99 (20 Feb 2024 07:00)  HR: 98 (20 Feb 2024 08:00) (92 - 134)  BP: 109/79 (20 Feb 2024 08:00) (80/42 - 135/80)  BP(mean): 90 (20 Feb 2024 08:00) (56 - 97)  RR: 13 (20 Feb 2024 08:00) (8 - 45)  SpO2: 99% (20 Feb 2024 08:00) (92% - 100%)    Parameters below as of 20 Feb 2024 07:00  Patient On (Oxygen Delivery Method): room air      GENERAL: NAD, well-groomed, well-developed  EYES: No proptosis, no lid lag, anicteric  HEENT:  Atraumatic, Normocephalic, moist mucous membranes  THYROID: Normal size, no palpable nodules  RESPIRATORY: Clear to auscultation bilaterally; No rales, rhonchi, wheezing  CARDIOVASCULAR: Regular rate and rhythm; No murmurs  GI: Soft, nontender, non distended, normal bowel sounds  SKIN: Dry, intact  MUSCULOSKELETAL: Moving extremities spontaneously, no peripheral edema  NEURO: sensation intact, extraocular movements intact, no tremor  PSYCH: Answering questions appropriately, normal affect, normal mood  CUSHING'S SIGNS: no striae, no acanthosis, no dorsocervical fatpad    CAPILLARY BLOOD GLUCOSE      POCT Blood Glucose.: 76 mg/dL (20 Feb 2024 07:38)      A1C with Estimated Average Glucose Result: 5.4 % (02-19-24 @ 15:14)                            11.2   7.36  )-----------( 314      ( 20 Feb 2024 01:09 )             33.6       02-20    142  |  106  |  7   ----------------------------<  100<H>  3.5   |  24  |  0.83    Ca    9.0      20 Feb 2024 04:50  Phos  3.6     02-19  Mg     2.0     02-19    TPro  7.7  /  Alb  4.3  /  TBili  0.3  /  DBili  x   /  AST  32  /  ALT  55<H>  /  AlkPhos  57  02-19      Thyroid Stimulating Hormone, Serum: 0.03 uIU/mL (02-19-24 @ 14:17)  Thyroid Stimulating Hormone, Serum: 0.05 uIU/mL (02-08-24 @ 06:39)  Thyroid Stimulating Hormone, Serum: 0.15 uIU/mL (02-03-24 @ 08:37)  Thyroid Stimulating Hormone, Serum: 0.27 uIU/mL (02-02-24 @ 11:35)      LIPIDS    RADIOLOGY ENDOCRINE INITIAL CONSULT - panhypopituitarism    HPI:  p (1480)     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 Today, p/f leaking from nostrils (L>R) when he stands up or valsalvas. Reports no h/as. Afebrile per nurse in ED.  WBC 11.92. Last Na (2/8/24 136).    ENDOCRINE HISTORY   Patient known to our service. 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 MRI 1/24 showed abnormal enhancing lesion involving the suprasellar region measuring approximately 2.1x2.3cm and previously measured 2.1x1.4cm. Patient had an endocrine workup in the past with Dr. Isidra Dao and was told he had a low testosterone level but not offered treatment prior to surgery. Had TSR 2/1/24. Post op TSH 0.15, free thyroxine 0.8, consistent with secondary hypothyroidism. ACTH 3.5, am cortisol 0.5, consistent with secondary AI. LH 0.4, FSH 1.2. 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 The patient was discharged on levothyroxine 75mcg daily, hydrocortisone 10mg/5mg and DDAVP 0.5mg prn.    Endocrinologist: Dr. Isidra Dao  Social History:  Family History:  Surgical History: as above      PAST MEDICAL & SURGICAL HISTORY:  HLD (hyperlipidemia)      History of pituitary tumor      Deviated septum      History of dental surgery          FAMILY HISTORY:  Family history of breast cancer in mother        Social History:      Home Medications:  acetaminophen 325 mg oral tablet: 3 tab(s) orally every 6 hours As needed Temp greater or equal to 38C (100.4F), Mild Pain (1 - 3) (08 Feb 2024 14:20)  Crestor 10 mg oral tablet: 1 tab(s) orally once a day (at bedtime) (01 Feb 2024 06:01)  ergocalciferol 1.25 mg (50,000 intl units) oral capsule: 1 cap(s) orally every 7 days (01 Feb 2024 06:01)  melatonin 5 mg oral tablet: 1 tab(s) orally once a day (at bedtime) As needed Sleep (08 Feb 2024 14:20)  polyethylene glycol 3350 oral powder for reconstitution: 17 gram(s) orally every 12 hours (08 Feb 2024 14:20)  senna leaf extract oral tablet: 2 tab(s) orally once a day (at bedtime) (08 Feb 2024 14:20)  sodium chloride 0.65% nasal spray: nasal every 4 hours (08 Feb 2024 14:20)      MEDICATIONS  (STANDING):  atorvastatin 40 milliGRAM(s) Oral at bedtime  ergocalciferol 27807 Unit(s) Oral every week  hydrocortisone 10 milliGRAM(s) Oral <User Schedule>  hydrocortisone 5 milliGRAM(s) Oral <User Schedule>  insulin lispro (ADMELOG) corrective regimen sliding scale   SubCutaneous Before meals and at bedtime  lactated ringers. 1000 milliLiter(s) (70 mL/Hr) IV Continuous <Continuous>  levothyroxine 75 MICROGram(s) Oral daily  multivitamin 1 Tablet(s) Oral daily  polyethylene glycol 3350 17 Gram(s) Oral daily  senna 2 Tablet(s) Oral at bedtime    MEDICATIONS  (PRN):  acetaminophen   IVPB .. 1000 milliGRAM(s) IV Intermittent every 6 hours PRN Severe Pain (7 - 10)  melatonin 5 milliGRAM(s) Oral at bedtime PRN Insomnia  ondansetron Injectable 4 milliGRAM(s) IV Push every 6 hours PRN Nausea and/or Vomiting  oxyCODONE    IR 5 milliGRAM(s) Oral every 4 hours PRN Moderate Pain (4 - 6)  oxyCODONE    IR 10 milliGRAM(s) Oral every 4 hours PRN Severe Pain (7 - 10)      Allergies    No Known Allergies    Intolerances        REVIEW OF SYSTEMS  Constitutional: No fever  Eyes: No blurry vision  Neuro: No tremors  HEENT: No pain  Cardiovascular: No chest pain, palpitations  Respiratory: No SOB, no cough  GI: No nausea, vomiting, abdominal pain  : No dysuria  Skin: no rash  Psych: no depression  Endocrine: no polyuria, polydipsia  Hem/lymph: no swelling  Osteoporosis: no fractures  ALL OTHER SYSTEMS REVIEWED AND NEGATIVE     UNABLE TO OBTAIN     PHYSICAL EXAM   Vital Signs Last 24 Hrs  T(C): 37.2 (20 Feb 2024 07:00), Max: 37.2 (20 Feb 2024 07:00)  T(F): 99 (20 Feb 2024 07:00), Max: 99 (20 Feb 2024 07:00)  HR: 98 (20 Feb 2024 08:00) (92 - 134)  BP: 109/79 (20 Feb 2024 08:00) (80/42 - 135/80)  BP(mean): 90 (20 Feb 2024 08:00) (56 - 97)  RR: 13 (20 Feb 2024 08:00) (8 - 45)  SpO2: 99% (20 Feb 2024 08:00) (92% - 100%)    Parameters below as of 20 Feb 2024 07:00  Patient On (Oxygen Delivery Method): room air      GENERAL: NAD, well-groomed, well-developed  EYES: No proptosis, no lid lag, anicteric  HEENT:  Atraumatic, Normocephalic, moist mucous membranes  THYROID: Normal size, no palpable nodules  RESPIRATORY: Clear to auscultation bilaterally; No rales, rhonchi, wheezing  CARDIOVASCULAR: Regular rate and rhythm; No murmurs  GI: Soft, nontender, non distended, normal bowel sounds  SKIN: Dry, intact  MUSCULOSKELETAL: Moving extremities spontaneously, no peripheral edema  NEURO: sensation intact, extraocular movements intact, no tremor  PSYCH: Answering questions appropriately, normal affect, normal mood  CUSHING'S SIGNS: no striae, no acanthosis, no dorsocervical fatpad    CAPILLARY BLOOD GLUCOSE      POCT Blood Glucose.: 76 mg/dL (20 Feb 2024 07:38)      A1C with Estimated Average Glucose Result: 5.4 % (02-19-24 @ 15:14)                            11.2   7.36  )-----------( 314      ( 20 Feb 2024 01:09 )             33.6       02-20    142  |  106  |  7   ----------------------------<  100<H>  3.5   |  24  |  0.83    Ca    9.0      20 Feb 2024 04:50  Phos  3.6     02-19  Mg     2.0     02-19    TPro  7.7  /  Alb  4.3  /  TBili  0.3  /  DBili  x   /  AST  32  /  ALT  55<H>  /  AlkPhos  57  02-19      Thyroid Stimulating Hormone, Serum: 0.03 uIU/mL (02-19-24 @ 14:17)  Thyroid Stimulating Hormone, Serum: 0.05 uIU/mL (02-08-24 @ 06:39)  Thyroid Stimulating Hormone, Serum: 0.15 uIU/mL (02-03-24 @ 08:37)  Thyroid Stimulating Hormone, Serum: 0.27 uIU/mL (02-02-24 @ 11:35)      LIPIDS    RADIOLOGY ENDOCRINE INITIAL CONSULT - panhypopituitarism    HPI:  p (1480)     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 Today, p/f leaking from nostrils (L>R) when he stands up or valsalvas. Reports no h/as. Afebrile per nurse in ED.  WBC 11.92. Last Na (2/8/24 136).    ENDOCRINE HISTORY   Patient known to our service. 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 MRI 1/24 showed abnormal enhancing lesion involving the suprasellar region measuring approximately 2.1x2.3cm and previously measured 2.1x1.4cm. Patient had an endocrine workup in the past with Dr. Isidra Dao and was told he had a low testosterone level but not offered treatment prior to surgery. Had TSR 2/1/24. Post op TSH 0.15, free thyroxine 0.8, consistent with secondary hypothyroidism. ACTH 3.5, am cortisol 0.5, consistent with secondary AI. LH 0.4, FSH 1.2. 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 The patient was discharged on levothyroxine 75mcg daily, hydrocortisone 10mg/5mg and DDAVP 0.5mg prn.     He endorses taking hydrocortisone 10mg/5mg and levothyroxine 75mcg. He endorses polyuria and polydipsia but did not take the desmopressin. He endorses polyuria, polydipsia. He reports he has not fully recoevered in terms of his energy levels, endorses feeling cold and tired at the same time, alternating loose stools and constipation. He denies nausea, vomiting, skin changes, hair loss, unexplained weight changes.    Endocrinologist: Dr. Isidra Dao  Social History: He denies illicit substance use  Family History: He endorses his mother may have had a thyroid disease, denies pituitary disorders or adrenal gland disorders  Surgical History: as above      PAST MEDICAL & SURGICAL HISTORY:  HLD (hyperlipidemia)      History of pituitary tumor      Deviated septum      History of dental surgery          FAMILY HISTORY:  Family history of breast cancer in mother        Home Medications:  acetaminophen 325 mg oral tablet: 3 tab(s) orally every 6 hours As needed Temp greater or equal to 38C (100.4F), Mild Pain (1 - 3) (08 Feb 2024 14:20)  Crestor 10 mg oral tablet: 1 tab(s) orally once a day (at bedtime) (01 Feb 2024 06:01)  ergocalciferol 1.25 mg (50,000 intl units) oral capsule: 1 cap(s) orally every 7 days (01 Feb 2024 06:01)  melatonin 5 mg oral tablet: 1 tab(s) orally once a day (at bedtime) As needed Sleep (08 Feb 2024 14:20)  polyethylene glycol 3350 oral powder for reconstitution: 17 gram(s) orally every 12 hours (08 Feb 2024 14:20)  senna leaf extract oral tablet: 2 tab(s) orally once a day (at bedtime) (08 Feb 2024 14:20)  sodium chloride 0.65% nasal spray: nasal every 4 hours (08 Feb 2024 14:20)      MEDICATIONS  (STANDING):  atorvastatin 40 milliGRAM(s) Oral at bedtime  ergocalciferol 72473 Unit(s) Oral every week  hydrocortisone 10 milliGRAM(s) Oral <User Schedule>  hydrocortisone 5 milliGRAM(s) Oral <User Schedule>  insulin lispro (ADMELOG) corrective regimen sliding scale   SubCutaneous Before meals and at bedtime  lactated ringers. 1000 milliLiter(s) (70 mL/Hr) IV Continuous <Continuous>  levothyroxine 75 MICROGram(s) Oral daily  multivitamin 1 Tablet(s) Oral daily  polyethylene glycol 3350 17 Gram(s) Oral daily  senna 2 Tablet(s) Oral at bedtime    MEDICATIONS  (PRN):  acetaminophen   IVPB .. 1000 milliGRAM(s) IV Intermittent every 6 hours PRN Severe Pain (7 - 10)  melatonin 5 milliGRAM(s) Oral at bedtime PRN Insomnia  ondansetron Injectable 4 milliGRAM(s) IV Push every 6 hours PRN Nausea and/or Vomiting  oxyCODONE    IR 5 milliGRAM(s) Oral every 4 hours PRN Moderate Pain (4 - 6)  oxyCODONE    IR 10 milliGRAM(s) Oral every 4 hours PRN Severe Pain (7 - 10)      Allergies    No Known Allergies    Intolerances        REVIEW OF SYSTEMS  Constitutional: No fever  Eyes: No blurry vision  Neuro: No tremors  HEENT: No pain, endorses drainage  Cardiovascular: No chest pain, palpitations  Respiratory: No SOB, no cough  GI: No nausea, vomiting, abdominal pain, endorses loose stools, constipation  : No dysuria  Skin: no rash  Psych: no depression  Endocrine: endorses polyuria, polydipsia  Hem/lymph: no swelling  Osteoporosis: no fractures  ALL OTHER SYSTEMS REVIEWED AND NEGATIVE       PHYSICAL EXAM   Vital Signs Last 24 Hrs  T(C): 37.2 (20 Feb 2024 07:00), Max: 37.2 (20 Feb 2024 07:00)  T(F): 99 (20 Feb 2024 07:00), Max: 99 (20 Feb 2024 07:00)  HR: 98 (20 Feb 2024 08:00) (92 - 134)  BP: 109/79 (20 Feb 2024 08:00) (80/42 - 135/80)  BP(mean): 90 (20 Feb 2024 08:00) (56 - 97)  RR: 13 (20 Feb 2024 08:00) (8 - 45)  SpO2: 99% (20 Feb 2024 08:00) (92% - 100%)    Parameters below as of 20 Feb 2024 07:00  Patient On (Oxygen Delivery Method): room air      GENERAL: NAD, well-groomed, well-developed  EYES: No proptosis, no lid lag, anicteric  HEENT:  Atraumatic, Normocephalic, moist mucous membranes  THYROID: Normal size, no palpable nodules  RESPIRATORY: Clear to auscultation bilaterally; No rales, rhonchi, wheezing  CARDIOVASCULAR: Regular rate and rhythm; No murmurs  GI: Soft, nontender, non distended, normal bowel sounds  SKIN: Dry, intact  MUSCULOSKELETAL: Moving extremities spontaneously, no peripheral edema  NEURO: sensation intact, extraocular movements intact, no tremor  PSYCH: Answering questions appropriately, normal affect, normal mood  CUSHING'S SIGNS: no striae, no acanthosis, no dorsocervical fatpad    CAPILLARY BLOOD GLUCOSE      POCT Blood Glucose.: 76 mg/dL (20 Feb 2024 07:38)      A1C with Estimated Average Glucose Result: 5.4 % (02-19-24 @ 15:14)                            11.2   7.36  )-----------( 314      ( 20 Feb 2024 01:09 )             33.6       02-20    142  |  106  |  7   ----------------------------<  100<H>  3.5   |  24  |  0.83    Ca    9.0      20 Feb 2024 04:50  Phos  3.6     02-19  Mg     2.0     02-19    TPro  7.7  /  Alb  4.3  /  TBili  0.3  /  DBili  x   /  AST  32  /  ALT  55<H>  /  AlkPhos  57  02-19      Thyroid Stimulating Hormone, Serum: 0.03 uIU/mL (02-19-24 @ 14:17)  Thyroid Stimulating Hormone, Serum: 0.05 uIU/mL (02-08-24 @ 06:39)  Thyroid Stimulating Hormone, Serum: 0.15 uIU/mL (02-03-24 @ 08:37)  Thyroid Stimulating Hormone, Serum: 0.27 uIU/mL (02-02-24 @ 11:35)      LIPIDS    RADIOLOGY ENDOCRINE INITIAL CONSULT - panhypopituitarism    HPI:  p (1480)     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 Today, p/f leaking from nostrils (L>R) when he stands up or valsalvas. Reports no h/as. Afebrile per nurse in ED.  WBC 11.92. Last Na (2/8/24 136).    ENDOCRINE HISTORY   Patient known to our service. 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 MRI 1/24 showed abnormal enhancing lesion involving the suprasellar region measuring approximately 2.1x2.3cm and previously measured 2.1x1.4cm. Patient had an endocrine workup in the past with Dr. Isidra Dao and was told he had a low testosterone level but not offered treatment prior to surgery. Had TSR 2/1/24. Post op TSH 0.15, free thyroxine 0.8, consistent with secondary hypothyroidism. ACTH 3.5, am cortisol 0.5, consistent with secondary AI. LH 0.4, FSH 1.2. 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 The patient was discharged on levothyroxine 75mcg daily, hydrocortisone 10mg/5mg and DDAVP 0.5mg prn.     He endorses taking hydrocortisone 10mg/5mg and levothyroxine 75mcg. He endorses polyuria and polydipsia but did not take the desmopressin. He endorses polyuria, polydipsia. He reports he has not fully recoevered in terms of his energy levels, endorses feeling cold and tired at the same time, alternating loose stools and constipation. He denies nausea, vomiting, skin changes, hair loss, unexplained weight changes.    Endocrinologist: Dr. Isidra Dao  Social History: He denies illicit substance use  Family History: He endorses his mother may have had a thyroid disease, denies pituitary disorders or adrenal gland disorders  Surgical History: as above      PAST MEDICAL & SURGICAL HISTORY:  HLD (hyperlipidemia)      History of pituitary tumor      Deviated septum      History of dental surgery          FAMILY HISTORY:  Family history of breast cancer in mother        Home Medications:  acetaminophen 325 mg oral tablet: 3 tab(s) orally every 6 hours As needed Temp greater or equal to 38C (100.4F), Mild Pain (1 - 3) (08 Feb 2024 14:20)  Crestor 10 mg oral tablet: 1 tab(s) orally once a day (at bedtime) (01 Feb 2024 06:01)  ergocalciferol 1.25 mg (50,000 intl units) oral capsule: 1 cap(s) orally every 7 days (01 Feb 2024 06:01)  melatonin 5 mg oral tablet: 1 tab(s) orally once a day (at bedtime) As needed Sleep (08 Feb 2024 14:20)  polyethylene glycol 3350 oral powder for reconstitution: 17 gram(s) orally every 12 hours (08 Feb 2024 14:20)  senna leaf extract oral tablet: 2 tab(s) orally once a day (at bedtime) (08 Feb 2024 14:20)  sodium chloride 0.65% nasal spray: nasal every 4 hours (08 Feb 2024 14:20)      MEDICATIONS  (STANDING):  atorvastatin 40 milliGRAM(s) Oral at bedtime  ergocalciferol 67740 Unit(s) Oral every week  hydrocortisone 10 milliGRAM(s) Oral <User Schedule>  hydrocortisone 5 milliGRAM(s) Oral <User Schedule>  insulin lispro (ADMELOG) corrective regimen sliding scale   SubCutaneous Before meals and at bedtime  lactated ringers. 1000 milliLiter(s) (70 mL/Hr) IV Continuous <Continuous>  levothyroxine 75 MICROGram(s) Oral daily  multivitamin 1 Tablet(s) Oral daily  polyethylene glycol 3350 17 Gram(s) Oral daily  senna 2 Tablet(s) Oral at bedtime    MEDICATIONS  (PRN):  acetaminophen   IVPB .. 1000 milliGRAM(s) IV Intermittent every 6 hours PRN Severe Pain (7 - 10)  melatonin 5 milliGRAM(s) Oral at bedtime PRN Insomnia  ondansetron Injectable 4 milliGRAM(s) IV Push every 6 hours PRN Nausea and/or Vomiting  oxyCODONE    IR 5 milliGRAM(s) Oral every 4 hours PRN Moderate Pain (4 - 6)  oxyCODONE    IR 10 milliGRAM(s) Oral every 4 hours PRN Severe Pain (7 - 10)      Allergies    No Known Allergies    Intolerances        REVIEW OF SYSTEMS  Constitutional: No fever  Eyes: No blurry vision  Neuro: No tremors  HEENT: No pain, endorses drainage  Cardiovascular: No chest pain, palpitations  Respiratory: No SOB, no cough  GI: No nausea, vomiting, abdominal pain, endorses loose stools, constipation  : No dysuria  Skin: no rash  Psych: no depression  Endocrine: endorses polyuria, polydipsia  Hem/lymph: no swelling  Osteoporosis: no fractures  ALL OTHER SYSTEMS REVIEWED AND NEGATIVE       PHYSICAL EXAM   Vital Signs Last 24 Hrs  T(C): 37.2 (20 Feb 2024 07:00), Max: 37.2 (20 Feb 2024 07:00)  T(F): 99 (20 Feb 2024 07:00), Max: 99 (20 Feb 2024 07:00)  HR: 98 (20 Feb 2024 08:00) (92 - 134)  BP: 109/79 (20 Feb 2024 08:00) (80/42 - 135/80)  BP(mean): 90 (20 Feb 2024 08:00) (56 - 97)  RR: 13 (20 Feb 2024 08:00) (8 - 45)  SpO2: 99% (20 Feb 2024 08:00) (92% - 100%)    Parameters below as of 20 Feb 2024 07:00  Patient On (Oxygen Delivery Method): room air      GENERAL: NAD, well-groomed, well-developed  EYES: No proptosis, no lid lag, anicteric  HEENT:  nose bandaged, dry mucous membranes  THYROID: Normal size, no palpable nodules  RESPIRATORY: Clear to auscultation bilaterally anteriorly; No rales, rhonchi, wheezing  CARDIOVASCULAR: Regular rate and rhythm; No murmurs  GI: Soft, nontender, non distended, normal bowel sounds  SKIN: Dry, intact  MUSCULOSKELETAL: Moving extremities spontaneously, no peripheral edema  NEURO: sensation intact, extraocular movements intact, no tremor  PSYCH: Answering questions appropriately, normal affect, normal mood  CUSHING'S SIGNS: no striae, no acanthosis, no dorsocervical fatpad    CAPILLARY BLOOD GLUCOSE      POCT Blood Glucose.: 76 mg/dL (20 Feb 2024 07:38)      A1C with Estimated Average Glucose Result: 5.4 % (02-19-24 @ 15:14)                            11.2   7.36  )-----------( 314      ( 20 Feb 2024 01:09 )             33.6       02-20    142  |  106  |  7   ----------------------------<  100<H>  3.5   |  24  |  0.83    Ca    9.0      20 Feb 2024 04:50  Phos  3.6     02-19  Mg     2.0     02-19    TPro  7.7  /  Alb  4.3  /  TBili  0.3  /  DBili  x   /  AST  32  /  ALT  55<H>  /  AlkPhos  57  02-19      Thyroid Stimulating Hormone, Serum: 0.03 uIU/mL (02-19-24 @ 14:17)  Thyroid Stimulating Hormone, Serum: 0.05 uIU/mL (02-08-24 @ 06:39)  Thyroid Stimulating Hormone, Serum: 0.15 uIU/mL (02-03-24 @ 08:37)  Thyroid Stimulating Hormone, Serum: 0.27 uIU/mL (02-02-24 @ 11:35)      LIPIDS    RADIOLOGY ENDOCRINE INITIAL CONSULT - panhypopituitarism    HPI:  p (1480)     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 Today, p/f leaking from nostrils (L>R) when he stands up or valsalvas. Reports no h/as. Afebrile per nurse in ED.  WBC 11.92. Last Na (2/8/24 136).    ENDOCRINE HISTORY   Patient known to our service. 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 MRI 1/24 showed abnormal enhancing lesion involving the suprasellar region measuring approximately 2.1x2.3cm and previously measured 2.1x1.4cm. Patient had an endocrine workup in the past with Dr. Isidra Dao and was told he had a low testosterone level but not offered treatment prior to surgery. Had TSR 2/1/24. Post op TSH 0.15, free thyroxine 0.8, consistent with secondary hypothyroidism. ACTH 3.5, am cortisol 0.5, consistent with secondary AI. LH 0.4, FSH 1.2. 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 The patient was discharged on levothyroxine 75mcg daily, hydrocortisone 10mg/5mg and DDAVP 0.05mg prn.     He endorses taking hydrocortisone 10mg/5mg and levothyroxine 75mcg. He endorses polyuria and polydipsia but did not take the desmopressin. He endorses polyuria, polydipsia. He reports he has not fully recoevered in terms of his energy levels, endorses feeling cold and tired at the same time, alternating loose stools and constipation. He denies nausea, vomiting, skin changes, hair loss, unexplained weight changes.    Endocrinologist: Dr. Isidra Dao  Social History: He denies illicit substance use  Family History: He endorses his mother may have had a thyroid disease, denies pituitary disorders or adrenal gland disorders  Surgical History: as above      PAST MEDICAL & SURGICAL HISTORY:  HLD (hyperlipidemia)      History of pituitary tumor      Deviated septum      History of dental surgery          FAMILY HISTORY:  Family history of breast cancer in mother        Home Medications:  acetaminophen 325 mg oral tablet: 3 tab(s) orally every 6 hours As needed Temp greater or equal to 38C (100.4F), Mild Pain (1 - 3) (08 Feb 2024 14:20)  Crestor 10 mg oral tablet: 1 tab(s) orally once a day (at bedtime) (01 Feb 2024 06:01)  ergocalciferol 1.25 mg (50,000 intl units) oral capsule: 1 cap(s) orally every 7 days (01 Feb 2024 06:01)  melatonin 5 mg oral tablet: 1 tab(s) orally once a day (at bedtime) As needed Sleep (08 Feb 2024 14:20)  polyethylene glycol 3350 oral powder for reconstitution: 17 gram(s) orally every 12 hours (08 Feb 2024 14:20)  senna leaf extract oral tablet: 2 tab(s) orally once a day (at bedtime) (08 Feb 2024 14:20)  sodium chloride 0.65% nasal spray: nasal every 4 hours (08 Feb 2024 14:20)      MEDICATIONS  (STANDING):  atorvastatin 40 milliGRAM(s) Oral at bedtime  ergocalciferol 47207 Unit(s) Oral every week  hydrocortisone 10 milliGRAM(s) Oral <User Schedule>  hydrocortisone 5 milliGRAM(s) Oral <User Schedule>  insulin lispro (ADMELOG) corrective regimen sliding scale   SubCutaneous Before meals and at bedtime  lactated ringers. 1000 milliLiter(s) (70 mL/Hr) IV Continuous <Continuous>  levothyroxine 75 MICROGram(s) Oral daily  multivitamin 1 Tablet(s) Oral daily  polyethylene glycol 3350 17 Gram(s) Oral daily  senna 2 Tablet(s) Oral at bedtime    MEDICATIONS  (PRN):  acetaminophen   IVPB .. 1000 milliGRAM(s) IV Intermittent every 6 hours PRN Severe Pain (7 - 10)  melatonin 5 milliGRAM(s) Oral at bedtime PRN Insomnia  ondansetron Injectable 4 milliGRAM(s) IV Push every 6 hours PRN Nausea and/or Vomiting  oxyCODONE    IR 5 milliGRAM(s) Oral every 4 hours PRN Moderate Pain (4 - 6)  oxyCODONE    IR 10 milliGRAM(s) Oral every 4 hours PRN Severe Pain (7 - 10)      Allergies    No Known Allergies    Intolerances        REVIEW OF SYSTEMS  Constitutional: No fever  Eyes: No blurry vision  Neuro: No tremors  HEENT: No pain, endorses drainage  Cardiovascular: No chest pain, palpitations  Respiratory: No SOB, no cough  GI: No nausea, vomiting, abdominal pain, endorses loose stools, constipation  : No dysuria  Skin: no rash  Psych: no depression  Endocrine: endorses polyuria, polydipsia  Hem/lymph: no swelling  Osteoporosis: no fractures  ALL OTHER SYSTEMS REVIEWED AND NEGATIVE       PHYSICAL EXAM   Vital Signs Last 24 Hrs  T(C): 37.2 (20 Feb 2024 07:00), Max: 37.2 (20 Feb 2024 07:00)  T(F): 99 (20 Feb 2024 07:00), Max: 99 (20 Feb 2024 07:00)  HR: 98 (20 Feb 2024 08:00) (92 - 134)  BP: 109/79 (20 Feb 2024 08:00) (80/42 - 135/80)  BP(mean): 90 (20 Feb 2024 08:00) (56 - 97)  RR: 13 (20 Feb 2024 08:00) (8 - 45)  SpO2: 99% (20 Feb 2024 08:00) (92% - 100%)    Parameters below as of 20 Feb 2024 07:00  Patient On (Oxygen Delivery Method): room air      GENERAL: NAD, well-groomed, well-developed  EYES: No proptosis, no lid lag, anicteric  HEENT:  nose bandaged, dry mucous membranes  THYROID: Normal size, no palpable nodules  RESPIRATORY: Clear to auscultation bilaterally anteriorly; No rales, rhonchi, wheezing  CARDIOVASCULAR: Regular rate and rhythm; No murmurs  GI: Soft, nontender, non distended, normal bowel sounds  SKIN: Dry, intact  MUSCULOSKELETAL: Moving extremities spontaneously, no peripheral edema  NEURO: sensation intact, extraocular movements intact, no tremor  PSYCH: Answering questions appropriately, normal affect, normal mood  CUSHING'S SIGNS: no striae, no acanthosis, no dorsocervical fatpad    CAPILLARY BLOOD GLUCOSE      POCT Blood Glucose.: 76 mg/dL (20 Feb 2024 07:38)      A1C with Estimated Average Glucose Result: 5.4 % (02-19-24 @ 15:14)                            11.2   7.36  )-----------( 314      ( 20 Feb 2024 01:09 )             33.6       02-20    142  |  106  |  7   ----------------------------<  100<H>  3.5   |  24  |  0.83    Ca    9.0      20 Feb 2024 04:50  Phos  3.6     02-19  Mg     2.0     02-19    TPro  7.7  /  Alb  4.3  /  TBili  0.3  /  DBili  x   /  AST  32  /  ALT  55<H>  /  AlkPhos  57  02-19      Thyroid Stimulating Hormone, Serum: 0.03 uIU/mL (02-19-24 @ 14:17)  Thyroid Stimulating Hormone, Serum: 0.05 uIU/mL (02-08-24 @ 06:39)  Thyroid Stimulating Hormone, Serum: 0.15 uIU/mL (02-03-24 @ 08:37)  Thyroid Stimulating Hormone, Serum: 0.27 uIU/mL (02-02-24 @ 11:35)      LIPIDS    RADIOLOGY

## 2024-02-20 NOTE — PROGRESS NOTE ADULT - ASSESSMENT
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    PLAN:  Neuro:   neuro checks q 2 hr  LD placement by IR in AM  MR head when stable  pain: oxy 5/10, tylenol  sedation: hold  activity: as tolerated, flat 2hrs after LD  PT/OT to see    Respiratory:   mook >92% RA  skull base precautions      CV:   -150 mmhg   c/w home statin    Renal:   IVF: LR @70  hyponatremia, now improved    GI:   Diet: regular diet   PPx: hold  zofran  Reg: miralax/senna    Endocrine:   FS goal normoglycemia  PAWAN  panhypopit, c/w hydrocort, synthroid    Heme:  DVT ppx: SCDs, hold chemoprophylaxis    LED: pending    ID:   afebrile       Social/Family:   Discharge planning:     Code Status: [x] Full Code [] DNR [] DNI [] Goals of Care:   Disposition: [x] ICU [] Stroke Unit [] RCU []PCU []Floor [] Discharge Home     Patient at high risk for neurologic deterioration, critical care time, excluding procedures: 30 minutes, csf rhinorrhea at risk for meningitis, brain sag, subdural hematoma

## 2024-02-20 NOTE — PROGRESS NOTE ADULT - SUBJECTIVE AND OBJECTIVE BOX
Night rounds   Patient seen and examined    T(C): 36.8 (02-20-24 @ 15:00), Max: 37.4 (02-20-24 @ 11:00)  HR: 94 (02-20-24 @ 19:00) (84 - 107)  BP: 130/74 (02-20-24 @ 19:00) (99/69 - 130/74)  RR: 18 (02-20-24 @ 19:00) (8 - 23)  SpO2: 100% (02-20-24 @ 19:00) (95% - 100%)  02-19-24 @ 07:01  -  02-20-24 @ 07:00  --------------------------------------------------------  IN: 8225 mL / OUT: 5950 mL / NET: 2275 mL    02-20-24 @ 07:01  -  02-20-24 @ 20:33  --------------------------------------------------------  IN: 2180 mL / OUT: 2400 mL / NET: -220 mL    acetaminophen     Tablet .. 650 milliGRAM(s) Oral every 6 hours PRN  atorvastatin 40 milliGRAM(s) Oral at bedtime  ergocalciferol 41643 Unit(s) Oral every week  insulin lispro (ADMELOG) corrective regimen sliding scale   SubCutaneous Before meals and at bedtime  levothyroxine 75 MICROGram(s) Oral daily  melatonin 5 milliGRAM(s) Oral at bedtime PRN  multivitamin 1 Tablet(s) Oral daily  ondansetron Injectable 4 milliGRAM(s) IV Push every 6 hours PRN  oxyCODONE    IR 10 milliGRAM(s) Oral every 4 hours PRN  oxyCODONE    IR 5 milliGRAM(s) Oral every 4 hours PRN  polyethylene glycol 3350 17 Gram(s) Oral daily  senna 2 Tablet(s) Oral at bedtime        PHYSICAL EXAM:    General: No Acute Distress     Neurological: Awake, alert oriented to person, place and time, Following Commands, PERRL, EOMI, Face Symmetrical, Speech Fluent, Moving all extremities, Muscle Strength normal in all four extremities, No Drift, Sensation to Light Touch Intact    Pulmonary: Clear to Auscultation, No Rales, No Rhonchi, No Wheezes     Cardiovascular: S1, S2, Regular Rate and Rhythm     Gastrointestinal: Soft, Nontender, Nondistended     Extremities: No calf tenderness     Incision:       LABS:  Na: 141 (02-20 @ 15:08), 142 (02-20 @ 04:50), 143 (02-19 @ 23:53), 127 (02-19 @ 14:17)  K: 4.0 (02-20 @ 15:08), 3.5 (02-20 @ 04:50), 3.7 (02-19 @ 23:53), 3.8 (02-19 @ 14:17)  Cl: 106 (02-20 @ 15:08), 106 (02-20 @ 04:50), 106 (02-19 @ 23:53), 108 (02-19 @ 14:17)  CO2: 24 (02-20 @ 15:08), 24 (02-20 @ 04:50), 23 (02-19 @ 23:53), 25 (02-19 @ 14:17)  BUN: 5 (02-20 @ 15:08), 7 (02-20 @ 04:50), 7 (02-19 @ 23:53), 8 (02-19 @ 14:17)  Cr: 0.85 (02-20 @ 15:08), 0.83 (02-20 @ 04:50), 0.89 (02-19 @ 23:53), 0.93 (02-19 @ 14:17)  Glu: 103(02-20 @ 15:08), 100(02-20 @ 04:50), 181(02-19 @ 23:53), 117(02-19 @ 14:17)    Hgb: 11.2 (02-20 @ 01:09), 12.8 (02-19 @ 14:17)  Hct: 33.6 (02-20 @ 01:09), 37.7 (02-19 @ 14:17)  WBC: 7.36 (02-20 @ 01:09), 11.92 (02-19 @ 14:17)  Plt: 314 (02-20 @ 01:09), 363 (02-19 @ 14:17)    INR: 1.14 02-19-24 @ 15:13  PTT: 36.6 02-19-24 @ 15:13              A/P:    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concern for CSF leak rhinorrhea   Neuro: neuro checks q 2 hr  LD placement by IR placed today, to drain 5 cc/hr    Respiratory: RA, pituitary precaution    CV: -150 mmhg   Endocrine: panhypopit  hydrocortisone 10 milliGRAM(s) Oral daily  hydrocortisone 5 milliGRAM(s) Oral daily  levothyroxine 75 MICROGram(s) Oral daily  ddavp 0.05 for DI, monitor UO   DVT ppx: hold chemoprophylaxis as he just had LD placed today     ID: afebrile   GI: regular diet   Social/Family:   Discharge planning:     Code Status: [x] Full Code [] DNR [] DNI [] Goals of Care:   Disposition: [x] ICU [] Stroke Unit [] RCU []PCU []Floor [] Discharge Home     not critical

## 2024-02-20 NOTE — PROGRESS NOTE ADULT - SUBJECTIVE AND OBJECTIVE BOX
CLINICAL INDICATION: CSF leak    Patient presents for fluoroscopically guided lumbar puncture with lumbar drain insertion.  Risks and benefits were discussed with the patient and/or patient health care proxy.  Risks discussed included bleeding, infection, nerve damage, and headache.     Status post lumbar puncture at the L3 - L4 level utilizing a 14 G spinal needle.  Drain inserted and reinforced with Tegaderm.     No immediate complications.

## 2024-02-20 NOTE — OCCUPATIONAL THERAPY INITIAL EVALUATION ADULT - TOILETING, PREVIOUS LEVEL OF FUNCTION, OT EVAL
[FreeTextEntry1] : Medical assistant present for duration of physical examination\par \par General no acute distress, alert and oriented\par Psych calm, pleasant demeanor, responding appropriately to questions\par Nonlabored breathing\par Ambulating without assistance\par Skin normal color and pigment, no visible lesions or rashes\par \par Anorectal Exam:\par Inspection no erythema, induration or fluctuance, mild pruritus of perianal skin, soft noninflamed external hemorrhoidal cushions nonthrombosed\par REGINALDO nontender, no masses palpated, no blood on gloved finger\par \par Procedure: Anoscopy\par \par Pre procedure Diagnosis: pruritus, hemorrhoids\par Post procedure Diagnosis: pruritus, hemorrhoids\par Anesthesia: none\par Estimated blood loss: none\par Specimen: none\par Complications: none\par \par Consent obtained. Anoscopy was performed by passing a lighted anoscope with lubricant jelly into the anal canal and the entire anal mucosal surface was inspected. Findings included no fissure, mild internal hemorrhoids left lateral and right posterior - right anterior mild to moderate, no visible masses or lesions\par \par Patient tolerated examination and procedure well.\par \par \par  independent

## 2024-02-20 NOTE — OCCUPATIONAL THERAPY INITIAL EVALUATION ADULT - PERTINENT HX OF CURRENT PROBLEM, REHAB EVAL
43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 43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 Pending LD 2/20.

## 2024-02-20 NOTE — PROGRESS NOTE ADULT - SUBJECTIVE AND OBJECTIVE BOX
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 Presented with ~4 days clear fluid leaking from nostrils (L>R) on standing or valsalva. Reports no h/as. Afebrile.      24H Events: LD placement unsuccessful      OBJECTIVE:    ICU Vital Signs Last 24 Hrs  T(C): 37.2 (20 Feb 2024 07:00), Max: 37.2 (20 Feb 2024 07:00)  T(F): 99 (20 Feb 2024 07:00), Max: 99 (20 Feb 2024 07:00)  HR: 98 (20 Feb 2024 08:00) (92 - 134)  BP: 108/81 (20 Feb 2024 07:00) (80/42 - 135/80)  BP(mean): 89 (20 Feb 2024 07:00) (56 - 97)  ABP: --  ABP(mean): --  RR: 13 (20 Feb 2024 08:00) (8 - 45)  SpO2: 99% (20 Feb 2024 08:00) (92% - 100%)    O2 Parameters below as of 20 Feb 2024 07:00  Patient On (Oxygen Delivery Method): room air      MEDICATIONS  (STANDING):  atorvastatin 40 milliGRAM(s) Oral at bedtime  ergocalciferol 55994 Unit(s) Oral every week  hydrocortisone 10 milliGRAM(s) Oral <User Schedule>  hydrocortisone 5 milliGRAM(s) Oral <User Schedule>  insulin lispro (ADMELOG) corrective regimen sliding scale   SubCutaneous Before meals and at bedtime  lactated ringers. 1000 milliLiter(s) (70 mL/Hr) IV Continuous <Continuous>  levothyroxine 75 MICROGram(s) Oral daily  multivitamin 1 Tablet(s) Oral daily  polyethylene glycol 3350 17 Gram(s) Oral daily  senna 2 Tablet(s) Oral at bedtime    MEDICATIONS  (PRN):  acetaminophen   IVPB .. 1000 milliGRAM(s) IV Intermittent every 6 hours PRN Severe Pain (7 - 10)  melatonin 5 milliGRAM(s) Oral at bedtime PRN Insomnia  ondansetron Injectable 4 milliGRAM(s) IV Push every 6 hours PRN Nausea and/or Vomiting  oxyCODONE    IR 10 milliGRAM(s) Oral every 4 hours PRN Severe Pain (7 - 10)  oxyCODONE    IR 5 milliGRAM(s) Oral every 4 hours PRN Moderate Pain (4 - 6)        LABS    (02-20 @ 01:09)                      11.2  7.36 )-----------( 314                 33.6      02-20    142  |  106  |  7   ----------------------------<  100<H>  3.5   |  24  |  0.83    Ca    9.0      20 Feb 2024 04:50  Phos  3.6     02-19  Mg     2.0     02-19    TPro  7.7  /  Alb  4.3  /  TBili  0.3  /  DBili  x   /  AST  32  /  ALT  55<H>  /  AlkPhos  57  02-19    ( 19 Feb 2024 15:13 )   PT: 12.5 sec;   INR: 1.14 ratio;       PTT:36.6 sec        Urinalysis Basic - ( 20 Feb 2024 04:50 )    Color: x / Appearance: x / SG: x / pH: x  Gluc: 100 mg/dL / Ketone: x  / Bili: x / Urobili: x   Blood: x / Protein: x / Nitrite: x   Leuk Esterase: x / RBC: x / WBC x   Sq Epi: x / Non Sq Epi: x / Bacteria: x      I&O's Summary    19 Feb 2024 07:01  -  20 Feb 2024 07:00  --------------------------------------------------------  IN: 8225 mL / OUT: 5950 mL / NET: 2275 mL    20 Feb 2024 07:01  -  20 Feb 2024 08:32  --------------------------------------------------------  IN: 70 mL / OUT: 0 mL / NET: 70 mL              PHYSICAL EXAM:    General: No Acute Distress     Neurological: Awake, alert oriented to person, place and time, Following Commands, PERRL, EOMI, Face Symmetrical, Speech Fluent, Moving all extremities, Muscle Strength normal in all four extremities, No Drift, Sensation to Light Touch Intact    Pulmonary: Clear to Auscultation, No Rales, No Rhonchi, No Wheezes     Cardiovascular: S1, S2, Regular Rate and Rhythm     Gastrointestinal: Soft, Nontender, Nondistended     Extremities: No calf tenderness        DEVICES: [] Restraints [] LUCINDA/HMV []LD [] ET tube [] Trach [] Chest Tube [] A-line [] Nunez [] NGT [] Rectal Tube

## 2024-02-20 NOTE — PROGRESS NOTE ADULT - SUBJECTIVE AND OBJECTIVE BOX
Patient seen and examined at bedside.    --Anticoagulation--    T(C): 36.9 (02-20-24 @ 03:00), Max: 37.1 (02-19-24 @ 23:00)  HR: 92 (02-20-24 @ 04:00) (92 - 134)  BP: 100/72 (02-20-24 @ 04:00) (80/42 - 135/80)  RR: 13 (02-20-24 @ 04:00) (8 - 45)  SpO2: 100% (02-20-24 @ 04:00) (92% - 100%)  Wt(kg): --    Exam: neurointact, leaking when supine

## 2024-02-21 ENCOUNTER — NON-APPOINTMENT (OUTPATIENT)
Age: 44
End: 2024-02-21

## 2024-02-21 DIAGNOSIS — G96.00 CEREBROSPINAL FLUID LEAK, UNSPECIFIED: ICD-10-CM

## 2024-02-21 LAB
ANION GAP SERPL CALC-SCNC: 12 MMOL/L — SIGNIFICANT CHANGE UP (ref 5–17)
ANION GAP SERPL CALC-SCNC: 12 MMOL/L — SIGNIFICANT CHANGE UP (ref 5–17)
ANION GAP SERPL CALC-SCNC: 16 MMOL/L — SIGNIFICANT CHANGE UP (ref 5–17)
APPEARANCE UR: CLEAR — SIGNIFICANT CHANGE UP
B2 TRANSFERRIN FLD QL: POSITIVE
BILIRUB UR-MCNC: NEGATIVE — SIGNIFICANT CHANGE UP
BUN SERPL-MCNC: 7 MG/DL — SIGNIFICANT CHANGE UP (ref 7–23)
BUN SERPL-MCNC: 7 MG/DL — SIGNIFICANT CHANGE UP (ref 7–23)
BUN SERPL-MCNC: 8 MG/DL — SIGNIFICANT CHANGE UP (ref 7–23)
CALCIUM SERPL-MCNC: 10 MG/DL — SIGNIFICANT CHANGE UP (ref 8.4–10.5)
CALCIUM SERPL-MCNC: 9 MG/DL — SIGNIFICANT CHANGE UP (ref 8.4–10.5)
CALCIUM SERPL-MCNC: 9.6 MG/DL — SIGNIFICANT CHANGE UP (ref 8.4–10.5)
CHLORIDE SERPL-SCNC: 101 MMOL/L — SIGNIFICANT CHANGE UP (ref 96–108)
CHLORIDE SERPL-SCNC: 101 MMOL/L — SIGNIFICANT CHANGE UP (ref 96–108)
CHLORIDE SERPL-SCNC: 99 MMOL/L — SIGNIFICANT CHANGE UP (ref 96–108)
CO2 SERPL-SCNC: 24 MMOL/L — SIGNIFICANT CHANGE UP (ref 22–31)
CO2 SERPL-SCNC: 24 MMOL/L — SIGNIFICANT CHANGE UP (ref 22–31)
CO2 SERPL-SCNC: 25 MMOL/L — SIGNIFICANT CHANGE UP (ref 22–31)
COLOR SPEC: YELLOW — SIGNIFICANT CHANGE UP
CREAT SERPL-MCNC: 0.87 MG/DL — SIGNIFICANT CHANGE UP (ref 0.5–1.3)
CREAT SERPL-MCNC: 0.91 MG/DL — SIGNIFICANT CHANGE UP (ref 0.5–1.3)
CREAT SERPL-MCNC: 1 MG/DL — SIGNIFICANT CHANGE UP (ref 0.5–1.3)
DIFF PNL FLD: NEGATIVE — SIGNIFICANT CHANGE UP
EGFR: 107 ML/MIN/1.73M2 — SIGNIFICANT CHANGE UP
EGFR: 110 ML/MIN/1.73M2 — SIGNIFICANT CHANGE UP
EGFR: 96 ML/MIN/1.73M2 — SIGNIFICANT CHANGE UP
GLUCOSE BLDC GLUCOMTR-MCNC: 118 MG/DL — HIGH (ref 70–99)
GLUCOSE BLDC GLUCOMTR-MCNC: 121 MG/DL — HIGH (ref 70–99)
GLUCOSE BLDC GLUCOMTR-MCNC: 89 MG/DL — SIGNIFICANT CHANGE UP (ref 70–99)
GLUCOSE BLDC GLUCOMTR-MCNC: 97 MG/DL — SIGNIFICANT CHANGE UP (ref 70–99)
GLUCOSE SERPL-MCNC: 104 MG/DL — HIGH (ref 70–99)
GLUCOSE SERPL-MCNC: 126 MG/DL — HIGH (ref 70–99)
GLUCOSE SERPL-MCNC: 97 MG/DL — SIGNIFICANT CHANGE UP (ref 70–99)
GLUCOSE UR QL: NEGATIVE MG/DL — SIGNIFICANT CHANGE UP
HCT VFR BLD CALC: 32.9 % — LOW (ref 39–50)
HGB BLD-MCNC: 10.9 G/DL — LOW (ref 13–17)
KETONES UR-MCNC: NEGATIVE MG/DL — SIGNIFICANT CHANGE UP
LEUKOCYTE ESTERASE UR-ACNC: NEGATIVE — SIGNIFICANT CHANGE UP
MAGNESIUM SERPL-MCNC: 1.9 MG/DL — SIGNIFICANT CHANGE UP (ref 1.6–2.6)
MAGNESIUM SERPL-MCNC: 2.1 MG/DL — SIGNIFICANT CHANGE UP (ref 1.6–2.6)
MCHC RBC-ENTMCNC: 29 PG — SIGNIFICANT CHANGE UP (ref 27–34)
MCHC RBC-ENTMCNC: 33.1 GM/DL — SIGNIFICANT CHANGE UP (ref 32–36)
MCV RBC AUTO: 87.5 FL — SIGNIFICANT CHANGE UP (ref 80–100)
NITRITE UR-MCNC: NEGATIVE — SIGNIFICANT CHANGE UP
NRBC # BLD: 0 /100 WBCS — SIGNIFICANT CHANGE UP (ref 0–0)
OSMOLALITY SERPL: 286 MOSMOL/KG — SIGNIFICANT CHANGE UP (ref 275–300)
OSMOLALITY UR: 120 MOS/KG — LOW (ref 300–900)
PH UR: 7 — SIGNIFICANT CHANGE UP (ref 5–8)
PHOSPHATE SERPL-MCNC: 4.2 MG/DL — SIGNIFICANT CHANGE UP (ref 2.5–4.5)
PHOSPHATE SERPL-MCNC: 5.3 MG/DL — HIGH (ref 2.5–4.5)
PLATELET # BLD AUTO: 307 K/UL — SIGNIFICANT CHANGE UP (ref 150–400)
POTASSIUM SERPL-MCNC: 3.5 MMOL/L — SIGNIFICANT CHANGE UP (ref 3.5–5.3)
POTASSIUM SERPL-MCNC: 3.8 MMOL/L — SIGNIFICANT CHANGE UP (ref 3.5–5.3)
POTASSIUM SERPL-MCNC: 4.4 MMOL/L — SIGNIFICANT CHANGE UP (ref 3.5–5.3)
POTASSIUM SERPL-SCNC: 3.5 MMOL/L — SIGNIFICANT CHANGE UP (ref 3.5–5.3)
POTASSIUM SERPL-SCNC: 3.8 MMOL/L — SIGNIFICANT CHANGE UP (ref 3.5–5.3)
POTASSIUM SERPL-SCNC: 4.4 MMOL/L — SIGNIFICANT CHANGE UP (ref 3.5–5.3)
PROT UR-MCNC: NEGATIVE MG/DL — SIGNIFICANT CHANGE UP
RBC # BLD: 3.76 M/UL — LOW (ref 4.2–5.8)
RBC # FLD: 12.7 % — SIGNIFICANT CHANGE UP (ref 10.3–14.5)
SODIUM SERPL-SCNC: 137 MMOL/L — SIGNIFICANT CHANGE UP (ref 135–145)
SODIUM SERPL-SCNC: 138 MMOL/L — SIGNIFICANT CHANGE UP (ref 135–145)
SODIUM SERPL-SCNC: 139 MMOL/L — SIGNIFICANT CHANGE UP (ref 135–145)
SP GR SPEC: 1.01 — SIGNIFICANT CHANGE UP (ref 1–1.03)
SP GR UR STRIP: <1.005 — LOW (ref 1–1.03)
T4 FREE SERPL-MCNC: 0.9 NG/DL — SIGNIFICANT CHANGE UP (ref 0.9–1.8)
UROBILINOGEN FLD QL: 0.2 MG/DL — SIGNIFICANT CHANGE UP (ref 0.2–1)
WBC # BLD: 8.76 K/UL — SIGNIFICANT CHANGE UP (ref 3.8–10.5)
WBC # FLD AUTO: 8.76 K/UL — SIGNIFICANT CHANGE UP (ref 3.8–10.5)

## 2024-02-21 PROCEDURE — 99291 CRITICAL CARE FIRST HOUR: CPT

## 2024-02-21 PROCEDURE — 72128 CT CHEST SPINE W/O DYE: CPT | Mod: 26

## 2024-02-21 PROCEDURE — ZZZZZ: CPT

## 2024-02-21 PROCEDURE — 70450 CT HEAD/BRAIN W/O DYE: CPT | Mod: 26

## 2024-02-21 PROCEDURE — 70553 MRI BRAIN STEM W/O & W/DYE: CPT | Mod: 26

## 2024-02-21 PROCEDURE — 99233 SBSQ HOSP IP/OBS HIGH 50: CPT

## 2024-02-21 PROCEDURE — 72131 CT LUMBAR SPINE W/O DYE: CPT | Mod: 26

## 2024-02-21 PROCEDURE — 71045 X-RAY EXAM CHEST 1 VIEW: CPT | Mod: 26

## 2024-02-21 RX ORDER — POTASSIUM CHLORIDE 20 MEQ
20 PACKET (EA) ORAL ONCE
Refills: 0 | Status: COMPLETED | OUTPATIENT
Start: 2024-02-21 | End: 2024-02-21

## 2024-02-21 RX ORDER — ENOXAPARIN SODIUM 100 MG/ML
40 INJECTION SUBCUTANEOUS
Refills: 0 | Status: DISCONTINUED | OUTPATIENT
Start: 2024-02-21 | End: 2024-02-22

## 2024-02-21 RX ORDER — DESMOPRESSIN ACETATE 0.1 MG/1
0.05 TABLET ORAL ONCE
Refills: 0 | Status: DISCONTINUED | OUTPATIENT
Start: 2024-02-21 | End: 2024-02-21

## 2024-02-21 RX ORDER — HYDROMORPHONE HYDROCHLORIDE 2 MG/ML
2 INJECTION INTRAMUSCULAR; INTRAVENOUS; SUBCUTANEOUS EVERY 4 HOURS
Refills: 0 | Status: DISCONTINUED | OUTPATIENT
Start: 2024-02-21 | End: 2024-02-22

## 2024-02-21 RX ORDER — DESMOPRESSIN ACETATE 0.1 MG/1
0.05 TABLET ORAL AT BEDTIME
Refills: 0 | Status: DISCONTINUED | OUTPATIENT
Start: 2024-02-21 | End: 2024-02-23

## 2024-02-21 RX ORDER — POTASSIUM CHLORIDE 20 MEQ
40 PACKET (EA) ORAL ONCE
Refills: 0 | Status: COMPLETED | OUTPATIENT
Start: 2024-02-21 | End: 2024-02-21

## 2024-02-21 RX ORDER — SODIUM CHLORIDE 9 MG/ML
1000 INJECTION, SOLUTION INTRAVENOUS
Refills: 0 | Status: COMPLETED | OUTPATIENT
Start: 2024-02-21 | End: 2024-02-21

## 2024-02-21 RX ORDER — LEVOTHYROXINE SODIUM 125 MCG
88 TABLET ORAL DAILY
Refills: 0 | Status: DISCONTINUED | OUTPATIENT
Start: 2024-02-21 | End: 2024-02-23

## 2024-02-21 RX ORDER — CHLORHEXIDINE GLUCONATE 213 G/1000ML
1 SOLUTION TOPICAL DAILY
Refills: 0 | Status: DISCONTINUED | OUTPATIENT
Start: 2024-02-21 | End: 2024-02-23

## 2024-02-21 RX ORDER — HYDROMORPHONE HYDROCHLORIDE 2 MG/ML
0.5 INJECTION INTRAMUSCULAR; INTRAVENOUS; SUBCUTANEOUS ONCE
Refills: 0 | Status: DISCONTINUED | OUTPATIENT
Start: 2024-02-21 | End: 2024-02-21

## 2024-02-21 RX ORDER — ACETAMINOPHEN 500 MG
1000 TABLET ORAL ONCE
Refills: 0 | Status: COMPLETED | OUTPATIENT
Start: 2024-02-21 | End: 2024-02-21

## 2024-02-21 RX ORDER — MAGNESIUM SULFATE 500 MG/ML
1 VIAL (ML) INJECTION ONCE
Refills: 0 | Status: COMPLETED | OUTPATIENT
Start: 2024-02-21 | End: 2024-02-21

## 2024-02-21 RX ADMIN — Medication 400 MILLIGRAM(S): at 11:42

## 2024-02-21 RX ADMIN — Medication 40 MILLIEQUIVALENT(S): at 11:42

## 2024-02-21 RX ADMIN — HYDROMORPHONE HYDROCHLORIDE 2 MILLIGRAM(S): 2 INJECTION INTRAMUSCULAR; INTRAVENOUS; SUBCUTANEOUS at 09:05

## 2024-02-21 RX ADMIN — HYDROMORPHONE HYDROCHLORIDE 2 MILLIGRAM(S): 2 INJECTION INTRAMUSCULAR; INTRAVENOUS; SUBCUTANEOUS at 08:05

## 2024-02-21 RX ADMIN — OXYCODONE HYDROCHLORIDE 5 MILLIGRAM(S): 5 TABLET ORAL at 03:38

## 2024-02-21 RX ADMIN — Medication 1 TABLET(S): at 11:41

## 2024-02-21 RX ADMIN — HYDROMORPHONE HYDROCHLORIDE 0.5 MILLIGRAM(S): 2 INJECTION INTRAMUSCULAR; INTRAVENOUS; SUBCUTANEOUS at 14:30

## 2024-02-21 RX ADMIN — Medication 1000 MILLIGRAM(S): at 12:45

## 2024-02-21 RX ADMIN — SENNA PLUS 2 TABLET(S): 8.6 TABLET ORAL at 22:24

## 2024-02-21 RX ADMIN — OXYCODONE HYDROCHLORIDE 10 MILLIGRAM(S): 5 TABLET ORAL at 06:30

## 2024-02-21 RX ADMIN — ENOXAPARIN SODIUM 40 MILLIGRAM(S): 100 INJECTION SUBCUTANEOUS at 20:05

## 2024-02-21 RX ADMIN — Medication 20 MILLIGRAM(S): at 09:14

## 2024-02-21 RX ADMIN — Medication 10 MILLIGRAM(S): at 15:23

## 2024-02-21 RX ADMIN — Medication 650 MILLIGRAM(S): at 06:30

## 2024-02-21 RX ADMIN — HYDROMORPHONE HYDROCHLORIDE 2 MILLIGRAM(S): 2 INJECTION INTRAMUSCULAR; INTRAVENOUS; SUBCUTANEOUS at 14:50

## 2024-02-21 RX ADMIN — Medication 650 MILLIGRAM(S): at 05:31

## 2024-02-21 RX ADMIN — DESMOPRESSIN ACETATE 0.05 MILLIGRAM(S): 0.1 TABLET ORAL at 22:24

## 2024-02-21 RX ADMIN — SODIUM CHLORIDE 1000 MILLILITER(S): 9 INJECTION, SOLUTION INTRAVENOUS at 06:14

## 2024-02-21 RX ADMIN — POLYETHYLENE GLYCOL 3350 17 GRAM(S): 17 POWDER, FOR SOLUTION ORAL at 11:41

## 2024-02-21 RX ADMIN — ATORVASTATIN CALCIUM 40 MILLIGRAM(S): 80 TABLET, FILM COATED ORAL at 22:24

## 2024-02-21 RX ADMIN — OXYCODONE HYDROCHLORIDE 5 MILLIGRAM(S): 5 TABLET ORAL at 04:38

## 2024-02-21 RX ADMIN — Medication 102 GRAM(S): at 19:15

## 2024-02-21 RX ADMIN — OXYCODONE HYDROCHLORIDE 10 MILLIGRAM(S): 5 TABLET ORAL at 05:28

## 2024-02-21 RX ADMIN — SODIUM CHLORIDE 1000 MILLILITER(S): 9 INJECTION, SOLUTION INTRAVENOUS at 02:13

## 2024-02-21 RX ADMIN — Medication 75 MICROGRAM(S): at 05:29

## 2024-02-21 RX ADMIN — HYDROMORPHONE HYDROCHLORIDE 0.5 MILLIGRAM(S): 2 INJECTION INTRAMUSCULAR; INTRAVENOUS; SUBCUTANEOUS at 14:15

## 2024-02-21 RX ADMIN — Medication 20 MILLIEQUIVALENT(S): at 02:13

## 2024-02-21 RX ADMIN — CHLORHEXIDINE GLUCONATE 1 APPLICATION(S): 213 SOLUTION TOPICAL at 12:29

## 2024-02-21 RX ADMIN — HYDROMORPHONE HYDROCHLORIDE 2 MILLIGRAM(S): 2 INJECTION INTRAMUSCULAR; INTRAVENOUS; SUBCUTANEOUS at 13:50

## 2024-02-21 NOTE — PROGRESS NOTE ADULT - SUBJECTIVE AND OBJECTIVE BOX
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 Presented with ~4 days clear fluid leaking from nostrils (L>R) on standing or valsalva. Reports no h/as. Afebrile.      24H Events: No acute events. LD placed by neurorads. Temp of 38.2, worsening headaches.      OBJECTIVE:    ICU Vital Signs Last 24 Hrs  T(C): 37.8 (21 Feb 2024 07:00), Max: 38.2 (21 Feb 2024 05:00)  T(F): 100.1 (21 Feb 2024 07:00), Max: 100.7 (21 Feb 2024 05:00)  HR: 112 (21 Feb 2024 09:00) (84 - 114)  BP: 133/79 (21 Feb 2024 09:00) (104/68 - 133/79)  BP(mean): 97 (21 Feb 2024 09:00) (80 - 106)  ABP: --  ABP(mean): --  RR: 17 (21 Feb 2024 09:00) (12 - 22)  SpO2: 94% (21 Feb 2024 09:00) (93% - 100%)    O2 Parameters below as of 21 Feb 2024 07:00  Patient On (Oxygen Delivery Method): room air      MEDICATIONS  (STANDING):  atorvastatin 40 milliGRAM(s) Oral at bedtime  chlorhexidine 4% Liquid 1 Application(s) Topical daily  ergocalciferol 50264 Unit(s) Oral every week  hydrocortisone 10 milliGRAM(s) Oral <User Schedule>  hydrocortisone 20 milliGRAM(s) Oral <User Schedule>  insulin lispro (ADMELOG) corrective regimen sliding scale   SubCutaneous Before meals and at bedtime  levothyroxine 75 MICROGram(s) Oral daily  multivitamin 1 Tablet(s) Oral daily  polyethylene glycol 3350 17 Gram(s) Oral daily  senna 2 Tablet(s) Oral at bedtime    MEDICATIONS  (PRN):  acetaminophen     Tablet .. 650 milliGRAM(s) Oral every 6 hours PRN Temp greater or equal to 38C (100.4F), Mild Pain (1 - 3)  HYDROmorphone   Tablet 2 milliGRAM(s) Oral every 4 hours PRN Severe Pain (7 - 10)  melatonin 5 milliGRAM(s) Oral at bedtime PRN Insomnia  ondansetron Injectable 4 milliGRAM(s) IV Push every 6 hours PRN Nausea and/or Vomiting  oxyCODONE    IR 10 milliGRAM(s) Oral every 4 hours PRN Severe Pain (7 - 10)  oxyCODONE    IR 5 milliGRAM(s) Oral every 4 hours PRN Moderate Pain (4 - 6)      LABS    (02-21 @ 00:53)                      10.9  8.76 )-----------( 307                 32.9    Neutrophils = -- (--%)  Lymphocytes = -- (--%)  Eosinophils = -- (--%)  Basophils = -- (--%)  Monocytes = -- (--%)  Bands = --%    02-21    138  |  101  |  8   ----------------------------<  104<H>  3.8   |  25  |  0.91    Ca    9.6      21 Feb 2024 00:53  Phos  4.2     02-21  Mg     2.1     02-21    TPro  7.7  /  Alb  4.3  /  TBili  0.3  /  DBili  x   /  AST  32  /  ALT  55<H>  /  AlkPhos  57  02-19    ( 19 Feb 2024 15:13 )   PT: 12.5 sec;   INR: 1.14 ratio;       PTT:36.6 sec        Urinalysis Basic - ( 21 Feb 2024 00:53 )    Color: x / Appearance: x / SG: x / pH: x  Gluc: 104 mg/dL / Ketone: x  / Bili: x / Urobili: x   Blood: x / Protein: x / Nitrite: x   Leuk Esterase: x / RBC: x / WBC x   Sq Epi: x / Non Sq Epi: x / Bacteria: x        I&O's Summary    20 Feb 2024 07:01  -  21 Feb 2024 07:00  --------------------------------------------------------  IN: 4980 mL / OUT: 5265 mL / NET: -285 mL              PHYSICAL EXAM:    General: Uncomfortable appearing. No Acute Distress     Neurological: Awake, alert oriented to person, place and time, Following Commands, PERRL, EOMI, Face Symmetrical, Speech Fluent, Moving all extremities, Muscle Strength normal in all four extremities, No Drift, Sensation to Light Touch Intact    Pulmonary: Clear to Auscultation, No Rales, No Rhonchi, No Wheezes     Cardiovascular: S1, S2, Regular Rate and Rhythm     Gastrointestinal: Soft, Nontender, Nondistended     Extremities: No calf tenderness        DEVICES: [] Restraints [] LUCINDA/HMV [x]LD [] ET tube [] Trach [] Chest Tube [] A-line [] Nunez [] NGT [] Rectal Tube

## 2024-02-21 NOTE — CONSULT NOTE ADULT - PROBLEM SELECTOR RECOMMENDATION 9
- Continue to monitor for CSF leak with LD in place per NSCU   - TSRP precautions (no straws, incentive spirometry, bipap)   - ENT will continue to follow  - Call with questions or concerns

## 2024-02-21 NOTE — DIETITIAN INITIAL EVALUATION ADULT - ENTER FROM (CAL/KG)
Physician Discharge Summary     Patient ID:  Levon Hdz  [id number]  76 year old  1941    Admit date: 2018    Discharge date and time: 2018    Admitting Physician: Vonnie Srivastava MD     Discharge Physician: Edu Sky MD    Admission Diagnoses: Pneumonia of right middle lobe due to infectious organism (CMS/[facility]) [J18.1]    Discharge Diagnoses:   . Behavioural outburst with h/o Dementia  . HTN Stable  . Dyslipidemia  . CKD 3, Stable    Admission Condition: stable    Discharged Condition: stable    Indication for Admission: Behavioural outburst, gen weakness.    Hospital Course:   76 year old  male, history of dementia, CKD-3, gout, dyslipidemia, hypertension not on medications who presents to the hospital with abnormal breathing pattern and multiple other complaints including decreased oral intake and generalized weakness.  Patient's wife recently  couple weeks ago and since then patient has been living with his daughter. For last week or so, family has noted that patient has decreased oral intake, generalized weakness and behavioral outburst, including visual hallucination. Also has been having some agitation intermittently.    Geriatrics were consulted for behaviour management, and recommended Continue Namenda 10 mg bid, Depakote Sprinkles 500 mg q12h, quetiapine 50 mg in evening.    Family wanted to take him home, and provide 24 hour support. Pneumonia was rule out and remained stable respiratory wise.    Consults: Geriatrics    Disposition: Home    Patient Instructions:   Activity: activity as tolerated  Diet: cardiac diet  Wound Care: none needed    Follow-up with PCP in 1 week.    Total time spend less than 30 min coordinating discharge.    Signed:  Edu Sky MD  2018  9:18 AM      
23

## 2024-02-21 NOTE — DIETITIAN INITIAL EVALUATION ADULT - REASON INDICATOR FOR ASSESSMENT
Seen for ICU LOS   Sources: Electronic Medical Record, Team (Rounds), Patient  Seen for ICU LOS   Sources: Electronic Medical Record, Team (Rounds), Patient, Spouse at bedside

## 2024-02-21 NOTE — DIETITIAN INITIAL EVALUATION ADULT - PERTINENT LABORATORY DATA
02-21    139  |  99  |  7   ----------------------------<  97  3.5   |  24  |  1.00    Ca    10.0      21 Feb 2024 09:07  Phos  4.2     02-21  Mg     2.1     02-21    TPro  7.7  /  Alb  4.3  /  TBili  0.3  /  DBili  x   /  AST  32  /  ALT  55<H>  /  AlkPhos  57  02-19  POCT Blood Glucose.: 97 mg/dL (02-21-24 @ 11:25)  A1C with Estimated Average Glucose Result: 5.4 % (02-19-24 @ 15:14)

## 2024-02-21 NOTE — DIETITIAN INITIAL EVALUATION ADULT - PERTINENT MEDS FT
MEDICATIONS  (STANDING):  atorvastatin 40 milliGRAM(s) Oral at bedtime  chlorhexidine 4% Liquid 1 Application(s) Topical daily  ergocalciferol 78202 Unit(s) Oral every week  hydrocortisone 20 milliGRAM(s) Oral <User Schedule>  hydrocortisone 10 milliGRAM(s) Oral <User Schedule>  insulin lispro (ADMELOG) corrective regimen sliding scale   SubCutaneous Before meals and at bedtime  levothyroxine 75 MICROGram(s) Oral daily  multivitamin 1 Tablet(s) Oral daily  polyethylene glycol 3350 17 Gram(s) Oral daily  senna 2 Tablet(s) Oral at bedtime    MEDICATIONS  (PRN):  acetaminophen     Tablet .. 650 milliGRAM(s) Oral every 6 hours PRN Temp greater or equal to 38C (100.4F), Mild Pain (1 - 3)  HYDROmorphone   Tablet 2 milliGRAM(s) Oral every 4 hours PRN Severe Pain (7 - 10)  melatonin 5 milliGRAM(s) Oral at bedtime PRN Insomnia  ondansetron Injectable 4 milliGRAM(s) IV Push every 6 hours PRN Nausea and/or Vomiting  oxyCODONE    IR 5 milliGRAM(s) Oral every 4 hours PRN Moderate Pain (4 - 6)  oxyCODONE    IR 10 milliGRAM(s) Oral every 4 hours PRN Severe Pain (7 - 10)

## 2024-02-21 NOTE — PROGRESS NOTE ADULT - ASSESSMENT
ASSESSMENT: TSP resection of pituitary adenoma, POD 0    NEURO:  Neuro checks q2hrs/vital q1hr  Skull Base Precaution  LD @5cc/hr  2/22 AM premedication and inject with fluorescein to monitor CSF nasal leak  CT Head in AM  MRI: pending final read  Tylenol and oxycodone for pain   Activity: [] OOB as tolerated [] Bedrest [x] PT [x] OT [] PMNR      PULM:  Pituitary precautions (no incentive spirometry, no straws, no BIPAP)  Sats >92%, RA    CV:  -160mmHg  c/w home statin    RENAL:  Strict I+Os  IVL  Drink to thirst    GI:  Diet: Regular  GI prophylaxis [x] not indicated [] PPI [] other:  Bowel regimen [x] senna [] other: miralax  Last BM:   Zofran prn    ENDO:   Goal euglycemia (-180)  Pituitary labs: Hx panhypopituitarism  Endorses polyuria and polydipsia; on standing DDAVP 0.05mg PO at bedtime  Synthroid 88mcg daily  Hydrocort 20/10    HEME/ONC:  VTE prophylaxis: [x] SCDs [] chemoprophylaxis [x] hold chemoprophylaxis due to:   2/00 LED: no DVTS    ID:  febrile overnight 2/21  f/u blood cx, CSF, UA    MISC:    SOCIAL/FAMILY:  [] awaiting [x] updated at bedside [] family meeting    CODE STATUS:  [x] Full Code [] DNR [] DNI [] Palliative/Comfort Care    DISPOSITION:  [x] ICU [] Stroke Unit [] Floor [] EMU [] RCU [] PCU    [x] Patient is at high risk of neurologic deterioration/death due to: csf rhinorrhea at risk for meningitis, brain sag, subdural hematoma           ASSESSMENT: TSP resection of pituitary adenoma, POD 0    NEURO:  Neuro checks q2hrs/vital q1hr  Skull Base Precaution  LD @5cc/hr  2/22 AM premedication and inject with fluorescein to monitor CSF nasal leak  CT Head in AM  MRI: pending final read  Tylenol and oxycodone for pain   Activity: [] OOB as tolerated [] Bedrest [x] PT [x] OT [] PMNR      PULM:  Pituitary precautions (no incentive spirometry, no straws, no BIPAP)  Sats >92%, RA    CV:  -160mmHg  c/w home statin    RENAL:  Strict I+Os  IVL  Drink to thirst    GI:  Diet: Regular  GI prophylaxis [x] not indicated [] PPI [] other:  Bowel regimen [x] senna [] other: miralax  Last BM: PTA  Zofran prn    ENDO:   Goal euglycemia (-180)  Pituitary labs: Hx panhypopituitarism  Endorses polyuria and polydipsia; on standing DDAVP 0.05mg PO at bedtime  Synthroid 88mcg daily  Hydrocort 20/10    HEME/ONC:  VTE prophylaxis: [x] SCDs [] chemoprophylaxis [x] hold chemoprophylaxis due to:   2/20 LED: no DVTS    ID:  febrile overnight 2/21  f/u blood cx, CSF, UA    MISC:    SOCIAL/FAMILY:  [] awaiting [x] updated at bedside [] family meeting    CODE STATUS:  [x] Full Code [] DNR [] DNI [] Palliative/Comfort Care    DISPOSITION:  [x] ICU [] Stroke Unit [] Floor [] EMU [] RCU [] PCU    [x] Patient is at high risk of neurologic deterioration/death due to: csf rhinorrhea at risk for meningitis, brain sag, subdural hematoma

## 2024-02-21 NOTE — CONSULT NOTE ADULT - SUBJECTIVE AND OBJECTIVE BOX
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 Presented with ~4 days clear fluid leaking from nostrils (L>R) on standing or valsalva. Also reporting consistent salty taste. Reports no h/as. Afebrile.      PAST MEDICAL & SURGICAL HISTORY:  HLD (hyperlipidemia)      History of pituitary tumor      Deviated septum      History of dental surgery        Allergies    No Known Allergies    Intolerances      MEDICATIONS  (STANDING):  atorvastatin 40 milliGRAM(s) Oral at bedtime  chlorhexidine 4% Liquid 1 Application(s) Topical daily  desmopressin 0.05 milliGRAM(s) Oral at bedtime  enoxaparin Injectable 40 milliGRAM(s) SubCutaneous <User Schedule>  ergocalciferol 93875 Unit(s) Oral every week  hydrocortisone 10 milliGRAM(s) Oral <User Schedule>  hydrocortisone 20 milliGRAM(s) Oral <User Schedule>  insulin lispro (ADMELOG) corrective regimen sliding scale   SubCutaneous Before meals and at bedtime  levothyroxine 88 MICROGram(s) Oral daily  multivitamin 1 Tablet(s) Oral daily  polyethylene glycol 3350 17 Gram(s) Oral daily  senna 2 Tablet(s) Oral at bedtime    MEDICATIONS  (PRN):  acetaminophen     Tablet .. 650 milliGRAM(s) Oral every 6 hours PRN Temp greater or equal to 38C (100.4F), Mild Pain (1 - 3)  HYDROmorphone   Tablet 2 milliGRAM(s) Oral every 4 hours PRN Severe Pain (7 - 10)  melatonin 5 milliGRAM(s) Oral at bedtime PRN Insomnia  ondansetron Injectable 4 milliGRAM(s) IV Push every 6 hours PRN Nausea and/or Vomiting  oxyCODONE    IR 10 milliGRAM(s) Oral every 4 hours PRN Severe Pain (7 - 10)  oxyCODONE    IR 5 milliGRAM(s) Oral every 4 hours PRN Moderate Pain (4 - 6)    SOCIAL HISTORY:   Occupation:   Marital Status:     FAMILY HISTORY:  Family history of breast cancer in mother    ROS:   ENT: all negative except as noted in HPI   CV: denies palpitations  Pulm: denies SOB, cough, hemoptysis  GI: denies change in appetite, indigestion, n/v  : denies pertinent urinary symptoms, urgency  Neuro: denies numbness/tingling, loss of sensation  Psych: denies anxiety  MS: denies muscle weakness, instability  Heme: denies easy bruising or bleeding  Endo: denies heat/cold intolerance, excessive sweating  Vascular: denies LE edema    Vital Signs Last 24 Hrs  T(C): 36.9 (21 Feb 2024 19:00), Max: 38.7 (21 Feb 2024 11:00)  T(F): 98.4 (21 Feb 2024 19:00), Max: 101.7 (21 Feb 2024 11:00)  HR: 123 (21 Feb 2024 19:00) (85 - 123)  BP: 114/75 (21 Feb 2024 19:00) (104/68 - 137/68)  BP(mean): 90 (21 Feb 2024 19:00) (76 - 106)  RR: 26 (21 Feb 2024 19:00) (12 - 26)  SpO2: 94% (21 Feb 2024 19:00) (89% - 99%)    Parameters below as of 21 Feb 2024 19:00  Patient On (Oxygen Delivery Method): room air                              10.9   8.76  )-----------( 307      ( 21 Feb 2024 00:53 )             32.9    02-21    137  |  101  |  7   ----------------------------<  126<H>  4.4   |  24  |  0.87    Ca    9.0      21 Feb 2024 16:16  Phos  5.3     02-21  Mg     1.9     02-21         PHYSICAL EXAM:  Gen: NAD  Skin: No rashes, bruises, or lesions  Head: Normocephalic, Atraumatic  Face: no edema, erythema, or fluctuance. Parotid glands soft without mass  Eyes: no scleral injection  Nose: No active drainage from b/l nares. Copious mucus b/l nares.   Mouth: No Stridor / Drooling / Trismus.  Mucosa moist, tongue/uvula midline, oropharynx clear  Neck: Flat, supple, no lymphadenopathy, trachea midline, no masses  Lymphatic: No lymphadenopathy  Resp: breathing easily, no stridor  CV: no peripheral edema/cyanosis  GI: nondistended   Peripheral vascular: no JVD or edema  Neuro: facial nerve intact, no facial droop        Diagnostic Nasal Endoscopy:   Indication for procedure: r/o csf leak    "Anterior rhinoscopy insufficient to account for symptoms"    Verbal and/or written consent obtained from patient    Scope #3: flexible fiber optic telescope used with surgilube     no sigmoidal septal deviation noted. Mucosa moist with copious mucus, postop changes noted, dissolvable packing in place with possible leak around flap. Normal inferior/middle/superior turbinates, normal inferior/middle/superior meatus, normal fontanelles, maxillary ostia clear. No polyps noted.         IMAGING/ADDITIONAL STUDIES:     CT SINUS  IMPRESSION:  Bony defect along the anterior sellar floor corresponding with recent surgery. Air-fluid level in the sphenoid sinus is noted. A CSF leak is not ruled out.    Extensive inflammatory changes throughout the remainder of the paranasal sinuses.    --- End of Report ---        CT HEAD  IMPRESSION:    1. HEAD: Postoperative findings of transphenoidal procedure. No brain parenchymal lesion has developed.    2. THORACIC SPINE: No acute thoracic vertebral fracture. Minimal thoracic degenerative disc disease    3. LUMBAR SPINE: No acute lumbar vertebral fracture. Mild lumbar degenerative disc disease results primarily in foraminal compromise at L5-S1    4. Catheter enters the central canal at L2-L3 and extends upward to the T11 level    --- End of Report ---

## 2024-02-21 NOTE — DIETITIAN INITIAL EVALUATION ADULT - ORAL INTAKE PTA/DIET HISTORY
PTA per pt  -Intake:   -Chewing/Swallowing: no hx of difficulty   -Allergies/Intolerances: NKFA   -Vitamins/Supplements:  PTA per pt  -Intake: reports good PO intake in-between hospitalizations  -Chewing/Swallowing: no hx of difficulty   -Allergies/Intolerances: NKFA   -Vitamins/Supplements: Vitamin C, D

## 2024-02-21 NOTE — PROGRESS NOTE ADULT - ASSESSMENT
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 Today, p/f leaking from nostrils (L>R) when he stands up or valsalvas. Endocrinology consulted for management of panhypopituitarism.    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 MRI 1/24 showed abnormal enhancing lesion involving the suprasellar region measuring approximately 2.1x2.3cm and previously measured 2.1x1.4cm. Patient had an endocrine workup in the past with Dr. Isidra Dao and was told he had a low testosterone level but not offered treatment prior to surgery. Had TSR 2/1/24. Post op TSH 0.15, free thyroxine 0.8, consistent with secondary hypothyroidism. ACTH 3.5, am cortisol 0.5, consistent with secondary AI. LH 0.4, FSH 1.2. 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 The patient was discharged on levothyroxine 75mcg daily, hydrocortisone 10mg/5mg and DDAVP 0.5mg prn. Reports did not take desmopressin  - patient dumping urine, Na 142, U osm <1.005 concerning for compensated DI  - received solucortef 100mg IV on 2/19 2pm  - pathology consistent with craniopharyngioma  - follows with Dr. Isidra Dao  PLAN  In terms of DI:   Goal Na 140-145  - q12h [facility]  Recommendations:   - DI Watch: Please monitor strict I/O, sodium levels q8hrs (monitor for hypo or hypernatremia).   If urine output more than 500ml/h or 250ml/h for 2 consecutive hours get stat Na, if Na is increasing again compared to prior than bolus 1/2 NS to match half the output (for example, if net negative 2Liters can give 1Liter 1/2NS if patient is unable to keep up with output with PO intake)  - If Na > increases to 140 or higher would dose DDAVP 0.05mg and continue DI watch as above e   - Please make sure pt has access to water and encourage patient to drink to thirst   In terms of secondary AI  - continue hydrocortisone PO 20mg at 8am and 10mg PO at 3pm, plan to discharge on hydrocortisone 10mg at 8am and 5mg at 3pm  In terms of secondary hypothyroidism  - continue levothyroxine 75mcg once daily (maintain on empty stomach (at least 1 hour before meals), separate by 4 hours from PPI or calcium supplementation which can inhibit its absorption)  In terms of hypogonadism  - outpatient management  - check IGF-1    Discussed with primary team.     Thank you for the interesting consult.    Joe Walker MD, Endocrinology Fellow  For non-urgent follow up questions, please email [email or web address] or [email or web address].  Pager [phone] from 9am to 5pm. After hours and on weekends, please call [phone]. 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 Today, p/f leaking from nostrils (L>R) when he stands up or valsalvas. Endocrinology consulted for management of panhypopituitarism.    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 MRI 1/24 showed abnormal enhancing lesion involving the suprasellar region measuring approximately 2.1x2.3cm and previously measured 2.1x1.4cm. Patient had an endocrine workup in the past with Dr. Isidra Dao and was told he had a low testosterone level but not offered treatment prior to surgery. Had TSR 2/1/24. Post op TSH 0.15, free thyroxine 0.8, consistent with secondary hypothyroidism. ACTH 3.5, am cortisol 0.5, consistent with secondary AI. LH 0.4, FSH 1.2. 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 The patient was discharged on levothyroxine 75mcg daily, hydrocortisone 10mg/5mg and DDAVP 0.5mg prn. Reports did not take desmopressin  - patient dumping urine, Na 142, U osm <1.005 concerning for compensated DI  - received solucortef 100mg IV on 2/19 2pm  - pathology consistent with craniopharyngioma  - follows with Dr. Isidra Dao  PLAN  In terms of DI:   Goal Na 140-145  - q12h BMP  - start DDAVP 0.05mg PO daily at bedtime  Recommendations:   - DI Watch: Please monitor strict I/O, sodium levels q8hrs (monitor for hypo or hypernatremia).   If urine output more than 500ml/h or 250ml/h for 2 consecutive hours get stat Na, if Na is increasing again compared to prior than bolus 1/2 NS to match half the output (for example, if net negative 2Liters can give 1Liter 1/2NS if patient is unable to keep up with output with PO intake)  - If Na > increases to 140 or higher would dose DDAVP 0.05mg and continue DI watch as above e   - Please make sure pt has access to water and encourage patient to drink to thirst   In terms of secondary AI  - continue hydrocortisone PO 20mg at 8am and 10mg PO at 3pm, plan to discharge on hydrocortisone 10mg at 8am and 5mg at 3pm, if HD unstable, start stress dose steroids  In terms of secondary hypothyroidism  - given FT4 only increase to 0.9, increase levothyroxine to 88mcg once daily (maintain on empty stomach (at least 1 hour before meals), separate by 4 hours from PPI or calcium supplementation which can inhibit its absorption)  In terms of hypogonadism  - outpatient management  - f/u IGF-1    Discussed with primary team.     Thank you for the interesting consult.    Joe Walker MD, Endocrinology Fellow  For non-urgent follow up questions, please email [email or web address] or [email or web address].  Pager [phone] from 9am to 5pm. After hours and on weekends, please call [phone].

## 2024-02-21 NOTE — DIETITIAN INITIAL EVALUATION ADULT - OTHER INFO
GI/Intake:   -Per Pt,   -Last BM documented ; bowel regimen ordered (Miralax, Senna)     Endo:   -Hydrocortisone steroid regimen ordered   -Monitor BG levels closely   -SSI ordered for coverage   -No hx of DM     Neruo:   -Hx of craniopharyngioma   -S/p TSR 24  -Presenting with CSF leak     Weight Hx:   -Current dosin pounds   -Per previous RD note: 179 pounds ()m -? true weight loss vs scale accuracy   -Pt states UBW: 170 pounds    GI/Intake:   -Per Pt and Spouse, varying PO intake in the setting of pain   -Last BM documented ; bowel regimen ordered (Miralax, Senna)     Endo:   -Hydrocortisone steroid regimen ordered   -Monitor BG levels closely   -SSI ordered for coverage   -No hx of DM     Neruo:   -Hx of craniopharyngioma   -S/p TSR 24  -Presenting with CSF leak     Weight Hx:   -Current dosin pounds   -Per previous RD note: 179 pounds () -? true weight loss vs scale accuracy   -Pt states UBW: 170 pounds   -Spouse does not note any significant weight loss

## 2024-02-21 NOTE — PROGRESS NOTE ADULT - ASSESSMENT
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    PLAN:  Neuro:   neuro checks q 2 hr  LD placed, drain 5cc /hr  MR head when stable  pain: oxy 5/10, tylenol, dilaudid  sedation: hold  activity: as tolerated, PT/OT saw, no needs    Respiratory:   mook >92% RA  skull base precautions      CV:   -150 mmhg   c/w home statin    Renal:   IVF: IVL  s/p ddAVP    GI:   Diet: regular diet   PPx: hold  zofran  Reg: miralax/senna    Endocrine:   FS goal normoglycemia  PAWAN  panhypopit, c/w hydrocort, synthroid    Heme:  DVT ppx: SCDs, hold chemoprophylaxis    LED 2/20: negative    ID:   febrile overnight 2/21  f/u blood + CSF cultures, UA    Social/Family:   Discharge planning:     Code Status: [x] Full Code [] DNR [] DNI [] Goals of Care:   Disposition: [x] ICU [] Stroke Unit [] RCU []PCU []Floor [] Discharge Home     Patient at high risk for neurologic deterioration, critical care time, excluding procedures: 30 minutes, csf rhinorrhea at risk for meningitis, brain sag, subdural hematoma

## 2024-02-21 NOTE — PROGRESS NOTE ADULT - SUBJECTIVE AND OBJECTIVE BOX
ENDOCRINE FOLLOW UP     Chief Complaint: panhypopituitarism    History:     MEDICATIONS  (STANDING):  atorvastatin 40 milliGRAM(s) Oral at bedtime  chlorhexidine 4% Liquid 1 Application(s) Topical daily  ergocalciferol 19594 Unit(s) Oral every week  hydrocortisone 10 milliGRAM(s) Oral <User Schedule>  hydrocortisone 20 milliGRAM(s) Oral <User Schedule>  insulin lispro (ADMELOG) corrective regimen sliding scale   SubCutaneous Before meals and at bedtime  levothyroxine 75 MICROGram(s) Oral daily  multivitamin 1 Tablet(s) Oral daily  polyethylene glycol 3350 17 Gram(s) Oral daily  senna 2 Tablet(s) Oral at bedtime    MEDICATIONS  (PRN):  acetaminophen     Tablet .. 650 milliGRAM(s) Oral every 6 hours PRN Temp greater or equal to 38C (100.4F), Mild Pain (1 - 3)  HYDROmorphone   Tablet 2 milliGRAM(s) Oral every 4 hours PRN Severe Pain (7 - 10)  melatonin 5 milliGRAM(s) Oral at bedtime PRN Insomnia  ondansetron Injectable 4 milliGRAM(s) IV Push every 6 hours PRN Nausea and/or Vomiting  oxyCODONE    IR 5 milliGRAM(s) Oral every 4 hours PRN Moderate Pain (4 - 6)  oxyCODONE    IR 10 milliGRAM(s) Oral every 4 hours PRN Severe Pain (7 - 10)      Allergies    No Known Allergies    Intolerances        ROS: All other systems reviewed and negative    PHYSICAL EXAM:  VITALS: T(C): 38.2 (02-21-24 @ 10:00)  T(F): 100.7 (02-21-24 @ 10:00), Max: 100.7 (02-21-24 @ 05:00)  HR: 95 (02-21-24 @ 10:00) (84 - 114)  BP: 127/73 (02-21-24 @ 10:00) (104/68 - 133/79)  RR:  (12 - 23)  SpO2:  (93% - 100%)  Wt(kg): --  GENERAL: NAD, resting comfortably   EYES: No proptosis,  anicteric  HEENT:  Atraumatic, Normocephalic, moist mucous membranes  RESPIRATORY: Nonlabored respirations on room air, normal rate/effort   CARDIOVASCULAR: Did not appear cyanotic, no lower extremity swelling visualized  GI: did not appear distended  NEURO: Answering questions appropriately, moves extremities spontaneously  PSYCH:  reactive affect, euthymic mood    POCT Blood Glucose.: 89 mg/dL (02-21-24 @ 08:12)  POCT Blood Glucose.: 131 mg/dL (02-20-24 @ 21:20)  POCT Blood Glucose.: 116 mg/dL (02-20-24 @ 18:35)  POCT Blood Glucose.: 120 mg/dL (02-20-24 @ 11:27)  POCT Blood Glucose.: 76 mg/dL (02-20-24 @ 07:38)      02-21    138  |  101  |  8   ----------------------------<  104<H>  3.8   |  25  |  0.91    eGFR: 107    Ca    9.6      02-21  Mg     2.1     02-21  Phos  4.2     02-21    TPro  7.7  /  Alb  4.3  /  TBili  0.3  /  DBili  x   /  AST  32  /  ALT  55<H>  /  AlkPhos  57  02-19      A1C with Estimated Average Glucose Result: 5.4 % (02-19-24 @ 15:14)      Thyroid Stimulating Hormone, Serum: 0.03 uIU/mL (02-19-24 @ 14:17)  Thyroid Stimulating Hormone, Serum: 0.05 uIU/mL (02-08-24 @ 06:39)  Thyroid Stimulating Hormone, Serum: 0.15 uIU/mL (02-03-24 @ 08:37)  Thyroid Stimulating Hormone, Serum: 0.27 uIU/mL (02-02-24 @ 11:35)   ENDOCRINE FOLLOW UP     Chief Complaint: panhypopituitarism    History:   He reports drain placed, first 4hr ok then developed headaches and having fever. He reports bad headache. Endorses polyuria and polydipsia that improved with ddavp. He reports IV tylenol helping headache. He denies nausea, vomiting.    MEDICATIONS  (STANDING):  atorvastatin 40 milliGRAM(s) Oral at bedtime  chlorhexidine 4% Liquid 1 Application(s) Topical daily  ergocalciferol 84915 Unit(s) Oral every week  hydrocortisone 10 milliGRAM(s) Oral <User Schedule>  hydrocortisone 20 milliGRAM(s) Oral <User Schedule>  insulin lispro (ADMELOG) corrective regimen sliding scale   SubCutaneous Before meals and at bedtime  levothyroxine 75 MICROGram(s) Oral daily  multivitamin 1 Tablet(s) Oral daily  polyethylene glycol 3350 17 Gram(s) Oral daily  senna 2 Tablet(s) Oral at bedtime    MEDICATIONS  (PRN):  acetaminophen     Tablet .. 650 milliGRAM(s) Oral every 6 hours PRN Temp greater or equal to 38C (100.4F), Mild Pain (1 - 3)  HYDROmorphone   Tablet 2 milliGRAM(s) Oral every 4 hours PRN Severe Pain (7 - 10)  melatonin 5 milliGRAM(s) Oral at bedtime PRN Insomnia  ondansetron Injectable 4 milliGRAM(s) IV Push every 6 hours PRN Nausea and/or Vomiting  oxyCODONE    IR 5 milliGRAM(s) Oral every 4 hours PRN Moderate Pain (4 - 6)  oxyCODONE    IR 10 milliGRAM(s) Oral every 4 hours PRN Severe Pain (7 - 10)      Allergies    No Known Allergies    Intolerances        ROS: All other systems reviewed and negative    PHYSICAL EXAM:  VITALS: T(C): 38.2 (02-21-24 @ 10:00)  T(F): 100.7 (02-21-24 @ 10:00), Max: 100.7 (02-21-24 @ 05:00)  HR: 95 (02-21-24 @ 10:00) (84 - 114)  BP: 127/73 (02-21-24 @ 10:00) (104/68 - 133/79)  RR:  (12 - 23)  SpO2:  (93% - 100%)  Wt(kg): --  GENERAL: tired appearing, lying in bed  EYES: No proptosis,  anicteric  HEENT:  dry mucous membranes, nasal packing in place  RESPIRATORY: Nonlabored respirations on room air, normal rate/effort   CARDIOVASCULAR: Did not appear cyanotic, no lower extremity swelling visualized  GI: did not appear distended  NEURO: Answering questions appropriately, moves extremities spontaneously  PSYCH:  reactive affect, euthymic mood    POCT Blood Glucose.: 89 mg/dL (02-21-24 @ 08:12)  POCT Blood Glucose.: 131 mg/dL (02-20-24 @ 21:20)  POCT Blood Glucose.: 116 mg/dL (02-20-24 @ 18:35)  POCT Blood Glucose.: 120 mg/dL (02-20-24 @ 11:27)  POCT Blood Glucose.: 76 mg/dL (02-20-24 @ 07:38)      02-21    138  |  101  |  8   ----------------------------<  104<H>  3.8   |  25  |  0.91    eGFR: 107    Ca    9.6      02-21  Mg     2.1     02-21  Phos  4.2     02-21    TPro  7.7  /  Alb  4.3  /  TBili  0.3  /  DBili  x   /  AST  32  /  ALT  55<H>  /  AlkPhos  57  02-19      A1C with Estimated Average Glucose Result: 5.4 % (02-19-24 @ 15:14)      Thyroid Stimulating Hormone, Serum: 0.03 uIU/mL (02-19-24 @ 14:17)  Thyroid Stimulating Hormone, Serum: 0.05 uIU/mL (02-08-24 @ 06:39)  Thyroid Stimulating Hormone, Serum: 0.15 uIU/mL (02-03-24 @ 08:37)  Thyroid Stimulating Hormone, Serum: 0.27 uIU/mL (02-02-24 @ 11:35)

## 2024-02-21 NOTE — PROGRESS NOTE ADULT - SUBJECTIVE AND OBJECTIVE BOX
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 Presented with ~4 days clear fluid leaking from nostrils (L>R) on standing or valsalva. Reports no h/as. Afebrile.    HOSPITAL COURSE:  2/20 No acute events. LD placed by neurorads. Temp of 38.2, worsening headaches.  2/21 ?LD working, CTH/CT L spine shows good placement, MRI done    DEVICES: [] Restraints [] LUCINDA/HMV [x]LD [] ET tube [] Trach [] Chest Tube [] A-line [] Nunez [] NGT [] Rectal Tube     ICU Vital Signs Last 24 Hrs  T(C): 36.8 (21 Feb 2024 15:00), Max: 38.7 (21 Feb 2024 11:00)  T(F): 98.2 (21 Feb 2024 15:00), Max: 101.7 (21 Feb 2024 11:00)  HR: 98 (21 Feb 2024 15:00) (85 - 114)  BP: 111/71 (21 Feb 2024 15:00) (104/68 - 137/68)  BP(mean): 88 (21 Feb 2024 15:00) (76 - 106)  ABP: --  ABP(mean): --  RR: 12 (21 Feb 2024 15:00) (12 - 23)  SpO2: 96% (21 Feb 2024 15:00) (89% - 100%)    O2 Parameters below as of 21 Feb 2024 14:20  Patient On (Oxygen Delivery Method): mask, Venturi  O2 Flow (L/min): 3  O2 Concentration (%): 24    CAPILLARY BLOOD GLUCOSE  POCT Blood Glucose.: 118 mg/dL (21 Feb 2024 16:11)  POCT Blood Glucose.: 97 mg/dL (21 Feb 2024 11:25)  POCT Blood Glucose.: 89 mg/dL (21 Feb 2024 08:12)  POCT Blood Glucose.: 131 mg/dL (20 Feb 2024 21:20)    I&O's Summary    20 Feb 2024 07:01  -  21 Feb 2024 07:00  --------------------------------------------------------  IN: 4980 mL / OUT: 5265 mL / NET: -285 mL    21 Feb 2024 07:01  -  21 Feb 2024 19:00  --------------------------------------------------------  IN: 970 mL / OUT: 2055 mL / NET: -1085 mL    LABS:                10.9   8.76  )-----------( 307      ( 21 Feb 2024 00:53 )             32.9   02-21    137  |  101  |  7   ----------------------------<  126<H>  4.4   |  24  |  0.87    Ca    9.0      21 Feb 2024 16:16  Phos  5.3     02-21  Mg     1.9     02-21      MEDICATIONS  (STANDING):  atorvastatin 40 milliGRAM(s) Oral at bedtime  chlorhexidine 4% Liquid 1 Application(s) Topical daily  desmopressin 0.05 milliGRAM(s) Oral at bedtime  enoxaparin Injectable 40 milliGRAM(s) SubCutaneous <User Schedule>  ergocalciferol 95665 Unit(s) Oral every week  hydrocortisone 10 milliGRAM(s) Oral <User Schedule>  hydrocortisone 20 milliGRAM(s) Oral <User Schedule>  insulin lispro (ADMELOG) corrective regimen sliding scale   SubCutaneous Before meals and at bedtime  levothyroxine 88 MICROGram(s) Oral daily  magnesium sulfate  IVPB 1 Gram(s) IV Intermittent once  multivitamin 1 Tablet(s) Oral daily  polyethylene glycol 3350 17 Gram(s) Oral daily  senna 2 Tablet(s) Oral at bedtime    MEDICATIONS  (PRN):  acetaminophen     Tablet .. 650 milliGRAM(s) Oral every 6 hours PRN Temp greater or equal to 38C (100.4F), Mild Pain (1 - 3)  HYDROmorphone   Tablet 2 milliGRAM(s) Oral every 4 hours PRN Severe Pain (7 - 10)  melatonin 5 milliGRAM(s) Oral at bedtime PRN Insomnia  ondansetron Injectable 4 milliGRAM(s) IV Push every 6 hours PRN Nausea and/or Vomiting  oxyCODONE    IR 10 milliGRAM(s) Oral every 4 hours PRN Severe Pain (7 - 10)  oxyCODONE    IR 5 milliGRAM(s) Oral every 4 hours PRN Moderate Pain (4 - 6)        PHYSICAL EXAM:    General: Uncomfortable appearing. No Acute Distress     Neurological: Awake, alert oriented to person, place and time, Following Commands, PERRL, EOMI, Face Symmetrical, Speech Fluent, Moving all extremities, Muscle Strength normal in all four extremities, No Drift, Sensation to Light Touch Intact    Pulmonary: Clear to Auscultation, No Rales, No Rhonchi, No Wheezes     Cardiovascular: S1, S2, Regular Rate and Rhythm     Gastrointestinal: Soft, Nontender, Nondistended     Extremities: No calf tenderness

## 2024-02-21 NOTE — PROGRESS NOTE ADULT - ASSESSMENT
LD@5cc/hr  -160  bmp q6  On hydrocort 20/10, DDAVP x1, Free T4/IGF in AM per endo  MR brain ww/o at some point

## 2024-02-21 NOTE — DIETITIAN INITIAL EVALUATION ADULT - REASON FOR ADMISSION
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 Presented with ~4 days clear fluid leaking from nostrils (L>R) on standing or valsalva."

## 2024-02-21 NOTE — CONSULT NOTE ADULT - ASSESSMENT
43M hx HLD, chronic HAs, s/p TSR of craniopharyngioma w/ Dr. 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 Presented with ~4 days clear fluid leaking from nostrils (L>R) on standing or valsalva. Also reporting consistent salty taste. On exam, no obvious leaking from b/l nares noted. Nasal endoscopy performed which shows post-op changes, dissolvable packing in place with possible small leak around flap. CT Sinus shows "Bony defect along the anterior sellar floor corresponding with recent surgery. Air-fluid level in the sphenoid sinus is noted. A CSF leak is not ruled out."

## 2024-02-22 ENCOUNTER — TRANSCRIPTION ENCOUNTER (OUTPATIENT)
Age: 44
End: 2024-02-22

## 2024-02-22 ENCOUNTER — APPOINTMENT (OUTPATIENT)
Dept: SPINE | Facility: CLINIC | Age: 44
End: 2024-02-22

## 2024-02-22 LAB
ANION GAP SERPL CALC-SCNC: 11 MMOL/L — SIGNIFICANT CHANGE UP (ref 5–17)
ANION GAP SERPL CALC-SCNC: 12 MMOL/L — SIGNIFICANT CHANGE UP (ref 5–17)
ANION GAP SERPL CALC-SCNC: 20 MMOL/L — HIGH (ref 5–17)
APPEARANCE CSF: ABNORMAL
BLD GP AB SCN SERPL QL: NEGATIVE — SIGNIFICANT CHANGE UP
BUN SERPL-MCNC: 6 MG/DL — LOW (ref 7–23)
BUN SERPL-MCNC: 6 MG/DL — LOW (ref 7–23)
BUN SERPL-MCNC: 7 MG/DL — SIGNIFICANT CHANGE UP (ref 7–23)
CALCIUM SERPL-MCNC: 10.2 MG/DL — SIGNIFICANT CHANGE UP (ref 8.4–10.5)
CALCIUM SERPL-MCNC: 9.3 MG/DL — SIGNIFICANT CHANGE UP (ref 8.4–10.5)
CALCIUM SERPL-MCNC: 9.5 MG/DL — SIGNIFICANT CHANGE UP (ref 8.4–10.5)
CHLORIDE SERPL-SCNC: 100 MMOL/L — SIGNIFICANT CHANGE UP (ref 96–108)
CHLORIDE SERPL-SCNC: 100 MMOL/L — SIGNIFICANT CHANGE UP (ref 96–108)
CHLORIDE SERPL-SCNC: 103 MMOL/L — SIGNIFICANT CHANGE UP (ref 96–108)
CO2 SERPL-SCNC: 23 MMOL/L — SIGNIFICANT CHANGE UP (ref 22–31)
CO2 SERPL-SCNC: 24 MMOL/L — SIGNIFICANT CHANGE UP (ref 22–31)
CO2 SERPL-SCNC: 24 MMOL/L — SIGNIFICANT CHANGE UP (ref 22–31)
COLOR CSF: YELLOW
CREAT SERPL-MCNC: 0.77 MG/DL — SIGNIFICANT CHANGE UP (ref 0.5–1.3)
CREAT SERPL-MCNC: 0.78 MG/DL — SIGNIFICANT CHANGE UP (ref 0.5–1.3)
CREAT SERPL-MCNC: 0.83 MG/DL — SIGNIFICANT CHANGE UP (ref 0.5–1.3)
EGFR: 111 ML/MIN/1.73M2 — SIGNIFICANT CHANGE UP
EGFR: 113 ML/MIN/1.73M2 — SIGNIFICANT CHANGE UP
EGFR: 114 ML/MIN/1.73M2 — SIGNIFICANT CHANGE UP
FLUAV AG NPH QL: SIGNIFICANT CHANGE UP
FLUBV AG NPH QL: SIGNIFICANT CHANGE UP
GLUCOSE BLDC GLUCOMTR-MCNC: 111 MG/DL — HIGH (ref 70–99)
GLUCOSE BLDC GLUCOMTR-MCNC: 139 MG/DL — HIGH (ref 70–99)
GLUCOSE BLDC GLUCOMTR-MCNC: 142 MG/DL — HIGH (ref 70–99)
GLUCOSE BLDC GLUCOMTR-MCNC: 186 MG/DL — HIGH (ref 70–99)
GLUCOSE CSF-MCNC: 53 MG/DL — SIGNIFICANT CHANGE UP (ref 40–70)
GLUCOSE SERPL-MCNC: 145 MG/DL — HIGH (ref 70–99)
GLUCOSE SERPL-MCNC: 192 MG/DL — HIGH (ref 70–99)
GLUCOSE SERPL-MCNC: 77 MG/DL — SIGNIFICANT CHANGE UP (ref 70–99)
GRAM STN FLD: SIGNIFICANT CHANGE UP
HCT VFR BLD CALC: 38.4 % — LOW (ref 39–50)
HGB BLD-MCNC: 12.7 G/DL — LOW (ref 13–17)
INSULIN-LIKE GROWTH FACTOR 1 INTERPRETATION: SIGNIFICANT CHANGE UP
INSULIN-LIKE GROWTH FACTOR 1: 46 NG/ML — LOW (ref 78–233)
LDH CSF L TO P-CCNC: 63 U/L — SIGNIFICANT CHANGE UP
LDH FLD-CCNC: 63 U/L — SIGNIFICANT CHANGE UP
LYMPHOCYTES # CSF: 11 % — LOW (ref 40–80)
MAGNESIUM SERPL-MCNC: 2.2 MG/DL — SIGNIFICANT CHANGE UP (ref 1.6–2.6)
MAGNESIUM SERPL-MCNC: 2.4 MG/DL — SIGNIFICANT CHANGE UP (ref 1.6–2.6)
MCHC RBC-ENTMCNC: 28.7 PG — SIGNIFICANT CHANGE UP (ref 27–34)
MCHC RBC-ENTMCNC: 33.1 GM/DL — SIGNIFICANT CHANGE UP (ref 32–36)
MCV RBC AUTO: 86.9 FL — SIGNIFICANT CHANGE UP (ref 80–100)
MONOS+MACROS NFR CSF: 17 % — SIGNIFICANT CHANGE UP (ref 15–45)
NEUTROPHILS # CSF: 72 % — HIGH (ref 0–6)
NRBC # BLD: 0 /100 WBCS — SIGNIFICANT CHANGE UP (ref 0–0)
NRBC NFR CSF: 770 /UL — HIGH (ref 0–5)
OSMOLALITY SERPL: 288 MOSMOL/KG — SIGNIFICANT CHANGE UP (ref 275–300)
PHOSPHATE SERPL-MCNC: 4.3 MG/DL — SIGNIFICANT CHANGE UP (ref 2.5–4.5)
PHOSPHATE SERPL-MCNC: 4.9 MG/DL — HIGH (ref 2.5–4.5)
PLATELET # BLD AUTO: 317 K/UL — SIGNIFICANT CHANGE UP (ref 150–400)
POTASSIUM SERPL-MCNC: 4 MMOL/L — SIGNIFICANT CHANGE UP (ref 3.5–5.3)
POTASSIUM SERPL-MCNC: 4.1 MMOL/L — SIGNIFICANT CHANGE UP (ref 3.5–5.3)
POTASSIUM SERPL-MCNC: 4.7 MMOL/L — SIGNIFICANT CHANGE UP (ref 3.5–5.3)
POTASSIUM SERPL-SCNC: 4 MMOL/L — SIGNIFICANT CHANGE UP (ref 3.5–5.3)
POTASSIUM SERPL-SCNC: 4.1 MMOL/L — SIGNIFICANT CHANGE UP (ref 3.5–5.3)
POTASSIUM SERPL-SCNC: 4.7 MMOL/L — SIGNIFICANT CHANGE UP (ref 3.5–5.3)
PROT CSF-MCNC: 116 MG/DL — HIGH (ref 15–45)
RBC # BLD: 4.42 M/UL — SIGNIFICANT CHANGE UP (ref 4.2–5.8)
RBC # CSF: 2375 /UL — HIGH (ref 0–0)
RBC # FLD: 12.7 % — SIGNIFICANT CHANGE UP (ref 10.3–14.5)
RH IG SCN BLD-IMP: POSITIVE — SIGNIFICANT CHANGE UP
RSV RNA NPH QL NAA+NON-PROBE: SIGNIFICANT CHANGE UP
SARS-COV-2 RNA SPEC QL NAA+PROBE: SIGNIFICANT CHANGE UP
SODIUM SERPL-SCNC: 135 MMOL/L — SIGNIFICANT CHANGE UP (ref 135–145)
SODIUM SERPL-SCNC: 139 MMOL/L — SIGNIFICANT CHANGE UP (ref 135–145)
SODIUM SERPL-SCNC: 143 MMOL/L — SIGNIFICANT CHANGE UP (ref 135–145)
SPECIMEN SOURCE: SIGNIFICANT CHANGE UP
TUBE TYPE: SIGNIFICANT CHANGE UP
WBC # BLD: 8.1 K/UL — SIGNIFICANT CHANGE UP (ref 3.8–10.5)
WBC # FLD AUTO: 8.1 K/UL — SIGNIFICANT CHANGE UP (ref 3.8–10.5)

## 2024-02-22 PROCEDURE — 71045 X-RAY EXAM CHEST 1 VIEW: CPT | Mod: 26

## 2024-02-22 PROCEDURE — 99232 SBSQ HOSP IP/OBS MODERATE 35: CPT

## 2024-02-22 PROCEDURE — 99233 SBSQ HOSP IP/OBS HIGH 50: CPT

## 2024-02-22 RX ORDER — SODIUM CHLORIDE 9 MG/ML
1000 INJECTION INTRAMUSCULAR; INTRAVENOUS; SUBCUTANEOUS ONCE
Refills: 0 | Status: COMPLETED | OUTPATIENT
Start: 2024-02-22 | End: 2024-02-22

## 2024-02-22 RX ORDER — DIPHENHYDRAMINE HCL 50 MG
50 CAPSULE ORAL ONCE
Refills: 0 | Status: COMPLETED | OUTPATIENT
Start: 2024-02-22 | End: 2024-02-22

## 2024-02-22 RX ORDER — DEXAMETHASONE 0.5 MG/5ML
10 ELIXIR ORAL ONCE
Refills: 0 | Status: COMPLETED | OUTPATIENT
Start: 2024-02-22 | End: 2024-02-22

## 2024-02-22 RX ORDER — ACETAMINOPHEN 500 MG
1000 TABLET ORAL ONCE
Refills: 0 | Status: COMPLETED | OUTPATIENT
Start: 2024-02-22 | End: 2024-02-22

## 2024-02-22 RX ORDER — HYDROCORTISONE 20 MG
100 TABLET ORAL ONCE
Refills: 0 | Status: COMPLETED | OUTPATIENT
Start: 2024-02-23 | End: 2024-02-23

## 2024-02-22 RX ORDER — SODIUM CHLORIDE 9 MG/ML
1000 INJECTION INTRAMUSCULAR; INTRAVENOUS; SUBCUTANEOUS ONCE
Refills: 0 | Status: DISCONTINUED | OUTPATIENT
Start: 2024-02-22 | End: 2024-02-22

## 2024-02-22 RX ORDER — SODIUM CHLORIDE 9 MG/ML
1000 INJECTION INTRAMUSCULAR; INTRAVENOUS; SUBCUTANEOUS
Refills: 0 | Status: DISCONTINUED | OUTPATIENT
Start: 2024-02-22 | End: 2024-02-23

## 2024-02-22 RX ADMIN — POLYETHYLENE GLYCOL 3350 17 GRAM(S): 17 POWDER, FOR SOLUTION ORAL at 13:42

## 2024-02-22 RX ADMIN — Medication 1000 MILLIGRAM(S): at 04:19

## 2024-02-22 RX ADMIN — Medication 20 MILLIGRAM(S): at 07:04

## 2024-02-22 RX ADMIN — SENNA PLUS 2 TABLET(S): 8.6 TABLET ORAL at 21:35

## 2024-02-22 RX ADMIN — Medication 10 MILLIGRAM(S): at 15:27

## 2024-02-22 RX ADMIN — Medication 102 MILLIGRAM(S): at 08:23

## 2024-02-22 RX ADMIN — DESMOPRESSIN ACETATE 0.05 MILLIGRAM(S): 0.1 TABLET ORAL at 21:35

## 2024-02-22 RX ADMIN — Medication 50 MILLIGRAM(S): at 08:22

## 2024-02-22 RX ADMIN — Medication 1 TABLET(S): at 13:42

## 2024-02-22 RX ADMIN — ATORVASTATIN CALCIUM 40 MILLIGRAM(S): 80 TABLET, FILM COATED ORAL at 21:35

## 2024-02-22 RX ADMIN — Medication 2: at 17:28

## 2024-02-22 RX ADMIN — CHLORHEXIDINE GLUCONATE 1 APPLICATION(S): 213 SOLUTION TOPICAL at 12:44

## 2024-02-22 RX ADMIN — OXYCODONE HYDROCHLORIDE 10 MILLIGRAM(S): 5 TABLET ORAL at 01:51

## 2024-02-22 RX ADMIN — SODIUM CHLORIDE 1000 MILLILITER(S): 9 INJECTION INTRAMUSCULAR; INTRAVENOUS; SUBCUTANEOUS at 13:41

## 2024-02-22 RX ADMIN — Medication 400 MILLIGRAM(S): at 04:04

## 2024-02-22 RX ADMIN — Medication 0.1 MILLILITER(S): at 11:45

## 2024-02-22 RX ADMIN — Medication 88 MICROGRAM(S): at 05:20

## 2024-02-22 RX ADMIN — OXYCODONE HYDROCHLORIDE 10 MILLIGRAM(S): 5 TABLET ORAL at 02:11

## 2024-02-22 NOTE — PROGRESS NOTE ADULT - PROBLEM SELECTOR PLAN 1
- Continue to monitor for CSF leak with LD in place per NSCU   - TSRP precautions (no straws, incentive spirometry, bipap)   - ENT will continue to follow  - Call with questions or concerns.

## 2024-02-22 NOTE — PROGRESS NOTE ADULT - ASSESSMENT
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 Today, p/f leaking from nostrils (L>R) when he stands up or valsalvas. Endocrinology consulted for management of panhypopituitarism.    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 MRI 1/24 showed abnormal enhancing lesion involving the suprasellar region measuring approximately 2.1x2.3cm and previously measured 2.1x1.4cm. Patient had an endocrine workup in the past with Dr. Isidra Dao and was told he had a low testosterone level but not offered treatment prior to surgery. Had TSR 2/1/24. Post op TSH 0.15, free thyroxine 0.8, consistent with secondary hypothyroidism. ACTH 3.5, am cortisol 0.5, consistent with secondary AI. LH 0.4, FSH 1.2. 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 The patient was discharged on levothyroxine 75mcg daily, hydrocortisone 10mg/5mg and DDAVP 0.05mg prn. Reports did not take desmopressin  - patient dumping urine, Na 142, U osm <1.005 concerning for compensated DI  - IGF-1 46 low)  - pathology consistent with craniopharyngioma  - follows with Dr. Isidra Dao  PLAN  In terms of DI:   Goal Na 140-145  - q12h BMP  - start DDAVP 0.05mg PO daily at bedtime  Recommendations:   - DI Watch: Please monitor strict I/O, sodium levels q8hrs (monitor for hypo or hypernatremia).   If urine output more than 500ml/h or 250ml/h for 2 consecutive hours get stat Na, if Na is increasing again compared to prior than bolus 1/2 NS to match half the output (for example, if net negative 2Liters can give 1Liter 1/2NS if patient is unable to keep up with output with PO intake)  - If Na > increases to 140 or higher would dose DDAVP 0.05mg and continue DI watch as above e   - Please make sure pt has access to water and encourage patient to drink to thirst   In terms of secondary AI  - continue hydrocortisone PO 20mg at 8am and 10mg PO at 3pm, plan to discharge on hydrocortisone 10mg at 8am and 5mg at 3pm, if HD unstable, start stress dose steroids  In terms of secondary hypothyroidism  - given FT4 only increase to 0.9, increase levothyroxine to 88mcg once daily (maintain on empty stomach (at least 1 hour before meals), separate by 4 hours from PPI or calcium supplementation which can inhibit its absorption)  In terms of hypogonadism  - outpatient management  In terms of growth hormone deficiency  - outpatient management    Discussed with primary team.     Thank you for the interesting consult.    Joe Walker MD, Endocrinology Fellow  For non-urgent follow up questions, please email [email or web address] or [email or web address].  Pager [phone] from 9am to 5pm. After hours and on weekends, please call [phone]. 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 Today, p/f leaking from nostrils (L>R) when he stands up or valsalvas. Endocrinology consulted for management of panhypopituitarism.    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 MRI 1/24 showed abnormal enhancing lesion involving the suprasellar region measuring approximately 2.1x2.3cm and previously measured 2.1x1.4cm. Patient had an endocrine workup in the past with Dr. Isidra Dao and was told he had a low testosterone level but not offered treatment prior to surgery. Had TSR 2/1/24. Post op TSH 0.15, free thyroxine 0.8, consistent with secondary hypothyroidism. ACTH 3.5, am cortisol 0.5, consistent with secondary AI. LH 0.4, FSH 1.2. 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 The patient was discharged on levothyroxine 75mcg daily, hydrocortisone 10mg/5mg and DDAVP 0.05mg prn. Reports did not take desmopressin  - patient dumping urine, Na 142, U osm <1.005 concerning for compensated DI  - IGF-1 46 low)  - pathology consistent with craniopharyngioma  - follows with Dr. Isidra Dao  PLAN  In terms of DI:   Goal Na 140-145  - q12h BMP  - continue DDAVP 0.05mg PO daily at bedtime, will consider increasing to 0.1mg daily at bedtime pending sodium and output on 2/23  Recommendations:   - DI Watch: Please monitor strict I/O, sodium levels q8hrs (monitor for hypo or hypernatremia).   If urine output more than 500ml/h or 250ml/h for 2 consecutive hours get stat Na, if Na is increasing again compared to prior than bolus 1/2 NS to match half the output (for example, if net negative 2Liters can give 1Liter 1/2NS if patient is unable to keep up with output with PO intake)  - If Na > increases to 140 or higher would dose DDAVP 0.05mg and continue DI watch as above e   - Please make sure pt has access to water and encourage patient to drink to thirst   In terms of secondary AI  - please give patient 100mg IV hydrocortisone on call to OR on 2/23  - continue hydrocortisone PO 20mg at 8am and 10mg PO at 3pm, plan to discharge on hydrocortisone 10mg at 8am and 5mg at 3pm, if HD unstable, start stress dose steroids  In terms of secondary hypothyroidism  - given FT4 only increase to 0.9, increase levothyroxine to 88mcg once daily (maintain on empty stomach (at least 1 hour before meals), separate by 4 hours from PPI or calcium supplementation which can inhibit its absorption)  In terms of hypogonadism  - outpatient management  In terms of growth hormone deficiency  - outpatient management    Discussed with primary team.     Thank you for the interesting consult.    Joe Walker MD, Endocrinology Fellow  For non-urgent follow up questions, please email [email or web address] or [email or web address].  Pager [phone] from 9am to 5pm. After hours and on weekends, please call [phone].

## 2024-02-22 NOTE — PROGRESS NOTE ADULT - SUBJECTIVE AND OBJECTIVE BOX
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 Presented with ~4 days clear fluid leaking from nostrils (L>R) on standing or valsalva. Reports no h/as. Afebrile.      24H Events: No acute events. Febrile to 39.3, episodes of desats to high 80s now on NRB.      OBJECTIVE:    ICU Vital Signs Last 24 Hrs  T(C): 37.2 (22 Feb 2024 05:00), Max: 39.3 (22 Feb 2024 04:00)  T(F): 98.9 (22 Feb 2024 05:00), Max: 102.7 (22 Feb 2024 04:00)  HR: 70 (22 Feb 2024 06:00) (70 - 123)  BP: 101/59 (22 Feb 2024 06:00) (101/59 - 137/68)  BP(mean): 75 (22 Feb 2024 06:00) (73 - 101)  ABP: --  ABP(mean): --  RR: 19 (22 Feb 2024 06:00) (12 - 26)  SpO2: 94% (22 Feb 2024 06:00) (89% - 96%)    O2 Parameters below as of 22 Feb 2024 03:00  Patient On (Oxygen Delivery Method): mask, Venturi    O2 Concentration (%): 35            I&O's Summary    21 Feb 2024 07:01  -  22 Feb 2024 07:00  --------------------------------------------------------  IN: 3170 mL / OUT: 6430 mL / NET: -3260 mL        MEDICATIONS  (STANDING):  atorvastatin 40 milliGRAM(s) Oral at bedtime  chlorhexidine 4% Liquid 1 Application(s) Topical daily  desmopressin 0.05 milliGRAM(s) Oral at bedtime  dexAMETHasone  IVPB 10 milliGRAM(s) IV Intermittent once  diphenhydrAMINE Injectable 50 milliGRAM(s) IV Push once  enoxaparin Injectable 40 milliGRAM(s) SubCutaneous <User Schedule>  ergocalciferol 13834 Unit(s) Oral every week  fluorescein 10% IntraThecal 0.1 milliLiter(s) IntraThecal. once  hydrocortisone 10 milliGRAM(s) Oral <User Schedule>  hydrocortisone 20 milliGRAM(s) Oral <User Schedule>  insulin lispro (ADMELOG) corrective regimen sliding scale   SubCutaneous Before meals and at bedtime  levothyroxine 88 MICROGram(s) Oral daily  multivitamin 1 Tablet(s) Oral daily  polyethylene glycol 3350 17 Gram(s) Oral daily  senna 2 Tablet(s) Oral at bedtime    MEDICATIONS  (PRN):  acetaminophen     Tablet .. 650 milliGRAM(s) Oral every 6 hours PRN Temp greater or equal to 38C (100.4F), Mild Pain (1 - 3)  melatonin 5 milliGRAM(s) Oral at bedtime PRN Insomnia  ondansetron Injectable 4 milliGRAM(s) IV Push every 6 hours PRN Nausea and/or Vomiting  oxyCODONE    IR 5 milliGRAM(s) Oral every 4 hours PRN Moderate Pain (4 - 6)  oxyCODONE    IR 10 milliGRAM(s) Oral every 4 hours PRN Severe Pain (7 - 10)      LABS    (02-22 @ 00:12)                      12.7  8.10 )-----------( 317                 38.4    Neutrophils = -- (--%)  Lymphocytes = -- (--%)  Eosinophils = -- (--%)  Basophils = -- (--%)  Monocytes = -- (--%)  Bands = --%    02-22    139  |  103  |  6<L>  ----------------------------<  145<H>  4.7   |  24  |  0.77    Ca    9.3      22 Feb 2024 00:12  Phos  4.3     02-22  Mg     2.4     02-22              Urinalysis Basic - ( 22 Feb 2024 00:12 )    Color: x / Appearance: x / SG: x / pH: x  Gluc: 145 mg/dL / Ketone: x  / Bili: x / Urobili: x   Blood: x / Protein: x / Nitrite: x   Leuk Esterase: x / RBC: x / WBC x   Sq Epi: x / Non Sq Epi: x / Bacteria: x          IMAGING    CAPILLARY BLOOD GLUCOSE      POCT Blood Glucose.: 111 mg/dL (22 Feb 2024 05:19)  POCT Blood Glucose.: 121 mg/dL (21 Feb 2024 22:23)  POCT Blood Glucose.: 118 mg/dL (21 Feb 2024 16:11)  POCT Blood Glucose.: 97 mg/dL (21 Feb 2024 11:25)  POCT Blood Glucose.: 89 mg/dL (21 Feb 2024 08:12)                  PHYSICAL EXAM:    General: Uncomfortable appearing. No Acute Distress     Neurological: Awake, alert oriented to person, place and time, Following Commands, PERRL, EOMI, Face Symmetrical, Speech Fluent, Moving all extremities, Muscle Strength normal in all four extremities, No Drift, Sensation to Light Touch Intact    Pulmonary: Clear to Auscultation, No Rales, No Rhonchi, No Wheezes     Cardiovascular: S1, S2, Regular Rate and Rhythm     Gastrointestinal: Soft, Nontender, Nondistended     Extremities: No calf tenderness        DEVICES: [] Restraints [] LUCINDA/HMV [x]LD [] ET tube [] Trach [] Chest Tube [] A-line [] Nunez [] NGT [] Rectal Tube

## 2024-02-22 NOTE — PROGRESS NOTE ADULT - SUBJECTIVE AND OBJECTIVE BOX
Pt seen and examined.  LD in place.  Fluoroscein injected earlier today  Awake alert  RED well  vision good.    I leaned him forward and there was 1-2 drops of fluorscein stained CSF from his Rt nostril    Given cont' CSF leak despite LD will proceed with re-exploration endoscopically and repair for CSF leak. I discuss this with pt and his wife and explained the risks, benefits, indications, alternatives with them and answered all their questions.    Will proceed on Fri 2/23 along with Dr. Wren (ENT)

## 2024-02-22 NOTE — PROGRESS NOTE ADULT - ASSESSMENT
43M hx HLD, chronic HAs, s/p TSR of craniopharyngioma w/ Dr. Conway + Dr Brown 2/1/24. Presented with ~4 days clear fluid leaking from nostrils (L>R) on standing or valsalva.  On exam, no obvious leaking from b/l nares or in the posterior oropharynx . Nasal endoscopy performed  overnight showed post-op changes, dissolvable packing in place with possible small leak around flap. CT Sinus shows "Bony defect along the anterior sellar floor corresponding with recent surgery. Air-fluid level in the sphenoid sinus is noted. A CSF leak is not ruled out."

## 2024-02-22 NOTE — PROGRESS NOTE ADULT - ASSESSMENT
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 Pt underwent LD placement in Neuro IR on 2/20/24.     Neuro:   neuro checks q 2 hr  fluorescein via LD today  LD placed, drain 5cc /hr  MR head - follow up official read   pain: oxy 5/10, Tylenol   sedation: hold  activity: as tolerated, PT/OT saw, no needs    Respiratory:   Currently on venturi mask in setting of desaturation   Previous chest XR were unremarkable- will repeat another CXR   RVP - pending   skull base precautions      CV:   -160 mmhg   c/w home statin    Renal:   IVF:   ddAVP 0.05 mg QHS    GI:   Diet: regular diet   PPx: hold  zofran  Reg: miralax/senna  LBM  2/20    Endocrine:   FS goal normoglycemia  PAWAN  panhypopit, c/w hydrocort, synthroid    Heme:  DVT ppx: SCDs, lovenox  LED 2/20: negative    ID:   Febrile  CSF cultures follow   UA negative  f/u blood + CSF cultures    Social/Family:   Discharge planning:     Code Status: [x] Full Code [] DNR [] DNI [] Goals of Care:   Disposition: [x] ICU [] Stroke Unit [] RCU []PCU []Floor [] Discharge Home     Patient at high risk for neurologic deterioration, critical care time, excluding procedures: 30 minutes, csf rhinorrhea at risk for meningitis, brain sag, subdural hematoma           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 Pt underwent LD placement in Neuro IR on 2/20/24.     Neuro:   neuro checks q 2 hr  fluorescein via LD today  LD placed, drain 5cc /hr  2/21 MRI: postop changes, decreased soft tissue thickening  pain: oxy 5/10, Tylenol   sedation: hold  activity: as tolerated, PT/OT saw, no needs    Respiratory:   Currently on venturi mask in setting of desaturation   Previous chest XR were unremarkable- will repeat another CXR   RVP - pending   skull base precautions      CV:   -160 mmhg   c/w home statin    Renal:   IVF:   ddAVP 0.05 mg QHS    GI:   Diet: regular diet   PPx: hold  zofran  Reg: miralax/senna  LBM  2/20    Endocrine:   FS goal normoglycemia  PAWAN  panhypopit, c/w hydrocort, synthroid    Heme:  DVT ppx: SCDs, lovenox  LED 2/20: negative    ID:   Febrile  CSF cultures follow   UA negative  f/u blood + CSF cultures    Social/Family:   Discharge planning:     Code Status: [x] Full Code [] DNR [] DNI [] Goals of Care:   Disposition: [x] ICU [] Stroke Unit [] RCU []PCU []Floor [] Discharge Home     Patient at high risk for neurologic deterioration, critical care time, excluding procedures: 30 minutes, csf rhinorrhea at risk for meningitis, brain sag, subdural hematoma           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 Pt underwent LD placement in Neuro IR on 2/20/24.     Neuro:   neuro checks q 2 hr  fluorescein via LD today  CSF sent, glucose 53, protein 116, increased RBC, cell count,  neutrophils, and decreased lymphocytes  LD placed, drain 5cc /hr  2/21 MRI: postop changes, decreased soft tissue thickening  pain: oxy 5/10, Tylenol   melatonin 5 HS   sedation: hold  activity: as tolerated, PT/OT saw, no needs    Respiratory:   Currently on face tent mask in setting of desaturation   Previous chest XR were unremarkable  RVP - pending   skull base precautions  Chest PT       CV:   -160 mmhg   c/w home statin    Renal:   IVF: NS @ 75   ddAVP 0.05 mg QHS    GI:   Diet: regular diet   PPx: hold  zofran  Reg: miralax/senna  LBM  2/20    Endocrine:   FS goal normoglycemia  PAWAN  panhypopit, c/w hydrocort, synthroid  hydrocort 100mg preop    Heme:  DVT ppx: SCDs  LED 2/20: negative    ID:   Febrile  UA negative  f/u blood cultures    Social/Family:     Code Status: [x] Full Code [] DNR [] DNI [] Goals of Care:   Disposition: [x] ICU [] Stroke Unit [] RCU []PCU []Floor [] Discharge Home     Patient at high risk for neurologic deterioration, critical care time, excluding procedures: 30 minutes, csf rhinorrhea at risk for meningitis, brain sag, subdural hematoma           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 Pt underwent LD placement in Neuro IR on 2/20/24.     Neuro:   neuro checks q 2 hr  fluorescein via LD today  Planned to go for CSF leak repair 2/23  CSF sent, glucose 53, protein 116, increased RBC, cell count,  neutrophils, and decreased lymphocytes  LD placed, drain 5cc /hr  2/21 MRI: postop changes, decreased soft tissue thickening  pain: oxy 5/10, Tylenol   melatonin 5 HS   sedation: hold  activity: as tolerated, PT/OT saw, no needs    Respiratory:   Currently on face tent mask in setting of desaturation   Previous chest XR were unremarkable  RVP - pending   skull base precautions  Chest PT       CV:   -160 mmhg   c/w home statin    Renal:   IVF: NS @ 75   ddAVP 0.05 mg QHS    GI:   Diet: regular diet   PPx: hold  zofran  Reg: miralax/senna  LBM  2/20    Endocrine:   FS goal normoglycemia  PAWAN  panhypopit, c/w hydrocort, synthroid  hydrocort 100mg preop    Heme:  DVT ppx: SCDs  LED 2/20: negative    ID:   Febrile  UA negative  f/u blood cultures    Social/Family:     Code Status: [x] Full Code [] DNR [] DNI [] Goals of Care:   Disposition: [x] ICU [] Stroke Unit [] RCU []PCU []Floor [] Discharge Home     Patient at high risk for neurologic deterioration, critical care time, excluding procedures: 30 minutes, csf rhinorrhea at risk for meningitis, brain sag, subdural hematoma

## 2024-02-22 NOTE — PROGRESS NOTE ADULT - SUBJECTIVE AND OBJECTIVE BOX
ENT ISSUE/POD: r/o csfk leak s/p TSR craniopharyngioma     HPI:  43M hx HLD, chronic HAs, craniopharyngioma s/p EEA w/ Dr. Conway/ [facility]  2/1/24. Presented with ~4 days clear fluid leaking from nostrils (L>R) on standing or valsalva. Pt seen and examined at bedside. Pt reports no problems overnight - denied any salty or metallic  taste in mouth this AM . Patient reports dripping down the nose overnight.          PAST MEDICAL & SURGICAL HISTORY:  HLD (hyperlipidemia)      History of pituitary tumor      Deviated septum      History of dental surgery        Allergies    No Known Allergies    Intolerances      MEDICATIONS  (STANDING):  atorvastatin 40 milliGRAM(s) Oral at bedtime  chlorhexidine 4% Liquid 1 Application(s) Topical daily  desmopressin 0.05 milliGRAM(s) Oral at bedtime  dexAMETHasone  IVPB 10 milliGRAM(s) IV Intermittent once  diphenhydrAMINE Injectable 50 milliGRAM(s) IV Push once  enoxaparin Injectable 40 milliGRAM(s) SubCutaneous <User Schedule>  ergocalciferol 10345 Unit(s) Oral every week  fluorescein 10% IntraThecal 0.1 milliLiter(s) IntraThecal. once  hydrocortisone 20 milliGRAM(s) Oral <User Schedule>  hydrocortisone 10 milliGRAM(s) Oral <User Schedule>  insulin lispro (ADMELOG) corrective regimen sliding scale   SubCutaneous Before meals and at bedtime  levothyroxine 88 MICROGram(s) Oral daily  multivitamin 1 Tablet(s) Oral daily  polyethylene glycol 3350 17 Gram(s) Oral daily  senna 2 Tablet(s) Oral at bedtime    MEDICATIONS  (PRN):  acetaminophen     Tablet .. 650 milliGRAM(s) Oral every 6 hours PRN Temp greater or equal to 38C (100.4F), Mild Pain (1 - 3)  melatonin 5 milliGRAM(s) Oral at bedtime PRN Insomnia  ondansetron Injectable 4 milliGRAM(s) IV Push every 6 hours PRN Nausea and/or Vomiting  oxyCODONE    IR 10 milliGRAM(s) Oral every 4 hours PRN Severe Pain (7 - 10)  oxyCODONE    IR 5 milliGRAM(s) Oral every 4 hours PRN Moderate Pain (4 - 6)           ROS:   ENT: all negative except as noted in HPI   Pulm: denies SOB, cough, hemoptysis  Neuro: denies numbness/tingling, loss of sensation  Endo: denies heat/cold intolerance, excessive sweating      Vital Signs Last 24 Hrs  T(C): 37.2 (22 Feb 2024 05:00), Max: 39.3 (22 Feb 2024 04:00)  T(F): 98.9 (22 Feb 2024 05:00), Max: 102.7 (22 Feb 2024 04:00)  HR: 70 (22 Feb 2024 06:00) (70 - 123)  BP: 101/59 (22 Feb 2024 06:00) (101/59 - 137/68)  BP(mean): 75 (22 Feb 2024 06:00) (73 - 101)  RR: 19 (22 Feb 2024 06:00) (12 - 26)  SpO2: 94% (22 Feb 2024 06:00) (89% - 96%)    Parameters below as of 22 Feb 2024 03:00  Patient On (Oxygen Delivery Method): mask, Venturi    O2 Concentration (%): 35                          12.7   8.10  )-----------( 317      ( 22 Feb 2024 00:12 )             38.4    02-22    139  |  103  |  6<L>  ----------------------------<  145<H>  4.7   |  24  |  0.77    Ca    9.3      22 Feb 2024 00:12  Phos  4.3     02-22  Mg     2.4     02-22         PHYSICAL EXAM:  Gen: NAD  Skin: No rashes, bruises, or lesions  Head: Normocephalic, Atraumatic  Face: no edema, erythema, or fluctuance.   Eyes: no scleral injection  Nose: Nares bilaterally patent, no discharge- visualized at time of encounter   Mouth: No Stridor / Drooling / Trismus.  Mucosa moist, tongue/uvula midline, oropharynx clear - no dripping of clear fluid noted at time of encounter   Neck: Flat, supple, no lymphadenopathy, trachea midline, no masses  Lymphatic: No lymphadenopathy  Resp: breathing easily, no stridor  Neuro: facial nerve intact, no facial droop

## 2024-02-22 NOTE — PROGRESS NOTE ADULT - ASSESSMENT
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    PLAN:  Neuro:   neuro checks q 2 hr  fluorescein via LD today  LD placed, drain 5cc /hr  MR head when stable  pain: oxy 5/10, tylenol, dilaudid  sedation: hold  activity: as tolerated, PT/OT saw, no needs    Respiratory:   mook >92% RA  skull base precautions      CV:   -150 mmhg   c/w home statin    Renal:   IVF: IVL  ddAVP QHS    GI:   Diet: regular diet   PPx: hold  zofran  Reg: miralax/senna    Endocrine:   FS goal normoglycemia  PAWAN  panhypopit, c/w hydrocort, synthroid    Heme:  DVT ppx: SCDs, lovenox  LED 2/20: negative    ID:   febrile   nsgy to send CSF  UA negative  f/u blood + CSF cultures    Social/Family:   Discharge planning:     Code Status: [x] Full Code [] DNR [] DNI [] Goals of Care:   Disposition: [x] ICU [] Stroke Unit [] RCU []PCU []Floor [] Discharge Home     Patient at high risk for neurologic deterioration, critical care time, excluding procedures: 30 minutes, csf rhinorrhea at risk for meningitis, brain sag, subdural hematoma           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    Neuro:   neuro checks q 2 hr  fluorescein via LD today  LD placed, drain 5cc /hr  MR head - follow up official read   pain: oxy 5/10, Tylenol Dilaudid  sedation: hold  activity: as tolerated, PT/OT saw, no needs    Respiratory:   Currently on venturi mask in setting of desaturation   Previous chest XR were unremarkable- will repeat another CXR   RVP - pending   skull base precautions      CV:   -150 mmhg   c/w home statin    Renal:   IVF: IVL  ddAVP 0.05 mg QHS    GI:   Diet: regular diet   PPx: hold  zofran  Reg: miralax/senna  LBM  2/20    Endocrine:   FS goal normoglycemia  PAWAN  panhypopit, c/w hydrocort, synthroid    Heme:  DVT ppx: SCDs, lovenox  LED 2/20: negative    ID:   Febrile  CSF cultures follow   UA negative  f/u blood + CSF cultures    Social/Family:   Discharge planning:     Code Status: [x] Full Code [] DNR [] DNI [] Goals of Care:   Disposition: [x] ICU [] Stroke Unit [] RCU []PCU []Floor [] Discharge Home     Patient at high risk for neurologic deterioration, critical care time, excluding procedures: 30 minutes, csf rhinorrhea at risk for meningitis, brain sag, subdural hematoma

## 2024-02-22 NOTE — PROGRESS NOTE ADULT - SUBJECTIVE AND OBJECTIVE BOX
ENDOCRINE FOLLOW UP     Chief Complaint: panhypopituitarism    History:     MEDICATIONS  (STANDING):  atorvastatin 40 milliGRAM(s) Oral at bedtime  chlorhexidine 4% Liquid 1 Application(s) Topical daily  desmopressin 0.05 milliGRAM(s) Oral at bedtime  enoxaparin Injectable 40 milliGRAM(s) SubCutaneous <User Schedule>  ergocalciferol 55398 Unit(s) Oral every week  fluorescein 10% IntraThecal 0.1 milliLiter(s) IntraThecal. once  hydrocortisone 10 milliGRAM(s) Oral <User Schedule>  hydrocortisone 20 milliGRAM(s) Oral <User Schedule>  insulin lispro (ADMELOG) corrective regimen sliding scale   SubCutaneous Before meals and at bedtime  levothyroxine 88 MICROGram(s) Oral daily  multivitamin 1 Tablet(s) Oral daily  polyethylene glycol 3350 17 Gram(s) Oral daily  senna 2 Tablet(s) Oral at bedtime    MEDICATIONS  (PRN):  acetaminophen     Tablet .. 650 milliGRAM(s) Oral every 6 hours PRN Temp greater or equal to 38C (100.4F), Mild Pain (1 - 3)  melatonin 5 milliGRAM(s) Oral at bedtime PRN Insomnia  ondansetron Injectable 4 milliGRAM(s) IV Push every 6 hours PRN Nausea and/or Vomiting  oxyCODONE    IR 5 milliGRAM(s) Oral every 4 hours PRN Moderate Pain (4 - 6)  oxyCODONE    IR 10 milliGRAM(s) Oral every 4 hours PRN Severe Pain (7 - 10)      Allergies    No Known Allergies    Intolerances        ROS: All other systems reviewed and negative    PHYSICAL EXAM:  VITALS: T(C): 37.3 (02-22-24 @ 07:00)  T(F): 99.2 (02-22-24 @ 07:00), Max: 102.7 (02-22-24 @ 04:00)  HR: 119 (02-22-24 @ 11:00) (70 - 123)  BP: 102/64 (02-22-24 @ 11:00) (101/59 - 133/79)  RR:  (12 - 26)  SpO2:  (89% - 98%)  Wt(kg): --  GENERAL: NAD, resting comfortably   EYES: No proptosis,  anicteric  HEENT:  Atraumatic, Normocephalic, moist mucous membranes  RESPIRATORY: Nonlabored respirations on room air, normal rate/effort   CARDIOVASCULAR: Did not appear cyanotic, no lower extremity swelling visualized  GI: did not appear distended  NEURO: Answering questions appropriately, moves extremities spontaneously  PSYCH:  reactive affect, euthymic mood    POCT Blood Glucose.: 139 mg/dL (02-22-24 @ 11:32)  POCT Blood Glucose.: 111 mg/dL (02-22-24 @ 05:19)  POCT Blood Glucose.: 121 mg/dL (02-21-24 @ 22:23)  POCT Blood Glucose.: 118 mg/dL (02-21-24 @ 16:11)  POCT Blood Glucose.: 97 mg/dL (02-21-24 @ 11:25)  POCT Blood Glucose.: 89 mg/dL (02-21-24 @ 08:12)  POCT Blood Glucose.: 131 mg/dL (02-20-24 @ 21:20)  POCT Blood Glucose.: 116 mg/dL (02-20-24 @ 18:35)  POCT Blood Glucose.: 120 mg/dL (02-20-24 @ 11:27)  POCT Blood Glucose.: 76 mg/dL (02-20-24 @ 07:38)      02-22    139  |  103  |  6<L>  ----------------------------<  145<H>  4.7   |  24  |  0.77    eGFR: 114    Ca    9.3      02-22  Mg     2.4     02-22  Phos  4.3     02-22    TPro  7.7  /  Alb  4.3  /  TBili  0.3  /  DBili  x   /  AST  32  /  ALT  55<H>  /  AlkPhos  57  02-19      A1C with Estimated Average Glucose Result: 5.4 % (02-19-24 @ 15:14)      Thyroid Stimulating Hormone, Serum: 0.03 uIU/mL (02-19-24 @ 14:17)  Thyroid Stimulating Hormone, Serum: 0.05 uIU/mL (02-08-24 @ 06:39)  Thyroid Stimulating Hormone, Serum: 0.15 uIU/mL (02-03-24 @ 08:37)  Thyroid Stimulating Hormone, Serum: 0.27 uIU/mL (02-02-24 @ 11:35)   ENDOCRINE FOLLOW UP     Chief Complaint: panhypopituitarism    History:   Patient reports will be having surgery tomorrow to fix csf leak. He reports polyuria and polydipsia has slowed down. He denies nausea, vomiting,    MEDICATIONS  (STANDING):  atorvastatin 40 milliGRAM(s) Oral at bedtime  chlorhexidine 4% Liquid 1 Application(s) Topical daily  desmopressin 0.05 milliGRAM(s) Oral at bedtime  enoxaparin Injectable 40 milliGRAM(s) SubCutaneous <User Schedule>  ergocalciferol 93571 Unit(s) Oral every week  fluorescein 10% IntraThecal 0.1 milliLiter(s) IntraThecal. once  hydrocortisone 10 milliGRAM(s) Oral <User Schedule>  hydrocortisone 20 milliGRAM(s) Oral <User Schedule>  insulin lispro (ADMELOG) corrective regimen sliding scale   SubCutaneous Before meals and at bedtime  levothyroxine 88 MICROGram(s) Oral daily  multivitamin 1 Tablet(s) Oral daily  polyethylene glycol 3350 17 Gram(s) Oral daily  senna 2 Tablet(s) Oral at bedtime    MEDICATIONS  (PRN):  acetaminophen     Tablet .. 650 milliGRAM(s) Oral every 6 hours PRN Temp greater or equal to 38C (100.4F), Mild Pain (1 - 3)  melatonin 5 milliGRAM(s) Oral at bedtime PRN Insomnia  ondansetron Injectable 4 milliGRAM(s) IV Push every 6 hours PRN Nausea and/or Vomiting  oxyCODONE    IR 5 milliGRAM(s) Oral every 4 hours PRN Moderate Pain (4 - 6)  oxyCODONE    IR 10 milliGRAM(s) Oral every 4 hours PRN Severe Pain (7 - 10)      Allergies    No Known Allergies    Intolerances        ROS: All other systems reviewed and negative    PHYSICAL EXAM:  VITALS: T(C): 37.3 (02-22-24 @ 07:00)  T(F): 99.2 (02-22-24 @ 07:00), Max: 102.7 (02-22-24 @ 04:00)  HR: 119 (02-22-24 @ 11:00) (70 - 123)  BP: 102/64 (02-22-24 @ 11:00) (101/59 - 133/79)  RR:  (12 - 26)  SpO2:  (89% - 98%)  Wt(kg): --  GENERAL: NAD, resting comfortably   EYES: No proptosis,  anicteric  HEENT:  Atraumatic, Normocephalic, mildly dry mucous membranes  RESPIRATORY: Nonlabored respirations on room air, normal rate/effort   CARDIOVASCULAR: Did not appear cyanotic, no lower extremity swelling visualized  GI: did not appear distended  NEURO: Answering questions appropriately, moves extremities spontaneously  PSYCH:  reactive affect, euthymic mood    POCT Blood Glucose.: 139 mg/dL (02-22-24 @ 11:32)  POCT Blood Glucose.: 111 mg/dL (02-22-24 @ 05:19)  POCT Blood Glucose.: 121 mg/dL (02-21-24 @ 22:23)  POCT Blood Glucose.: 118 mg/dL (02-21-24 @ 16:11)  POCT Blood Glucose.: 97 mg/dL (02-21-24 @ 11:25)  POCT Blood Glucose.: 89 mg/dL (02-21-24 @ 08:12)  POCT Blood Glucose.: 131 mg/dL (02-20-24 @ 21:20)  POCT Blood Glucose.: 116 mg/dL (02-20-24 @ 18:35)  POCT Blood Glucose.: 120 mg/dL (02-20-24 @ 11:27)  POCT Blood Glucose.: 76 mg/dL (02-20-24 @ 07:38)      02-22    139  |  103  |  6<L>  ----------------------------<  145<H>  4.7   |  24  |  0.77    eGFR: 114    Ca    9.3      02-22  Mg     2.4     02-22  Phos  4.3     02-22    TPro  7.7  /  Alb  4.3  /  TBili  0.3  /  DBili  x   /  AST  32  /  ALT  55<H>  /  AlkPhos  57  02-19      A1C with Estimated Average Glucose Result: 5.4 % (02-19-24 @ 15:14)      Thyroid Stimulating Hormone, Serum: 0.03 uIU/mL (02-19-24 @ 14:17)  Thyroid Stimulating Hormone, Serum: 0.05 uIU/mL (02-08-24 @ 06:39)  Thyroid Stimulating Hormone, Serum: 0.15 uIU/mL (02-03-24 @ 08:37)  Thyroid Stimulating Hormone, Serum: 0.27 uIU/mL (02-02-24 @ 11:35)

## 2024-02-22 NOTE — PROGRESS NOTE ADULT - SUBJECTIVE AND OBJECTIVE BOX
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 Presented with ~4 days clear fluid leaking from nostrils (L>R) on standing or valsalva. Reports no h/as. Afebrile.      24H Events: No acute events. Febrile to 39.3, episodes of desats to high 80s now on NRB.      OBJECTIVE:    ICU Vital Signs Last 24 Hrs  T(C): 37.6 (22 Feb 2024 17:00), Max: 39.3 (22 Feb 2024 04:00)  T(F): 99.6 (22 Feb 2024 17:00), Max: 102.7 (22 Feb 2024 04:00)  HR: 122 (22 Feb 2024 18:00) (70 - 133)  BP: 115/66 (22 Feb 2024 18:00) (101/59 - 133/79)  BP(mean): 85 (22 Feb 2024 18:00) (73 - 101)  ABP: --  ABP(mean): --  RR: 20 (22 Feb 2024 18:00) (13 - 27)  SpO2: 89% (22 Feb 2024 18:00) (89% - 98%)    O2 Parameters below as of 22 Feb 2024 07:00  Patient On (Oxygen Delivery Method): mask, Venturi  O2 Flow (L/min): 35      I&O's Summary    21 Feb 2024 07:01  -  22 Feb 2024 07:00  --------------------------------------------------------  IN: 3170 mL / OUT: 6435 mL / NET: -3265 mL    22 Feb 2024 07:01  -  22 Feb 2024 19:49  --------------------------------------------------------  IN: 1830 mL / OUT: 2650 mL / NET: -820 mL    MEDICATIONS  (STANDING):  atorvastatin 40 milliGRAM(s) Oral at bedtime  chlorhexidine 4% Liquid 1 Application(s) Topical daily  desmopressin 0.05 milliGRAM(s) Oral at bedtime  ergocalciferol 38133 Unit(s) Oral every week  hydrocortisone 20 milliGRAM(s) Oral <User Schedule>  hydrocortisone 10 milliGRAM(s) Oral <User Schedule>  insulin lispro (ADMELOG) corrective regimen sliding scale   SubCutaneous Before meals and at bedtime  levothyroxine 88 MICROGram(s) Oral daily  multivitamin 1 Tablet(s) Oral daily  polyethylene glycol 3350 17 Gram(s) Oral daily  senna 2 Tablet(s) Oral at bedtime  sodium chloride 0.9%. 1000 milliLiter(s) (75 mL/Hr) IV Continuous <Continuous>    MEDICATIONS  (PRN):  acetaminophen     Tablet .. 650 milliGRAM(s) Oral every 6 hours PRN Temp greater or equal to 38C (100.4F), Mild Pain (1 - 3)  melatonin 5 milliGRAM(s) Oral at bedtime PRN Insomnia  ondansetron Injectable 4 milliGRAM(s) IV Push every 6 hours PRN Nausea and/or Vomiting  oxyCODONE    IR 5 milliGRAM(s) Oral every 4 hours PRN Moderate Pain (4 - 6)  oxyCODONE    IR 10 milliGRAM(s) Oral every 4 hours PRN Severe Pain (7 - 10)          LABS                          12.7   8.10  )-----------( 317      ( 22 Feb 2024 00:12 )             38.4   02-22    135  |  100  |  7   ----------------------------<  192<H>  4.0   |  24  |  0.78    Ca    9.5      22 Feb 2024 17:23  Phos  4.9     02-22  Mg     2.2     02-22    CAPILLARY BLOOD GLUCOSE      POCT Blood Glucose.: 186 mg/dL (22 Feb 2024 17:22)  POCT Blood Glucose.: 139 mg/dL (22 Feb 2024 11:32)  POCT Blood Glucose.: 111 mg/dL (22 Feb 2024 05:19)  POCT Blood Glucose.: 121 mg/dL (21 Feb 2024 22:23)      IMAGING    < from: MR Head w/wo IV Cont (02.21.24 @ 22:05) >  IMPRESSION:  Status post transsphenoidal resection with postoperative changes.    Decreased soft tissue thickening within the postoperative bed and along   the tuberculum sella compared with themost recent MR scan likely   representing evolving postoperative changes.    Focal areas of nonenhancement along the sellar floor anteriorly which may   represent dural defects. Clinical correlation is advised.      < end of copied text >    < from: CT Lumbar Spine No Cont (02.21.24 @ 13:31) >  IMPRESSION:    1.  HEAD:  Postoperative findings of transphenoidal procedure. No brain   parenchymal lesion has developed.    2.  THORACIC SPINE:  No acute thoracic vertebral fracture.  Minimal   thoracic degenerative disc disease    3.  LUMBAR SPINE:  No acute lumbar vertebral fracture.   Mild lumbar   degenerative disc disease results primarily in foraminal compromise at   L5-S1    4. Catheterenters the central canal at L2-L3 and extends upward to the   T11 level    < end of copied text >        PHYSICAL EXAM:    General: Uncomfortable appearing. No Acute Distress     Neurological: Awake, alert oriented to person, place and time, Following Commands, PERRL, EOMI, Face Symmetrical, Speech Fluent, Moving all extremities, Muscle Strength normal in all four extremities, No Drift, Sensation to Light Touch Intact    Pulmonary: Clear to Auscultation, No Rales, No Rhonchi, No Wheezes     Cardiovascular: S1, S2, Regular Rate and Rhythm     Gastrointestinal: Soft, Nontender, Nondistended     Extremities: No calf tenderness        DEVICES: [] Restraints [] LUCINDA/HMV [x]LD [] ET tube [] Trach [] Chest Tube [] A-line [] Nunez [] NGT [] Rectal Tube                  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 Presented with ~4 days clear fluid leaking from nostrils (L>R) on standing or valsalva. Reports no h/as. Afebrile.      24H Events: No acute events. Febrile to 39.3, episodes of desats to high 80s now on face tent.      OBJECTIVE:    ICU Vital Signs Last 24 Hrs  T(C): 37.6 (22 Feb 2024 17:00), Max: 39.3 (22 Feb 2024 04:00)  T(F): 99.6 (22 Feb 2024 17:00), Max: 102.7 (22 Feb 2024 04:00)  HR: 122 (22 Feb 2024 18:00) (70 - 133)  BP: 115/66 (22 Feb 2024 18:00) (101/59 - 133/79)  BP(mean): 85 (22 Feb 2024 18:00) (73 - 101)  ABP: --  ABP(mean): --  RR: 20 (22 Feb 2024 18:00) (13 - 27)  SpO2: 89% (22 Feb 2024 18:00) (89% - 98%)    O2 Parameters below as of 22 Feb 2024 07:00  Patient On (Oxygen Delivery Method): mask, Venturi  O2 Flow (L/min): 35      I&O's Summary    21 Feb 2024 07:01  -  22 Feb 2024 07:00  --------------------------------------------------------  IN: 3170 mL / OUT: 6435 mL / NET: -3265 mL    22 Feb 2024 07:01  -  22 Feb 2024 19:49  --------------------------------------------------------  IN: 1830 mL / OUT: 2650 mL / NET: -820 mL    MEDICATIONS  (STANDING):  atorvastatin 40 milliGRAM(s) Oral at bedtime  chlorhexidine 4% Liquid 1 Application(s) Topical daily  desmopressin 0.05 milliGRAM(s) Oral at bedtime  ergocalciferol 96031 Unit(s) Oral every week  hydrocortisone 20 milliGRAM(s) Oral <User Schedule>  hydrocortisone 10 milliGRAM(s) Oral <User Schedule>  insulin lispro (ADMELOG) corrective regimen sliding scale   SubCutaneous Before meals and at bedtime  levothyroxine 88 MICROGram(s) Oral daily  multivitamin 1 Tablet(s) Oral daily  polyethylene glycol 3350 17 Gram(s) Oral daily  senna 2 Tablet(s) Oral at bedtime  sodium chloride 0.9%. 1000 milliLiter(s) (75 mL/Hr) IV Continuous <Continuous>    MEDICATIONS  (PRN):  acetaminophen     Tablet .. 650 milliGRAM(s) Oral every 6 hours PRN Temp greater or equal to 38C (100.4F), Mild Pain (1 - 3)  melatonin 5 milliGRAM(s) Oral at bedtime PRN Insomnia  ondansetron Injectable 4 milliGRAM(s) IV Push every 6 hours PRN Nausea and/or Vomiting  oxyCODONE    IR 5 milliGRAM(s) Oral every 4 hours PRN Moderate Pain (4 - 6)  oxyCODONE    IR 10 milliGRAM(s) Oral every 4 hours PRN Severe Pain (7 - 10)          LABS                          12.7   8.10  )-----------( 317      ( 22 Feb 2024 00:12 )             38.4   02-22    135  |  100  |  7   ----------------------------<  192<H>  4.0   |  24  |  0.78    Ca    9.5      22 Feb 2024 17:23  Phos  4.9     02-22  Mg     2.2     02-22    CAPILLARY BLOOD GLUCOSE      POCT Blood Glucose.: 186 mg/dL (22 Feb 2024 17:22)  POCT Blood Glucose.: 139 mg/dL (22 Feb 2024 11:32)  POCT Blood Glucose.: 111 mg/dL (22 Feb 2024 05:19)  POCT Blood Glucose.: 121 mg/dL (21 Feb 2024 22:23)      IMAGING    < from: MR Head w/wo IV Cont (02.21.24 @ 22:05) >  IMPRESSION:  Status post transsphenoidal resection with postoperative changes.    Decreased soft tissue thickening within the postoperative bed and along   the tuberculum sella compared with themost recent MR scan likely   representing evolving postoperative changes.    Focal areas of nonenhancement along the sellar floor anteriorly which may   represent dural defects. Clinical correlation is advised.      < end of copied text >    < from: CT Lumbar Spine No Cont (02.21.24 @ 13:31) >  IMPRESSION:    1.  HEAD:  Postoperative findings of transphenoidal procedure. No brain   parenchymal lesion has developed.    2.  THORACIC SPINE:  No acute thoracic vertebral fracture.  Minimal   thoracic degenerative disc disease    3.  LUMBAR SPINE:  No acute lumbar vertebral fracture.   Mild lumbar   degenerative disc disease results primarily in foraminal compromise at   L5-S1    4. Catheterenters the central canal at L2-L3 and extends upward to the   T11 level    < end of copied text >        PHYSICAL EXAM:    General: Uncomfortable appearing. No Acute Distress     Neurological: Awake, alert oriented to person, place and time, Following Commands, PERRL, EOMI, Face Symmetrical, Speech Fluent, Moving all extremities, Muscle Strength normal in all four extremities, No Drift, Sensation to Light Touch Intact    Pulmonary: Clear to Auscultation, No Rales, No Rhonchi, No Wheezes     Cardiovascular: S1, S2, Regular Rate and Rhythm     Gastrointestinal: Soft, Nontender, Nondistended     Extremities: No calf tenderness        DEVICES: [] Restraints [] LUCINDA/HMV [x]LD [] ET tube [] Trach [] Chest Tube [] A-line [] Nunez [] NGT [] Rectal Tube

## 2024-02-23 ENCOUNTER — APPOINTMENT (OUTPATIENT)
Dept: SPINE | Facility: HOSPITAL | Age: 44
End: 2024-02-23
Payer: COMMERCIAL

## 2024-02-23 ENCOUNTER — APPOINTMENT (OUTPATIENT)
Dept: OTOLARYNGOLOGY | Facility: HOSPITAL | Age: 44
End: 2024-02-23

## 2024-02-23 LAB
ANION GAP SERPL CALC-SCNC: 12 MMOL/L — SIGNIFICANT CHANGE UP (ref 5–17)
ANION GAP SERPL CALC-SCNC: 13 MMOL/L — SIGNIFICANT CHANGE UP (ref 5–17)
APTT BLD: 33.3 SEC — SIGNIFICANT CHANGE UP (ref 24.5–35.6)
APTT BLD: 33.4 SEC — SIGNIFICANT CHANGE UP (ref 24.5–35.6)
APTT BLD: 33.6 SEC — SIGNIFICANT CHANGE UP (ref 24.5–35.6)
BUN SERPL-MCNC: 11 MG/DL — SIGNIFICANT CHANGE UP (ref 7–23)
BUN SERPL-MCNC: 12 MG/DL — SIGNIFICANT CHANGE UP (ref 7–23)
CALCIUM SERPL-MCNC: 9.2 MG/DL — SIGNIFICANT CHANGE UP (ref 8.4–10.5)
CALCIUM SERPL-MCNC: 9.9 MG/DL — SIGNIFICANT CHANGE UP (ref 8.4–10.5)
CHLORIDE SERPL-SCNC: 101 MMOL/L — SIGNIFICANT CHANGE UP (ref 96–108)
CHLORIDE SERPL-SCNC: 109 MMOL/L — HIGH (ref 96–108)
CO2 SERPL-SCNC: 22 MMOL/L — SIGNIFICANT CHANGE UP (ref 22–31)
CO2 SERPL-SCNC: 26 MMOL/L — SIGNIFICANT CHANGE UP (ref 22–31)
CREAT SERPL-MCNC: 0.67 MG/DL — SIGNIFICANT CHANGE UP (ref 0.5–1.3)
CREAT SERPL-MCNC: 0.71 MG/DL — SIGNIFICANT CHANGE UP (ref 0.5–1.3)
CRYPTOC AG CSF-ACNC: NEGATIVE — SIGNIFICANT CHANGE UP
CSF PCR RESULT: SIGNIFICANT CHANGE UP
EGFR: 117 ML/MIN/1.73M2 — SIGNIFICANT CHANGE UP
EGFR: 119 ML/MIN/1.73M2 — SIGNIFICANT CHANGE UP
GLUCOSE BLDC GLUCOMTR-MCNC: 114 MG/DL — HIGH (ref 70–99)
GLUCOSE BLDC GLUCOMTR-MCNC: 123 MG/DL — HIGH (ref 70–99)
GLUCOSE BLDC GLUCOMTR-MCNC: 127 MG/DL — HIGH (ref 70–99)
GLUCOSE SERPL-MCNC: 119 MG/DL — HIGH (ref 70–99)
GLUCOSE SERPL-MCNC: 136 MG/DL — HIGH (ref 70–99)
INR BLD: 1.15 RATIO — SIGNIFICANT CHANGE UP (ref 0.85–1.18)
INR BLD: 1.36 RATIO — HIGH (ref 0.85–1.18)
INR BLD: 1.38 RATIO — HIGH (ref 0.85–1.18)
MAGNESIUM SERPL-MCNC: 2.1 MG/DL — SIGNIFICANT CHANGE UP (ref 1.6–2.6)
MRSA PCR RESULT.: SIGNIFICANT CHANGE UP
PHOSPHATE SERPL-MCNC: 3.2 MG/DL — SIGNIFICANT CHANGE UP (ref 2.5–4.5)
POTASSIUM SERPL-MCNC: 3.9 MMOL/L — SIGNIFICANT CHANGE UP (ref 3.5–5.3)
POTASSIUM SERPL-MCNC: 5 MMOL/L — SIGNIFICANT CHANGE UP (ref 3.5–5.3)
POTASSIUM SERPL-SCNC: 3.9 MMOL/L — SIGNIFICANT CHANGE UP (ref 3.5–5.3)
POTASSIUM SERPL-SCNC: 5 MMOL/L — SIGNIFICANT CHANGE UP (ref 3.5–5.3)
PROTHROM AB SERPL-ACNC: 12.6 SEC — SIGNIFICANT CHANGE UP (ref 9.5–13)
PROTHROM AB SERPL-ACNC: 14.2 SEC — HIGH (ref 9.5–13)
PROTHROM AB SERPL-ACNC: 14.4 SEC — HIGH (ref 9.5–13)
S AUREUS DNA NOSE QL NAA+PROBE: SIGNIFICANT CHANGE UP
SODIUM SERPL-SCNC: 136 MMOL/L — SIGNIFICANT CHANGE UP (ref 135–145)
SODIUM SERPL-SCNC: 147 MMOL/L — HIGH (ref 135–145)

## 2024-02-23 PROCEDURE — 99232 SBSQ HOSP IP/OBS MODERATE 35: CPT

## 2024-02-23 PROCEDURE — 61619 REPAIR DURA: CPT

## 2024-02-23 PROCEDURE — 61782 SCAN PROC CRANIAL EXTRA: CPT

## 2024-02-23 PROCEDURE — 71045 X-RAY EXAM CHEST 1 VIEW: CPT | Mod: 26

## 2024-02-23 PROCEDURE — 15740 ISLAND PEDICLE FLAP GRAFT: CPT

## 2024-02-23 PROCEDURE — 15769 GRFG AUTOL SOFT TISS DIR EXC: CPT | Mod: 78

## 2024-02-23 PROCEDURE — 61618 REPAIR DURA: CPT | Mod: 78

## 2024-02-23 DEVICE — SURGICEL 2 X 14": Type: IMPLANTABLE DEVICE | Status: FUNCTIONAL

## 2024-02-23 DEVICE — DVC ADHERUS AUTOSPRAY W/ DURAL SEALANT EXT TIP: Type: IMPLANTABLE DEVICE | Status: FUNCTIONAL

## 2024-02-23 DEVICE — MAYFIELD SKULL PIN ADULT STEEL: Type: IMPLANTABLE DEVICE | Status: FUNCTIONAL

## 2024-02-23 RX ORDER — ATORVASTATIN CALCIUM 80 MG/1
40 TABLET, FILM COATED ORAL AT BEDTIME
Refills: 0 | Status: DISCONTINUED | OUTPATIENT
Start: 2024-02-23 | End: 2024-03-03

## 2024-02-23 RX ORDER — ACETAMINOPHEN 500 MG
650 TABLET ORAL EVERY 6 HOURS
Refills: 0 | Status: DISCONTINUED | OUTPATIENT
Start: 2024-02-23 | End: 2024-03-03

## 2024-02-23 RX ORDER — DESMOPRESSIN ACETATE 0.1 MG/1
0.05 TABLET ORAL ONCE
Refills: 0 | Status: COMPLETED | OUTPATIENT
Start: 2024-02-23 | End: 2024-02-23

## 2024-02-23 RX ORDER — INSULIN LISPRO 100/ML
VIAL (ML) SUBCUTANEOUS
Refills: 0 | Status: DISCONTINUED | OUTPATIENT
Start: 2024-02-23 | End: 2024-02-24

## 2024-02-23 RX ORDER — ONDANSETRON 8 MG/1
4 TABLET, FILM COATED ORAL EVERY 6 HOURS
Refills: 0 | Status: DISCONTINUED | OUTPATIENT
Start: 2024-02-23 | End: 2024-03-03

## 2024-02-23 RX ORDER — HYDROCORTISONE 20 MG
10 TABLET ORAL
Refills: 0 | Status: DISCONTINUED | OUTPATIENT
Start: 2024-02-23 | End: 2024-03-03

## 2024-02-23 RX ORDER — SENNA PLUS 8.6 MG/1
2 TABLET ORAL AT BEDTIME
Refills: 0 | Status: DISCONTINUED | OUTPATIENT
Start: 2024-02-23 | End: 2024-03-03

## 2024-02-23 RX ORDER — OXYCODONE HYDROCHLORIDE 5 MG/1
5 TABLET ORAL EVERY 4 HOURS
Refills: 0 | Status: DISCONTINUED | OUTPATIENT
Start: 2024-02-23 | End: 2024-02-23

## 2024-02-23 RX ORDER — SENNA PLUS 8.6 MG/1
2 TABLET ORAL AT BEDTIME
Refills: 0 | Status: DISCONTINUED | OUTPATIENT
Start: 2024-02-23 | End: 2024-02-23

## 2024-02-23 RX ORDER — CEFAZOLIN SODIUM 1 G
2000 VIAL (EA) INJECTION EVERY 8 HOURS
Refills: 0 | Status: COMPLETED | OUTPATIENT
Start: 2024-02-23 | End: 2024-02-24

## 2024-02-23 RX ORDER — PHYTONADIONE (VIT K1) 5 MG
10 TABLET ORAL DAILY
Refills: 0 | Status: DISCONTINUED | OUTPATIENT
Start: 2024-02-23 | End: 2024-02-23

## 2024-02-23 RX ORDER — LEVOTHYROXINE SODIUM 125 MCG
88 TABLET ORAL DAILY
Refills: 0 | Status: DISCONTINUED | OUTPATIENT
Start: 2024-02-23 | End: 2024-03-01

## 2024-02-23 RX ORDER — ERGOCALCIFEROL 1.25 MG/1
50000 CAPSULE ORAL
Refills: 0 | Status: DISCONTINUED | OUTPATIENT
Start: 2024-02-23 | End: 2024-03-03

## 2024-02-23 RX ORDER — DESMOPRESSIN ACETATE 0.1 MG/1
0.1 TABLET ORAL AT BEDTIME
Refills: 0 | Status: DISCONTINUED | OUTPATIENT
Start: 2024-02-23 | End: 2024-02-24

## 2024-02-23 RX ORDER — POLYETHYLENE GLYCOL 3350 17 G/17G
17 POWDER, FOR SOLUTION ORAL DAILY
Refills: 0 | Status: DISCONTINUED | OUTPATIENT
Start: 2024-02-23 | End: 2024-03-03

## 2024-02-23 RX ORDER — SODIUM CHLORIDE 9 MG/ML
1000 INJECTION INTRAMUSCULAR; INTRAVENOUS; SUBCUTANEOUS
Refills: 0 | Status: DISCONTINUED | OUTPATIENT
Start: 2024-02-23 | End: 2024-02-24

## 2024-02-23 RX ORDER — HYDROCORTISONE 20 MG
20 TABLET ORAL
Refills: 0 | Status: DISCONTINUED | OUTPATIENT
Start: 2024-02-23 | End: 2024-03-03

## 2024-02-23 RX ORDER — LANOLIN ALCOHOL/MO/W.PET/CERES
5 CREAM (GRAM) TOPICAL AT BEDTIME
Refills: 0 | Status: DISCONTINUED | OUTPATIENT
Start: 2024-02-23 | End: 2024-03-03

## 2024-02-23 RX ADMIN — Medication 100 MILLIGRAM(S): at 22:45

## 2024-02-23 RX ADMIN — Medication 102 MILLIGRAM(S): at 02:01

## 2024-02-23 RX ADMIN — SODIUM CHLORIDE 75 MILLILITER(S): 9 INJECTION INTRAMUSCULAR; INTRAVENOUS; SUBCUTANEOUS at 07:54

## 2024-02-23 RX ADMIN — DESMOPRESSIN ACETATE 0.1 MILLIGRAM(S): 0.1 TABLET ORAL at 22:45

## 2024-02-23 RX ADMIN — Medication 20 MILLIGRAM(S): at 07:54

## 2024-02-23 RX ADMIN — ATORVASTATIN CALCIUM 40 MILLIGRAM(S): 80 TABLET, FILM COATED ORAL at 22:45

## 2024-02-23 RX ADMIN — CHLORHEXIDINE GLUCONATE 1 APPLICATION(S): 213 SOLUTION TOPICAL at 11:38

## 2024-02-23 RX ADMIN — Medication 100 MILLIGRAM(S): at 18:00

## 2024-02-23 RX ADMIN — DESMOPRESSIN ACETATE 0.05 MILLIGRAM(S): 0.1 TABLET ORAL at 15:46

## 2024-02-23 RX ADMIN — Medication 88 MICROGRAM(S): at 05:15

## 2024-02-23 RX ADMIN — SODIUM CHLORIDE 75 MILLILITER(S): 9 INJECTION INTRAMUSCULAR; INTRAVENOUS; SUBCUTANEOUS at 00:01

## 2024-02-23 NOTE — PROGRESS NOTE ADULT - SUBJECTIVE AND OBJECTIVE BOX
ENT ISSUE/POD: s/p CSF leak repair w/new septal flap and fat graft from right thigh, s/p b/l septal splints and LD. POD 0.    HPI: 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 Admitted for postoperative CSF leak despite attempted lumbar drain. Pt is status post OR CSF leak. original flap noted to be nonviable. is status post a new flap from contralateral side, and with additional fat graft from right thigh.also s/p bilateral septal splints placement. LD x 5 days.     PAST MEDICAL & SURGICAL HISTORY:  HLD (hyperlipidemia)      History of pituitary tumor      Deviated septum      History of dental surgery        Allergies    No Known Allergies    Intolerances      MEDICATIONS  (STANDING):  ceFAZolin   IVPB 2000 milliGRAM(s) IV Intermittent every 8 hours  senna 2 Tablet(s) Oral at bedtime  sodium chloride 0.9%. 1000 milliLiter(s) (75 mL/Hr) IV Continuous <Continuous>    MEDICATIONS  (PRN):  acetaminophen     Tablet .. 650 milliGRAM(s) Oral every 6 hours PRN Mild Pain (1 - 3)  bisacodyl 5 milliGRAM(s) Oral daily PRN Constipation  ondansetron Injectable 4 milliGRAM(s) IV Push every 6 hours PRN Nausea and/or Vomiting  oxyCODONE    IR 5 milliGRAM(s) Oral every 4 hours PRN Moderate Pain (4 - 6)      Social History: see consult note    Family history: see consult note    ROS:   ENT: all negative except as noted in HPI   Pulm: denies SOB, cough, hemoptysis  Neuro: denies numbness/tingling, loss of sensation  Endo: denies heat/cold intolerance, excessive sweating      Vital Signs Last 24 Hrs  T(C): 36.7 (23 Feb 2024 21:00), Max: 36.9 (22 Feb 2024 23:00)  T(F): 98.1 (23 Feb 2024 21:00), Max: 98.5 (23 Feb 2024 07:00)  HR: 110 (23 Feb 2024 21:00) (79 - 110)  BP: 108/73 (23 Feb 2024 18:41) (101/63 - 114/59)  BP(mean): 86 (23 Feb 2024 18:41) (70 - 86)  RR: 17 (23 Feb 2024 21:00) (17 - 23)  SpO2: 93% (23 Feb 2024 21:00) (90% - 97%)    Parameters below as of 23 Feb 2024 18:41    O2 Flow (L/min): 35  O2 Concentration (%): 35                          12.7   8.10  )-----------( 317      ( 22 Feb 2024 00:12 )             38.4    02-23    147<H>  |  109<H>  |  12  ----------------------------<  119<H>  3.9   |  26  |  0.71    Ca    9.9      23 Feb 2024 16:04  Phos  3.2     02-23  Mg     2.1     02-23     PT/INR - ( 23 Feb 2024 12:08 )   PT: 12.6 sec;   INR: 1.15 ratio         PTT - ( 23 Feb 2024 12:08 )  PTT:33.4 sec    PHYSICAL EXAM:  Gen: NAD  Skin: No rashes, bruises, or lesions  Head: Normocephalic, Atraumatic  Face: no edema, erythema, or fluctuance. Parotid glands soft without mass  Eyes: no scleral injection  Ears: Right: ear canal clear, TM intact without effusion or erythema. No evidence of any fluid drainage. No mastoid tenderness, erythema, or ear bulging            Left: ear canal clear, TM intact without effusion or erythema. No evidence of any fluid drainage. No mastoid tenderness, erythema, or ear bulging  Nose: Nares bilaterally patent, no discharge  Mouth: No Stridor / Drooling / Trismus.  Mucosa moist, tongue/uvula midline, oropharynx clear  Neck: Flat, supple, no lymphadenopathy, trachea midline, no masses  Lymphatic: No lymphadenopathy  Resp: breathing easily, no stridor  Neuro: facial nerve intact, no facial droop         ENT ISSUE/POD: s/p CSF leak repair w/new septal flap and fat graft from right thigh, s/p b/l septal splints and LD. POD 0.    HPI: 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 Admitted for postoperative CSF leak despite attempted lumbar drain. Pt is status post OR CSF leak. original flap noted to be nonviable. is status post a new flap from contralateral side, and with additional fat graft from right thigh.also s/p bilateral septal splints placement. LD x 5 days. endorses feeling well. mild sore throat, but no headache or abnormal nasal discharges.     PAST MEDICAL & SURGICAL HISTORY:  HLD (hyperlipidemia)    History of pituitary tumor    Deviated septum    History of dental surgery    Allergies    No Known Allergies    Intolerances      MEDICATIONS  (STANDING):  ceFAZolin   IVPB 2000 milliGRAM(s) IV Intermittent every 8 hours  senna 2 Tablet(s) Oral at bedtime  sodium chloride 0.9%. 1000 milliLiter(s) (75 mL/Hr) IV Continuous <Continuous>    MEDICATIONS  (PRN):  acetaminophen     Tablet .. 650 milliGRAM(s) Oral every 6 hours PRN Mild Pain (1 - 3)  bisacodyl 5 milliGRAM(s) Oral daily PRN Constipation  ondansetron Injectable 4 milliGRAM(s) IV Push every 6 hours PRN Nausea and/or Vomiting  oxyCODONE    IR 5 milliGRAM(s) Oral every 4 hours PRN Moderate Pain (4 - 6)      Social History: see consult note    Family history: see consult note    ROS:   ENT: all negative except as noted in HPI   Pulm: denies SOB, cough, hemoptysis  Neuro: denies numbness/tingling, loss of sensation  Endo: denies heat/cold intolerance, excessive sweating      Vital Signs Last 24 Hrs  T(C): 36.7 (23 Feb 2024 21:00), Max: 36.9 (22 Feb 2024 23:00)  T(F): 98.1 (23 Feb 2024 21:00), Max: 98.5 (23 Feb 2024 07:00)  HR: 110 (23 Feb 2024 21:00) (79 - 110)  BP: 108/73 (23 Feb 2024 18:41) (101/63 - 114/59)  BP(mean): 86 (23 Feb 2024 18:41) (70 - 86)  RR: 17 (23 Feb 2024 21:00) (17 - 23)  SpO2: 93% (23 Feb 2024 21:00) (90% - 97%)    Parameters below as of 23 Feb 2024 18:41    O2 Flow (L/min): 35  O2 Concentration (%): 35                          12.7   8.10  )-----------( 317      ( 22 Feb 2024 00:12 )             38.4    02-23    147<H>  |  109<H>  |  12  ----------------------------<  119<H>  3.9   |  26  |  0.71    Ca    9.9      23 Feb 2024 16:04  Phos  3.2     02-23  Mg     2.1     02-23     PT/INR - ( 23 Feb 2024 12:08 )   PT: 12.6 sec;   INR: 1.15 ratio         PTT - ( 23 Feb 2024 12:08 )  PTT:33.4 sec    PHYSICAL EXAM:  Gen: NAD  Skin: No rashes, bruises, or lesions  Head: Normocephalic, Atraumatic  Face: no edema, erythema, or fluctuance. Parotid glands soft without mass  Eyes: no scleral injection  Nose: patent bilateral nasal splints in place and patent. No abnormal discharge from bilateral nares.    Mouth: no posterior oropharynx bleed. No hard or soft palate trauma. No Stridor / Drooling / Trismus.  Mucosa moist, tongue/uvula midline, oropharynx clear  Neck: Flat, supple, no lymphadenopathy, trachea midline, no masses  Lymphatic: No lymphadenopathy  Resp: breathing easily, no stridor  Neuro: facial nerve intact, no facial droop

## 2024-02-23 NOTE — PROGRESS NOTE ADULT - ASSESSMENT
43M hx HLD, chronic HAs, s/p TSR of craniopharyngioma w/ Dr. Conway + Dr Brown 2/1/24. Presented with ~4 days clear discharge from nostrils (L>R) on standing or valsalva.  On exam, no obvious discharge from b/l nares or in the posterior oropharynx . Nasal endoscopy performed  2/21 showed post-op changes, dissolvable packing in place with possible small leak around flap. CT Sinus shows "Bony defect along the anterior sellar floor corresponding with recent surgery. Air-fluid level in the sphenoid sinus is noted. Pt is going to the OR today for CSF leak repair.  Afebrile

## 2024-02-23 NOTE — PROGRESS NOTE ADULT - ASSESSMENT
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 Admitted for postoperative CSF leak despite attempted lumbar drain. Pt is status post OR CSF leak. original flap noted to be nonviable. is POD 0 status post a new flap from contralateral side, and with additional fat graft from right thigh.also s/p bilateral septal splints placement. LD x 5 days. endorses feeling well. mild sore throat, but no headache or abnormal nasal discharges. On exam, bilateral nasal splints in place and patent. No abnormal discharges from either naris. posterior oropharynx clean. denies salty/ metallic taste in mouth.

## 2024-02-23 NOTE — PROGRESS NOTE ADULT - SUBJECTIVE AND OBJECTIVE BOX
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 Presented with ~4 days clear fluid leaking from nostrils (L>R) on standing or valsalva. Reports no h/as. Afebrile.      24H Events: No acute events. Requiring oxygen via face tent.    OBJECTIVE:    ICU Vital Signs Last 24 Hrs  T(C): 36.8 (23 Feb 2024 03:00), Max: 37.6 (22 Feb 2024 17:00)  T(F): 98.2 (23 Feb 2024 03:00), Max: 99.6 (22 Feb 2024 17:00)  HR: 96 (23 Feb 2024 06:00) (96 - 133)  BP: 102/60 (23 Feb 2024 06:00) (102/59 - 117/68)  BP(mean): 75 (23 Feb 2024 06:00) (70 - 91)  ABP: --  ABP(mean): --  RR: 19 (23 Feb 2024 06:00) (18 - 27)  SpO2: 96% (23 Feb 2024 06:00) (89% - 96%)            I&O's Summary    22 Feb 2024 07:01  -  23 Feb 2024 07:00  --------------------------------------------------------  IN: 3125 mL / OUT: 4065 mL / NET: -940 mL        MEDICATIONS  (STANDING):  atorvastatin 40 milliGRAM(s) Oral at bedtime  chlorhexidine 4% Liquid 1 Application(s) Topical daily  desmopressin 0.05 milliGRAM(s) Oral at bedtime  ergocalciferol 51211 Unit(s) Oral every week  hydrocortisone 10 milliGRAM(s) Oral <User Schedule>  hydrocortisone 20 milliGRAM(s) Oral <User Schedule>  hydrocortisone sodium succinate Injectable 100 milliGRAM(s) IV Push once  insulin lispro (ADMELOG) corrective regimen sliding scale   SubCutaneous Before meals and at bedtime  levothyroxine 88 MICROGram(s) Oral daily  multivitamin 1 Tablet(s) Oral daily  phytonadione  IVPB 10 milliGRAM(s) IV Intermittent daily  polyethylene glycol 3350 17 Gram(s) Oral daily  senna 2 Tablet(s) Oral at bedtime  sodium chloride 0.9%. 1000 milliLiter(s) (75 mL/Hr) IV Continuous <Continuous>    MEDICATIONS  (PRN):  acetaminophen     Tablet .. 650 milliGRAM(s) Oral every 6 hours PRN Temp greater or equal to 38C (100.4F), Mild Pain (1 - 3)  melatonin 5 milliGRAM(s) Oral at bedtime PRN Insomnia  ondansetron Injectable 4 milliGRAM(s) IV Push every 6 hours PRN Nausea and/or Vomiting  oxyCODONE    IR 10 milliGRAM(s) Oral every 4 hours PRN Severe Pain (7 - 10)  oxyCODONE    IR 5 milliGRAM(s) Oral every 4 hours PRN Moderate Pain (4 - 6)      LABS    (02-22 @ 00:12)                      12.7  8.10 )-----------( 317                 38.4    Neutrophils = -- (--%)  Lymphocytes = -- (--%)  Eosinophils = -- (--%)  Basophils = -- (--%)  Monocytes = -- (--%)  Bands = --%    02-23    136  |  101  |  11  ----------------------------<  136<H>  5.0   |  22  |  0.67    Ca    9.2      23 Feb 2024 00:41  Phos  3.2     02-23  Mg     2.1     02-23      ( 23 Feb 2024 04:16 )   PT: 14.2 sec;   INR: 1.36 ratio;       PTT:33.3 sec        Urinalysis Basic - ( 23 Feb 2024 00:41 )    Color: x / Appearance: x / SG: x / pH: x  Gluc: 136 mg/dL / Ketone: x  / Bili: x / Urobili: x   Blood: x / Protein: x / Nitrite: x   Leuk Esterase: x / RBC: x / WBC x   Sq Epi: x / Non Sq Epi: x / Bacteria: x          IMAGING    CAPILLARY BLOOD GLUCOSE      POCT Blood Glucose.: 142 mg/dL (22 Feb 2024 21:32)  POCT Blood Glucose.: 186 mg/dL (22 Feb 2024 17:22)  POCT Blood Glucose.: 139 mg/dL (22 Feb 2024 11:32)      PHYSICAL EXAM:    General: No Acute Distress     Neurological: Awake, alert oriented to person, place and time, Following Commands, PERRL, EOMI, Face Symmetrical, Speech Fluent, Moving all extremities, Muscle Strength normal in all four extremities, No Drift, Sensation to Light Touch Intact    Pulmonary: Clear to Auscultation, No Rales, No Rhonchi, No Wheezes     Cardiovascular: S1, S2, Regular Rate and Rhythm     Gastrointestinal: Soft, Nontender, Nondistended     Extremities: No calf tenderness        DEVICES: [] Restraints [] LUCINDA/HMV [x]LD [] ET tube [] Trach [] Chest Tube [] A-line [] Nunez [] NGT [] Rectal Tube                  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 Presented with ~4 days clear fluid leaking from nostrils (L>R) on standing or valsalva. Reports no h/as. Afebrile.      24H Events: No acute events. Requiring oxygen via face tent. Pre op for OR today. No examination changes.     OBJECTIVE:    ICU Vital Signs Last 24 Hrs  T(C): 36.8 (23 Feb 2024 03:00), Max: 37.6 (22 Feb 2024 17:00)  T(F): 98.2 (23 Feb 2024 03:00), Max: 99.6 (22 Feb 2024 17:00)  HR: 96 (23 Feb 2024 06:00) (96 - 133)  BP: 102/60 (23 Feb 2024 06:00) (102/59 - 117/68)  BP(mean): 75 (23 Feb 2024 06:00) (70 - 91)  ABP: --  ABP(mean): --  RR: 19 (23 Feb 2024 06:00) (18 - 27)  SpO2: 96% (23 Feb 2024 06:00) (89% - 96%)            I&O's Summary    22 Feb 2024 07:01  -  23 Feb 2024 07:00  --------------------------------------------------------  IN: 3125 mL / OUT: 4065 mL / NET: -940 mL        MEDICATIONS  (STANDING):  atorvastatin 40 milliGRAM(s) Oral at bedtime  chlorhexidine 4% Liquid 1 Application(s) Topical daily  desmopressin 0.05 milliGRAM(s) Oral at bedtime  ergocalciferol 43369 Unit(s) Oral every week  hydrocortisone 10 milliGRAM(s) Oral <User Schedule>  hydrocortisone 20 milliGRAM(s) Oral <User Schedule>  hydrocortisone sodium succinate Injectable 100 milliGRAM(s) IV Push once  insulin lispro (ADMELOG) corrective regimen sliding scale   SubCutaneous Before meals and at bedtime  levothyroxine 88 MICROGram(s) Oral daily  multivitamin 1 Tablet(s) Oral daily  phytonadione  IVPB 10 milliGRAM(s) IV Intermittent daily  polyethylene glycol 3350 17 Gram(s) Oral daily  senna 2 Tablet(s) Oral at bedtime  sodium chloride 0.9%. 1000 milliLiter(s) (75 mL/Hr) IV Continuous <Continuous>    MEDICATIONS  (PRN):  acetaminophen     Tablet .. 650 milliGRAM(s) Oral every 6 hours PRN Temp greater or equal to 38C (100.4F), Mild Pain (1 - 3)  melatonin 5 milliGRAM(s) Oral at bedtime PRN Insomnia  ondansetron Injectable 4 milliGRAM(s) IV Push every 6 hours PRN Nausea and/or Vomiting  oxyCODONE    IR 10 milliGRAM(s) Oral every 4 hours PRN Severe Pain (7 - 10)  oxyCODONE    IR 5 milliGRAM(s) Oral every 4 hours PRN Moderate Pain (4 - 6)      LABS    (02-22 @ 00:12)                      12.7  8.10 )-----------( 317                 38.4    Neutrophils = -- (--%)  Lymphocytes = -- (--%)  Eosinophils = -- (--%)  Basophils = -- (--%)  Monocytes = -- (--%)  Bands = --%    02-23    136  |  101  |  11  ----------------------------<  136<H>  5.0   |  22  |  0.67    Ca    9.2      23 Feb 2024 00:41  Phos  3.2     02-23  Mg     2.1     02-23      ( 23 Feb 2024 04:16 )   PT: 14.2 sec;   INR: 1.36 ratio;       PTT:33.3 sec        Urinalysis Basic - ( 23 Feb 2024 00:41 )    Color: x / Appearance: x / SG: x / pH: x  Gluc: 136 mg/dL / Ketone: x  / Bili: x / Urobili: x   Blood: x / Protein: x / Nitrite: x   Leuk Esterase: x / RBC: x / WBC x   Sq Epi: x / Non Sq Epi: x / Bacteria: x          IMAGING    CAPILLARY BLOOD GLUCOSE      POCT Blood Glucose.: 142 mg/dL (22 Feb 2024 21:32)  POCT Blood Glucose.: 186 mg/dL (22 Feb 2024 17:22)  POCT Blood Glucose.: 139 mg/dL (22 Feb 2024 11:32)      PHYSICAL EXAM:    General: No Acute Distress     Neurological: Awake, alert oriented to person, place and time, Following Commands, PERRL, EOMI, Face Symmetrical, Speech Fluent, Moving all extremities, Muscle Strength normal in all four extremities, No Drift, Sensation to Light Touch Intact    Pulmonary: Clear to Auscultation, No Rales, No Rhonchi, No Wheezes     Cardiovascular: S1, S2, Regular Rate and Rhythm     Gastrointestinal: Soft, Nontender, Nondistended     Extremities: No calf tenderness        DEVICES: [] Restraints [] LUCINDA/HMV [x]LD [] ET tube [] Trach [] Chest Tube [] A-line [] Nunez [] NGT [] Rectal Tube

## 2024-02-23 NOTE — BRIEF OPERATIVE NOTE - OPERATION/FINDINGS
Pt was taken to OR from NSCU 2/2 persistent CSF leak despite attempted lumbar drain. Flap was noted to be unhealthy and was removed. A new flap from the contralateral side was harvested and buttressed w/fat graft harvested from R thigh. [facility]

## 2024-02-23 NOTE — PRE PROCEDURE NOTE - PRE PROCEDURE EVALUATION
Preop for TSP in AM  Has LD 5cc/hr  febrile, mild consolidation/atelectasis on cxr, chest PT encouraged (no IS)  Stress dose steroids prior to OR  DDAVP 0.5mg PO

## 2024-02-23 NOTE — PROGRESS NOTE ADULT - PROBLEM SELECTOR PLAN 1
- humidified face tent  - nasal saline, 2 sprays to b/l nares 4 times a day.  - monitor for signs of Epistaxis and CSF leak  - Pituitary precautions (no incentive spirometry, no straws, no BIPAP)  - avoid nasal trauma, heavy lifting and bending at waist.  - Care per NSCU

## 2024-02-23 NOTE — PROGRESS NOTE ADULT - ASSESSMENT
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    Neuro:   neuro checks q 2 hr  preop for CSF leak repair in OR  LD placed, drain 5cc /hr  CSF sent, glucose nl, protein + cells elevated  MR head - postop changes possible dural defects   pain: oxy 5/10, Tylenol  activity: as tolerated, PT/OT saw, no needs    Respiratory:   On on face tent 50%  CXR with left basal consolidation c/f atelectasis  RVP negative  Chest PT  No IS - skull base precautions      CV:   -160 mmhg   c/w home statin    Renal:   IVF: IVF @75 while NPO  ddAVP 0.05 mg QHS  Voiding independently     GI:   Diet: NPO for procedure today  PPx: hold  Zofran for nausea/vomiting  Reg: miralax/senna  LBM  2/23    Endocrine:   FS goal normoglycemia  PAWAN  Panhypopituitarism, c/w hydroCort, synthroid    Heme:  DVT ppx: SCDs, Lovenox - held for surgery   LED 2/20: negative  Elevated INR 1.38-->1.36-- s/p 1 dose of vitamin K    ID:   Afebrile  UA negative  f/u blood + CSF cultures, no growth 24h    Social/Family:   Discharge planning:     Code Status: [x] Full Code [] DNR [] DNI [] Goals of Care:   Disposition: [x] ICU [] Stroke Unit [] RCU []PCU []Floor [] Discharge Home     Patient at high risk for neurologic deterioration, critical care time, excluding procedures: 30 minutes, csf rhinorrhea at risk for meningitis, brain sag, subdural hematoma           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    Neuro:   neuro checks q 2 hr  Pt in OR for TSP leak repair now  LD placed, drain 5cc /hr  CSF sent, glucose nl, protein + cells elevated  MR head - postop changes possible dural defects   pain: oxy 5/10, Tylenol  activity: as tolerated, PT/OT saw, no needs    Respiratory:   On on face tent 50%  CXR with left basal consolidation c/f atelectasis  RVP negative  Chest PT  No IS - skull base precautions      CV:   -160 mmhg   c/w home statin    Renal:   IVF: IVF @75 while NPO  ddAVP 0.05 mg QHS  Voiding independently     GI:   Diet: NPO for procedure today  PPx: hold  Zofran for nausea/vomiting  Reg: miralax/senna  LBM  2/23    Endocrine:   FS goal normoglycemia  PAWAN  Panhypopituitarism, c/w hydroCort, synthroid    Heme:  DVT ppx: SCDs, Lovenox - held for surgery   LED 2/20: negative  Elevated INR 1.38-->1.36--> 1.15-- s/p 1 dose of vitamin K    ID:   Afebrile  UA negative  f/u blood + CSF cultures, no growth 24h    Social/Family:   Discharge planning:     Code Status: [x] Full Code [] DNR [] DNI [] Goals of Care:   Disposition: [x] ICU [] Stroke Unit [] RCU []PCU []Floor [] Discharge Home     Patient at high risk for neurologic deterioration, critical care time, excluding procedures: 30 minutes, csf rhinorrhea at risk for meningitis, brain sag, subdural hematoma           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    Neuro:   neuro checks q 1 hr  Pt was in OR for TSP leak repair, replacement of dead nasoseptal flap with contralateral nasoseptal flap for CSF leak, right thigh fat graft. Nasal splints placed.   LD placed, drain 5cc /hr with plan to clamp on Wednesday  pain: oxy 5/10, Tylenol  activity: as tolerated, PT/OT saw, no needs    Respiratory:   On on face tent 50%  CXR with left basal consolidation c/f atelectasis  RVP negative  Chest PT  No IS - skull base precautions      CV:   -160 mmhg   c/w home statin    Renal:   IVF: IVF @75 while NPO  ddAVP 0.05 mg QHS  Will bladder scan    GI:   Diet: NPO for procedure today  PPx: hold  Zofran for nausea/vomiting  Reg: miralax/senna  LBM  2/23    Endocrine:   FS goal normoglycemia  PAWAN  Panhypopituitarism, c/w hydroCort, synthroid    Heme:  DVT ppx: SCDs, Lovenox - held for surgery   LED 2/20: negative  Elevated INR 1.38-->1.36--> 1.15-- s/p 1 dose of vitamin K    ID:   Afebrile  UA negative  f/u blood + CSF cultures, no growth 24h    Social/Family: wife updated at bedside     Code Status: [x] Full Code [] DNR [] DNI [] Goals of Care:   Disposition: [x] ICU [] Stroke Unit [] RCU []PCU []Floor [] Discharge Home     Patient at high risk for neurologic deterioration, critical care time, excluding procedures: 30 minutes, csf rhinorrhea at risk for meningitis, brain sag, subdural hematoma           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 Pt now POD0 CSF leak repair.    Neuro:   neuro checks q 1 hr  Pt was in OR for TSP leak repair, replacement of dead nasoseptal flap with contralateral nasoseptal flap for CSF leak, right thigh fat graft. Nasal splints placed.   LD placed, drain 5cc /hr with plan to clamp on Wednesday  pain: oxy 5/10, Tylenol  activity: as tolerated, PT/OT saw, no needs    Respiratory:   On on face tent 50%  CXR with left basal consolidation c/f atelectasis  RVP negative  Chest PT  No IS - skull base precautions      CV:   -160 mmhg   c/w home statin    Renal:   IVF: IVF @75 while NPO  ddAVP 0.05 mg QHS  Will bladder scan    GI:   Diet: NPO for procedure today  PPx: hold  Zofran for nausea/vomiting  Reg: miralax/senna  LBM  2/23    Endocrine:   FS goal normoglycemia  PAWAN  Panhypopituitarism, c/w hydroCort, synthroid    Heme:  DVT ppx: SCDs, Lovenox - held for surgery   LED 2/20: negative  Elevated INR 1.38-->1.36--> 1.15-- s/p 1 dose of vitamin K    ID:   Afebrile  UA negative  f/u blood + CSF cultures, no growth 24h    Social/Family: wife updated at bedside     Code Status: [x] Full Code [] DNR [] DNI [] Goals of Care:   Disposition: [x] ICU [] Stroke Unit [] RCU []PCU []Floor [] Discharge Home     Patient at high risk for neurologic deterioration, critical care time, excluding procedures: 30 minutes, csf rhinorrhea at risk for meningitis, brain sag, subdural hematoma

## 2024-02-23 NOTE — BRIEF OPERATIVE NOTE - NSICDXBRIEFPROCEDURE_GEN_ALL_CORE_FT
PROCEDURES:  Repair, CSF leak, using nasal septal flap or abdominal fat graft and frameless stereotaxy, after transsphenoidal surgery 23-Feb-2024 21:15:27  Genesis Marlow

## 2024-02-23 NOTE — PROGRESS NOTE ADULT - SUBJECTIVE AND OBJECTIVE BOX
ENT ISSUE/POD: r/o csfk leak s/p TSR craniopharyngioma     HPI: 43M hx HLD, chronic HAs, craniopharyngioma s/p RAYMONDA w/ Dr. Conway/ [facility]  2/1/24. Presented with ~4 days clear fluid leaking from nostrils (L>R) on standing or valsalva. Pt seen and examined at bedside.  No acurte events overnight. Pt reports clear discharge from the nose, but denies any salty or metallic  taste, HAs, fevers.             PAST MEDICAL & SURGICAL HISTORY:  HLD (hyperlipidemia)      History of pituitary tumor      Deviated septum      History of dental surgery        Allergies    No Known Allergies    Intolerances      MEDICATIONS  (STANDING):  atorvastatin 40 milliGRAM(s) Oral at bedtime  chlorhexidine 4% Liquid 1 Application(s) Topical daily  desmopressin 0.05 milliGRAM(s) Oral at bedtime  ergocalciferol 12092 Unit(s) Oral every week  hydrocortisone 10 milliGRAM(s) Oral <User Schedule>  hydrocortisone 20 milliGRAM(s) Oral <User Schedule>  hydrocortisone sodium succinate Injectable 100 milliGRAM(s) IV Push once  insulin lispro (ADMELOG) corrective regimen sliding scale   SubCutaneous Before meals and at bedtime  levothyroxine 88 MICROGram(s) Oral daily  multivitamin 1 Tablet(s) Oral daily  phytonadione  IVPB 10 milliGRAM(s) IV Intermittent daily  polyethylene glycol 3350 17 Gram(s) Oral daily  senna 2 Tablet(s) Oral at bedtime  sodium chloride 0.9%. 1000 milliLiter(s) (75 mL/Hr) IV Continuous <Continuous>    MEDICATIONS  (PRN):  acetaminophen     Tablet .. 650 milliGRAM(s) Oral every 6 hours PRN Temp greater or equal to 38C (100.4F), Mild Pain (1 - 3)  melatonin 5 milliGRAM(s) Oral at bedtime PRN Insomnia  ondansetron Injectable 4 milliGRAM(s) IV Push every 6 hours PRN Nausea and/or Vomiting  oxyCODONE    IR 5 milliGRAM(s) Oral every 4 hours PRN Moderate Pain (4 - 6)  oxyCODONE    IR 10 milliGRAM(s) Oral every 4 hours PRN Severe Pain (7 - 10)      Social History: see consult    Family history: see consult    ROS:   ENT: all negative except as noted in HPI   Pulm: denies SOB, cough, hemoptysis  Neuro: denies numbness/tingling, loss of sensation  Endo: denies heat/cold intolerance, excessive sweating      Vital Signs Last 24 Hrs  T(C): 36.9 (23 Feb 2024 07:00), Max: 37.6 (22 Feb 2024 17:00)  T(F): 98.5 (23 Feb 2024 07:00), Max: 99.6 (22 Feb 2024 17:00)  HR: 92 (23 Feb 2024 08:00) (92 - 133)  BP: 107/66 (23 Feb 2024 08:00) (101/63 - 117/68)  BP(mean): 81 (23 Feb 2024 08:00) (70 - 91)  RR: 18 (23 Feb 2024 08:00) (18 - 27)  SpO2: 93% (23 Feb 2024 08:00) (89% - 96%)    Parameters below as of 23 Feb 2024 07:00  Patient On (Oxygen Delivery Method): oxygen tent                              12.7   8.10  )-----------( 317      ( 22 Feb 2024 00:12 )             38.4    02-23    136  |  101  |  11  ----------------------------<  136<H>  5.0   |  22  |  0.67    Ca    9.2      23 Feb 2024 00:41  Phos  3.2     02-23  Mg     2.1     02-23     PT/INR - ( 23 Feb 2024 04:16 )   PT: 14.2 sec;   INR: 1.36 ratio         PTT - ( 23 Feb 2024 04:16 )  PTT:33.3 sec    PHYSICAL EXAM:  Gen: NAD  Skin: No rashes, bruises, or lesions  Head: Normocephalic, Atraumatic  Face: no edema, erythema, or fluctuance.   Eyes: no scleral injection  Nose: Nares bilaterally patent, no discharge- visualized at time of encounter   Mouth: No Stridor / Drooling / Trismus.  Mucosa moist, tongue/uvula midline, oropharynx clear - no dripping of clear fluid noted at time of encounter   Neck: Flat, supple, no lymphadenopathy, trachea midline, no masses  Lymphatic: No lymphadenopathy  Resp: breathing easily, no stridor  Neuro: facial nerve intact, no facial droop

## 2024-02-23 NOTE — PROGRESS NOTE ADULT - SUBJECTIVE AND OBJECTIVE BOX
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 Presented with ~4 days clear fluid leaking from nostrils (L>R) on standing or valsalva. Reports no h/as. Afebrile.      24H Events: No acute events. Requiring oxygen via face tent. Pre op for OR today. No examination changes.     OBJECTIVE:    ICU Vital Signs Last 24 Hrs  T(C): 36.7 (23 Feb 2024 18:41), Max: 36.9 (22 Feb 2024 23:00)  T(F): 98 (23 Feb 2024 15:00), Max: 98.5 (23 Feb 2024 07:00)  HR: 88 (23 Feb 2024 18:41) (79 - 114)  BP: 108/73 (23 Feb 2024 18:41) (101/63 - 114/59)  BP(mean): 86 (23 Feb 2024 18:41) (70 - 86)  ABP: --  ABP(mean): --  RR: 17 (23 Feb 2024 18:41) (17 - 23)  SpO2: 94% (23 Feb 2024 18:41) (90% - 97%)    O2 Parameters below as of 23 Feb 2024 18:41    O2 Flow (L/min): 35  O2 Concentration (%): 35        I&O's Summary    22 Feb 2024 07:01  -  23 Feb 2024 07:00  --------------------------------------------------------  IN: 3200 mL / OUT: 4065 mL / NET: -865 mL    23 Feb 2024 07:01  -  23 Feb 2024 19:01  --------------------------------------------------------  IN: 825 mL / OUT: 2400 mL / NET: -1575 mL      MEDICATIONS  (STANDING):  atorvastatin 40 milliGRAM(s) Oral at bedtime  chlorhexidine 4% Liquid 1 Application(s) Topical daily  desmopressin 0.05 milliGRAM(s) Oral at bedtime  ergocalciferol 95343 Unit(s) Oral every week  hydrocortisone 10 milliGRAM(s) Oral <User Schedule>  hydrocortisone 20 milliGRAM(s) Oral <User Schedule>  insulin lispro (ADMELOG) corrective regimen sliding scale   SubCutaneous Before meals and at bedtime  levothyroxine 88 MICROGram(s) Oral daily  multivitamin 1 Tablet(s) Oral daily  phytonadione  IVPB 10 milliGRAM(s) IV Intermittent daily  polyethylene glycol 3350 17 Gram(s) Oral daily  senna 2 Tablet(s) Oral at bedtime  sodium chloride 0.9%. 1000 milliLiter(s) (75 mL/Hr) IV Continuous <Continuous>    MEDICATIONS  (PRN):  acetaminophen     Tablet .. 650 milliGRAM(s) Oral every 6 hours PRN Temp greater or equal to 38C (100.4F), Mild Pain (1 - 3)  melatonin 5 milliGRAM(s) Oral at bedtime PRN Insomnia  ondansetron Injectable 4 milliGRAM(s) IV Push every 6 hours PRN Nausea and/or Vomiting  oxyCODONE    IR 5 milliGRAM(s) Oral every 4 hours PRN Moderate Pain (4 - 6)  oxyCODONE    IR 10 milliGRAM(s) Oral every 4 hours PRN Severe Pain (7 - 10)          LABS    (02-22 @ 00:12)                      12.7  8.10 )-----------( 317                 38.4    Neutrophils = -- (--%)  Lymphocytes = -- (--%)  Eosinophils = -- (--%)  Basophils = -- (--%)  Monocytes = -- (--%)  Bands = --%    02-23    136  |  101  |  11  ----------------------------<  136<H>  5.0   |  22  |  0.67    Ca    9.2      23 Feb 2024 00:41  Phos  3.2     02-23  Mg     2.1     02-23      ( 23 Feb 2024 04:16 )   PT: 14.2 sec;   INR: 1.36 ratio;       PTT:33.3 sec        Urinalysis Basic - ( 23 Feb 2024 00:41 )    Color: x / Appearance: x / SG: x / pH: x  Gluc: 136 mg/dL / Ketone: x  / Bili: x / Urobili: x   Blood: x / Protein: x / Nitrite: x   Leuk Esterase: x / RBC: x / WBC x   Sq Epi: x / Non Sq Epi: x / Bacteria: x          IMAGING    CAPILLARY BLOOD GLUCOSE      POCT Blood Glucose.: 142 mg/dL (22 Feb 2024 21:32)  POCT Blood Glucose.: 186 mg/dL (22 Feb 2024 17:22)  POCT Blood Glucose.: 139 mg/dL (22 Feb 2024 11:32)      PHYSICAL EXAM:    General: No Acute Distress     Neurological: Awake, alert oriented to person, place and time, Following Commands, PERRL, EOMI, Face Symmetrical, Speech Fluent, Moving all extremities, Muscle Strength normal in all four extremities, No Drift, Sensation to Light Touch Intact    Pulmonary: Clear to Auscultation, No Rales, No Rhonchi, No Wheezes     Cardiovascular: S1, S2, Regular Rate and Rhythm     Gastrointestinal: Soft, Nontender, Nondistended     Extremities: No calf tenderness        DEVICES: [] Restraints [] LUCINDA/HMV [x]LD [] ET tube [] Trach [] Chest Tube [] A-line [] Nunez [] NGT [] Rectal Tube                  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 Presented with ~4 days clear fluid leaking from nostrils (L>R) on standing or valsalva. Reports no h/as. Afebrile.      24H Events: No acute events. Requiring oxygen via face tent. Pre op for OR today. No examination changes.     OBJECTIVE:    ICU Vital Signs Last 24 Hrs  T(C): 36.7 (23 Feb 2024 18:41), Max: 36.9 (22 Feb 2024 23:00)  T(F): 98 (23 Feb 2024 15:00), Max: 98.5 (23 Feb 2024 07:00)  HR: 88 (23 Feb 2024 18:41) (79 - 114)  BP: 108/73 (23 Feb 2024 18:41) (101/63 - 114/59)  BP(mean): 86 (23 Feb 2024 18:41) (70 - 86)  ABP: --  ABP(mean): --  RR: 17 (23 Feb 2024 18:41) (17 - 23)  SpO2: 94% (23 Feb 2024 18:41) (90% - 97%)    O2 Parameters below as of 23 Feb 2024 18:41    O2 Flow (L/min): 35  O2 Concentration (%): 35        I&O's Summary    22 Feb 2024 07:01  -  23 Feb 2024 07:00  --------------------------------------------------------  IN: 3200 mL / OUT: 4065 mL / NET: -865 mL    23 Feb 2024 07:01  -  23 Feb 2024 19:01  --------------------------------------------------------  IN: 825 mL / OUT: 2400 mL / NET: -1575 mL      MEDICATIONS  (STANDING):  atorvastatin 40 milliGRAM(s) Oral at bedtime  chlorhexidine 4% Liquid 1 Application(s) Topical daily  desmopressin 0.05 milliGRAM(s) Oral at bedtime  ergocalciferol 29342 Unit(s) Oral every week  hydrocortisone 10 milliGRAM(s) Oral <User Schedule>  hydrocortisone 20 milliGRAM(s) Oral <User Schedule>  insulin lispro (ADMELOG) corrective regimen sliding scale   SubCutaneous Before meals and at bedtime  levothyroxine 88 MICROGram(s) Oral daily  multivitamin 1 Tablet(s) Oral daily  phytonadione  IVPB 10 milliGRAM(s) IV Intermittent daily  polyethylene glycol 3350 17 Gram(s) Oral daily  senna 2 Tablet(s) Oral at bedtime  sodium chloride 0.9%. 1000 milliLiter(s) (75 mL/Hr) IV Continuous <Continuous>    MEDICATIONS  (PRN):  acetaminophen     Tablet .. 650 milliGRAM(s) Oral every 6 hours PRN Temp greater or equal to 38C (100.4F), Mild Pain (1 - 3)  melatonin 5 milliGRAM(s) Oral at bedtime PRN Insomnia  ondansetron Injectable 4 milliGRAM(s) IV Push every 6 hours PRN Nausea and/or Vomiting  oxyCODONE    IR 5 milliGRAM(s) Oral every 4 hours PRN Moderate Pain (4 - 6)  oxyCODONE    IR 10 milliGRAM(s) Oral every 4 hours PRN Severe Pain (7 - 10)          LABS    02-23    147<H>  |  109<H>  |  12  ----------------------------<  119<H>  3.9   |  26  |  0.71    Ca    9.9      23 Feb 2024 16:04  Phos  3.2     02-23  Mg     2.1     02-23                          12.7   8.10  )-----------( 317      ( 22 Feb 2024 00:12 )             38.4     PT/INR - ( 23 Feb 2024 12:08 )   PT: 12.6 sec;   INR: 1.15 ratio         PTT - ( 23 Feb 2024 12:08 )  PTT:33.4 sec    Urinalysis Basic - ( 23 Feb 2024 16:04 )    Color: x / Appearance: x / SG: x / pH: x  Gluc: 119 mg/dL / Ketone: x  / Bili: x / Urobili: x   Blood: x / Protein: x / Nitrite: x   Leuk Esterase: x / RBC: x / WBC x   Sq Epi: x / Non Sq Epi: x / Bacteria: x        CAPILLARY BLOOD GLUCOSE    CAPILLARY BLOOD GLUCOSE      POCT Blood Glucose.: 123 mg/dL (23 Feb 2024 11:05)  POCT Blood Glucose.: 114 mg/dL (23 Feb 2024 07:56)  POCT Blood Glucose.: 142 mg/dL (22 Feb 2024 21:32)        PHYSICAL EXAM:    General: No Acute Distress     Neurological: Awake, alert oriented to person, place and time, Following Commands, PERRL, EOMI, Face Symmetrical, Speech Fluent, Moving all extremities, Muscle Strength normal in all four extremities, No Drift, Sensation to Light Touch Intact    Pulmonary: Clear to Auscultation, No Rales, No Rhonchi, No Wheezes     Cardiovascular: S1, S2, Regular Rate and Rhythm     Gastrointestinal: Soft, Nontender, Nondistended     Extremities: No calf tenderness        DEVICES: [] Restraints [] LUCINDA/HMV [x]LD [] ET tube [] Trach [] Chest Tube [] A-line [] Nunez [] NGT [] Rectal Tube                  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 Presented with ~4 days clear fluid leaking from nostrils (L>R) on standing or valsalva. Reports no h/as. Afebrile.      24H Events: No acute events. Requiring oxygen via face tent. Pre op for OR today. No examination changes.     OBJECTIVE:    ICU Vital Signs Last 24 Hrs  T(C): 36.7 (23 Feb 2024 18:41), Max: 36.9 (22 Feb 2024 23:00)  T(F): 98 (23 Feb 2024 15:00), Max: 98.5 (23 Feb 2024 07:00)  HR: 88 (23 Feb 2024 18:41) (79 - 114)  BP: 108/73 (23 Feb 2024 18:41) (101/63 - 114/59)  BP(mean): 86 (23 Feb 2024 18:41) (70 - 86)  ABP: --  ABP(mean): --  RR: 17 (23 Feb 2024 18:41) (17 - 23)  SpO2: 94% (23 Feb 2024 18:41) (90% - 97%)    O2 Parameters below as of 23 Feb 2024 18:41    O2 Flow (L/min): 35  O2 Concentration (%): 35        I&O's Summary    22 Feb 2024 07:01  -  23 Feb 2024 07:00  --------------------------------------------------------  IN: 3200 mL / OUT: 4065 mL / NET: -865 mL    23 Feb 2024 07:01  -  23 Feb 2024 19:01  --------------------------------------------------------  IN: 825 mL / OUT: 2400 mL / NET: -1575 mL      MEDICATIONS  (STANDING):  atorvastatin 40 milliGRAM(s) Oral at bedtime  chlorhexidine 4% Liquid 1 Application(s) Topical daily  desmopressin 0.05 milliGRAM(s) Oral at bedtime  ergocalciferol 43517 Unit(s) Oral every week  hydrocortisone 10 milliGRAM(s) Oral <User Schedule>  hydrocortisone 20 milliGRAM(s) Oral <User Schedule>  insulin lispro (ADMELOG) corrective regimen sliding scale   SubCutaneous Before meals and at bedtime  levothyroxine 88 MICROGram(s) Oral daily  multivitamin 1 Tablet(s) Oral daily  phytonadione  IVPB 10 milliGRAM(s) IV Intermittent daily  polyethylene glycol 3350 17 Gram(s) Oral daily  senna 2 Tablet(s) Oral at bedtime  sodium chloride 0.9%. 1000 milliLiter(s) (75 mL/Hr) IV Continuous <Continuous>    MEDICATIONS  (PRN):  acetaminophen     Tablet .. 650 milliGRAM(s) Oral every 6 hours PRN Temp greater or equal to 38C (100.4F), Mild Pain (1 - 3)  melatonin 5 milliGRAM(s) Oral at bedtime PRN Insomnia  ondansetron Injectable 4 milliGRAM(s) IV Push every 6 hours PRN Nausea and/or Vomiting  oxyCODONE    IR 5 milliGRAM(s) Oral every 4 hours PRN Moderate Pain (4 - 6)  oxyCODONE    IR 10 milliGRAM(s) Oral every 4 hours PRN Severe Pain (7 - 10)          LABS    02-23    147<H>  |  109<H>  |  12  ----------------------------<  119<H>  3.9   |  26  |  0.71    Ca    9.9      23 Feb 2024 16:04  Phos  3.2     02-23  Mg     2.1     02-23                          12.7   8.10  )-----------( 317      ( 22 Feb 2024 00:12 )             38.4     PT/INR - ( 23 Feb 2024 12:08 )   PT: 12.6 sec;   INR: 1.15 ratio         PTT - ( 23 Feb 2024 12:08 )  PTT:33.4 sec    Urinalysis Basic - ( 23 Feb 2024 16:04 )    Color: x / Appearance: x / SG: x / pH: x  Gluc: 119 mg/dL / Ketone: x  / Bili: x / Urobili: x   Blood: x / Protein: x / Nitrite: x   Leuk Esterase: x / RBC: x / WBC x   Sq Epi: x / Non Sq Epi: x / Bacteria: x        CAPILLARY BLOOD GLUCOSE    CAPILLARY BLOOD GLUCOSE      POCT Blood Glucose.: 123 mg/dL (23 Feb 2024 11:05)  POCT Blood Glucose.: 114 mg/dL (23 Feb 2024 07:56)  POCT Blood Glucose.: 142 mg/dL (22 Feb 2024 21:32)        PHYSICAL EXAM:    General: No Acute Distress     Neurological: Awake, alert oriented to person, place and time, Following Commands, PERRL, EOMI, Face Symmetrical, Speech Fluent, Moving all extremities, Muscle Strength normal in all four extremities, No Drift, Sensation to Light Touch Intact    Pulmonary: Clear to Auscultation, No Rales, No Rhonchi, No Wheezes     Cardiovascular: S1, S2, Regular Rate and Rhythm     Gastrointestinal: Soft, Nontender, Nondistended     Extremities: No calf tenderness       DEVICES: [] Restraints [] LUCINDA/HMV [x]LD [] ET tube [] Trach [] Chest Tube [] A-line [] Nunez [] NGT [] Rectal Tube            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 Presented with ~4 days clear fluid leaking from nostrils (L>R) on standing or valsalva. Reports no h/as. Afebrile.      24H Events: No acute events. Requiring oxygen via face tent. Pre op for OR today. No examination changes.     OBJECTIVE:    ICU Vital Signs Last 24 Hrs  T(C): 36.7 (23 Feb 2024 18:41), Max: 36.9 (22 Feb 2024 23:00)  T(F): 98 (23 Feb 2024 15:00), Max: 98.5 (23 Feb 2024 07:00)  HR: 88 (23 Feb 2024 18:41) (79 - 114)  BP: 108/73 (23 Feb 2024 18:41) (101/63 - 114/59)  BP(mean): 86 (23 Feb 2024 18:41) (70 - 86)  ABP: --  ABP(mean): --  RR: 17 (23 Feb 2024 18:41) (17 - 23)  SpO2: 94% (23 Feb 2024 18:41) (90% - 97%)    O2 Parameters below as of 23 Feb 2024 18:41    O2 Flow (L/min): 35  O2 Concentration (%): 35        I&O's Summary    22 Feb 2024 07:01  -  23 Feb 2024 07:00  --------------------------------------------------------  IN: 3200 mL / OUT: 4065 mL / NET: -865 mL    23 Feb 2024 07:01  -  23 Feb 2024 19:01  --------------------------------------------------------  IN: 825 mL / OUT: 2400 mL / NET: -1575 mL      MEDICATIONS  (STANDING):  atorvastatin 40 milliGRAM(s) Oral at bedtime  chlorhexidine 4% Liquid 1 Application(s) Topical daily  desmopressin 0.05 milliGRAM(s) Oral at bedtime  ergocalciferol 46864 Unit(s) Oral every week  hydrocortisone 10 milliGRAM(s) Oral <User Schedule>  hydrocortisone 20 milliGRAM(s) Oral <User Schedule>  insulin lispro (ADMELOG) corrective regimen sliding scale   SubCutaneous Before meals and at bedtime  levothyroxine 88 MICROGram(s) Oral daily  multivitamin 1 Tablet(s) Oral daily  phytonadione  IVPB 10 milliGRAM(s) IV Intermittent daily  polyethylene glycol 3350 17 Gram(s) Oral daily  senna 2 Tablet(s) Oral at bedtime  sodium chloride 0.9%. 1000 milliLiter(s) (75 mL/Hr) IV Continuous <Continuous>    MEDICATIONS  (PRN):  acetaminophen     Tablet .. 650 milliGRAM(s) Oral every 6 hours PRN Temp greater or equal to 38C (100.4F), Mild Pain (1 - 3)  melatonin 5 milliGRAM(s) Oral at bedtime PRN Insomnia  ondansetron Injectable 4 milliGRAM(s) IV Push every 6 hours PRN Nausea and/or Vomiting  oxyCODONE    IR 5 milliGRAM(s) Oral every 4 hours PRN Moderate Pain (4 - 6)  oxyCODONE    IR 10 milliGRAM(s) Oral every 4 hours PRN Severe Pain (7 - 10)          LABS    02-23    147<H>  |  109<H>  |  12  ----------------------------<  119<H>  3.9   |  26  |  0.71    Ca    9.9      23 Feb 2024 16:04  Phos  3.2     02-23  Mg     2.1     02-23                          12.7   8.10  )-----------( 317      ( 22 Feb 2024 00:12 )             38.4     PT/INR - ( 23 Feb 2024 12:08 )   PT: 12.6 sec;   INR: 1.15 ratio         PTT - ( 23 Feb 2024 12:08 )  PTT:33.4 sec    Urinalysis Basic - ( 23 Feb 2024 16:04 )    Color: x / Appearance: x / SG: x / pH: x  Gluc: 119 mg/dL / Ketone: x  / Bili: x / Urobili: x   Blood: x / Protein: x / Nitrite: x   Leuk Esterase: x / RBC: x / WBC x   Sq Epi: x / Non Sq Epi: x / Bacteria: x        CAPILLARY BLOOD GLUCOSE    CAPILLARY BLOOD GLUCOSE      POCT Blood Glucose.: 123 mg/dL (23 Feb 2024 11:05)  POCT Blood Glucose.: 114 mg/dL (23 Feb 2024 07:56)  POCT Blood Glucose.: 142 mg/dL (22 Feb 2024 21:32)        PHYSICAL EXAM:    General: No Acute Distress     Neurological: Awake, alert oriented to person, place and time, Following Commands, PERRL, EOMI, Face Symmetrical, Speech Fluent, Moving all extremities, Muscle Strength normal in all four extremities, No Drift, Sensation to Light Touch Intact    Pulmonary: Clear to Auscultation, No Rales, No Rhonchi, No Wheezes     Cardiovascular: S1, S2, Regular Rate and Rhythm     Gastrointestinal: Soft, Nontender, Nondistended     Extremities: No calf tenderness       DEVICES: [] Restraints [] LUCINDA/HMV [x]LD [] ET tube [] Trach [] Chest Tube [] A-line [] Nunez [] NGT [] Rectal Tube

## 2024-02-24 LAB
ANION GAP SERPL CALC-SCNC: 13 MMOL/L — SIGNIFICANT CHANGE UP (ref 5–17)
ANION GAP SERPL CALC-SCNC: 14 MMOL/L — SIGNIFICANT CHANGE UP (ref 5–17)
ANION GAP SERPL CALC-SCNC: 16 MMOL/L — SIGNIFICANT CHANGE UP (ref 5–17)
APTT BLD: 29.9 SEC — SIGNIFICANT CHANGE UP (ref 24.5–35.6)
BUN SERPL-MCNC: 13 MG/DL — SIGNIFICANT CHANGE UP (ref 7–23)
BUN SERPL-MCNC: 15 MG/DL — SIGNIFICANT CHANGE UP (ref 7–23)
BUN SERPL-MCNC: 15 MG/DL — SIGNIFICANT CHANGE UP (ref 7–23)
CALCIUM SERPL-MCNC: 8.3 MG/DL — LOW (ref 8.4–10.5)
CALCIUM SERPL-MCNC: 8.4 MG/DL — SIGNIFICANT CHANGE UP (ref 8.4–10.5)
CALCIUM SERPL-MCNC: 9.1 MG/DL — SIGNIFICANT CHANGE UP (ref 8.4–10.5)
CHLORIDE SERPL-SCNC: 100 MMOL/L — SIGNIFICANT CHANGE UP (ref 96–108)
CHLORIDE SERPL-SCNC: 104 MMOL/L — SIGNIFICANT CHANGE UP (ref 96–108)
CHLORIDE SERPL-SCNC: 104 MMOL/L — SIGNIFICANT CHANGE UP (ref 96–108)
CO2 SERPL-SCNC: 20 MMOL/L — LOW (ref 22–31)
CO2 SERPL-SCNC: 23 MMOL/L — SIGNIFICANT CHANGE UP (ref 22–31)
CO2 SERPL-SCNC: 24 MMOL/L — SIGNIFICANT CHANGE UP (ref 22–31)
CREAT SERPL-MCNC: 0.75 MG/DL — SIGNIFICANT CHANGE UP (ref 0.5–1.3)
CREAT SERPL-MCNC: 0.78 MG/DL — SIGNIFICANT CHANGE UP (ref 0.5–1.3)
CREAT SERPL-MCNC: 0.8 MG/DL — SIGNIFICANT CHANGE UP (ref 0.5–1.3)
EBV PCR: SIGNIFICANT CHANGE UP IU/ML
EGFR: 113 ML/MIN/1.73M2 — SIGNIFICANT CHANGE UP
EGFR: 113 ML/MIN/1.73M2 — SIGNIFICANT CHANGE UP
EGFR: 115 ML/MIN/1.73M2 — SIGNIFICANT CHANGE UP
GLUCOSE BLDC GLUCOMTR-MCNC: 126 MG/DL — HIGH (ref 70–99)
GLUCOSE SERPL-MCNC: 117 MG/DL — HIGH (ref 70–99)
GLUCOSE SERPL-MCNC: 142 MG/DL — HIGH (ref 70–99)
GLUCOSE SERPL-MCNC: 193 MG/DL — HIGH (ref 70–99)
HCT VFR BLD CALC: 31.5 % — LOW (ref 39–50)
HGB BLD-MCNC: 10.3 G/DL — LOW (ref 13–17)
INR BLD: 1.11 RATIO — SIGNIFICANT CHANGE UP (ref 0.85–1.18)
JCPYV DNA # CSF NAA+PROBE: SIGNIFICANT CHANGE UP COPIES/ML
MAGNESIUM SERPL-MCNC: 2.4 MG/DL — SIGNIFICANT CHANGE UP (ref 1.6–2.6)
MAGNESIUM SERPL-MCNC: 2.4 MG/DL — SIGNIFICANT CHANGE UP (ref 1.6–2.6)
MAGNESIUM SERPL-MCNC: 2.5 MG/DL — SIGNIFICANT CHANGE UP (ref 1.6–2.6)
MCHC RBC-ENTMCNC: 28.6 PG — SIGNIFICANT CHANGE UP (ref 27–34)
MCHC RBC-ENTMCNC: 32.7 GM/DL — SIGNIFICANT CHANGE UP (ref 32–36)
MCV RBC AUTO: 87.5 FL — SIGNIFICANT CHANGE UP (ref 80–100)
NRBC # BLD: 0 /100 WBCS — SIGNIFICANT CHANGE UP (ref 0–0)
PHOSPHATE SERPL-MCNC: 2.6 MG/DL — SIGNIFICANT CHANGE UP (ref 2.5–4.5)
PHOSPHATE SERPL-MCNC: 2.9 MG/DL — SIGNIFICANT CHANGE UP (ref 2.5–4.5)
PHOSPHATE SERPL-MCNC: 3.5 MG/DL — SIGNIFICANT CHANGE UP (ref 2.5–4.5)
PLATELET # BLD AUTO: 328 K/UL — SIGNIFICANT CHANGE UP (ref 150–400)
POTASSIUM SERPL-MCNC: 3.8 MMOL/L — SIGNIFICANT CHANGE UP (ref 3.5–5.3)
POTASSIUM SERPL-MCNC: 4 MMOL/L — SIGNIFICANT CHANGE UP (ref 3.5–5.3)
POTASSIUM SERPL-MCNC: 4.2 MMOL/L — SIGNIFICANT CHANGE UP (ref 3.5–5.3)
POTASSIUM SERPL-SCNC: 3.8 MMOL/L — SIGNIFICANT CHANGE UP (ref 3.5–5.3)
POTASSIUM SERPL-SCNC: 4 MMOL/L — SIGNIFICANT CHANGE UP (ref 3.5–5.3)
POTASSIUM SERPL-SCNC: 4.2 MMOL/L — SIGNIFICANT CHANGE UP (ref 3.5–5.3)
PROTHROM AB SERPL-ACNC: 12.2 SEC — SIGNIFICANT CHANGE UP (ref 9.5–13)
RBC # BLD: 3.6 M/UL — LOW (ref 4.2–5.8)
RBC # FLD: 12.7 % — SIGNIFICANT CHANGE UP (ref 10.3–14.5)
SODIUM SERPL-SCNC: 137 MMOL/L — SIGNIFICANT CHANGE UP (ref 135–145)
SODIUM SERPL-SCNC: 140 MMOL/L — SIGNIFICANT CHANGE UP (ref 135–145)
SODIUM SERPL-SCNC: 141 MMOL/L — SIGNIFICANT CHANGE UP (ref 135–145)
SP GR UR STRIP: 1.02 — SIGNIFICANT CHANGE UP (ref 1–1.03)
WBC # BLD: 13.64 K/UL — HIGH (ref 3.8–10.5)
WBC # FLD AUTO: 13.64 K/UL — HIGH (ref 3.8–10.5)

## 2024-02-24 PROCEDURE — 99232 SBSQ HOSP IP/OBS MODERATE 35: CPT

## 2024-02-24 PROCEDURE — 99232 SBSQ HOSP IP/OBS MODERATE 35: CPT | Mod: 25

## 2024-02-24 RX ORDER — DESMOPRESSIN ACETATE 0.1 MG/1
0.05 TABLET ORAL ONCE
Refills: 0 | Status: COMPLETED | OUTPATIENT
Start: 2024-02-24 | End: 2024-02-24

## 2024-02-24 RX ORDER — CHLORHEXIDINE GLUCONATE 213 G/1000ML
1 SOLUTION TOPICAL DAILY
Refills: 0 | Status: DISCONTINUED | OUTPATIENT
Start: 2024-02-24 | End: 2024-03-01

## 2024-02-24 RX ORDER — POTASSIUM CHLORIDE 20 MEQ
40 PACKET (EA) ORAL ONCE
Refills: 0 | Status: COMPLETED | OUTPATIENT
Start: 2024-02-24 | End: 2024-02-24

## 2024-02-24 RX ORDER — DESMOPRESSIN ACETATE 0.1 MG/1
0.05 TABLET ORAL
Refills: 0 | Status: DISCONTINUED | OUTPATIENT
Start: 2024-02-24 | End: 2024-02-25

## 2024-02-24 RX ORDER — ENOXAPARIN SODIUM 100 MG/ML
40 INJECTION SUBCUTANEOUS
Refills: 0 | Status: DISCONTINUED | OUTPATIENT
Start: 2024-02-24 | End: 2024-03-03

## 2024-02-24 RX ORDER — SODIUM CHLORIDE 0.65 %
1 AEROSOL, SPRAY (ML) NASAL
Refills: 0 | Status: COMPLETED | OUTPATIENT
Start: 2024-02-24 | End: 2024-03-01

## 2024-02-24 RX ORDER — DESMOPRESSIN ACETATE 0.1 MG/1
0.1 TABLET ORAL AT BEDTIME
Refills: 0 | Status: DISCONTINUED | OUTPATIENT
Start: 2024-02-24 | End: 2024-02-24

## 2024-02-24 RX ORDER — POTASSIUM PHOSPHATE, MONOBASIC POTASSIUM PHOSPHATE, DIBASIC 236; 224 MG/ML; MG/ML
15 INJECTION, SOLUTION INTRAVENOUS ONCE
Refills: 0 | Status: COMPLETED | OUTPATIENT
Start: 2024-02-24 | End: 2024-02-24

## 2024-02-24 RX ORDER — POTASSIUM PHOSPHATE, MONOBASIC POTASSIUM PHOSPHATE, DIBASIC 236; 224 MG/ML; MG/ML
30 INJECTION, SOLUTION INTRAVENOUS ONCE
Refills: 0 | Status: COMPLETED | OUTPATIENT
Start: 2024-02-24 | End: 2024-02-24

## 2024-02-24 RX ADMIN — Medication 100 MILLIGRAM(S): at 05:33

## 2024-02-24 RX ADMIN — Medication 1 SPRAY(S): at 18:00

## 2024-02-24 RX ADMIN — Medication 40 MILLIEQUIVALENT(S): at 08:49

## 2024-02-24 RX ADMIN — ATORVASTATIN CALCIUM 40 MILLIGRAM(S): 80 TABLET, FILM COATED ORAL at 21:00

## 2024-02-24 RX ADMIN — DESMOPRESSIN ACETATE 0.05 MILLIGRAM(S): 0.1 TABLET ORAL at 13:03

## 2024-02-24 RX ADMIN — SENNA PLUS 2 TABLET(S): 8.6 TABLET ORAL at 21:00

## 2024-02-24 RX ADMIN — POTASSIUM PHOSPHATE, MONOBASIC POTASSIUM PHOSPHATE, DIBASIC 83.33 MILLIMOLE(S): 236; 224 INJECTION, SOLUTION INTRAVENOUS at 17:05

## 2024-02-24 RX ADMIN — Medication 88 MICROGRAM(S): at 05:33

## 2024-02-24 RX ADMIN — CHLORHEXIDINE GLUCONATE 1 APPLICATION(S): 213 SOLUTION TOPICAL at 21:00

## 2024-02-24 RX ADMIN — Medication 20 MILLIGRAM(S): at 08:49

## 2024-02-24 RX ADMIN — Medication 1 SPRAY(S): at 05:33

## 2024-02-24 RX ADMIN — DESMOPRESSIN ACETATE 0.05 MILLIGRAM(S): 0.1 TABLET ORAL at 21:00

## 2024-02-24 RX ADMIN — Medication 10 MILLIGRAM(S): at 15:39

## 2024-02-24 RX ADMIN — POTASSIUM PHOSPHATE, MONOBASIC POTASSIUM PHOSPHATE, DIBASIC 62.5 MILLIMOLE(S): 236; 224 INJECTION, SOLUTION INTRAVENOUS at 02:05

## 2024-02-24 RX ADMIN — ERGOCALCIFEROL 50000 UNIT(S): 1.25 CAPSULE ORAL at 12:50

## 2024-02-24 RX ADMIN — POLYETHYLENE GLYCOL 3350 17 GRAM(S): 17 POWDER, FOR SOLUTION ORAL at 12:50

## 2024-02-24 RX ADMIN — ENOXAPARIN SODIUM 40 MILLIGRAM(S): 100 INJECTION SUBCUTANEOUS at 18:00

## 2024-02-24 RX ADMIN — Medication 1 TABLET(S): at 12:50

## 2024-02-24 NOTE — PROGRESS NOTE ADULT - SUBJECTIVE AND OBJECTIVE BOX
ENT ISSUE/POD: s/p CSF leak repair w/new septal flap and fat graft from right thigh, s/p b/l septal splints and LD. POD 1.      HPI: 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 Admitted for postoperative CSF leak despite attempted lumbar drain. Pt is status post OR CSF leak. original flap noted to be nonviable. is status post a new flap from contralateral side, and with additional fat graft from right thigh .also s/p bilateral septal splints placement. LD x 5 days. Pt denies HAs, salty or metallic taste, fevers.        PAST MEDICAL & SURGICAL HISTORY:  HLD (hyperlipidemia)      History of pituitary tumor      Deviated septum      History of dental surgery        Allergies    No Known Allergies    Intolerances      MEDICATIONS  (STANDING):  atorvastatin 40 milliGRAM(s) Oral at bedtime  chlorhexidine 4% Liquid 1 Application(s) Topical daily  desmopressin 0.1 milliGRAM(s) Oral at bedtime  enoxaparin Injectable 40 milliGRAM(s) SubCutaneous <User Schedule>  ergocalciferol 77069 Unit(s) Oral every week  hydrocortisone 10 milliGRAM(s) Oral <User Schedule>  hydrocortisone 20 milliGRAM(s) Oral <User Schedule>  insulin lispro (ADMELOG) corrective regimen sliding scale   SubCutaneous Before meals and at bedtime  levothyroxine 88 MICROGram(s) Oral daily  multivitamin 1 Tablet(s) Oral daily  polyethylene glycol 3350 17 Gram(s) Oral daily  senna 2 Tablet(s) Oral at bedtime  sodium chloride 0.65% Nasal 1 Spray(s) Both Nostrils two times a day    MEDICATIONS  (PRN):  acetaminophen     Tablet .. 650 milliGRAM(s) Oral every 6 hours PRN Mild Pain (1 - 3)  bisacodyl 5 milliGRAM(s) Oral daily PRN Constipation  melatonin 5 milliGRAM(s) Oral at bedtime PRN Insomnia  ondansetron Injectable 4 milliGRAM(s) IV Push every 6 hours PRN Nausea and/or Vomiting  oxyCODONE    IR 5 milliGRAM(s) Oral every 4 hours PRN Moderate Pain (4 - 6)      Social History: see consult    Family history: see consult    ROS:   ENT: all negative except as noted in HPI   Pulm: denies SOB, cough, hemoptysis  Neuro: denies numbness/tingling, loss of sensation  Endo: denies heat/cold intolerance, excessive sweating      Vital Signs Last 24 Hrs  T(C): 37 (24 Feb 2024 11:00), Max: 37 (24 Feb 2024 11:00)  T(F): 98.6 (24 Feb 2024 11:00), Max: 98.6 (24 Feb 2024 11:00)  HR: 82 (24 Feb 2024 11:00) (71 - 110)  BP: 120/75 (24 Feb 2024 11:00) (101/66 - 132/90)  BP(mean): 92 (24 Feb 2024 11:00) (78 - 105)  RR: 20 (24 Feb 2024 11:00) (11 - 22)  SpO2: 96% (24 Feb 2024 11:00) (90% - 99%)    Parameters below as of 24 Feb 2024 07:00  Patient On (Oxygen Delivery Method): face tent                              10.3   13.64 )-----------( 328      ( 24 Feb 2024 00:38 )             31.5    02-24    141  |  104  |  13  ----------------------------<  117<H>  3.8   |  23  |  0.75    Ca    8.3<L>      24 Feb 2024 06:29  Phos  3.5     02-24  Mg     2.4     02-24     PT/INR - ( 24 Feb 2024 00:38 )   PT: 12.2 sec;   INR: 1.11 ratio         PTT - ( 24 Feb 2024 00:38 )  PTT:29.9 sec    PHYSICAL EXAM:  Gen: NAD  Skin: No rashes, bruises, or lesions  Head: Normocephalic, Atraumatic  Face: no edema, erythema, or fluctuance. Parotid glands soft without mass  Eyes: no scleral injection  Nose: Blood tinged mucous from B/L nares, no active bleeding, no clear discharge  Mouth: No Stridor / Drooling / Trismus.  Mucosa moist, tongue/uvula midline, oropharynx clear  Neck: Flat, supple, no lymphadenopathy, trachea midline, no masses  Lymphatic: No lymphadenopathy  Resp: breathing easily, no stridor  Neuro: facial nerve intact, no facial droop

## 2024-02-24 NOTE — PHYSICAL THERAPY INITIAL EVALUATION ADULT - PLANNED THERAPY INTERVENTIONS, PT EVAL
Medication refill received from [facility] for Norvasc, Lipitor, Hydrodiuril, and Wellbutrin.    LOV: 3/15/19  NOV: not scheduled  Last Labs: 3/28/13 FLP 3/9/12  Last refilled:   Norvasc 9/19/18 #90 w 3  Lipitor- 9/4/18 #90 w 3  Hydrodiuril - 9/4/18 #90 w 3   Wellbutrin - 9/4/18 #90 w 3    Please advise.    stair neg: GOAL: pt will neg 12 steps 1 HR ind in 4wks./gait training

## 2024-02-24 NOTE — PHYSICAL THERAPY INITIAL EVALUATION ADULT - PATIENT/FAMILY/SIGNIFICANT OTHER GOALS STATEMENT, PT EVAL
Clear bilaterally, pupils equal, round and reactive to light.
to get better and go home
to get better and go home

## 2024-02-24 NOTE — PROGRESS NOTE ADULT - SUBJECTIVE AND OBJECTIVE BOX
HPI:  43M hx HLD, chronic HAs, craniopharyngioma s/p RAYMONDA w/ Dr. 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 Presented with ~4 days clear fluid leaking from nostrils (L>R) on standing or valsalva. Reports no h/as. Afebrile.      24H Events: No acute events. Requiring oxygen via face tent. Pre op for OR today. No examination changes.   2/24- Patient s/p replacement of necrotic nasoseptal flap with contralateral nasoseptal flap for CSF leak (right thigh fat graft). Reports no salty, metallic taste in the mouth.    Allergies    No Known Allergies    Intolerances        REVIEW OF SYSTEMS: [ ] Unable to Assess due to neurologic exam   [x] All ROS addressed below are non-contributory, except:  Neuro: [ ] Headache [ ] Back pain [ ] Numbness [ ] Weakness [ ] Ataxia [ ] Dizziness [ ] Aphasia [ ] Dysarthria [ ] Visual disturbance  Resp: [ ] Shortness of breath/dyspnea, [ ] Orthopnea [ ] Cough  CV: [ ] Chest pain [ ] Palpitation [ ] Lightheadedness [ ] Syncope  Renal: [ ] Thirst [ ] Edema  GI: [ ] Nausea [ ] Emesis [ ] Abdominal pain [ ] Constipation [ ] Diarrhea  Hem: [ ] Hematemesis [ ] bright red blood per rectum  ID: [ ] Fever [ ] Chills [ ] Dysuria  ENT: [ ] Rhinorrhea      DEVICES:   [ ] Restraints [ ] ET tube [ ] central line [ ] arterial line [ ] carlson [ ] NGT/OGT [ ] EVD [ ] LD [ ] LUCINDA/HMV [ ] Trach [ ] PEG [ ] Chest Tube     VITALS:   Vital Signs Last 24 Hrs  T(C): 36.8 (24 Feb 2024 07:00), Max: 36.8 (23 Feb 2024 23:00)  T(F): 98.2 (24 Feb 2024 07:00), Max: 98.2 (23 Feb 2024 23:00)  HR: 86 (24 Feb 2024 10:00) (71 - 110)  BP: 116/69 (24 Feb 2024 10:00) (101/66 - 132/90)  BP(mean): 86 (24 Feb 2024 10:00) (78 - 105)  RR: 17 (24 Feb 2024 10:00) (11 - 22)  SpO2: 96% (24 Feb 2024 10:00) (90% - 99%)    Parameters below as of 24 Feb 2024 07:00  Patient On (Oxygen Delivery Method): face tent      CAPILLARY BLOOD GLUCOSE      POCT Blood Glucose.: 126 mg/dL (24 Feb 2024 08:18)  POCT Blood Glucose.: 127 mg/dL (23 Feb 2024 22:44)  POCT Blood Glucose.: 123 mg/dL (23 Feb 2024 11:05)    I&O's Summary    23 Feb 2024 07:01  -  24 Feb 2024 07:00  --------------------------------------------------------  IN: 3665 mL / OUT: 4005 mL / NET: -340 mL    24 Feb 2024 07:01  -  24 Feb 2024 11:02  --------------------------------------------------------  IN: 240 mL / OUT: 435 mL / NET: -195 mL        Respiratory:        LABS:                        10.3   13.64 )-----------( 328      ( 24 Feb 2024 00:38 )             31.5     02-24    141  |  104  |  13  ----------------------------<  117<H>  3.8   |  23  |  0.75             MEDICATION LEVELS:     IVF FLUIDS/MEDICATIONS:   MEDICATIONS  (STANDING):  atorvastatin 40 milliGRAM(s) Oral at bedtime  chlorhexidine 4% Liquid 1 Application(s) Topical daily  desmopressin 0.1 milliGRAM(s) Oral at bedtime  ergocalciferol 72221 Unit(s) Oral every week  hydrocortisone 10 milliGRAM(s) Oral <User Schedule>  hydrocortisone 20 milliGRAM(s) Oral <User Schedule>  insulin lispro (ADMELOG) corrective regimen sliding scale   SubCutaneous Before meals and at bedtime  levothyroxine 88 MICROGram(s) Oral daily  multivitamin 1 Tablet(s) Oral daily  polyethylene glycol 3350 17 Gram(s) Oral daily  senna 2 Tablet(s) Oral at bedtime  sodium chloride 0.65% Nasal 1 Spray(s) Both Nostrils two times a day    MEDICATIONS  (PRN):  acetaminophen     Tablet .. 650 milliGRAM(s) Oral every 6 hours PRN Mild Pain (1 - 3)  bisacodyl 5 milliGRAM(s) Oral daily PRN Constipation  melatonin 5 milliGRAM(s) Oral at bedtime PRN Insomnia  ondansetron Injectable 4 milliGRAM(s) IV Push every 6 hours PRN Nausea and/or Vomiting  oxyCODONE    IR 5 milliGRAM(s) Oral every 4 hours PRN Moderate Pain (4 - 6)        IMAGING:      EXAMINATION:  PHYSICAL EXAM:    Constitutional: No Acute Distress, face tent on    Neurological: Awake, alert oriented to person, place and time, Following Commands, PERRL, EOMI, No Gaze Preference, Face Symmetrical, Speech nasal.    Motor exam:          Upper extremity                         Delt     Bicep     Tricep    HG                                                 R         5/5        5/5        5/5       5/5                                               L          5/5        5/5        5/5       5/5          Lower extremity                        HF         KF        KE       DF         PF                                                  R        5/5        5/5        5/5       5/5         5/5                                               L         5/5        5/5       5/5       5/5          5/5                                                 Sensation: [ ] intact to light touch  [ ] decreased:     Reflexes: Deep Tendon Reflexes Intact     Pulmonary: Clear to Auscultation, diminished breath sounds in bilateral bases     Cardiovascular: S1, S2, Regular rate and rhythm     Gastrointestinal: Soft, Non-tender, Non-distended     Extremities: No calf tenderness     Incision:

## 2024-02-24 NOTE — PHYSICAL THERAPY INITIAL EVALUATION ADULT - NSPTDISCHREC_GEN_A_CORE
No skilled PT needs
DC home with no skilled PT needs, assist from spouse as needed, SW to be notified./No skilled PT needs

## 2024-02-24 NOTE — PROGRESS NOTE ADULT - ASSESSMENT
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 Pt now POD0 CSF leak repair.    Neuro:   neuro checks q 4hr  Pt was in OR for TSP leak repair, replacement of dead nasoseptal flap with contralateral nasoseptal flap for CSF leak, right thigh fat graft. Nasal splints placed.   LD placed, drain 5cc /hr with plan to clamp on Wednesday  pain: oxy 5/10, Tylenol  activity: OOB    Respiratory:   On on face tent 50%  CXR with left basal consolidation c/f atelectasis  RVP negative  Chest PT  No IS - skull base precautions      CV:   -160 mmhg   c/w home statin    Renal:   IVF: IVL  ddAVP 0.1 mg in AM    GI:   Diet: NPO for procedure today  PPx: hold  Zofran for nausea/vomiting  Reg: miralax/senna  LBM  2/23    Endocrine:   FS goal normoglycemia  PAWAN  Panhypopituitarism, c/w hydroCort, synthroid  DDAVP 0.1 QD-- increase if needed     Heme:  DVT ppx: SCDs, Lovenox- restart tonight   LED 2/20: negative  Elevated INR-- now back to normal     ID:   Afebrile  UA negative, elevated leukocyte count-- likely in setting of steroid or post op   f/u blood + CSF cultures, no growth 24h    Social/Family: wife updated at bedside     Code Status: [x] Full Code [] DNR [] DNI [] Goals of Care:   Disposition: [x] ICU [] Stroke Unit [] RCU []PCU []Floor [] Discharge Home     Patient at high risk for neurologic deterioration, critical care time, excluding procedures: 30 minutes, csf rhinorrhea at risk for meningitis, brain sag, subdural hematoma

## 2024-02-24 NOTE — PHYSICAL THERAPY INITIAL EVALUATION ADULT - PERTINENT HX OF CURRENT PROBLEM, REHAB EVAL
43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 Pending LD 2/20.
43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 Pending LD 2/20. 2/24- Patient s/p replacement of necrotic nasoseptal flap with contralateral nasoseptal flap for CSF leak (right thigh fat graft). Reports no salty, metallic taste in the mouth. Pt was in OR for TSP leak repair, replacement of dead nasoseptal flap with contralateral nasoseptal flap for CSF leak, right thigh fat graft. Nasal splints placed. LD placed, drain 5cc /hr with plan to clamp on Wednesday

## 2024-02-24 NOTE — PHYSICAL THERAPY INITIAL EVALUATION ADULT - PRECAUTIONS/LIMITATIONS, REHAB EVAL
skull base precautions/fall precautions/surgical precautions
skull base precautions/fall precautions/surgical precautions

## 2024-02-24 NOTE — PROGRESS NOTE ADULT - SUBJECTIVE AND OBJECTIVE BOX
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 Presented with ~4 days clear fluid leaking from nostrils (L>R) on standing or valsalva. Reports no h/as. Afebrile.      24H Events: No acute events. Requiring oxygen via face tent. Pre op for OR today. No examination changes.   2/24- Patient s/p replacement of necrotic nasoseptal flap with contralateral nasoseptal flap for CSF leak (right thigh fat graft). Reports no salty, metallic taste in the mouth.  2/25: No acute events overnight     Allergies    No Known Allergies    Intolerances        REVIEW OF SYSTEMS: [ ] Unable to Assess due to neurologic exam   [x] All ROS addressed below are non-contributory, except:  Neuro: [ ] Headache [ ] Back pain [ ] Numbness [ ] Weakness [ ] Ataxia [ ] Dizziness [ ] Aphasia [ ] Dysarthria [ ] Visual disturbance  Resp: [ ] Shortness of breath/dyspnea, [ ] Orthopnea [ ] Cough  CV: [ ] Chest pain [ ] Palpitation [ ] Lightheadedness [ ] Syncope  Renal: [ ] Thirst [ ] Edema  GI: [ ] Nausea [ ] Emesis [ ] Abdominal pain [ ] Constipation [ ] Diarrhea  Hem: [ ] Hematemesis [ ] bright red blood per rectum  ID: [ ] Fever [ ] Chills [ ] Dysuria  ENT: [ ] Rhinorrhea      DEVICES:   [ ] Restraints [ ] ET tube [ ] central line [ ] arterial line [ ] carlson [ ] NGT/OGT [ ] EVD [ ] LD [ ] LUCINDA/HMV [ ] Trach [ ] PEG [ ] Chest Tube     VITALS:   Vital Signs Last 24 Hrs  T(C): 36.8 (24 Feb 2024 07:00), Max: 36.8 (23 Feb 2024 23:00)  T(F): 98.2 (24 Feb 2024 07:00), Max: 98.2 (23 Feb 2024 23:00)  HR: 86 (24 Feb 2024 10:00) (71 - 110)  BP: 116/69 (24 Feb 2024 10:00) (101/66 - 132/90)  BP(mean): 86 (24 Feb 2024 10:00) (78 - 105)  RR: 17 (24 Feb 2024 10:00) (11 - 22)  SpO2: 96% (24 Feb 2024 10:00) (90% - 99%)    Parameters below as of 24 Feb 2024 07:00  Patient On (Oxygen Delivery Method): face tent      CAPILLARY BLOOD GLUCOSE      POCT Blood Glucose.: 126 mg/dL (24 Feb 2024 08:18)  POCT Blood Glucose.: 127 mg/dL (23 Feb 2024 22:44)  POCT Blood Glucose.: 123 mg/dL (23 Feb 2024 11:05)    I&O's Summary    23 Feb 2024 07:01  -  24 Feb 2024 07:00  --------------------------------------------------------  IN: 3665 mL / OUT: 4005 mL / NET: -340 mL    24 Feb 2024 07:01  -  24 Feb 2024 11:02  --------------------------------------------------------  IN: 240 mL / OUT: 435 mL / NET: -195 mL        Respiratory:        LABS:                        10.3   13.64 )-----------( 328      ( 24 Feb 2024 00:38 )             31.5     02-24    141  |  104  |  13  ----------------------------<  117<H>  3.8   |  23  |  0.75             MEDICATION LEVELS:     IVF FLUIDS/MEDICATIONS:   MEDICATIONS  (STANDING):  atorvastatin 40 milliGRAM(s) Oral at bedtime  chlorhexidine 4% Liquid 1 Application(s) Topical daily  desmopressin 0.1 milliGRAM(s) Oral at bedtime  ergocalciferol 94230 Unit(s) Oral every week  hydrocortisone 10 milliGRAM(s) Oral <User Schedule>  hydrocortisone 20 milliGRAM(s) Oral <User Schedule>  insulin lispro (ADMELOG) corrective regimen sliding scale   SubCutaneous Before meals and at bedtime  levothyroxine 88 MICROGram(s) Oral daily  multivitamin 1 Tablet(s) Oral daily  polyethylene glycol 3350 17 Gram(s) Oral daily  senna 2 Tablet(s) Oral at bedtime  sodium chloride 0.65% Nasal 1 Spray(s) Both Nostrils two times a day    MEDICATIONS  (PRN):  acetaminophen     Tablet .. 650 milliGRAM(s) Oral every 6 hours PRN Mild Pain (1 - 3)  bisacodyl 5 milliGRAM(s) Oral daily PRN Constipation  melatonin 5 milliGRAM(s) Oral at bedtime PRN Insomnia  ondansetron Injectable 4 milliGRAM(s) IV Push every 6 hours PRN Nausea and/or Vomiting  oxyCODONE    IR 5 milliGRAM(s) Oral every 4 hours PRN Moderate Pain (4 - 6)        IMAGING:      EXAMINATION:  PHYSICAL EXAM:    Constitutional: No Acute Distress, face tent on    Neurological: Awake, alert oriented to person, place and time, Following Commands, PERRL, EOMI, No Gaze Preference, Face Symmetrical, Speech nasal.    Motor exam:          Upper extremity                         Delt     Bicep     Tricep    HG                                                 R         5/5        5/5        5/5       5/5                                               L          5/5        5/5        5/5       5/5          Lower extremity                        HF         KF        KE       DF         PF                                                  R        5/5        5/5        5/5       5/5         5/5                                               L         5/5        5/5       5/5       5/5          5/5                                                 Sensation: [ ] intact to light touch  [ ] decreased:     Reflexes: Deep Tendon Reflexes Intact     Pulmonary: Clear to Auscultation, diminished breath sounds in bilateral bases     Cardiovascular: S1, S2, Regular rate and rhythm     Gastrointestinal: Soft, Non-tender, Non-distended     Extremities: No calf tenderness     Incision:

## 2024-02-24 NOTE — PHYSICAL THERAPY INITIAL EVALUATION ADULT - AMBULATION SKILLS, REHAB EVAL
How Severe Is Your Skin Lesion?: mild Is This A New Presentation, Or A Follow-Up?: Skin Lesions independent

## 2024-02-24 NOTE — PHYSICAL THERAPY INITIAL EVALUATION ADULT - GENERAL OBSERVATIONS, REHAB EVAL
Chart reviewed. Pt sarah 45 mins PT eval; recd semi supine in bed, NAD, VSS, A/Ox4,
Pt is s/p CSF leak repair with new septal flap and fat graft from R thigh, s/p b/l septal splints and LD. Pt received sitting EOB, +ICU monitoring, +dressing under nose, +LD (clamped), +room air, +portable tele monitor during amb, cleared by RN for PT re-eval.

## 2024-02-24 NOTE — PROGRESS NOTE ADULT - ASSESSMENT
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 Admitted for postoperative CSF leak despite attempted lumbar drain. Pt is status post CSF leak repair with a new flap from contralateral side and fat graft. The original flap was no longer viable. B/L nasal septal splints in place, no CSF leak.

## 2024-02-24 NOTE — PHYSICAL THERAPY INITIAL EVALUATION ADULT - DID THE PATIENT HAVE SURGERY?
yes
s/p replacement of necrotic nasoseptal flap with contralateral nasoseptal flap for CSF leak (right thigh fat graft)./yes

## 2024-02-24 NOTE — PHYSICAL THERAPY INITIAL EVALUATION ADULT - ADDITIONAL COMMENTS
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
PTA pt was independent with functional mobility and ADLs.

## 2024-02-25 LAB
ANION GAP SERPL CALC-SCNC: 12 MMOL/L — SIGNIFICANT CHANGE UP (ref 5–17)
APTT BLD: 28.4 SEC — SIGNIFICANT CHANGE UP (ref 24.5–35.6)
BASOPHILS # BLD AUTO: 0.02 K/UL — SIGNIFICANT CHANGE UP (ref 0–0.2)
BASOPHILS NFR BLD AUTO: 0.2 % — SIGNIFICANT CHANGE UP (ref 0–2)
BUN SERPL-MCNC: 17 MG/DL — SIGNIFICANT CHANGE UP (ref 7–23)
CALCIUM SERPL-MCNC: 8.6 MG/DL — SIGNIFICANT CHANGE UP (ref 8.4–10.5)
CHLORIDE SERPL-SCNC: 105 MMOL/L — SIGNIFICANT CHANGE UP (ref 96–108)
CO2 SERPL-SCNC: 22 MMOL/L — SIGNIFICANT CHANGE UP (ref 22–31)
CREAT SERPL-MCNC: 0.83 MG/DL — SIGNIFICANT CHANGE UP (ref 0.5–1.3)
EGFR: 111 ML/MIN/1.73M2 — SIGNIFICANT CHANGE UP
EOSINOPHIL # BLD AUTO: 0.01 K/UL — SIGNIFICANT CHANGE UP (ref 0–0.5)
EOSINOPHIL NFR BLD AUTO: 0.1 % — SIGNIFICANT CHANGE UP (ref 0–6)
GLUCOSE SERPL-MCNC: 102 MG/DL — HIGH (ref 70–99)
HCT VFR BLD CALC: 29.3 % — LOW (ref 39–50)
HGB BLD-MCNC: 9.9 G/DL — LOW (ref 13–17)
IMM GRANULOCYTES NFR BLD AUTO: 1.7 % — HIGH (ref 0–0.9)
INR BLD: 1.1 RATIO — SIGNIFICANT CHANGE UP (ref 0.85–1.18)
LYMPHOCYTES # BLD AUTO: 2.69 K/UL — SIGNIFICANT CHANGE UP (ref 1–3.3)
LYMPHOCYTES # BLD AUTO: 21.1 % — SIGNIFICANT CHANGE UP (ref 13–44)
MAGNESIUM SERPL-MCNC: 2.3 MG/DL — SIGNIFICANT CHANGE UP (ref 1.6–2.6)
MCHC RBC-ENTMCNC: 29.3 PG — SIGNIFICANT CHANGE UP (ref 27–34)
MCHC RBC-ENTMCNC: 33.8 GM/DL — SIGNIFICANT CHANGE UP (ref 32–36)
MCV RBC AUTO: 86.7 FL — SIGNIFICANT CHANGE UP (ref 80–100)
MONOCYTES # BLD AUTO: 0.56 K/UL — SIGNIFICANT CHANGE UP (ref 0–0.9)
MONOCYTES NFR BLD AUTO: 4.4 % — SIGNIFICANT CHANGE UP (ref 2–14)
NEUTROPHILS # BLD AUTO: 9.23 K/UL — HIGH (ref 1.8–7.4)
NEUTROPHILS NFR BLD AUTO: 72.5 % — SIGNIFICANT CHANGE UP (ref 43–77)
NRBC # BLD: 0 /100 WBCS — SIGNIFICANT CHANGE UP (ref 0–0)
OSMOLALITY SERPL: 285 MOSMOL/KG — SIGNIFICANT CHANGE UP (ref 275–300)
OSMOLALITY UR: 410 MOS/KG — SIGNIFICANT CHANGE UP (ref 300–900)
PHOSPHATE SERPL-MCNC: 4.4 MG/DL — SIGNIFICANT CHANGE UP (ref 2.5–4.5)
PLATELET # BLD AUTO: 260 K/UL — SIGNIFICANT CHANGE UP (ref 150–400)
POTASSIUM SERPL-MCNC: 4.2 MMOL/L — SIGNIFICANT CHANGE UP (ref 3.5–5.3)
POTASSIUM SERPL-SCNC: 4.2 MMOL/L — SIGNIFICANT CHANGE UP (ref 3.5–5.3)
PROTHROM AB SERPL-ACNC: 11.5 SEC — SIGNIFICANT CHANGE UP (ref 9.5–13)
RBC # BLD: 3.38 M/UL — LOW (ref 4.2–5.8)
RBC # FLD: 12.4 % — SIGNIFICANT CHANGE UP (ref 10.3–14.5)
SODIUM SERPL-SCNC: 139 MMOL/L — SIGNIFICANT CHANGE UP (ref 135–145)
WBC # BLD: 12.72 K/UL — HIGH (ref 3.8–10.5)
WBC # FLD AUTO: 12.72 K/UL — HIGH (ref 3.8–10.5)

## 2024-02-25 PROCEDURE — 99232 SBSQ HOSP IP/OBS MODERATE 35: CPT

## 2024-02-25 PROCEDURE — 99232 SBSQ HOSP IP/OBS MODERATE 35: CPT | Mod: GC

## 2024-02-25 RX ORDER — DESMOPRESSIN ACETATE 0.1 MG/1
0.1 TABLET ORAL
Refills: 0 | Status: DISCONTINUED | OUTPATIENT
Start: 2024-02-25 | End: 2024-03-02

## 2024-02-25 RX ORDER — SODIUM CHLORIDE 9 MG/ML
1000 INJECTION, SOLUTION INTRAVENOUS
Refills: 0 | Status: DISCONTINUED | OUTPATIENT
Start: 2024-02-25 | End: 2024-02-25

## 2024-02-25 RX ADMIN — Medication 10 MILLIGRAM(S): at 14:58

## 2024-02-25 RX ADMIN — POLYETHYLENE GLYCOL 3350 17 GRAM(S): 17 POWDER, FOR SOLUTION ORAL at 17:08

## 2024-02-25 RX ADMIN — SENNA PLUS 2 TABLET(S): 8.6 TABLET ORAL at 21:01

## 2024-02-25 RX ADMIN — Medication 20 MILLIGRAM(S): at 07:35

## 2024-02-25 RX ADMIN — DESMOPRESSIN ACETATE 0.1 MILLIGRAM(S): 0.1 TABLET ORAL at 21:01

## 2024-02-25 RX ADMIN — Medication 88 MICROGRAM(S): at 05:12

## 2024-02-25 RX ADMIN — SODIUM CHLORIDE 1000 MILLILITER(S): 9 INJECTION, SOLUTION INTRAVENOUS at 05:20

## 2024-02-25 RX ADMIN — ENOXAPARIN SODIUM 40 MILLIGRAM(S): 100 INJECTION SUBCUTANEOUS at 17:08

## 2024-02-25 RX ADMIN — CHLORHEXIDINE GLUCONATE 1 APPLICATION(S): 213 SOLUTION TOPICAL at 11:24

## 2024-02-25 RX ADMIN — ATORVASTATIN CALCIUM 40 MILLIGRAM(S): 80 TABLET, FILM COATED ORAL at 21:01

## 2024-02-25 RX ADMIN — Medication 1 TABLET(S): at 11:24

## 2024-02-25 RX ADMIN — DESMOPRESSIN ACETATE 0.1 MILLIGRAM(S): 0.1 TABLET ORAL at 09:00

## 2024-02-25 RX ADMIN — Medication 1 SPRAY(S): at 17:09

## 2024-02-25 NOTE — PROGRESS NOTE ADULT - SUBJECTIVE AND OBJECTIVE BOX
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 Presented with ~4 days clear fluid leaking from nostrils (L>R) on standing or valsalva. Reports no h/as. Afebrile.      24H Events: No acute events. Requiring oxygen via face tent. Pre op for OR today. No examination changes.   2/24- Patient s/p replacement of necrotic nasoseptal flap with contralateral nasoseptal flap for CSF leak (right thigh fat graft). Reports no salty, metallic taste in the mouth.  2/25: No acute events overnight     Allergies    No Known Allergies    Intolerances        REVIEW OF SYSTEMS: [ ] Unable to Assess due to neurologic exam   [x] All ROS addressed below are non-contributory, except:  Neuro: [ ] Headache [ ] Back pain [ ] Numbness [ ] Weakness [ ] Ataxia [ ] Dizziness [ ] Aphasia [ ] Dysarthria [ ] Visual disturbance  Resp: [ ] Shortness of breath/dyspnea, [ ] Orthopnea [ ] Cough  CV: [ ] Chest pain [ ] Palpitation [ ] Lightheadedness [ ] Syncope  Renal: [ ] Thirst [ ] Edema  GI: [ ] Nausea [ ] Emesis [ ] Abdominal pain [ ] Constipation [ ] Diarrhea  Hem: [ ] Hematemesis [ ] bright red blood per rectum  ID: [ ] Fever [ ] Chills [ ] Dysuria  ENT: [ ] Rhinorrhea      DEVICES:   [ ] Restraints [ ] ET tube [ ] central line [ ] arterial line [ ] carlson [ ] NGT/OGT [ ] EVD [ ] LD [ ] LUCINDA/HMV [ ] Trach [ ] PEG [ ] Chest Tube     VITALS:   Vital Signs Last 24 Hrs  T(C): 36.8 (24 Feb 2024 07:00), Max: 36.8 (23 Feb 2024 23:00)  T(F): 98.2 (24 Feb 2024 07:00), Max: 98.2 (23 Feb 2024 23:00)  HR: 86 (24 Feb 2024 10:00) (71 - 110)  BP: 116/69 (24 Feb 2024 10:00) (101/66 - 132/90)  BP(mean): 86 (24 Feb 2024 10:00) (78 - 105)  RR: 17 (24 Feb 2024 10:00) (11 - 22)  SpO2: 96% (24 Feb 2024 10:00) (90% - 99%)    Parameters below as of 24 Feb 2024 07:00  Patient On (Oxygen Delivery Method): face tent      CAPILLARY BLOOD GLUCOSE      POCT Blood Glucose.: 126 mg/dL (24 Feb 2024 08:18)  POCT Blood Glucose.: 127 mg/dL (23 Feb 2024 22:44)  POCT Blood Glucose.: 123 mg/dL (23 Feb 2024 11:05)    I&O's Summary    23 Feb 2024 07:01  -  24 Feb 2024 07:00  --------------------------------------------------------  IN: 3665 mL / OUT: 4005 mL / NET: -340 mL    24 Feb 2024 07:01  -  24 Feb 2024 11:02  --------------------------------------------------------  IN: 240 mL / OUT: 435 mL / NET: -195 mL        Respiratory:        LABS:                        10.3   13.64 )-----------( 328      ( 24 Feb 2024 00:38 )             31.5     02-24    141  |  104  |  13  ----------------------------<  117<H>  3.8   |  23  |  0.75             MEDICATION LEVELS:     IVF FLUIDS/MEDICATIONS:   MEDICATIONS  (STANDING):  atorvastatin 40 milliGRAM(s) Oral at bedtime  chlorhexidine 4% Liquid 1 Application(s) Topical daily  desmopressin 0.1 milliGRAM(s) Oral at bedtime  ergocalciferol 79898 Unit(s) Oral every week  hydrocortisone 10 milliGRAM(s) Oral <User Schedule>  hydrocortisone 20 milliGRAM(s) Oral <User Schedule>  insulin lispro (ADMELOG) corrective regimen sliding scale   SubCutaneous Before meals and at bedtime  levothyroxine 88 MICROGram(s) Oral daily  multivitamin 1 Tablet(s) Oral daily  polyethylene glycol 3350 17 Gram(s) Oral daily  senna 2 Tablet(s) Oral at bedtime  sodium chloride 0.65% Nasal 1 Spray(s) Both Nostrils two times a day    MEDICATIONS  (PRN):  acetaminophen     Tablet .. 650 milliGRAM(s) Oral every 6 hours PRN Mild Pain (1 - 3)  bisacodyl 5 milliGRAM(s) Oral daily PRN Constipation  melatonin 5 milliGRAM(s) Oral at bedtime PRN Insomnia  ondansetron Injectable 4 milliGRAM(s) IV Push every 6 hours PRN Nausea and/or Vomiting  oxyCODONE    IR 5 milliGRAM(s) Oral every 4 hours PRN Moderate Pain (4 - 6)        IMAGING:      EXAMINATION:  PHYSICAL EXAM:    Constitutional: No Acute Distress, face tent on    Neurological: Awake, alert oriented to person, place and time, Following Commands, PERRL, EOMI, No Gaze Preference, Face Symmetrical, Speech nasal.    Motor exam:          Upper extremity                         Delt     Bicep     Tricep    HG                                                 R         5/5        5/5        5/5       5/5                                               L          5/5        5/5        5/5       5/5          Lower extremity                        HF         KF        KE       DF         PF                                                  R        5/5        5/5        5/5       5/5         5/5                                               L         5/5        5/5       5/5       5/5          5/5                                                 Sensation: [ ] intact to light touch  [ ] decreased:     Reflexes: Deep Tendon Reflexes Intact     Pulmonary: Clear to Auscultation, diminished breath sounds in bilateral bases     Cardiovascular: S1, S2, Regular rate and rhythm     Gastrointestinal: Soft, Non-tender, Non-distended     Extremities: No calf tenderness     Incision:

## 2024-02-25 NOTE — PROGRESS NOTE ADULT - SUBJECTIVE AND OBJECTIVE BOX
NSCU ATTENDING -- ADDITIONAL PROGRESS NOTE    Nighttime rounds were performed -- please refer to earlier Progress Note for HPI details.    T(C): 36.6 (02-25-24 @ 19:00), Max: 36.9 (02-24-24 @ 23:00)  HR: 79 (02-25-24 @ 19:00) (73 - 103)  BP: 109/75 (02-25-24 @ 19:00) (108/71 - 124/78)  RR: 16 (02-25-24 @ 19:00) (16 - 22)  SpO2: 96% (02-25-24 @ 19:00) (94% - 99%)  Wt(kg): --    Relevant labwork and imaging reviewed.    POD 2 replacement of necrotic nasoseptal flap, CSF leak repair  LD @5cc/hr   protected sleep time

## 2024-02-25 NOTE — PROGRESS NOTE ADULT - ASSESSMENT
LD@5cc/hr  -160  bmp q6  On hydrocort 20/10, DDAVP PO  Endo following  Has 2 splints in to be managed by [facility]  thigh incision c/d/i  monitor bmps

## 2024-02-25 NOTE — PROGRESS NOTE ADULT - SUBJECTIVE AND OBJECTIVE BOX
Patient seen and examined at bedside.    --Anticoagulation--  enoxaparin Injectable 40 milliGRAM(s) SubCutaneous <User Schedule>    T(C): 36.9 (02-24-24 @ 23:00), Max: 37 (02-24-24 @ 11:00)  HR: 73 (02-24-24 @ 23:00) (71 - 95)  BP: 124/78 (02-24-24 @ 23:00) (114/74 - 131/68)  RR: 22 (02-24-24 @ 23:00) (14 - 26)  SpO2: 99% (02-24-24 @ 23:00) (91% - 99%)  Wt(kg): --    Exam:  AOx3, PERRL, EOMI, no facial, RED 5/5    Labs:                        9.9    12.72 )-----------( 260      ( 25 Feb 2024 01:00 )             29.3     02-25    139  |  105  |  17  ----------------------------<  102<H>  4.2   |  22  |  0.83    Ca    8.6      25 Feb 2024 01:00  Phos  4.4     02-25  Mg     2.3     02-25

## 2024-02-25 NOTE — PROGRESS NOTE ADULT - PROBLEM SELECTOR PLAN 1
- Keep nasal splints in place for now  - continue humidified face tent  - nasal saline, 2 sprays to b/l nares 4 times a day.  - monitor for signs of Epistaxis and CSF leak  - avoid nasal trauma, heavy lifting and bending at waist.  - Care a/p neurosurgery.

## 2024-02-25 NOTE — PROGRESS NOTE ADULT - ASSESSMENT
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 Admitted for postoperative CSF leak despite attempted lumbar drain. Pt is status post CSF leak repair with a new flap from contralateral side and fat graft. The original flap was no longer viable. B/L nasal septal splints in place with blood tinged mucous, no CSF leak.

## 2024-02-25 NOTE — PROGRESS NOTE ADULT - SUBJECTIVE AND OBJECTIVE BOX
ENT ISSUE/POD: s/p CSF leak repair w/new septal flap and fat graft from right thigh, s/p b/l septal splints and LD. POD 2.    HPI: 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 Admitted for postoperative CSF leak despite attempted lumbar drain. Pt is status post OR CSF leak. original flap noted to be nonviable. is status post a new flap from contralateral side, and with additional fat graft from right thigh .also s/p bilateral septal splints placement. LD x 5 days. Pt is C/O blood tinged mucous from B/L nares, but denies HAs, salty or metallic taste, fevers, clear nasal discharge.          PAST MEDICAL & SURGICAL HISTORY:  HLD (hyperlipidemia)      History of pituitary tumor      Deviated septum      History of dental surgery        Allergies    No Known Allergies    Intolerances      MEDICATIONS  (STANDING):  atorvastatin 40 milliGRAM(s) Oral at bedtime  chlorhexidine 4% Liquid 1 Application(s) Topical daily  desmopressin 0.1 milliGRAM(s) Oral <User Schedule>  enoxaparin Injectable 40 milliGRAM(s) SubCutaneous <User Schedule>  ergocalciferol 67934 Unit(s) Oral every week  hydrocortisone 10 milliGRAM(s) Oral <User Schedule>  hydrocortisone 20 milliGRAM(s) Oral <User Schedule>  levothyroxine 88 MICROGram(s) Oral daily  multivitamin 1 Tablet(s) Oral daily  polyethylene glycol 3350 17 Gram(s) Oral daily  senna 2 Tablet(s) Oral at bedtime  sodium chloride 0.45%. 1000 milliLiter(s) (1000 mL/Hr) IV Continuous <Continuous>  sodium chloride 0.65% Nasal 1 Spray(s) Both Nostrils two times a day    MEDICATIONS  (PRN):  acetaminophen     Tablet .. 650 milliGRAM(s) Oral every 6 hours PRN Mild Pain (1 - 3)  bisacodyl 5 milliGRAM(s) Oral daily PRN Constipation  melatonin 5 milliGRAM(s) Oral at bedtime PRN Insomnia  ondansetron Injectable 4 milliGRAM(s) IV Push every 6 hours PRN Nausea and/or Vomiting  oxyCODONE    IR 5 milliGRAM(s) Oral every 4 hours PRN Moderate Pain (4 - 6)      Social History: see consult    Family history: see consult    ROS:   ENT: all negative except as noted in HPI   Pulm: denies SOB, cough, hemoptysis  Neuro: denies numbness/tingling, loss of sensation  Endo: denies heat/cold intolerance, excessive sweating      Vital Signs Last 24 Hrs  T(C): 36 (25 Feb 2024 11:00), Max: 36.9 (24 Feb 2024 23:00)  T(F): 96.8 (25 Feb 2024 11:00), Max: 98.5 (24 Feb 2024 23:00)  HR: 103 (25 Feb 2024 11:00) (73 - 103)  BP: 108/71 (25 Feb 2024 11:00) (108/71 - 124/78)  BP(mean): 86 (25 Feb 2024 11:00) (86 - 95)  RR: 22 (25 Feb 2024 11:00) (16 - 23)  SpO2: 94% (25 Feb 2024 11:00) (94% - 99%)    Parameters below as of 24 Feb 2024 19:00  Patient On (Oxygen Delivery Method): room air                              9.9    12.72 )-----------( 260      ( 25 Feb 2024 01:00 )             29.3    02-25    139  |  105  |  17  ----------------------------<  102<H>  4.2   |  22  |  0.83    Ca    8.6      25 Feb 2024 01:00  Phos  4.4     02-25  Mg     2.3     02-25     PT/INR - ( 25 Feb 2024 02:35 )   PT: 11.5 sec;   INR: 1.10 ratio         PTT - ( 25 Feb 2024 02:35 )  PTT:28.4 sec    PHYSICAL EXAM:  Gen: NAD  Skin: No rashes, bruises, or lesions  Head: Normocephalic, Atraumatic  Face: no edema, erythema, or fluctuance. Parotid glands soft without mass  Eyes: no scleral injection  Nose: Blood tinged mucous from B/L nares, no active bleeding, no clear discharge, mustache DRSG placed  Mouth: No Stridor / Drooling / Trismus.  Mucosa moist, tongue/uvula midline, oropharynx clear  Neck: Flat, supple, no lymphadenopathy, trachea midline, no masses  Lymphatic: No lymphadenopathy  Resp: breathing easily, no stridor  Neuro: facial nerve intact, no facial droop

## 2024-02-25 NOTE — PROGRESS NOTE ADULT - ASSESSMENT
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 Pt now POD0 CSF leak repair.    Neuro:   neuro checks q 4hr  Pt was in OR for TSP leak repair, replacement of dead nasoseptal flap with contralateral nasoseptal flap for CSF leak, right thigh fat graft. Nasal splints placed.   LD placed, drain 5cc /hr with plan to clamp on Wednesday  pain: oxy 5/10, Tylenol  activity: OOB    Respiratory:   On on face tent 50%  CXR with left basal consolidation c/f atelectasis  RVP negative  Chest PT  No IS - skull base precautions      CV:   -160 mmhg   c/w home statin    Renal:   IVF: IVL  ddAVP 0.1 mg in AM    GI:   Diet: NPO for procedure today  PPx: hold  Zofran for nausea/vomiting  Reg: miralax/senna  LBM  2/23    Endocrine:   FS goal normoglycemia  PAWAN  Panhypopituitarism, c/w hydroCort, synthroid  DDAVP 0.1 QD-- increase if needed     Heme:  DVT ppx: SCDs, Lovenox- restart tonight   LED 2/20: negative  Elevated INR-- now back to normal     ID:   Afebrile  UA negative, elevated leukocyte count-- likely in setting of steroid or post op   f/u blood + CSF cultures, no growth 24h    Social/Family: wife updated at bedside     Code Status: [x] Full Code [] DNR [] DNI [] Goals of Care:   Disposition: [x] ICU [] Stroke Unit [] RCU []PCU []Floor [] Discharge Home     Patient at high risk for neurologic deterioration, critical care time, excluding procedures: 30 minutes, csf rhinorrhea at risk for meningitis, brain sag, subdural hematoma           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 Pt now POD0 CSF leak repair.    Neuro:   neuro checks q 4hr  Pt was in OR for TSP leak repair, replacement of dead nasoseptal flap with contralateral nasoseptal flap for CSF leak, right thigh fat graft. Nasal splints placed.   LD placed, drain 5cc /hr with plan to clamp on Wednesday  pain: oxy 5/10, Tylenol  activity: OOB    Respiratory:   On on face tent 50%  CXR with left basal consolidation c/f atelectasis  RVP negative  Chest PT  No IS - skull base precautions      CV:   -160 mmhg   c/w home statin    Renal:   IVF: IVL  ddAVP 0.1 mg BID    GI:   Diet: regular diet  PPx: hold  Zofran for nausea/vomiting  Reg: miralax/senna  LBM  2/25    Endocrine:   FS goal normoglycemia  PAWAN  Panhypopituitarism, c/w hydroCort, synthroid  DDAVP 0.1 QD-- increase if needed     Heme:  DVT ppx: SCDs, Lovenox 40 Qd  LED 2/20: negative  Elevated INR-- now back to normal     ID:   Afebrile  UA negative, elevated leukocyte count-- likely in setting of steroid or post op   f/u blood + CSF cultures, no growth 24h    Social/Family: wife updated at bedside     Code Status: [x] Full Code [] DNR [] DNI [] Goals of Care:   Disposition: [x] ICU [] Stroke Unit [] RCU []PCU []Floor [] Discharge Home     Patient at high risk for neurologic deterioration, critical care time, excluding procedures: 30 minutes, csf rhinorrhea at risk for meningitis, brain sag, subdural hematoma

## 2024-02-26 LAB
CULTURE RESULTS: SIGNIFICANT CHANGE UP
CULTURE RESULTS: SIGNIFICANT CHANGE UP
LYME IGG LINE BLOT INTERP.: NEGATIVE — SIGNIFICANT CHANGE UP
LYME IGM LINE BLOT INTERP.: NEGATIVE — SIGNIFICANT CHANGE UP
P18 AB. IGG: SIGNIFICANT CHANGE UP
P23 AB. IGG: SIGNIFICANT CHANGE UP
P23 AB. IGM: SIGNIFICANT CHANGE UP
P28 AB. IGG: SIGNIFICANT CHANGE UP
P30 AB. IGG: SIGNIFICANT CHANGE UP
P39 AB. IGG: SIGNIFICANT CHANGE UP
P39 AB. IGM: SIGNIFICANT CHANGE UP
P41 AB. IGG: PRESENT
P41 AB. IGM: SIGNIFICANT CHANGE UP
P45 AB. IGG: SIGNIFICANT CHANGE UP
P58 AB. IGG: SIGNIFICANT CHANGE UP
P66 AB. IGG: SIGNIFICANT CHANGE UP
P93 AB. IGG: SIGNIFICANT CHANGE UP
SPECIMEN SOURCE: SIGNIFICANT CHANGE UP
SPECIMEN SOURCE: SIGNIFICANT CHANGE UP
WNV IGG CSF IA-ACNC: NEGATIVE — SIGNIFICANT CHANGE UP
WNV IGM CSF IA-ACNC: NEGATIVE — SIGNIFICANT CHANGE UP

## 2024-02-26 PROCEDURE — 99233 SBSQ HOSP IP/OBS HIGH 50: CPT

## 2024-02-26 PROCEDURE — 99232 SBSQ HOSP IP/OBS MODERATE 35: CPT | Mod: GC

## 2024-02-26 PROCEDURE — 99231 SBSQ HOSP IP/OBS SF/LOW 25: CPT

## 2024-02-26 RX ORDER — SODIUM CHLORIDE 9 MG/ML
1000 INJECTION, SOLUTION INTRAVENOUS
Refills: 0 | Status: DISCONTINUED | OUTPATIENT
Start: 2024-02-26 | End: 2024-02-28

## 2024-02-26 RX ADMIN — Medication 10 MILLIGRAM(S): at 15:09

## 2024-02-26 RX ADMIN — Medication 1 TABLET(S): at 11:06

## 2024-02-26 RX ADMIN — Medication 1 SPRAY(S): at 06:01

## 2024-02-26 RX ADMIN — DESMOPRESSIN ACETATE 0.1 MILLIGRAM(S): 0.1 TABLET ORAL at 10:23

## 2024-02-26 RX ADMIN — CHLORHEXIDINE GLUCONATE 1 APPLICATION(S): 213 SOLUTION TOPICAL at 11:06

## 2024-02-26 RX ADMIN — SENNA PLUS 2 TABLET(S): 8.6 TABLET ORAL at 21:04

## 2024-02-26 RX ADMIN — DESMOPRESSIN ACETATE 0.1 MILLIGRAM(S): 0.1 TABLET ORAL at 21:03

## 2024-02-26 RX ADMIN — Medication 1 SPRAY(S): at 17:10

## 2024-02-26 RX ADMIN — Medication 88 MICROGRAM(S): at 06:01

## 2024-02-26 RX ADMIN — SODIUM CHLORIDE 1000 MILLILITER(S): 9 INJECTION, SOLUTION INTRAVENOUS at 04:47

## 2024-02-26 RX ADMIN — ENOXAPARIN SODIUM 40 MILLIGRAM(S): 100 INJECTION SUBCUTANEOUS at 17:10

## 2024-02-26 RX ADMIN — POLYETHYLENE GLYCOL 3350 17 GRAM(S): 17 POWDER, FOR SOLUTION ORAL at 17:10

## 2024-02-26 RX ADMIN — Medication 20 MILLIGRAM(S): at 07:56

## 2024-02-26 RX ADMIN — ATORVASTATIN CALCIUM 40 MILLIGRAM(S): 80 TABLET, FILM COATED ORAL at 21:04

## 2024-02-26 NOTE — PROGRESS NOTE ADULT - SUBJECTIVE AND OBJECTIVE BOX
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 Presented with ~4 days clear fluid leaking from nostrils (L>R) on standing or valsalva. Reports no h/as. Afebrile.      24H Events: No acute events. Requiring oxygen via face tent. Pre op for OR today. No examination changes.   2/24- Patient s/p replacement of necrotic nasoseptal flap with contralateral nasoseptal flap for CSF leak (right thigh fat graft). Reports no salty, metallic taste in the mouth.  2/25: No acute events overnight   2/26- Patient denies any salty taste from his mouth, denies headaches.    Allergies    No Known Allergies    Intolerances        REVIEW OF SYSTEMS: [ ] Unable to Assess due to neurologic exam   [x] All ROS addressed below are non-contributory, except:  Neuro: [ ] Headache [ ] Back pain [ ] Numbness [ ] Weakness [ ] Ataxia [ ] Dizziness [ ] Aphasia [ ] Dysarthria [ ] Visual disturbance  Resp: [ ] Shortness of breath/dyspnea, [ ] Orthopnea [ ] Cough  CV: [ ] Chest pain [ ] Palpitation [ ] Lightheadedness [ ] Syncope  Renal: [ ] Thirst [ ] Edema  GI: [ ] Nausea [ ] Emesis [ ] Abdominal pain [ ] Constipation [ ] Diarrhea  Hem: [ ] Hematemesis [ ] bright red blood per rectum  ID: [ ] Fever [ ] Chills [ ] Dysuria  ENT: [ ] Rhinorrhea      DEVICES:   [ ] Restraints [ ] ET tube [ ] central line [ ] arterial line [ ] carlson [ ] NGT/OGT [ ] EVD [ ] LD [ ] LUCINDA/HMV [ ] Trach [ ] PEG [ ] Chest Tube     VITALS:   Vital Signs Last 24 Hrs  T(C): 36.8 (26 Feb 2024 07:00), Max: 36.8 (26 Feb 2024 03:00)  T(F): 98.2 (26 Feb 2024 07:00), Max: 98.2 (26 Feb 2024 03:00)  HR: 75 (26 Feb 2024 07:00) (75 - 103)  BP: 119/84 (26 Feb 2024 07:00) (108/71 - 119/84)  BP(mean): 96 (26 Feb 2024 07:00) (80 - 96)  RR: 14 (26 Feb 2024 07:00) (14 - 22)  SpO2: 93% (26 Feb 2024 07:00) (93% - 96%)    Parameters below as of 25 Feb 2024 19:00  Patient On (Oxygen Delivery Method): room air      CAPILLARY BLOOD GLUCOSE        I&O's Summary    25 Feb 2024 07:01  -  26 Feb 2024 07:00  --------------------------------------------------------  IN: 2751 mL / OUT: 4195 mL / NET: -1444 mL    26 Feb 2024 07:01  -  26 Feb 2024 08:23  --------------------------------------------------------  IN: 0 mL / OUT: 5 mL / NET: -5 mL        Respiratory:        LABS:                        9.9    12.72 )-----------( 260      ( 25 Feb 2024 01:00 )             29.3     02-25    139  |  105  |  17  ----------------------------<  102<H>  4.2   |  22  |  0.83             MEDICATION LEVELS:     IVF FLUIDS/MEDICATIONS:   MEDICATIONS  (STANDING):  atorvastatin 40 milliGRAM(s) Oral at bedtime  chlorhexidine 4% Liquid 1 Application(s) Topical daily  desmopressin 0.1 milliGRAM(s) Oral <User Schedule>  enoxaparin Injectable 40 milliGRAM(s) SubCutaneous <User Schedule>  ergocalciferol 25336 Unit(s) Oral every week  hydrocortisone 10 milliGRAM(s) Oral <User Schedule>  hydrocortisone 20 milliGRAM(s) Oral <User Schedule>  levothyroxine 88 MICROGram(s) Oral daily  multivitamin 1 Tablet(s) Oral daily  polyethylene glycol 3350 17 Gram(s) Oral daily  senna 2 Tablet(s) Oral at bedtime  sodium chloride 0.45%. 1000 milliLiter(s) (1000 mL/Hr) IV Continuous <Continuous>  sodium chloride 0.65% Nasal 1 Spray(s) Both Nostrils two times a day    MEDICATIONS  (PRN):  acetaminophen     Tablet .. 650 milliGRAM(s) Oral every 6 hours PRN Mild Pain (1 - 3)  bisacodyl 5 milliGRAM(s) Oral daily PRN Constipation  melatonin 5 milliGRAM(s) Oral at bedtime PRN Insomnia  ondansetron Injectable 4 milliGRAM(s) IV Push every 6 hours PRN Nausea and/or Vomiting  oxyCODONE    IR 5 milliGRAM(s) Oral every 4 hours PRN Moderate Pain (4 - 6)        IMAGING:      EXAMINATION:  PHYSICAL EXAM:    Constitutional: No Acute Distress, nasal dressing in, saturated mildly with serosanguineous fluid    Neurological: Awake, alert oriented to person, place and time, Following Commands, PERRL, EOMI, No Gaze Preference, Face Symmetrical, Speech Fluent, No dysmetria, No ataxia, No nystagmus     Motor exam:          Upper extremity                         Delt     Bicep     Tricep    HG                                                 R         5/5        5/5        5/5       5/5                                               L          5/5        5/5        5/5       5/5          Lower extremity                        HF         KF        KE       DF         PF                                                  R        5/5        5/5        5/5       5/5         5/5                                               L         5/5        5/5       5/5       5/5          5/5                                                 Sensation: [ ] intact to light touch  [ ] decreased:     Reflexes: Deep Tendon Reflexes Intact     Pulmonary: Clear to Auscultation, No rales, No rhonchi, No wheezes     Cardiovascular: S1, S2, Regular rate and rhythm     Gastrointestinal: Soft, Non-tender, Non-distended     Extremities: No calf tenderness     Incision:    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 Presented with ~4 days clear fluid leaking from nostrils (L>R) on standing or valsalva. Reports no h/as. Afebrile.      24H Events: No acute events. Requiring oxygen via face tent. Pre op for OR today. No examination changes.   2/24- Patient s/p replacement of necrotic nasoseptal flap with contralateral nasoseptal flap for CSF leak (right thigh fat graft). Reports no salty, metallic taste in the mouth.  2/25: No acute events overnight   2/26- Patient denies any salty taste from his mouth, denies headaches.    Allergies    No Known Allergies    Intolerances        REVIEW OF SYSTEMS: [ ] Unable to Assess due to neurologic exam   [x] All ROS addressed below are non-contributory, except:  Neuro: [ ] Headache [ ] Back pain [ ] Numbness [ ] Weakness [ ] Ataxia [ ] Dizziness [ ] Aphasia [ ] Dysarthria [ ] Visual disturbance  Resp: [ ] Shortness of breath/dyspnea, [ ] Orthopnea [ ] Cough  CV: [ ] Chest pain [ ] Palpitation [ ] Lightheadedness [ ] Syncope  Renal: [ ] Thirst [ ] Edema  GI: [ ] Nausea [ ] Emesis [ ] Abdominal pain [ ] Constipation [ ] Diarrhea  Hem: [ ] Hematemesis [ ] bright red blood per rectum  ID: [ ] Fever [ ] Chills [ ] Dysuria  ENT: [ ] Rhinorrhea      DEVICES:   [ ] Restraints [ ] ET tube [ ] central line [ ] arterial line [ ] carlson [ ] NGT/OGT [ ] EVD [ ] LD [ ] LUCINDA/HMV [ ] Trach [ ] PEG [ ] Chest Tube     VITALS:   Vital Signs Last 24 Hrs  T(C): 36.8 (26 Feb 2024 07:00), Max: 36.8 (26 Feb 2024 03:00)  T(F): 98.2 (26 Feb 2024 07:00), Max: 98.2 (26 Feb 2024 03:00)  HR: 75 (26 Feb 2024 07:00) (75 - 103)  BP: 119/84 (26 Feb 2024 07:00) (108/71 - 119/84)  BP(mean): 96 (26 Feb 2024 07:00) (80 - 96)  RR: 14 (26 Feb 2024 07:00) (14 - 22)  SpO2: 93% (26 Feb 2024 07:00) (93% - 96%)    Parameters below as of 25 Feb 2024 19:00  Patient On (Oxygen Delivery Method): room air      CAPILLARY BLOOD GLUCOSE        I&O's Summary    25 Feb 2024 07:01  -  26 Feb 2024 07:00  --------------------------------------------------------  IN: 2751 mL / OUT: 4195 mL / NET: -1444 mL    26 Feb 2024 07:01  -  26 Feb 2024 08:23  --------------------------------------------------------  IN: 0 mL / OUT: 5 mL / NET: -5 mL        Respiratory:        LABS:                        9.9    12.72 )-----------( 260      ( 25 Feb 2024 01:00 )             29.3     02-25    139  |  105  |  17  ----------------------------<  102<H>  4.2   |  22  |  0.83             MEDICATION LEVELS:     IVF FLUIDS/MEDICATIONS:   MEDICATIONS  (STANDING):  atorvastatin 40 milliGRAM(s) Oral at bedtime  chlorhexidine 4% Liquid 1 Application(s) Topical daily  desmopressin 0.1 milliGRAM(s) Oral <User Schedule>  enoxaparin Injectable 40 milliGRAM(s) SubCutaneous <User Schedule>  ergocalciferol 54088 Unit(s) Oral every week  hydrocortisone 10 milliGRAM(s) Oral <User Schedule>  hydrocortisone 20 milliGRAM(s) Oral <User Schedule>  levothyroxine 88 MICROGram(s) Oral daily  multivitamin 1 Tablet(s) Oral daily  polyethylene glycol 3350 17 Gram(s) Oral daily  senna 2 Tablet(s) Oral at bedtime  sodium chloride 0.45%. 1000 milliLiter(s) (1000 mL/Hr) IV Continuous <Continuous>  sodium chloride 0.65% Nasal 1 Spray(s) Both Nostrils two times a day    MEDICATIONS  (PRN):  acetaminophen     Tablet .. 650 milliGRAM(s) Oral every 6 hours PRN Mild Pain (1 - 3)  bisacodyl 5 milliGRAM(s) Oral daily PRN Constipation  melatonin 5 milliGRAM(s) Oral at bedtime PRN Insomnia  ondansetron Injectable 4 milliGRAM(s) IV Push every 6 hours PRN Nausea and/or Vomiting  oxyCODONE    IR 5 milliGRAM(s) Oral every 4 hours PRN Moderate Pain (4 - 6)        IMAGING:      EXAMINATION:  PHYSICAL EXAM:    Constitutional: No Acute Distress, nasal dressing in, saturated mildly with serosanguineous fluid    Neurological: Awake, alert oriented to person, place and time, Following Commands, PERRL, EOMI, No Gaze Preference, Face Symmetrical, Speech Fluent.    Motor exam:          Upper extremity                         Delt     Bicep     Tricep    HG                                                 R         5/5        5/5        5/5       5/5                                               L          5/5        5/5        5/5       5/5          Lower extremity                        HF         KF        KE       DF         PF                                                  R        5/5        5/5        5/5       5/5         5/5                                               L         5/5        5/5       5/5       5/5          5/5                                                 Sensation: [X] intact to light touch  [ ] decreased:     Pulmonary: Clear to Auscultation, No rales, No rhonchi, No wheezes     Cardiovascular: S1, S2, Regular rate and rhythm     Gastrointestinal: Soft, Non-tender, Non-distended     Extremities: No calf tenderness     Incision:

## 2024-02-26 NOTE — PROGRESS NOTE ADULT - SUBJECTIVE AND OBJECTIVE BOX
ENT ISSUE/POD: s/p CSF leak repair w/new septal flap and fat graft from right thigh, s/p b/l septal splints and LD. POD 3    HPI: 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 Admitted for postoperative CSF leak despite attempted lumbar drain. Pt is status post OR CSF leak. original flap noted to be nonviable. is status post a new flap from contralateral side, and with additional fat graft from right thigh .also s/p bilateral septal splints placement. LD x 5 days. Pt is C/O blood tinged mucous from B/L nares, but denies HAs, salty or metallic taste, fevers, clear nasal discharge.    PAST MEDICAL & SURGICAL HISTORY:  HLD (hyperlipidemia)      History of pituitary tumor      Deviated septum      History of dental surgery        Allergies    No Known Allergies    Intolerances      MEDICATIONS  (STANDING):  atorvastatin 40 milliGRAM(s) Oral at bedtime  chlorhexidine 4% Liquid 1 Application(s) Topical daily  desmopressin 0.1 milliGRAM(s) Oral <User Schedule>  enoxaparin Injectable 40 milliGRAM(s) SubCutaneous <User Schedule>  ergocalciferol 96787 Unit(s) Oral every week  hydrocortisone 10 milliGRAM(s) Oral <User Schedule>  hydrocortisone 20 milliGRAM(s) Oral <User Schedule>  levothyroxine 88 MICROGram(s) Oral daily  multivitamin 1 Tablet(s) Oral daily  polyethylene glycol 3350 17 Gram(s) Oral daily  senna 2 Tablet(s) Oral at bedtime  sodium chloride 0.45%. 1000 milliLiter(s) (1000 mL/Hr) IV Continuous <Continuous>  sodium chloride 0.65% Nasal 1 Spray(s) Both Nostrils two times a day    MEDICATIONS  (PRN):  acetaminophen     Tablet .. 650 milliGRAM(s) Oral every 6 hours PRN Mild Pain (1 - 3)  bisacodyl 5 milliGRAM(s) Oral daily PRN Constipation  melatonin 5 milliGRAM(s) Oral at bedtime PRN Insomnia  ondansetron Injectable 4 milliGRAM(s) IV Push every 6 hours PRN Nausea and/or Vomiting  oxyCODONE    IR 5 milliGRAM(s) Oral every 4 hours PRN Moderate Pain (4 - 6)    social history: see consult     family history: see consult     ROS:   ENT: all negative except as noted in HPI   Pulm: denies SOB, cough, hemoptysis  Neuro: denies numbness/tingling, loss of sensation  Endo: denies heat/cold intolerance, excessive sweating      Vital Signs Last 24 Hrs  T(C): 36.8 (26 Feb 2024 07:00), Max: 36.8 (26 Feb 2024 03:00)  T(F): 98.2 (26 Feb 2024 07:00), Max: 98.2 (26 Feb 2024 03:00)  HR: 75 (26 Feb 2024 07:00) (75 - 103)  BP: 119/84 (26 Feb 2024 07:00) (108/71 - 119/84)  BP(mean): 96 (26 Feb 2024 07:00) (80 - 96)  RR: 14 (26 Feb 2024 07:00) (14 - 22)  SpO2: 93% (26 Feb 2024 07:00) (93% - 96%)    Parameters below as of 25 Feb 2024 19:00  Patient On (Oxygen Delivery Method): room air                              9.9    12.72 )-----------( 260      ( 25 Feb 2024 01:00 )             29.3    02-25    139  |  105  |  17  ----------------------------<  102<H>  4.2   |  22  |  0.83    Ca    8.6      25 Feb 2024 01:00  Phos  4.4     02-25  Mg     2.3     02-25     PT/INR - ( 25 Feb 2024 02:35 )   PT: 11.5 sec;   INR: 1.10 ratio         PTT - ( 25 Feb 2024 02:35 )  PTT:28.4 sec    PHYSICAL EXAM:  Gen: NAD  Skin: No rashes, bruises, or lesions  Head: Normocephalic, Atraumatic  Face: no edema, erythema, or fluctuance. Parotid glands soft without mass  Eyes: no scleral injection  Nose: Blood tinged mucous from B/L nares, no active bleeding, no clear discharge, mustache DRSG placed  Mouth: No Stridor / Drooling / Trismus.  Mucosa moist, tongue/uvula midline, oropharynx clear  Neck: Flat, supple, no lymphadenopathy, trachea midline, no masses  Lymphatic: No lymphadenopathy  Resp: breathing easily, no stridor  Neuro: facial nerve intact, no facial droop

## 2024-02-26 NOTE — PROGRESS NOTE ADULT - SUBJECTIVE AND OBJECTIVE BOX
NSCU ATTENDING -- ADDITIONAL PROGRESS NOTE    Nighttime rounds were performed -- please refer to earlier Progress Note for HPI details.    ICU Vital Signs Last 24 Hrs  T(C): 36.7 (26 Feb 2024 19:00), Max: 38 (26 Feb 2024 15:00)  T(F): 98.1 (26 Feb 2024 19:00), Max: 100.4 (26 Feb 2024 15:00)  HR: 86 (26 Feb 2024 19:00) (75 - 103)  BP: 113/72 (26 Feb 2024 19:00) (104/64 - 119/84)  BP(mean): 87 (26 Feb 2024 19:00) (77 - 96)  ABP: --  ABP(mean): --  RR: 17 (26 Feb 2024 19:00) (14 - 21)  SpO2: 95% (26 Feb 2024 19:00) (93% - 96%)    O2 Parameters below as of 26 Feb 2024 19:00  Patient On (Oxygen Delivery Method): room air      CBC:            9.9    12.72 )-----------( 260      ( 02-25-24 @ 01:00 )             29.3         Chem:         ( 02-25-24 @ 01:00 )    139  |  105  |  17  ----------------------------<  102<H>  4.2   |  22  |  0.83        Liver Functions:     Type & Screen:       Relevant labwork and imaging reviewed.    MEDICATIONS  (STANDING):  atorvastatin 40 milliGRAM(s) Oral at bedtime  chlorhexidine 4% Liquid 1 Application(s) Topical daily  desmopressin 0.1 milliGRAM(s) Oral <User Schedule>  enoxaparin Injectable 40 milliGRAM(s) SubCutaneous <User Schedule>  ergocalciferol 10509 Unit(s) Oral every week  hydrocortisone 10 milliGRAM(s) Oral <User Schedule>  hydrocortisone 20 milliGRAM(s) Oral <User Schedule>  levothyroxine 88 MICROGram(s) Oral daily  multivitamin 1 Tablet(s) Oral daily  polyethylene glycol 3350 17 Gram(s) Oral daily  senna 2 Tablet(s) Oral at bedtime  sodium chloride 0.45%. 1000 milliLiter(s) (1000 mL/Hr) IV Continuous <Continuous>  sodium chloride 0.65% Nasal 1 Spray(s) Both Nostrils two times a day    MEDICATIONS  (PRN):  acetaminophen     Tablet .. 650 milliGRAM(s) Oral every 6 hours PRN Mild Pain (1 - 3)  bisacodyl 5 milliGRAM(s) Oral daily PRN Constipation  melatonin 5 milliGRAM(s) Oral at bedtime PRN Insomnia  ondansetron Injectable 4 milliGRAM(s) IV Push every 6 hours PRN Nausea and/or Vomiting  oxyCODONE    IR 5 milliGRAM(s) Oral every 4 hours PRN Moderate Pain (4 - 6)    POD 2 replacement of necrotic nasoseptal flap, CSF leak repair  LD @5cc/hr   protected sleep time

## 2024-02-26 NOTE — PROGRESS NOTE ADULT - ASSESSMENT
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 Pt now POD0 CSF leak repair.    Neuro:   neuro checks q 4hr  Pt was in OR for TSP leak repair, replacement of dead nasoseptal flap with contralateral nasoseptal flap for CSF leak, right thigh fat graft. Nasal splints placed.   LD placed, drain 5cc /hr with plan to clamp on Wednesday  pain: oxy 5/10, Tylenol  activity: OOB    Respiratory:   On on face tent 50%  CXR with left basal consolidation c/f atelectasis  RVP negative  Chest PT  No IS - skull base precautions      CV:   -160 mmhg   c/w home statin    Renal:   IVF: IVL  ddAVP 0.1 mg BID    GI:   Diet: regular diet  PPx: hold  Zofran for nausea/vomiting  Reg: miralax/senna  LBM  2/25    Endocrine:   FS goal normoglycemia  PAWAN  Panhypopituitarism, c/w hydroCort, synthroid  DDAVP 0.1 QD-- increase if needed     Heme:  DVT ppx: SCDs, Lovenox 40 Qd  LED 2/20: negative  Elevated INR-- now back to normal     ID:   Afebrile  UA negative, elevated leukocyte count-- likely in setting of steroid or post op   f/u blood + CSF cultures, no growth 24h    Social/Family: wife updated at bedside     Code Status: [x] Full Code [] DNR [] DNI [] Goals of Care:   Disposition: [x] ICU [] Stroke Unit [] RCU []PCU []Floor [] Discharge Home     Patient at high risk for neurologic deterioration, critical care time, excluding procedures: 30 minutes, csf rhinorrhea at risk for meningitis, brain sag, subdural hematoma           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 Pt now POD0 CSF leak repair.    Neuro:   neuro checks q 4hr  Pt S/P OR for TSP leak repair, replacement of dead nasoseptal flap with contralateral nasoseptal flap for CSF leak, right thigh fat graft. Nasal splints placed.   LD drain 5cc /hr with plan to clamp on Wednesday  pain: oxy 5/10, Tylenol  activity: OOB    Respiratory:   On on face tent 50%  RVP negative  Chest PT  No IS - skull base precautions      CV:   -160 mmhg   c/w home statin    Renal:   IVF: IVL  DDAVP 0.1 mg BID    GI:   Diet: regular diet  PPx: hold  Zofran for nausea/vomiting  Reg: miralax/senna  LBM  2/25    Endocrine:   FS goal normoglycemia  PAWAN  Panhypopituitarism, c/w hydroCort, synthroid  DDAVP 0.1 BID    Heme:  DVT ppx: SCDs, Lovenox 40 Qd  LED 2/20: negative    ID:   Afebrile  UA negative, elevated leukocyte count-- likely in setting of steroid or post op   f/u blood + CSF cultures, no growth 24h    Social/Family: wife updated at bedside     Code Status: [x] Full Code [] DNR [] DNI [] Goals of Care:   Disposition: [x] ICU [] Stroke Unit [] RCU []PCU []Floor [] Discharge Home     Patient at high risk for neurologic deterioration, critical care time, excluding procedures: 30 minutes, csf rhinorrhea at risk for meningitis, brain sag, subdural hematoma

## 2024-02-26 NOTE — PROGRESS NOTE ADULT - ASSESSMENT
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 Today, p/f leaking from nostrils (L>R) when he stands up or valsalvas. Endocrinology consulted for management of panhypopituitarism.    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 MRI 1/24 showed abnormal enhancing lesion involving the suprasellar region measuring approximately 2.1x2.3cm and previously measured 2.1x1.4cm. Patient had an endocrine workup in the past with Dr. Isidra Dao and was told he had a low testosterone level but not offered treatment prior to surgery. Had TSR 2/1/24. Post op TSH 0.15, free thyroxine 0.8, consistent with secondary hypothyroidism. ACTH 3.5, am cortisol 0.5, consistent with secondary AI. LH 0.4, FSH 1.2. 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 The patient was discharged on levothyroxine 75mcg daily, hydrocortisone 10mg/5mg and DDAVP 0.05mg prn. Reports did not take desmopressin  - patient dumping urine, Na 142, U osm <1.005 concerning for compensated DI  - IGF-1 46 low)  - pathology consistent with craniopharyngioma  - follows with Dr. Isidra Dao  PLAN  In terms of DI:   Goal Na 140-145  - q12h BMP  - continue DDAVP 0.1mg bid  Recommendations:   - DI Watch: Please monitor strict I/O, sodium levels q8hrs (monitor for hypo or hypernatremia).   If urine output more than 500ml/h or 250ml/h for 2 consecutive hours get stat Na, if Na is increasing again compared to prior than bolus 1/2 NS to match half the output (for example, if net negative 2Liters can give 1Liter 1/2NS if patient is unable to keep up with output with PO intake)  - If Na > increases to 140 or higher would dose DDAVP 0.05mg and continue DI watch as above e   - Please make sure pt has access to water and encourage patient to drink to thirst   In terms of secondary AI  - continue hydrocortisone PO 20mg at 8am and 10mg PO at 3pm, plan to discharge on hydrocortisone 10mg at 8am and 5mg at 3pm, if HD unstable, start stress dose steroids  In terms of secondary hypothyroidism  - given FT4 only increase to 0.9, increase levothyroxine to 88mcg once daily (maintain on empty stomach (at least 1 hour before meals), separate by 4 hours from PPI or calcium supplementation which can inhibit its absorption)  In terms of hypogonadism  - outpatient management  In terms of growth hormone deficiency  - outpatient management    Discussed with primary team.     Thank you for the interesting consult.    Joe Walker MD, Endocrinology Fellow  For non-urgent follow up questions, please email [email or web address] or [email or web address].  Pager [phone] from 9am to 5pm. After hours and on weekends, please call [phone]. 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 Today, p/f leaking from nostrils (L>R) when he stands up or valsalvas. Endocrinology consulted for management of panhypopituitarism.    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 MRI 1/24 showed abnormal enhancing lesion involving the suprasellar region measuring approximately 2.1x2.3cm and previously measured 2.1x1.4cm. Patient had an endocrine workup in the past with Dr. Isidra Dao and was told he had a low testosterone level but not offered treatment prior to surgery. Had TSR 2/1/24. Post op TSH 0.15, free thyroxine 0.8, consistent with secondary hypothyroidism. ACTH 3.5, am cortisol 0.5, consistent with secondary AI. LH 0.4, FSH 1.2. 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 The patient was discharged on levothyroxine 75mcg daily, hydrocortisone 10mg/5mg and DDAVP 0.05mg prn. Reports did not take desmopressin  - patient dumping urine, Na 142, U osm <1.005 concerning for compensated DI  - IGF-1 46 low)  - pathology consistent with craniopharyngioma  - follows with Dr. Isidra Dao  PLAN  In terms of DI:   Goal Na 140-145  - q12h BMP  - continue DDAVP 0.1mg bid  Recommendations:   - DI Watch: Please monitor strict I/O, sodium levels q8hrs (monitor for hypo or hypernatremia).   If urine output more than 500ml/h or 250ml/h for 2 consecutive hours get stat Na, if Na is increasing again compared to prior than bolus 1/2 NS to match half the output (for example, if net negative 2Liters can give 1Liter 1/2NS if patient is unable to keep up with output with PO intake)  - If Na > increases to 140 or higher would dose DDAVP 0.05mg and continue DI watch as above e   - Please make sure pt has access to water and encourage patient to drink to thirst   In terms of secondary AI  - continue hydrocortisone PO 20mg at 8am and 10mg PO at 3pm, plan to discharge on hydrocortisone 10mg at 8am and 5mg at 3pm, if HD unstable, start stress dose steroids  In terms of secondary hypothyroidism  - given FT4 only increase to 0.9, increase levothyroxine to 88mcg once daily (maintain on empty stomach (at least 1 hour before meals), separate by 4 hours from PPI or calcium supplementation which can inhibit its absorption)  In terms of hypogonadism  - outpatient management  In terms of growth hormone deficiency  - outpatient management    Discussed with primary team.     Thank you for the interesting consult.    Joe Walker MD, Endocrinology Fellow  For non-urgent follow up questions, please email [email or web address] or [email or web address].   Pager [phone] from 9am to 5pm. After hours and on weekends, please call [phone].

## 2024-02-26 NOTE — PROGRESS NOTE ADULT - SUBJECTIVE AND OBJECTIVE BOX
ENDOCRINE FOLLOW UP     Chief Complaint: craniopharyngioma    History:     MEDICATIONS  (STANDING):  atorvastatin 40 milliGRAM(s) Oral at bedtime  chlorhexidine 4% Liquid 1 Application(s) Topical daily  desmopressin 0.1 milliGRAM(s) Oral <User Schedule>  enoxaparin Injectable 40 milliGRAM(s) SubCutaneous <User Schedule>  ergocalciferol 23842 Unit(s) Oral every week  hydrocortisone 10 milliGRAM(s) Oral <User Schedule>  hydrocortisone 20 milliGRAM(s) Oral <User Schedule>  levothyroxine 88 MICROGram(s) Oral daily  multivitamin 1 Tablet(s) Oral daily  polyethylene glycol 3350 17 Gram(s) Oral daily  senna 2 Tablet(s) Oral at bedtime  sodium chloride 0.45%. 1000 milliLiter(s) (1000 mL/Hr) IV Continuous <Continuous>  sodium chloride 0.65% Nasal 1 Spray(s) Both Nostrils two times a day    MEDICATIONS  (PRN):  acetaminophen     Tablet .. 650 milliGRAM(s) Oral every 6 hours PRN Mild Pain (1 - 3)  bisacodyl 5 milliGRAM(s) Oral daily PRN Constipation  melatonin 5 milliGRAM(s) Oral at bedtime PRN Insomnia  ondansetron Injectable 4 milliGRAM(s) IV Push every 6 hours PRN Nausea and/or Vomiting  oxyCODONE    IR 5 milliGRAM(s) Oral every 4 hours PRN Moderate Pain (4 - 6)      Allergies    No Known Allergies    Intolerances        ROS: All other systems reviewed and negative    PHYSICAL EXAM:  VITALS: T(C): 36.8 (02-26-24 @ 07:00)  T(F): 98.2 (02-26-24 @ 07:00), Max: 98.2 (02-26-24 @ 03:00)  HR: 75 (02-26-24 @ 07:00) (75 - 103)  BP: 119/84 (02-26-24 @ 07:00) (108/71 - 119/84)  RR:  (14 - 22)  SpO2:  (93% - 96%)  Wt(kg): --  GENERAL: NAD, resting comfortably   EYES: No proptosis,  anicteric  HEENT:  Atraumatic, Normocephalic, moist mucous membranes  RESPIRATORY: Nonlabored respirations on room air, normal rate/effort   CARDIOVASCULAR: Did not appear cyanotic, no lower extremity swelling visualized  GI: did not appear distended  NEURO: Answering questions appropriately, moves extremities spontaneously  PSYCH:  reactive affect, euthymic mood    POCT Blood Glucose.: 126 mg/dL (02-24-24 @ 08:18)  POCT Blood Glucose.: 127 mg/dL (02-23-24 @ 22:44)  POCT Blood Glucose.: 123 mg/dL (02-23-24 @ 11:05)      02-25    139  |  105  |  17  ----------------------------<  102<H>  4.2   |  22  |  0.83    eGFR: 111    Ca    8.6      02-25  Mg     2.3     02-25  Phos  4.4     02-25        A1C with Estimated Average Glucose Result: 5.4 % (02-19-24 @ 15:14)      Thyroid Stimulating Hormone, Serum: 0.03 uIU/mL (02-19-24 @ 14:17)  Thyroid Stimulating Hormone, Serum: 0.05 uIU/mL (02-08-24 @ 06:39)  Thyroid Stimulating Hormone, Serum: 0.15 uIU/mL (02-03-24 @ 08:37)  Thyroid Stimulating Hormone, Serum: 0.27 uIU/mL (02-02-24 @ 11:35)   ENDOCRINE FOLLOW UP     Chief Complaint: craniopharyngioma    History:   Patient reports able to sleep whole night without urinating, unsure if peeing as much. Feels well otherwise.    MEDICATIONS  (STANDING):  atorvastatin 40 milliGRAM(s) Oral at bedtime  chlorhexidine 4% Liquid 1 Application(s) Topical daily  desmopressin 0.1 milliGRAM(s) Oral <User Schedule>  enoxaparin Injectable 40 milliGRAM(s) SubCutaneous <User Schedule>  ergocalciferol 57691 Unit(s) Oral every week  hydrocortisone 10 milliGRAM(s) Oral <User Schedule>  hydrocortisone 20 milliGRAM(s) Oral <User Schedule>  levothyroxine 88 MICROGram(s) Oral daily  multivitamin 1 Tablet(s) Oral daily  polyethylene glycol 3350 17 Gram(s) Oral daily  senna 2 Tablet(s) Oral at bedtime  sodium chloride 0.45%. 1000 milliLiter(s) (1000 mL/Hr) IV Continuous <Continuous>  sodium chloride 0.65% Nasal 1 Spray(s) Both Nostrils two times a day    MEDICATIONS  (PRN):  acetaminophen     Tablet .. 650 milliGRAM(s) Oral every 6 hours PRN Mild Pain (1 - 3)  bisacodyl 5 milliGRAM(s) Oral daily PRN Constipation  melatonin 5 milliGRAM(s) Oral at bedtime PRN Insomnia  ondansetron Injectable 4 milliGRAM(s) IV Push every 6 hours PRN Nausea and/or Vomiting  oxyCODONE    IR 5 milliGRAM(s) Oral every 4 hours PRN Moderate Pain (4 - 6)      Allergies    No Known Allergies    Intolerances        ROS: All other systems reviewed and negative    PHYSICAL EXAM:  VITALS: T(C): 36.8 (02-26-24 @ 07:00)  T(F): 98.2 (02-26-24 @ 07:00), Max: 98.2 (02-26-24 @ 03:00)  HR: 75 (02-26-24 @ 07:00) (75 - 103)  BP: 119/84 (02-26-24 @ 07:00) (108/71 - 119/84)  RR:  (14 - 22)  SpO2:  (93% - 96%)  Wt(kg): --  GENERAL: NAD, ambulating through halls  EYES: No proptosis,  anicteric  HEENT:  nasal packing in place, mildly dry mucous membranes  RESPIRATORY: Nonlabored respirations on room air, normal rate/effort   CARDIOVASCULAR: Did not appear cyanotic, no lower extremity swelling visualized  GI: did not appear distended  NEURO: Answering questions appropriately, moves extremities spontaneously  PSYCH:  reactive affect, euthymic mood    POCT Blood Glucose.: 126 mg/dL (02-24-24 @ 08:18)  POCT Blood Glucose.: 127 mg/dL (02-23-24 @ 22:44)  POCT Blood Glucose.: 123 mg/dL (02-23-24 @ 11:05)      02-25    139  |  105  |  17  ----------------------------<  102<H>  4.2   |  22  |  0.83    eGFR: 111    Ca    8.6      02-25  Mg     2.3     02-25  Phos  4.4     02-25        A1C with Estimated Average Glucose Result: 5.4 % (02-19-24 @ 15:14)      Thyroid Stimulating Hormone, Serum: 0.03 uIU/mL (02-19-24 @ 14:17)  Thyroid Stimulating Hormone, Serum: 0.05 uIU/mL (02-08-24 @ 06:39)  Thyroid Stimulating Hormone, Serum: 0.15 uIU/mL (02-03-24 @ 08:37)  Thyroid Stimulating Hormone, Serum: 0.27 uIU/mL (02-02-24 @ 11:35)

## 2024-02-27 LAB
ANION GAP SERPL CALC-SCNC: 10 MMOL/L — SIGNIFICANT CHANGE UP (ref 5–17)
ANION GAP SERPL CALC-SCNC: 12 MMOL/L — SIGNIFICANT CHANGE UP (ref 5–17)
B BURGDOR DNA SPEC QL NAA+PROBE: NEGATIVE — SIGNIFICANT CHANGE UP
BUN SERPL-MCNC: 12 MG/DL — SIGNIFICANT CHANGE UP (ref 7–23)
BUN SERPL-MCNC: 15 MG/DL — SIGNIFICANT CHANGE UP (ref 7–23)
CALCIUM SERPL-MCNC: 9 MG/DL — SIGNIFICANT CHANGE UP (ref 8.4–10.5)
CALCIUM SERPL-MCNC: 9.4 MG/DL — SIGNIFICANT CHANGE UP (ref 8.4–10.5)
CHLORIDE SERPL-SCNC: 102 MMOL/L — SIGNIFICANT CHANGE UP (ref 96–108)
CHLORIDE SERPL-SCNC: 98 MMOL/L — SIGNIFICANT CHANGE UP (ref 96–108)
CO2 SERPL-SCNC: 24 MMOL/L — SIGNIFICANT CHANGE UP (ref 22–31)
CO2 SERPL-SCNC: 25 MMOL/L — SIGNIFICANT CHANGE UP (ref 22–31)
CREAT SERPL-MCNC: 0.84 MG/DL — SIGNIFICANT CHANGE UP (ref 0.5–1.3)
CREAT SERPL-MCNC: 0.9 MG/DL — SIGNIFICANT CHANGE UP (ref 0.5–1.3)
EGFR: 109 ML/MIN/1.73M2 — SIGNIFICANT CHANGE UP
EGFR: 111 ML/MIN/1.73M2 — SIGNIFICANT CHANGE UP
GLUCOSE SERPL-MCNC: 106 MG/DL — HIGH (ref 70–99)
GLUCOSE SERPL-MCNC: 112 MG/DL — HIGH (ref 70–99)
HCT VFR BLD CALC: 33 % — LOW (ref 39–50)
HGB BLD-MCNC: 10.9 G/DL — LOW (ref 13–17)
MAGNESIUM SERPL-MCNC: 2.1 MG/DL — SIGNIFICANT CHANGE UP (ref 1.6–2.6)
MAGNESIUM SERPL-MCNC: 2.5 MG/DL — SIGNIFICANT CHANGE UP (ref 1.6–2.6)
MCHC RBC-ENTMCNC: 28.9 PG — SIGNIFICANT CHANGE UP (ref 27–34)
MCHC RBC-ENTMCNC: 33 GM/DL — SIGNIFICANT CHANGE UP (ref 32–36)
MCV RBC AUTO: 87.5 FL — SIGNIFICANT CHANGE UP (ref 80–100)
NRBC # BLD: 0 /100 WBCS — SIGNIFICANT CHANGE UP (ref 0–0)
OSMOLALITY SERPL: 286 MOSMOL/KG — SIGNIFICANT CHANGE UP (ref 275–300)
OSMOLALITY UR: 93 MOS/KG — LOW (ref 300–900)
PHOSPHATE SERPL-MCNC: 4.1 MG/DL — SIGNIFICANT CHANGE UP (ref 2.5–4.5)
PHOSPHATE SERPL-MCNC: 4.3 MG/DL — SIGNIFICANT CHANGE UP (ref 2.5–4.5)
PLATELET # BLD AUTO: 314 K/UL — SIGNIFICANT CHANGE UP (ref 150–400)
POTASSIUM SERPL-MCNC: 3.9 MMOL/L — SIGNIFICANT CHANGE UP (ref 3.5–5.3)
POTASSIUM SERPL-MCNC: 5.3 MMOL/L — SIGNIFICANT CHANGE UP (ref 3.5–5.3)
POTASSIUM SERPL-SCNC: 3.9 MMOL/L — SIGNIFICANT CHANGE UP (ref 3.5–5.3)
POTASSIUM SERPL-SCNC: 5.3 MMOL/L — SIGNIFICANT CHANGE UP (ref 3.5–5.3)
RBC # BLD: 3.77 M/UL — LOW (ref 4.2–5.8)
RBC # FLD: 12.8 % — SIGNIFICANT CHANGE UP (ref 10.3–14.5)
SODIUM SERPL-SCNC: 134 MMOL/L — LOW (ref 135–145)
SODIUM SERPL-SCNC: 137 MMOL/L — SIGNIFICANT CHANGE UP (ref 135–145)
WBC # BLD: 11.92 K/UL — HIGH (ref 3.8–10.5)
WBC # FLD AUTO: 11.92 K/UL — HIGH (ref 3.8–10.5)

## 2024-02-27 PROCEDURE — 99232 SBSQ HOSP IP/OBS MODERATE 35: CPT | Mod: GC

## 2024-02-27 PROCEDURE — 99233 SBSQ HOSP IP/OBS HIGH 50: CPT

## 2024-02-27 RX ORDER — SODIUM CHLORIDE 9 MG/ML
1000 INJECTION INTRAMUSCULAR; INTRAVENOUS; SUBCUTANEOUS ONCE
Refills: 0 | Status: COMPLETED | OUTPATIENT
Start: 2024-02-27 | End: 2024-02-27

## 2024-02-27 RX ADMIN — Medication 1 SPRAY(S): at 17:33

## 2024-02-27 RX ADMIN — DESMOPRESSIN ACETATE 0.1 MILLIGRAM(S): 0.1 TABLET ORAL at 09:59

## 2024-02-27 RX ADMIN — ATORVASTATIN CALCIUM 40 MILLIGRAM(S): 80 TABLET, FILM COATED ORAL at 21:08

## 2024-02-27 RX ADMIN — CHLORHEXIDINE GLUCONATE 1 APPLICATION(S): 213 SOLUTION TOPICAL at 12:34

## 2024-02-27 RX ADMIN — Medication 1 SPRAY(S): at 05:53

## 2024-02-27 RX ADMIN — Medication 1 TABLET(S): at 12:17

## 2024-02-27 RX ADMIN — Medication 10 MILLIGRAM(S): at 14:52

## 2024-02-27 RX ADMIN — Medication 88 MICROGRAM(S): at 05:52

## 2024-02-27 RX ADMIN — DESMOPRESSIN ACETATE 0.1 MILLIGRAM(S): 0.1 TABLET ORAL at 21:08

## 2024-02-27 RX ADMIN — ENOXAPARIN SODIUM 40 MILLIGRAM(S): 100 INJECTION SUBCUTANEOUS at 17:33

## 2024-02-27 RX ADMIN — SODIUM CHLORIDE 1000 MILLILITER(S): 9 INJECTION INTRAMUSCULAR; INTRAVENOUS; SUBCUTANEOUS at 14:52

## 2024-02-27 RX ADMIN — Medication 20 MILLIGRAM(S): at 08:03

## 2024-02-27 NOTE — PROGRESS NOTE ADULT - ASSESSMENT
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 Today, p/f leaking from nostrils (L>R) when he stands up or valsalvas. Endocrinology consulted for management of panhypopituitarism.    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 MRI 1/24 showed abnormal enhancing lesion involving the suprasellar region measuring approximately 2.1x2.3cm and previously measured 2.1x1.4cm. Patient had an endocrine workup in the past with Dr. Isidra Dao and was told he had a low testosterone level but not offered treatment prior to surgery. Had TSR 2/1/24. Post op TSH 0.15, free thyroxine 0.8, consistent with secondary hypothyroidism. ACTH 3.5, am cortisol 0.5, consistent with secondary AI. LH 0.4, FSH 1.2. 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 The patient was discharged on levothyroxine 75mcg daily, hydrocortisone 10mg/5mg and DDAVP 0.05mg prn. Reports did not take desmopressin  - patient dumping urine, Na 142, U osm <1.005 concerning for compensated DI  - IGF-1 46 low)  - pathology consistent with craniopharyngioma  - follows with Dr. Isidra Dao  PLAN  In terms of DI:   Goal Na 140-145  - q12h BMP  - continue DDAVP 0.1mg bid  Recommendations:   - DI Watch: Please monitor strict I/O, sodium levels q8hrs (monitor for hypo or hypernatremia).   If urine output more than 500ml/h or 250ml/h for 2 consecutive hours get stat Na, if Na is increasing again compared to prior than bolus 1/2 NS to match half the output (for example, if net negative 2Liters can give 1Liter 1/2NS if patient is unable to keep up with output with PO intake)  - If Na > increases to 140 or higher would dose DDAVP 0.05mg and continue DI watch as above e   - Please make sure pt has access to water and encourage patient to drink to thirst   In terms of secondary AI  - continue hydrocortisone PO 20mg at 8am and 10mg PO at 3pm, plan to discharge on hydrocortisone 10mg at 8am and 5mg at 3pm, if HD unstable, start stress dose steroids  In terms of secondary hypothyroidism  - given FT4 only increase to 0.9, increase levothyroxine to 88mcg once daily (maintain on empty stomach (at least 1 hour before meals), separate by 4 hours from PPI or calcium supplementation which can inhibit its absorption)  In terms of hypogonadism  - outpatient management  In terms of growth hormone deficiency  - outpatient management    Discussed with primary team.     Thank you for the interesting consult.    Joe Walker MD, Endocrinology Fellow  For non-urgent follow up questions, please email [email or web address] or [email or web address].   Pager [phone] from 9am to 5pm. After hours and on weekends, please call [phone]. 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 Today, p/f leaking from nostrils (L>R) when he stands up or valsalvas. Endocrinology consulted for management of panhypopituitarism.    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 MRI 1/24 showed abnormal enhancing lesion involving the suprasellar region measuring approximately 2.1x2.3cm and previously measured 2.1x1.4cm. Patient had an endocrine workup in the past with Dr. Isidra Dao and was told he had a low testosterone level but not offered treatment prior to surgery. Had TSR 2/1/24. Post op TSH 0.15, free thyroxine 0.8, consistent with secondary hypothyroidism. ACTH 3.5, am cortisol 0.5, consistent with secondary AI. LH 0.4, FSH 1.2. 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 The patient was discharged on levothyroxine 75mcg daily, hydrocortisone 10mg/5mg and DDAVP 0.05mg prn. Reports did not take desmopressin  - patient dumping urine, Na 142, U osm <1.005 concerning for compensated DI  - IGF-1 46 low)  - pathology consistent with craniopharyngioma  - follows with Dr. Isidra Dao  PLAN  In terms of DI:   Goal Na 140-145  - check BMP now as has not been checked since 2/25 and then daily  - continue DDAVP 0.1mg bid  Recommendations:   - DI Watch: Please monitor strict I/O, sodium levels q8hrs (monitor for hypo or hypernatremia).   If urine output more than 500ml/h or 250ml/h for 2 consecutive hours get stat Na, if Na is increasing again compared to prior than bolus 1/2 NS to match half the output (for example, if net negative 2Liters can give 1Liter 1/2NS if patient is unable to keep up with output with PO intake)  - If Na > increases to 140 or higher would dose DDAVP 0.05mg and continue DI watch as above e   - Please make sure pt has access to water and encourage patient to drink to thirst   In terms of secondary AI  - continue hydrocortisone PO 20mg at 8am and 10mg PO at 3pm, plan to discharge on hydrocortisone 10mg at 8am and 5mg at 3pm, if HD unstable, start stress dose steroids, if patient going under a procedure requiring general anesthesia can get 50mg IV hydrocortisone on call to OR  In terms of secondary hypothyroidism  - given FT4 only increase to 0.9, increase levothyroxine to 88mcg once daily (maintain on empty stomach (at least 1 hour before meals), separate by 4 hours from PPI or calcium supplementation which can inhibit its absorption)  In terms of hypogonadism  - outpatient management  In terms of growth hormone deficiency  - outpatient management    Discussed with primary team.     Thank you for the interesting consult.    Joe Walker MD, Endocrinology Fellow  For non-urgent follow up questions, please email [email or web address] or [email or web address].   Pager [phone] from 9am to 5pm. After hours and on weekends, please call [phone].

## 2024-02-27 NOTE — PROGRESS NOTE ADULT - ASSESSMENT
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 Pt now s/p CSF leak repair.    Neuro:   neuro checks q 4hr  Pt S/P OR for TSP leak repair, replacement of dead nasoseptal flap with contralateral nasoseptal flap for CSF leak, right thigh fat graft. Nasal splints placed.   LD drain 5cc /hr with plan to clamp on Wednesday  pain: oxy 5/10, Tylenol  activity: OOB    Respiratory:   On on face tent 50%  RVP negative  Chest PT  No IS - skull base precautions      CV:   -160 mmhg   c/w home statin    Renal:   IVF: IVL  DDAVP 0.1 mg BID    GI:   Diet: regular diet  PPx: hold  Zofran for nausea/vomiting  Reg: miralax/senna  LBM  2/25    Endocrine:   FS goal normoglycemia  PAWAN  Panhypopituitarism, c/w hydroCort, synthroid  DDAVP 0.1 BID    Heme:  DVT ppx: SCDs, Lovenox 40 Qd  LED 2/20: negative    ID:   Afebrile  UA negative, elevated leukocyte count-- likely in setting of steroid or post op   f/u blood + CSF cultures, no growth 24h    Social/Family: wife updated at bedside     Code Status: [x] Full Code [] DNR [] DNI [] Goals of Care:   Disposition: [x] ICU [] Stroke Unit [] RCU []PCU []Floor [] Discharge Home     Patient at high risk for neurologic deterioration, critical care time, excluding procedures: 30 minutes, csf rhinorrhea at risk for meningitis, brain sag, subdural hematoma           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 Pt now s/p CSF leak repair.    Neuro:   neuro checks q 4hr  Pt S/P OR for TSP leak repair, replacement of dead nasoseptal flap with contralateral nasoseptal flap for CSF leak, right thigh fat graft. Nasal splints placed.   LD drain 5cc /hr with plan to clamp on Wednesday (2/28)  pain: oxy 5/10, Tylenol  activity: OOB    Respiratory:   RA   RVP negative  Chest PT  No IS - skull base precautions      CV:   -160 mmhg   c/w home statin    Renal:   IVF: IVL  DDAVP 0.1 mg BID    GI:   Diet: regular diet  PPx: hold  Zofran for nausea/vomiting  Reg: miralax/senna  LBM  2/27    Endocrine:   FS goal normoglycemia  PAWAN  Panhypopituitarism, c/w hydroCort, synthroid  DDAVP 0.1 BID    Heme:  DVT ppx: SCDs, Lovenox 40 Qd  LED 2/20: negative    ID:   Afebrile  UA negative, elevated leukocyte count-- likely in setting of steroid or post op   f/u blood + CSF cultures, no growth 24h    Social/Family: wife updated at bedside     Code Status: [x] Full Code [] DNR [] DNI [] Goals of Care:   Disposition: [x] ICU [] Stroke Unit [] RCU []PCU []Floor [] Discharge Home     Patient at high risk for neurologic deterioration, critical care time, excluding procedures: 30 minutes, csf rhinorrhea at risk for meningitis, brain sag, subdural hematoma

## 2024-02-27 NOTE — PROVIDER CONTACT NOTE (OTHER) - ACTION/TREATMENT ORDERED:
1L bolus given, venice infusion primed but not started, atropine at bedside, pt leg elevated. o2 therapy initiated for comfort

## 2024-02-27 NOTE — PROVIDER CONTACT NOTE (OTHER) - ASSESSMENT
HR 59, BP 65/35 on R arm NIBP. recycled BP is same. spO2 is 95%, but pt has complains for difficulty breathing. pt stays he is unable to see RN

## 2024-02-27 NOTE — PROGRESS NOTE ADULT - SUBJECTIVE AND OBJECTIVE BOX
Pt seen and examined.  No evid of CSF rhinorrha. Pt tolerating LD at 5cc/hr  nonfocal exam    Plan: cont' LD until Wed pm and then clamp and leave in through Thurs. Will d/c on Fri if no evid of leak.

## 2024-02-27 NOTE — PROGRESS NOTE ADULT - SUBJECTIVE AND OBJECTIVE BOX
ENDOCRINE FOLLOW UP     Chief Complaint: craniopharyngioma    History:     MEDICATIONS  (STANDING):  atorvastatin 40 milliGRAM(s) Oral at bedtime  chlorhexidine 4% Liquid 1 Application(s) Topical daily  desmopressin 0.1 milliGRAM(s) Oral <User Schedule>  enoxaparin Injectable 40 milliGRAM(s) SubCutaneous <User Schedule>  ergocalciferol 28250 Unit(s) Oral every week  hydrocortisone 10 milliGRAM(s) Oral <User Schedule>  hydrocortisone 20 milliGRAM(s) Oral <User Schedule>  levothyroxine 88 MICROGram(s) Oral daily  multivitamin 1 Tablet(s) Oral daily  polyethylene glycol 3350 17 Gram(s) Oral daily  senna 2 Tablet(s) Oral at bedtime  sodium chloride 0.45%. 1000 milliLiter(s) (1000 mL/Hr) IV Continuous <Continuous>  sodium chloride 0.65% Nasal 1 Spray(s) Both Nostrils two times a day    MEDICATIONS  (PRN):  acetaminophen     Tablet .. 650 milliGRAM(s) Oral every 6 hours PRN Mild Pain (1 - 3)  bisacodyl 5 milliGRAM(s) Oral daily PRN Constipation  melatonin 5 milliGRAM(s) Oral at bedtime PRN Insomnia  ondansetron Injectable 4 milliGRAM(s) IV Push every 6 hours PRN Nausea and/or Vomiting  oxyCODONE    IR 5 milliGRAM(s) Oral every 4 hours PRN Moderate Pain (4 - 6)      Allergies    No Known Allergies    Intolerances        ROS: All other systems reviewed and negative    PHYSICAL EXAM:  VITALS: T(C): 36.8 (02-27-24 @ 07:00)  T(F): 98.2 (02-27-24 @ 07:00), Max: 100.4 (02-26-24 @ 15:00)  HR: 86 (02-27-24 @ 07:00) (80 - 103)  BP: 115/75 (02-27-24 @ 07:00) (104/64 - 115/75)  RR:  (14 - 21)  SpO2:  (93% - 96%)  Wt(kg): --  GENERAL: NAD, resting comfortably   EYES: No proptosis,  anicteric  HEENT:  Atraumatic, Normocephalic, moist mucous membranes  RESPIRATORY: Nonlabored respirations on room air, normal rate/effort   CARDIOVASCULAR: Did not appear cyanotic, no lower extremity swelling visualized  GI: did not appear distended  NEURO: Answering questions appropriately, moves extremities spontaneously  PSYCH:  reactive affect, euthymic mood        02-25    139  |  105  |  17  ----------------------------<  102<H>  4.2   |  22  |  0.83    eGFR: 111    Ca    8.6      02-25  Mg     2.3     02-25  Phos  4.4     02-25        A1C with Estimated Average Glucose Result: 5.4 % (02-19-24 @ 15:14)      Thyroid Stimulating Hormone, Serum: 0.03 uIU/mL (02-19-24 @ 14:17)  Thyroid Stimulating Hormone, Serum: 0.05 uIU/mL (02-08-24 @ 06:39)  Thyroid Stimulating Hormone, Serum: 0.15 uIU/mL (02-03-24 @ 08:37)  Thyroid Stimulating Hormone, Serum: 0.27 uIU/mL (02-02-24 @ 11:35)   ENDOCRINE FOLLOW UP     Chief Complaint: craniopharyngioma    History:   He reports sleeping throughout the night again not peeing. Reports able to drink to thirst. He denies nausea, vomiting. He reports the drain will be clamped tomorrow.    MEDICATIONS  (STANDING):  atorvastatin 40 milliGRAM(s) Oral at bedtime  chlorhexidine 4% Liquid 1 Application(s) Topical daily  desmopressin 0.1 milliGRAM(s) Oral <User Schedule>  enoxaparin Injectable 40 milliGRAM(s) SubCutaneous <User Schedule>  ergocalciferol 93130 Unit(s) Oral every week  hydrocortisone 10 milliGRAM(s) Oral <User Schedule>  hydrocortisone 20 milliGRAM(s) Oral <User Schedule>  levothyroxine 88 MICROGram(s) Oral daily  multivitamin 1 Tablet(s) Oral daily  polyethylene glycol 3350 17 Gram(s) Oral daily  senna 2 Tablet(s) Oral at bedtime  sodium chloride 0.45%. 1000 milliLiter(s) (1000 mL/Hr) IV Continuous <Continuous>  sodium chloride 0.65% Nasal 1 Spray(s) Both Nostrils two times a day    MEDICATIONS  (PRN):  acetaminophen     Tablet .. 650 milliGRAM(s) Oral every 6 hours PRN Mild Pain (1 - 3)  bisacodyl 5 milliGRAM(s) Oral daily PRN Constipation  melatonin 5 milliGRAM(s) Oral at bedtime PRN Insomnia  ondansetron Injectable 4 milliGRAM(s) IV Push every 6 hours PRN Nausea and/or Vomiting  oxyCODONE    IR 5 milliGRAM(s) Oral every 4 hours PRN Moderate Pain (4 - 6)      Allergies    No Known Allergies    Intolerances        ROS: All other systems reviewed and negative    PHYSICAL EXAM:  VITALS: T(C): 36.8 (02-27-24 @ 07:00)  T(F): 98.2 (02-27-24 @ 07:00), Max: 100.4 (02-26-24 @ 15:00)  HR: 86 (02-27-24 @ 07:00) (80 - 103)  BP: 115/75 (02-27-24 @ 07:00) (104/64 - 115/75)  RR:  (14 - 21)  SpO2:  (93% - 96%)  Wt(kg): --  GENERAL: NAD, resting comfortably   EYES: No proptosis,  anicteric  HEENT:  Atraumatic, Normocephalic, mildly dry mucous membranes  RESPIRATORY: Nonlabored respirations on room air, normal rate/effort   CARDIOVASCULAR: Did not appear cyanotic, no lower extremity swelling visualized  GI: did not appear distended  NEURO: Answering questions appropriately, moves extremities spontaneously  PSYCH:  reactive affect, euthymic mood        02-25    139  |  105  |  17  ----------------------------<  102<H>  4.2   |  22  |  0.83    eGFR: 111    Ca    8.6      02-25  Mg     2.3     02-25  Phos  4.4     02-25        A1C with Estimated Average Glucose Result: 5.4 % (02-19-24 @ 15:14)      Thyroid Stimulating Hormone, Serum: 0.03 uIU/mL (02-19-24 @ 14:17)  Thyroid Stimulating Hormone, Serum: 0.05 uIU/mL (02-08-24 @ 06:39)  Thyroid Stimulating Hormone, Serum: 0.15 uIU/mL (02-03-24 @ 08:37)  Thyroid Stimulating Hormone, Serum: 0.27 uIU/mL (02-02-24 @ 11:35)

## 2024-02-27 NOTE — PROVIDER CONTACT NOTE (OTHER) - SITUATION
during iv insertion and lab retrieval, pt verbalizes that he is seeing black and has become hot and clammy

## 2024-02-27 NOTE — PROGRESS NOTE ADULT - SUBJECTIVE AND OBJECTIVE BOX
NSCU ATTENDING -- ADDITIONAL PROGRESS NOTE    Nighttime rounds were performed -- please refer to earlier Progress Note for HPI details.    ICU Vital Signs Last 24 Hrs  T(C): 36.4 (27 Feb 2024 19:00), Max: 37.1 (27 Feb 2024 03:00)  T(F): 97.5 (27 Feb 2024 19:00), Max: 98.8 (27 Feb 2024 03:00)  HR: 85 (27 Feb 2024 19:00) (78 - 97)  BP: 106/67 (27 Feb 2024 19:00) (65/35 - 115/75)  BP(mean): 82 (27 Feb 2024 19:00) (45 - 90)  ABP: --  ABP(mean): --  RR: 14 (27 Feb 2024 19:00) (14 - 25)  SpO2: 94% (27 Feb 2024 19:00) (93% - 100%)    O2 Parameters below as of 27 Feb 2024 19:00  Patient On (Oxygen Delivery Method): room air    CBC:            10.9   11.92 )-----------( 314      ( 02-27-24 @ 21:37 )             33.0         Chem:         ( 02-27-24 @ 21:37 )    137  |  102  |  12  ----------------------------<  106<H>  5.3   |  25  |  0.84        Liver Functions:     Type & Screen:       MEDICATIONS  (STANDING):  atorvastatin 40 milliGRAM(s) Oral at bedtime  chlorhexidine 4% Liquid 1 Application(s) Topical daily  desmopressin 0.1 milliGRAM(s) Oral <User Schedule>  enoxaparin Injectable 40 milliGRAM(s) SubCutaneous <User Schedule>  ergocalciferol 07182 Unit(s) Oral every week  hydrocortisone 10 milliGRAM(s) Oral <User Schedule>  hydrocortisone 20 milliGRAM(s) Oral <User Schedule>  levothyroxine 88 MICROGram(s) Oral daily  multivitamin 1 Tablet(s) Oral daily  polyethylene glycol 3350 17 Gram(s) Oral daily  senna 2 Tablet(s) Oral at bedtime  sodium chloride 0.45%. 1000 milliLiter(s) (1000 mL/Hr) IV Continuous <Continuous>  sodium chloride 0.65% Nasal 1 Spray(s) Both Nostrils two times a day    MEDICATIONS  (PRN):  acetaminophen     Tablet .. 650 milliGRAM(s) Oral every 6 hours PRN Mild Pain (1 - 3)  bisacodyl 5 milliGRAM(s) Oral daily PRN Constipation  melatonin 5 milliGRAM(s) Oral at bedtime PRN Insomnia  ondansetron Injectable 4 milliGRAM(s) IV Push every 6 hours PRN Nausea and/or Vomiting  oxyCODONE    IR 5 milliGRAM(s) Oral every 4 hours PRN Moderate Pain (4 - 6)      POD 3 replacement of necrotic nasoseptal flap, CSF leak repair  LD @5cc/hr mngt per neurosurgery   protected sleep time   midline consult

## 2024-02-27 NOTE — PROGRESS NOTE ADULT - SUBJECTIVE AND OBJECTIVE BOX
ENT ISSUE/POD: s/p CSF leak repair w/new septal flap and fat graft from right thigh, s/p b/l septal splints and LD. POD 4    HPI: 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 Admitted for postoperative CSF leak despite attempted lumbar drain. Pt is status post OR CSF leak. original flap noted to be nonviable. is status post a new flap from contralateral side, and with additional fat graft from right thigh .also s/p bilateral septal splints placement. LD x 5 days. Pt is C/O blood tinged mucous from B/L nares, but denies HAs, salty or metallic taste, fevers, clear nasal discharge.        PAST MEDICAL & SURGICAL HISTORY:  HLD (hyperlipidemia)      History of pituitary tumor      Deviated septum      History of dental surgery        Allergies    No Known Allergies    Intolerances      MEDICATIONS  (STANDING):  atorvastatin 40 milliGRAM(s) Oral at bedtime  chlorhexidine 4% Liquid 1 Application(s) Topical daily  desmopressin 0.1 milliGRAM(s) Oral <User Schedule>  enoxaparin Injectable 40 milliGRAM(s) SubCutaneous <User Schedule>  ergocalciferol 23038 Unit(s) Oral every week  hydrocortisone 10 milliGRAM(s) Oral <User Schedule>  hydrocortisone 20 milliGRAM(s) Oral <User Schedule>  levothyroxine 88 MICROGram(s) Oral daily  multivitamin 1 Tablet(s) Oral daily  polyethylene glycol 3350 17 Gram(s) Oral daily  senna 2 Tablet(s) Oral at bedtime  sodium chloride 0.45%. 1000 milliLiter(s) (1000 mL/Hr) IV Continuous <Continuous>  sodium chloride 0.65% Nasal 1 Spray(s) Both Nostrils two times a day    MEDICATIONS  (PRN):  acetaminophen     Tablet .. 650 milliGRAM(s) Oral every 6 hours PRN Mild Pain (1 - 3)  bisacodyl 5 milliGRAM(s) Oral daily PRN Constipation  melatonin 5 milliGRAM(s) Oral at bedtime PRN Insomnia  ondansetron Injectable 4 milliGRAM(s) IV Push every 6 hours PRN Nausea and/or Vomiting  oxyCODONE    IR 5 milliGRAM(s) Oral every 4 hours PRN Moderate Pain (4 - 6)      Social History: see consult    Family history: see consult    ROS:   ENT: all negative except as noted in HPI   Pulm: denies SOB, cough, hemoptysis  Neuro: denies numbness/tingling, loss of sensation  Endo: denies heat/cold intolerance, excessive sweating      Vital Signs Last 24 Hrs  T(C): 36.8 (27 Feb 2024 07:00), Max: 38 (26 Feb 2024 15:00)  T(F): 98.2 (27 Feb 2024 07:00), Max: 100.4 (26 Feb 2024 15:00)  HR: 86 (27 Feb 2024 07:00) (80 - 103)  BP: 115/75 (27 Feb 2024 07:00) (104/64 - 115/75)  BP(mean): 90 (27 Feb 2024 07:00) (77 - 90)  RR: 21 (27 Feb 2024 07:00) (14 - 21)  SpO2: 94% (27 Feb 2024 07:00) (93% - 96%)    Parameters below as of 27 Feb 2024 07:00  Patient On (Oxygen Delivery Method): room air      PHYSICAL EXAM:  Gen: NAD  Skin: No rashes, bruises, or lesions  Head: Normocephalic, Atraumatic  Face: no edema, erythema, or fluctuance. Parotid glands soft without mass  Eyes: no scleral injection  Nose: Blood tinged mucous from B/L nares, no active bleeding, no clear discharge, mustache DRSG placed  Mouth: No Stridor / Drooling / Trismus.  Mucosa moist, tongue/uvula midline, oropharynx clear  Neck: Flat, supple, no lymphadenopathy, trachea midline, no masses  Lymphatic: No lymphadenopathy  Resp: breathing easily, no stridor  Neuro: facial nerve intact, no facial droop

## 2024-02-27 NOTE — PROGRESS NOTE ADULT - SUBJECTIVE AND OBJECTIVE BOX
HOSPITAL COURSE:  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 Presented with ~4 days clear fluid leaking from nostrils (L>R) on standing or valsalva. Reports no h/as. Afebrile.      24H Events: No acute events. Requiring oxygen via face tent. Pre op for OR today. No examination changes.   2/24- Patient s/p replacement of necrotic nasoseptal flap with contralateral nasoseptal flap for CSF leak (right thigh fat graft). Reports no salty, metallic taste in the mouth.  2/25: No acute events overnight   2/26- Patient denies any salty taste from his mouth, denies headaches.  2/27- No acute overnight events    Allergies    No Known Allergies    Intolerances        REVIEW OF SYSTEMS: [ ] Unable to Assess due to neurologic exam   [x ] All ROS addressed below are non-contributory, except:  Neuro: [ ] Headache [ ] Back pain [ ] Numbness [ ] Weakness [ ] Ataxia [ ] Dizziness [ ] Aphasia [ ] Dysarthria [ ] Visual disturbance  Resp: [ ] Shortness of breath/dyspnea, [ ] Orthopnea [ ] Cough  CV: [ ] Chest pain [ ] Palpitation [ ] Lightheadedness [ ] Syncope  Renal: [ ] Thirst [ ] Edema  GI: [ ] Nausea [ ] Emesis [ ] Abdominal pain [ ] Constipation [ ] Diarrhea  Hem: [ ] Hematemesis [ ] bright red blood per rectum  ID: [ ] Fever [ ] Chills [ ] Dysuria  ENT: [ ] Rhinorrhea      DEVICES:   [ ] Restraints [ ] ET tube [ ] central line [ ] arterial line [ ] carlson [ ] NGT/OGT [ ] EVD [ ] LD [ ] LUCINDA/HMV [ ] Trach [ ] PEG [ ] Chest Tube     VITALS:   Vital Signs Last 24 Hrs  T(C): 36.8 (27 Feb 2024 07:00), Max: 38 (26 Feb 2024 15:00)  T(F): 98.2 (27 Feb 2024 07:00), Max: 100.4 (26 Feb 2024 15:00)  HR: 86 (27 Feb 2024 07:00) (80 - 103)  BP: 115/75 (27 Feb 2024 07:00) (104/64 - 115/75)  BP(mean): 90 (27 Feb 2024 07:00) (77 - 90)  RR: 21 (27 Feb 2024 07:00) (14 - 21)  SpO2: 94% (27 Feb 2024 07:00) (93% - 96%)    Parameters below as of 27 Feb 2024 07:00  Patient On (Oxygen Delivery Method): room air      CAPILLARY BLOOD GLUCOSE        I&O's Summary    26 Feb 2024 07:01  -  27 Feb 2024 07:00  --------------------------------------------------------  IN: 1919 mL / OUT: 2120 mL / NET: -201 mL        Respiratory:        LABS:               MEDICATION LEVELS:     IVF FLUIDS/MEDICATIONS:   MEDICATIONS  (STANDING):  atorvastatin 40 milliGRAM(s) Oral at bedtime  chlorhexidine 4% Liquid 1 Application(s) Topical daily  desmopressin 0.1 milliGRAM(s) Oral <User Schedule>  enoxaparin Injectable 40 milliGRAM(s) SubCutaneous <User Schedule>  ergocalciferol 45552 Unit(s) Oral every week  hydrocortisone 10 milliGRAM(s) Oral <User Schedule>  hydrocortisone 20 milliGRAM(s) Oral <User Schedule>  levothyroxine 88 MICROGram(s) Oral daily  multivitamin 1 Tablet(s) Oral daily  polyethylene glycol 3350 17 Gram(s) Oral daily  senna 2 Tablet(s) Oral at bedtime  sodium chloride 0.45%. 1000 milliLiter(s) (1000 mL/Hr) IV Continuous <Continuous>  sodium chloride 0.65% Nasal 1 Spray(s) Both Nostrils two times a day    MEDICATIONS  (PRN):  acetaminophen     Tablet .. 650 milliGRAM(s) Oral every 6 hours PRN Mild Pain (1 - 3)  bisacodyl 5 milliGRAM(s) Oral daily PRN Constipation  melatonin 5 milliGRAM(s) Oral at bedtime PRN Insomnia  ondansetron Injectable 4 milliGRAM(s) IV Push every 6 hours PRN Nausea and/or Vomiting  oxyCODONE    IR 5 milliGRAM(s) Oral every 4 hours PRN Moderate Pain (4 - 6)        IMAGING:      EXAMINATION:  PHYSICAL EXAM:    Constitutional: No Acute Distress, nasal dressing dry, nasal packing in    Neurological: Awake, alert oriented to person, place and time, Following Commands, PERRL, EOMI, No Gaze Preference, Face Symmetrical, Speech Fluent, No dysmetria, No ataxia, No nystagmus     Motor exam:          Upper extremity                         Delt     Bicep     Tricep    HG                                                 R         5/5 5/5 5/5 5/5                                               L          5/5 5/5 5/5 5/5          Lower extremity                        HF         KF        KE       DF         PF                                                  R        5/5 5/5 5/5 5/5 5/5                                               L         5/5 5/5 5/5 5/5 5/5                                                 Pulmonary: Clear to Auscultation, No rales, No rhonchi, No wheezes     Cardiovascular: S1, S2, Regular rate and rhythm     Gastrointestinal: Soft, Non-tender, Non-distended     Extremities: No calf tenderness     Incision:    HOSPITAL COURSE:  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 Presented with ~4 days clear fluid leaking from nostrils (L>R) on standing or valsalva. Reports no h/as. Afebrile.      24H Events: No acute events. Requiring oxygen via face tent. Pre op for OR today. No examination changes.   2/24- Patient s/p replacement of necrotic nasoseptal flap with contralateral nasoseptal flap for CSF leak (right thigh fat graft). Reports no salty, metallic taste in the mouth.  2/25: No acute events overnight   2/26- Patient denies any salty taste from his mouth, denies headaches.  2/27- No acute overnight events    Allergies    No Known Allergies    Intolerances        REVIEW OF SYSTEMS: [ ] Unable to Assess due to neurologic exam   [x] All ROS addressed below are non-contributory, except:  Neuro: [ ] Headache [ ] Back pain [ ] Numbness [ ] Weakness [ ] Ataxia [ ] Dizziness [ ] Aphasia [ ] Dysarthria [ ] Visual disturbance  Resp: [ ] Shortness of breath/dyspnea, [ ] Orthopnea [ ] Cough  CV: [ ] Chest pain [ ] Palpitation [ ] Lightheadedness [ ] Syncope  Renal: [ ] Thirst [ ] Edema  GI: [ ] Nausea [ ] Emesis [ ] Abdominal pain [ ] Constipation [ ] Diarrhea  Hem: [ ] Hematemesis [ ] bright red blood per rectum  ID: [ ] Fever [ ] Chills [ ] Dysuria  ENT: [ ] Rhinorrhea      DEVICES:   [ ] Restraints [ ] ET tube [ ] central line [ ] arterial line [ ] carlson [ ] NGT/OGT [ ] EVD [ ] LD [ ] LUCINDA/HMV [ ] Trach [ ] PEG [ ] Chest Tube     VITALS:   Vital Signs Last 24 Hrs  T(C): 36.8 (27 Feb 2024 07:00), Max: 38 (26 Feb 2024 15:00)  T(F): 98.2 (27 Feb 2024 07:00), Max: 100.4 (26 Feb 2024 15:00)  HR: 86 (27 Feb 2024 07:00) (80 - 103)  BP: 115/75 (27 Feb 2024 07:00) (104/64 - 115/75)  BP(mean): 90 (27 Feb 2024 07:00) (77 - 90)  RR: 21 (27 Feb 2024 07:00) (14 - 21)  SpO2: 94% (27 Feb 2024 07:00) (93% - 96%)    Parameters below as of 27 Feb 2024 07:00  Patient On (Oxygen Delivery Method): room air      CAPILLARY BLOOD GLUCOSE        I&O's Summary    26 Feb 2024 07:01  -  27 Feb 2024 07:00  --------------------------------------------------------  IN: 1919 mL / OUT: 2120 mL / NET: -201 mL        Respiratory:        LABS:               MEDICATION LEVELS:     IVF FLUIDS/MEDICATIONS:   MEDICATIONS  (STANDING):  atorvastatin 40 milliGRAM(s) Oral at bedtime  chlorhexidine 4% Liquid 1 Application(s) Topical daily  desmopressin 0.1 milliGRAM(s) Oral <User Schedule>  enoxaparin Injectable 40 milliGRAM(s) SubCutaneous <User Schedule>  ergocalciferol 59210 Unit(s) Oral every week  hydrocortisone 10 milliGRAM(s) Oral <User Schedule>  hydrocortisone 20 milliGRAM(s) Oral <User Schedule>  levothyroxine 88 MICROGram(s) Oral daily  multivitamin 1 Tablet(s) Oral daily  polyethylene glycol 3350 17 Gram(s) Oral daily  senna 2 Tablet(s) Oral at bedtime  sodium chloride 0.45%. 1000 milliLiter(s) (1000 mL/Hr) IV Continuous <Continuous>  sodium chloride 0.65% Nasal 1 Spray(s) Both Nostrils two times a day    MEDICATIONS  (PRN):  acetaminophen     Tablet .. 650 milliGRAM(s) Oral every 6 hours PRN Mild Pain (1 - 3)  bisacodyl 5 milliGRAM(s) Oral daily PRN Constipation  melatonin 5 milliGRAM(s) Oral at bedtime PRN Insomnia  ondansetron Injectable 4 milliGRAM(s) IV Push every 6 hours PRN Nausea and/or Vomiting  oxyCODONE    IR 5 milliGRAM(s) Oral every 4 hours PRN Moderate Pain (4 - 6)        IMAGING:      EXAMINATION:  PHYSICAL EXAM:    Constitutional: No Acute Distress, nasal dressing dry, nasal packing in    Neurological: Awake, alert oriented to person, place and time, Following Commands, PERRL, EOMI, No Gaze Preference, Face Symmetrical, Speech Fluent, No dysmetria, No ataxia, No nystagmus     Motor exam:          Upper extremity                         Delt     Bicep     Tricep    HG                                                 R         5/5 5/5 5/5 5/5                                               L          5/5 5/5 5/5 5/5          Lower extremity                        HF         KF        KE       DF         PF                                                  R        5/5 5/5 5/5 5/5 5/5                                               L         5/5 5/5 5/5 5/5 5/5                                                 Pulmonary: Clear to Auscultation, No rales, No rhonchi, No wheezes     Cardiovascular: S1, S2, Regular rate and rhythm     Gastrointestinal: Soft, Non-tender, Non-distended     Extremities: No calf tenderness     Incision:

## 2024-02-28 LAB
ANION GAP SERPL CALC-SCNC: 11 MMOL/L — SIGNIFICANT CHANGE UP (ref 5–17)
ANION GAP SERPL CALC-SCNC: 13 MMOL/L — SIGNIFICANT CHANGE UP (ref 5–17)
BASOPHILS # BLD AUTO: 0 K/UL — SIGNIFICANT CHANGE UP (ref 0–0.2)
BASOPHILS NFR BLD AUTO: 0 % — SIGNIFICANT CHANGE UP (ref 0–2)
BUN SERPL-MCNC: 14 MG/DL — SIGNIFICANT CHANGE UP (ref 7–23)
BUN SERPL-MCNC: 14 MG/DL — SIGNIFICANT CHANGE UP (ref 7–23)
CALCIUM SERPL-MCNC: 8.1 MG/DL — LOW (ref 8.4–10.5)
CALCIUM SERPL-MCNC: 9.3 MG/DL — SIGNIFICANT CHANGE UP (ref 8.4–10.5)
CHLORIDE SERPL-SCNC: 101 MMOL/L — SIGNIFICANT CHANGE UP (ref 96–108)
CHLORIDE SERPL-SCNC: 99 MMOL/L — SIGNIFICANT CHANGE UP (ref 96–108)
CO2 SERPL-SCNC: 22 MMOL/L — SIGNIFICANT CHANGE UP (ref 22–31)
CO2 SERPL-SCNC: 25 MMOL/L — SIGNIFICANT CHANGE UP (ref 22–31)
CREAT SERPL-MCNC: 0.75 MG/DL — SIGNIFICANT CHANGE UP (ref 0.5–1.3)
CREAT SERPL-MCNC: 0.85 MG/DL — SIGNIFICANT CHANGE UP (ref 0.5–1.3)
EGFR: 111 ML/MIN/1.73M2 — SIGNIFICANT CHANGE UP
EGFR: 115 ML/MIN/1.73M2 — SIGNIFICANT CHANGE UP
EOSINOPHIL # BLD AUTO: 0.11 K/UL — SIGNIFICANT CHANGE UP (ref 0–0.5)
EOSINOPHIL NFR BLD AUTO: 0.9 % — SIGNIFICANT CHANGE UP (ref 0–6)
GIANT PLATELETS BLD QL SMEAR: PRESENT — SIGNIFICANT CHANGE UP
GLUCOSE SERPL-MCNC: 106 MG/DL — HIGH (ref 70–99)
GLUCOSE SERPL-MCNC: 93 MG/DL — SIGNIFICANT CHANGE UP (ref 70–99)
HCT VFR BLD CALC: 31.3 % — LOW (ref 39–50)
HGB BLD-MCNC: 10.5 G/DL — LOW (ref 13–17)
LYMPHOCYTES # BLD AUTO: 3.9 K/UL — HIGH (ref 1–3.3)
LYMPHOCYTES # BLD AUTO: 31.2 % — SIGNIFICANT CHANGE UP (ref 13–44)
MAGNESIUM SERPL-MCNC: 2 MG/DL — SIGNIFICANT CHANGE UP (ref 1.6–2.6)
MANUAL SMEAR VERIFICATION: SIGNIFICANT CHANGE UP
MCHC RBC-ENTMCNC: 29 PG — SIGNIFICANT CHANGE UP (ref 27–34)
MCHC RBC-ENTMCNC: 33.5 GM/DL — SIGNIFICANT CHANGE UP (ref 32–36)
MCV RBC AUTO: 86.5 FL — SIGNIFICANT CHANGE UP (ref 80–100)
METAMYELOCYTES # FLD: 1.8 % — HIGH (ref 0–0)
MONOCYTES # BLD AUTO: 0.45 K/UL — SIGNIFICANT CHANGE UP (ref 0–0.9)
MONOCYTES NFR BLD AUTO: 3.6 % — SIGNIFICANT CHANGE UP (ref 2–14)
NEUTROPHILS # BLD AUTO: 7.7 K/UL — HIGH (ref 1.8–7.4)
NEUTROPHILS NFR BLD AUTO: 61.6 % — SIGNIFICANT CHANGE UP (ref 43–77)
OSMOLALITY SERPL: 283 MOSMOL/KG — SIGNIFICANT CHANGE UP (ref 275–300)
OSMOLALITY UR: 98 MOS/KG — LOW (ref 300–900)
PHOSPHATE SERPL-MCNC: 3.7 MG/DL — SIGNIFICANT CHANGE UP (ref 2.5–4.5)
PLAT MORPH BLD: NORMAL — SIGNIFICANT CHANGE UP
PLATELET # BLD AUTO: 293 K/UL — SIGNIFICANT CHANGE UP (ref 150–400)
POTASSIUM SERPL-MCNC: 3.5 MMOL/L — SIGNIFICANT CHANGE UP (ref 3.5–5.3)
POTASSIUM SERPL-MCNC: 4.7 MMOL/L — SIGNIFICANT CHANGE UP (ref 3.5–5.3)
POTASSIUM SERPL-SCNC: 3.5 MMOL/L — SIGNIFICANT CHANGE UP (ref 3.5–5.3)
POTASSIUM SERPL-SCNC: 4.7 MMOL/L — SIGNIFICANT CHANGE UP (ref 3.5–5.3)
PROMYELOCYTES # FLD: 0.9 % — HIGH (ref 0–0)
RBC # BLD: 3.62 M/UL — LOW (ref 4.2–5.8)
RBC # FLD: 12.7 % — SIGNIFICANT CHANGE UP (ref 10.3–14.5)
RBC BLD AUTO: SIGNIFICANT CHANGE UP
SODIUM SERPL-SCNC: 135 MMOL/L — SIGNIFICANT CHANGE UP (ref 135–145)
SODIUM SERPL-SCNC: 136 MMOL/L — SIGNIFICANT CHANGE UP (ref 135–145)
SP GR UR STRIP: <1.005 — LOW (ref 1–1.03)
WBC # BLD: 12.5 K/UL — HIGH (ref 3.8–10.5)
WBC # FLD AUTO: 12.5 K/UL — HIGH (ref 3.8–10.5)

## 2024-02-28 PROCEDURE — 99232 SBSQ HOSP IP/OBS MODERATE 35: CPT | Mod: GC

## 2024-02-28 PROCEDURE — 99233 SBSQ HOSP IP/OBS HIGH 50: CPT

## 2024-02-28 RX ORDER — POTASSIUM CHLORIDE 20 MEQ
40 PACKET (EA) ORAL ONCE
Refills: 0 | Status: COMPLETED | OUTPATIENT
Start: 2024-02-28 | End: 2024-02-28

## 2024-02-28 RX ADMIN — ENOXAPARIN SODIUM 40 MILLIGRAM(S): 100 INJECTION SUBCUTANEOUS at 17:58

## 2024-02-28 RX ADMIN — Medication 20 MILLIGRAM(S): at 08:43

## 2024-02-28 RX ADMIN — Medication 1 TABLET(S): at 11:28

## 2024-02-28 RX ADMIN — SENNA PLUS 2 TABLET(S): 8.6 TABLET ORAL at 21:17

## 2024-02-28 RX ADMIN — ATORVASTATIN CALCIUM 40 MILLIGRAM(S): 80 TABLET, FILM COATED ORAL at 21:16

## 2024-02-28 RX ADMIN — Medication 88 MICROGRAM(S): at 05:11

## 2024-02-28 RX ADMIN — DESMOPRESSIN ACETATE 0.1 MILLIGRAM(S): 0.1 TABLET ORAL at 21:16

## 2024-02-28 RX ADMIN — Medication 1 SPRAY(S): at 05:11

## 2024-02-28 RX ADMIN — Medication 1 SPRAY(S): at 17:57

## 2024-02-28 RX ADMIN — Medication 40 MILLIEQUIVALENT(S): at 18:26

## 2024-02-28 RX ADMIN — POLYETHYLENE GLYCOL 3350 17 GRAM(S): 17 POWDER, FOR SOLUTION ORAL at 17:57

## 2024-02-28 RX ADMIN — DESMOPRESSIN ACETATE 0.1 MILLIGRAM(S): 0.1 TABLET ORAL at 10:34

## 2024-02-28 RX ADMIN — CHLORHEXIDINE GLUCONATE 1 APPLICATION(S): 213 SOLUTION TOPICAL at 11:27

## 2024-02-28 RX ADMIN — Medication 10 MILLIGRAM(S): at 15:55

## 2024-02-28 NOTE — PROGRESS NOTE ADULT - ASSESSMENT
LD@5cc/hr clamp in AM  -160  bmp q6  On hydrocort 20/10, DDAVP PO  Endo following  Has 2 splints in to be managed by [facility]  thigh incision c/d/i  monitor bmps

## 2024-02-28 NOTE — PROGRESS NOTE ADULT - SUBJECTIVE AND OBJECTIVE BOX
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 Presented with ~4 days clear fluid leaking from nostrils (L>R) on standing or valsalva. Reports no h/as. Afebrile.      24H Events: No acute events. Requiring oxygen via face tent. Pre op for OR today. No examination changes.   2/24- Patient s/p replacement of necrotic nasoseptal flap with contralateral nasoseptal flap for CSF leak (right thigh fat graft). Reports no salty, metallic taste in the mouth.  2/25: No acute events overnight   2/26- Patient denies any salty taste from his mouth, denies headaches.  2/27- No acute overnight events  2/28- No acute events overnight. Patient denies salty, metallic taste in mouth.    Allergies    No Known Allergies    Intolerances        REVIEW OF SYSTEMS: [ ] Unable to Assess due to neurologic exam   [x] All ROS addressed below are non-contributory, except:  Neuro: [ ] Headache [ ] Back pain [ ] Numbness [ ] Weakness [ ] Ataxia [ ] Dizziness [ ] Aphasia [ ] Dysarthria [ ] Visual disturbance  Resp: [ ] Shortness of breath/dyspnea, [ ] Orthopnea [ ] Cough  CV: [ ] Chest pain [ ] Palpitation [ ] Lightheadedness [ ] Syncope  Renal: [ ] Thirst [ ] Edema  GI: [ ] Nausea [ ] Emesis [ ] Abdominal pain [ ] Constipation [ ] Diarrhea  Hem: [ ] Hematemesis [ ] bright red blood per rectum  ID: [ ] Fever [ ] Chills [ ] Dysuria  ENT: [ ] Rhinorrhea      DEVICES:   [ ] Restraints [ ] ET tube [ ] central line [ ] arterial line [ ] carlson [ ] NGT/OGT [ ] EVD [ ] LD [ ] LUCINDA/HMV [ ] Trach [ ] PEG [ ] Chest Tube     VITALS:   Vital Signs Last 24 Hrs  T(C): 36.6 (28 Feb 2024 07:00), Max: 36.9 (27 Feb 2024 11:00)  T(F): 97.8 (28 Feb 2024 07:00), Max: 98.4 (27 Feb 2024 11:00)  HR: 91 (28 Feb 2024 07:00) (78 - 97)  BP: 109/71 (28 Feb 2024 07:00) (65/35 - 111/75)  BP(mean): 84 (28 Feb 2024 07:00) (45 - 87)  RR: 14 (28 Feb 2024 07:00) (14 - 25)  SpO2: 96% (28 Feb 2024 07:00) (94% - 100%)    Parameters below as of 28 Feb 2024 07:00  Patient On (Oxygen Delivery Method): room air      CAPILLARY BLOOD GLUCOSE        I&O's Summary    27 Feb 2024 07:01  -  28 Feb 2024 07:00  --------------------------------------------------------  IN: 2813 mL / OUT: 4465 mL / NET: -1652 mL        Respiratory:        LABS:                        10.9   11.92 )-----------( 314      ( 27 Feb 2024 21:37 )             33.0     02-27    137  |  102  |  12  ----------------------------<  106<H>  5.3   |  25  |  0.84             MEDICATION LEVELS:     IVF FLUIDS/MEDICATIONS:   MEDICATIONS  (STANDING):  atorvastatin 40 milliGRAM(s) Oral at bedtime  chlorhexidine 4% Liquid 1 Application(s) Topical daily  desmopressin 0.1 milliGRAM(s) Oral <User Schedule>  enoxaparin Injectable 40 milliGRAM(s) SubCutaneous <User Schedule>  ergocalciferol 91150 Unit(s) Oral every week  hydrocortisone 10 milliGRAM(s) Oral <User Schedule>  hydrocortisone 20 milliGRAM(s) Oral <User Schedule>  levothyroxine 88 MICROGram(s) Oral daily  multivitamin 1 Tablet(s) Oral daily  polyethylene glycol 3350 17 Gram(s) Oral daily  senna 2 Tablet(s) Oral at bedtime  sodium chloride 0.45%. 1000 milliLiter(s) (1000 mL/Hr) IV Continuous <Continuous>  sodium chloride 0.65% Nasal 1 Spray(s) Both Nostrils two times a day    MEDICATIONS  (PRN):  acetaminophen     Tablet .. 650 milliGRAM(s) Oral every 6 hours PRN Mild Pain (1 - 3)  bisacodyl 5 milliGRAM(s) Oral daily PRN Constipation  melatonin 5 milliGRAM(s) Oral at bedtime PRN Insomnia  ondansetron Injectable 4 milliGRAM(s) IV Push every 6 hours PRN Nausea and/or Vomiting  oxyCODONE    IR 5 milliGRAM(s) Oral every 4 hours PRN Moderate Pain (4 - 6)        IMAGING:      EXAMINATION:  PHYSICAL EXAM:    Constitutional: No Acute Distress     Neurological: Awake, alert oriented to person, place and time, Following Commands, PERRL, EOMI, No Gaze Preference, Face Symmetrical, Speech Fluent.    Motor exam:          Upper extremity                         Delt     Bicep     Tricep    HG                                                 R         5/5        5/5        5/5       5/5                                               L          5/5        5/5        5/5       5/5          Lower extremity                        HF         KF        KE       DF         PF                                                  R        5/5        5/5        5/5       5/5         5/5                                               L         5/5        5/5       5/5       5/5          5/5                                                   Pulmonary: Clear to Auscultation, No rales, No rhonchi, No wheezes     Cardiovascular: S1, S2, Regular rate and rhythm     Gastrointestinal: Soft, Non-tender, Non-distended     Extremities: No calf tenderness     Incision:    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 Presented with ~4 days clear fluid leaking from nostrils (L>R) on standing or valsalva. Reports no h/as. Afebrile.      24H Events: No acute events. Requiring oxygen via face tent. Pre op for OR today. No examination changes.   2/24- Patient s/p replacement of necrotic nasoseptal flap with contralateral nasoseptal flap for CSF leak (right thigh fat graft). Reports no salty, metallic taste in the mouth.  2/25: No acute events overnight   2/26- Patient denies any salty taste from his mouth, denies headaches.  2/27- No acute overnight events  2/28- No acute events overnight. Patient denies salty, metallic taste in mouth.  2/29- No acute events overnight    Allergies    No Known Allergies    Intolerances        REVIEW OF SYSTEMS: [ ] Unable to Assess due to neurologic exam   [x] All ROS addressed below are non-contributory, except:  Neuro: [ ] Headache [ ] Back pain [ ] Numbness [ ] Weakness [ ] Ataxia [ ] Dizziness [ ] Aphasia [ ] Dysarthria [ ] Visual disturbance  Resp: [ ] Shortness of breath/dyspnea, [ ] Orthopnea [ ] Cough  CV: [ ] Chest pain [ ] Palpitation [ ] Lightheadedness [ ] Syncope  Renal: [ ] Thirst [ ] Edema  GI: [ ] Nausea [ ] Emesis [ ] Abdominal pain [ ] Constipation [ ] Diarrhea  Hem: [ ] Hematemesis [ ] bright red blood per rectum  ID: [ ] Fever [ ] Chills [ ] Dysuria  ENT: [ ] Rhinorrhea      DEVICES:   [ ] Restraints [ ] ET tube [ ] central line [ ] arterial line [ ] carlson [ ] NGT/OGT [ ] EVD [ ] LD [ ] LUCINDA/HMV [ ] Trach [ ] PEG [ ] Chest Tube     VITALS:   Vital Signs Last 24 Hrs  T(C): 36.6 (28 Feb 2024 07:00), Max: 36.9 (27 Feb 2024 11:00)  T(F): 97.8 (28 Feb 2024 07:00), Max: 98.4 (27 Feb 2024 11:00)  HR: 91 (28 Feb 2024 07:00) (78 - 97)  BP: 109/71 (28 Feb 2024 07:00) (65/35 - 111/75)  BP(mean): 84 (28 Feb 2024 07:00) (45 - 87)  RR: 14 (28 Feb 2024 07:00) (14 - 25)  SpO2: 96% (28 Feb 2024 07:00) (94% - 100%)    Parameters below as of 28 Feb 2024 07:00  Patient On (Oxygen Delivery Method): room air      CAPILLARY BLOOD GLUCOSE        I&O's Summary    27 Feb 2024 07:01  -  28 Feb 2024 07:00  --------------------------------------------------------  IN: 2813 mL / OUT: 4465 mL / NET: -1652 mL        Respiratory:        LABS:                        10.9   11.92 )-----------( 314      ( 27 Feb 2024 21:37 )             33.0     02-27    137  |  102  |  12  ----------------------------<  106<H>  5.3   |  25  |  0.84             MEDICATION LEVELS:     IVF FLUIDS/MEDICATIONS:   MEDICATIONS  (STANDING):  atorvastatin 40 milliGRAM(s) Oral at bedtime  chlorhexidine 4% Liquid 1 Application(s) Topical daily  desmopressin 0.1 milliGRAM(s) Oral <User Schedule>  enoxaparin Injectable 40 milliGRAM(s) SubCutaneous <User Schedule>  ergocalciferol 98088 Unit(s) Oral every week  hydrocortisone 10 milliGRAM(s) Oral <User Schedule>  hydrocortisone 20 milliGRAM(s) Oral <User Schedule>  levothyroxine 88 MICROGram(s) Oral daily  multivitamin 1 Tablet(s) Oral daily  polyethylene glycol 3350 17 Gram(s) Oral daily  senna 2 Tablet(s) Oral at bedtime  sodium chloride 0.45%. 1000 milliLiter(s) (1000 mL/Hr) IV Continuous <Continuous>  sodium chloride 0.65% Nasal 1 Spray(s) Both Nostrils two times a day    MEDICATIONS  (PRN):  acetaminophen     Tablet .. 650 milliGRAM(s) Oral every 6 hours PRN Mild Pain (1 - 3)  bisacodyl 5 milliGRAM(s) Oral daily PRN Constipation  melatonin 5 milliGRAM(s) Oral at bedtime PRN Insomnia  ondansetron Injectable 4 milliGRAM(s) IV Push every 6 hours PRN Nausea and/or Vomiting  oxyCODONE    IR 5 milliGRAM(s) Oral every 4 hours PRN Moderate Pain (4 - 6)        IMAGING:      EXAMINATION:  PHYSICAL EXAM:    Constitutional: No Acute Distress     Neurological: Awake, alert oriented to person, place and time, Following Commands, PERRL, EOMI, No Gaze Preference, Face Symmetrical, Speech Fluent.    Motor exam:          Upper extremity                         Delt     Bicep     Tricep    HG                                                 R         5/5        5/5        5/5       5/5                                               L          5/5        5/5        5/5       5/5          Lower extremity                        HF         KF        KE       DF         PF                                                  R        5/5        5/5        5/5       5/5         5/5                                               L         5/5        5/5       5/5       5/5          5/5                                                   Pulmonary: Clear to Auscultation, No rales, No rhonchi, No wheezes     Cardiovascular: S1, S2, Regular rate and rhythm     Gastrointestinal: Soft, Non-tender, Non-distended     Extremities: No calf tenderness     Incision:

## 2024-02-28 NOTE — PROGRESS NOTE ADULT - SUBJECTIVE AND OBJECTIVE BOX
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 Presented with ~4 days clear fluid leaking from nostrils (L>R) on standing or valsalva. Reports no h/as. Afebrile.      24H Events: No acute events. Requiring oxygen via face tent. Pre op for OR today. No examination changes.   2/24- Patient s/p replacement of necrotic nasoseptal flap with contralateral nasoseptal flap for CSF leak (right thigh fat graft). Reports no salty, metallic taste in the mouth.  2/25: No acute events overnight   2/26- Patient denies any salty taste from his mouth, denies headaches.  2/27- No acute overnight events  2/28- No acute events overnight. Patient denies salty, metallic taste in mouth.    Allergies    No Known Allergies    Intolerances        REVIEW OF SYSTEMS: [ ] Unable to Assess due to neurologic exam   [x] All ROS addressed below are non-contributory, except:  Neuro: [ ] Headache [ ] Back pain [ ] Numbness [ ] Weakness [ ] Ataxia [ ] Dizziness [ ] Aphasia [ ] Dysarthria [ ] Visual disturbance  Resp: [ ] Shortness of breath/dyspnea, [ ] Orthopnea [ ] Cough  CV: [ ] Chest pain [ ] Palpitation [ ] Lightheadedness [ ] Syncope  Renal: [ ] Thirst [ ] Edema  GI: [ ] Nausea [ ] Emesis [ ] Abdominal pain [ ] Constipation [ ] Diarrhea  Hem: [ ] Hematemesis [ ] bright red blood per rectum  ID: [ ] Fever [ ] Chills [ ] Dysuria  ENT: [ ] Rhinorrhea      DEVICES:   [ ] Restraints [ ] ET tube [ ] central line [ ] arterial line [ ] carlson [ ] NGT/OGT [ ] EVD [ ] LD [ ] LUCINDA/HMV [ ] Trach [ ] PEG [ ] Chest Tube     VITALS:   Vital Signs Last 24 Hrs  T(C): 36.6 (28 Feb 2024 07:00), Max: 36.9 (27 Feb 2024 11:00)  T(F): 97.8 (28 Feb 2024 07:00), Max: 98.4 (27 Feb 2024 11:00)  HR: 91 (28 Feb 2024 07:00) (78 - 97)  BP: 109/71 (28 Feb 2024 07:00) (65/35 - 111/75)  BP(mean): 84 (28 Feb 2024 07:00) (45 - 87)  RR: 14 (28 Feb 2024 07:00) (14 - 25)  SpO2: 96% (28 Feb 2024 07:00) (94% - 100%)    Parameters below as of 28 Feb 2024 07:00  Patient On (Oxygen Delivery Method): room air      CAPILLARY BLOOD GLUCOSE        I&O's Summary    27 Feb 2024 07:01  -  28 Feb 2024 07:00  --------------------------------------------------------  IN: 2813 mL / OUT: 4465 mL / NET: -1652 mL        Respiratory:        LABS:                        10.9   11.92 )-----------( 314      ( 27 Feb 2024 21:37 )             33.0     02-27    137  |  102  |  12  ----------------------------<  106<H>  5.3   |  25  |  0.84             MEDICATION LEVELS:     IVF FLUIDS/MEDICATIONS:   MEDICATIONS  (STANDING):  atorvastatin 40 milliGRAM(s) Oral at bedtime  chlorhexidine 4% Liquid 1 Application(s) Topical daily  desmopressin 0.1 milliGRAM(s) Oral <User Schedule>  enoxaparin Injectable 40 milliGRAM(s) SubCutaneous <User Schedule>  ergocalciferol 95034 Unit(s) Oral every week  hydrocortisone 10 milliGRAM(s) Oral <User Schedule>  hydrocortisone 20 milliGRAM(s) Oral <User Schedule>  levothyroxine 88 MICROGram(s) Oral daily  multivitamin 1 Tablet(s) Oral daily  polyethylene glycol 3350 17 Gram(s) Oral daily  senna 2 Tablet(s) Oral at bedtime  sodium chloride 0.45%. 1000 milliLiter(s) (1000 mL/Hr) IV Continuous <Continuous>  sodium chloride 0.65% Nasal 1 Spray(s) Both Nostrils two times a day    MEDICATIONS  (PRN):  acetaminophen     Tablet .. 650 milliGRAM(s) Oral every 6 hours PRN Mild Pain (1 - 3)  bisacodyl 5 milliGRAM(s) Oral daily PRN Constipation  melatonin 5 milliGRAM(s) Oral at bedtime PRN Insomnia  ondansetron Injectable 4 milliGRAM(s) IV Push every 6 hours PRN Nausea and/or Vomiting  oxyCODONE    IR 5 milliGRAM(s) Oral every 4 hours PRN Moderate Pain (4 - 6)        IMAGING:      EXAMINATION:  PHYSICAL EXAM:    Constitutional: No Acute Distress     Neurological: Awake, alert oriented to person, place and time, Following Commands, PERRL, EOMI, No Gaze Preference, Face Symmetrical, Speech Fluent.    Motor exam:          Upper extremity                         Delt     Bicep     Tricep    HG                                                 R         5/5        5/5        5/5       5/5                                               L          5/5        5/5        5/5       5/5          Lower extremity                        HF         KF        KE       DF         PF                                                  R        5/5        5/5        5/5       5/5         5/5                                               L         5/5        5/5       5/5       5/5          5/5                                                   Pulmonary: Clear to Auscultation, No rales, No rhonchi, No wheezes     Cardiovascular: S1, S2, Regular rate and rhythm     Gastrointestinal: Soft, Non-tender, Non-distended     Extremities: No calf tenderness     Incision:

## 2024-02-28 NOTE — PROGRESS NOTE ADULT - SUBJECTIVE AND OBJECTIVE BOX
ENDOCRINE FOLLOW UP     Chief Complaint: craniopharyngioma    History:     MEDICATIONS  (STANDING):  atorvastatin 40 milliGRAM(s) Oral at bedtime  chlorhexidine 4% Liquid 1 Application(s) Topical daily  desmopressin 0.1 milliGRAM(s) Oral <User Schedule>  enoxaparin Injectable 40 milliGRAM(s) SubCutaneous <User Schedule>  ergocalciferol 99462 Unit(s) Oral every week  hydrocortisone 10 milliGRAM(s) Oral <User Schedule>  hydrocortisone 20 milliGRAM(s) Oral <User Schedule>  levothyroxine 88 MICROGram(s) Oral daily  multivitamin 1 Tablet(s) Oral daily  polyethylene glycol 3350 17 Gram(s) Oral daily  senna 2 Tablet(s) Oral at bedtime  sodium chloride 0.65% Nasal 1 Spray(s) Both Nostrils two times a day    MEDICATIONS  (PRN):  acetaminophen     Tablet .. 650 milliGRAM(s) Oral every 6 hours PRN Mild Pain (1 - 3)  bisacodyl 5 milliGRAM(s) Oral daily PRN Constipation  melatonin 5 milliGRAM(s) Oral at bedtime PRN Insomnia  ondansetron Injectable 4 milliGRAM(s) IV Push every 6 hours PRN Nausea and/or Vomiting  oxyCODONE    IR 5 milliGRAM(s) Oral every 4 hours PRN Moderate Pain (4 - 6)      Allergies    No Known Allergies    Intolerances        ROS: All other systems reviewed and negative    PHYSICAL EXAM:  VITALS: T(C): 36.6 (02-28-24 @ 07:00)  T(F): 97.8 (02-28-24 @ 07:00), Max: 98.4 (02-27-24 @ 11:00)  HR: 91 (02-28-24 @ 07:00) (78 - 97)  BP: 109/71 (02-28-24 @ 07:00) (65/35 - 111/75)  RR:  (14 - 25)  SpO2:  (94% - 100%)  Wt(kg): --  GENERAL: NAD, resting comfortably   EYES: No proptosis,  anicteric  HEENT:  Atraumatic, Normocephalic, moist mucous membranes  RESPIRATORY: Nonlabored respirations on room air, normal rate/effort   CARDIOVASCULAR: Did not appear cyanotic, no lower extremity swelling visualized  GI: did not appear distended  NEURO: Answering questions appropriately, moves extremities spontaneously  PSYCH:  reactive affect, euthymic mood        02-27    137  |  102  |  12  ----------------------------<  106<H>  5.3   |  25  |  0.84    eGFR: 111    Ca    9.4      02-27  Mg     2.5     02-27  Phos  4.3     02-27        A1C with Estimated Average Glucose Result: 5.4 % (02-19-24 @ 15:14)      Thyroid Stimulating Hormone, Serum: 0.03 uIU/mL (02-19-24 @ 14:17)  Thyroid Stimulating Hormone, Serum: 0.05 uIU/mL (02-08-24 @ 06:39)  Thyroid Stimulating Hormone, Serum: 0.15 uIU/mL (02-03-24 @ 08:37)  Thyroid Stimulating Hormone, Serum: 0.27 uIU/mL (02-02-24 @ 11:35)   ENDOCRINE FOLLOW UP     Chief Complaint: craniopharyngioma    History:   Patient reports vaso-vagal episode yesterday, got fluids for hypotension and urinated a lot. He reports polyuria has slowed down. He reports feeling okay currently, no complaints.    MEDICATIONS  (STANDING):  atorvastatin 40 milliGRAM(s) Oral at bedtime  chlorhexidine 4% Liquid 1 Application(s) Topical daily  desmopressin 0.1 milliGRAM(s) Oral <User Schedule>  enoxaparin Injectable 40 milliGRAM(s) SubCutaneous <User Schedule>  ergocalciferol 13234 Unit(s) Oral every week  hydrocortisone 10 milliGRAM(s) Oral <User Schedule>  hydrocortisone 20 milliGRAM(s) Oral <User Schedule>  levothyroxine 88 MICROGram(s) Oral daily  multivitamin 1 Tablet(s) Oral daily  polyethylene glycol 3350 17 Gram(s) Oral daily  senna 2 Tablet(s) Oral at bedtime  sodium chloride 0.65% Nasal 1 Spray(s) Both Nostrils two times a day    MEDICATIONS  (PRN):  acetaminophen     Tablet .. 650 milliGRAM(s) Oral every 6 hours PRN Mild Pain (1 - 3)  bisacodyl 5 milliGRAM(s) Oral daily PRN Constipation  melatonin 5 milliGRAM(s) Oral at bedtime PRN Insomnia  ondansetron Injectable 4 milliGRAM(s) IV Push every 6 hours PRN Nausea and/or Vomiting  oxyCODONE    IR 5 milliGRAM(s) Oral every 4 hours PRN Moderate Pain (4 - 6)      Allergies    No Known Allergies    Intolerances        ROS: All other systems reviewed and negative    PHYSICAL EXAM:  VITALS: T(C): 36.6 (02-28-24 @ 07:00)  T(F): 97.8 (02-28-24 @ 07:00), Max: 98.4 (02-27-24 @ 11:00)  HR: 91 (02-28-24 @ 07:00) (78 - 97)  BP: 109/71 (02-28-24 @ 07:00) (65/35 - 111/75)  RR:  (14 - 25)  SpO2:  (94% - 100%)  Wt(kg): --  GENERAL: NAD, resting comfortably   EYES: No proptosis,  anicteric  HEENT:  nasal packing in place, mildly dry mucous membranes  RESPIRATORY: Nonlabored respirations on room air, normal rate/effort   CARDIOVASCULAR: Did not appear cyanotic, no lower extremity swelling visualized  GI: did not appear distended  NEURO: Answering questions appropriately, moves extremities spontaneously  PSYCH:  reactive affect, euthymic mood        02-27    137  |  102  |  12  ----------------------------<  106<H>  5.3   |  25  |  0.84    eGFR: 111    Ca    9.4      02-27  Mg     2.5     02-27  Phos  4.3     02-27        A1C with Estimated Average Glucose Result: 5.4 % (02-19-24 @ 15:14)      Thyroid Stimulating Hormone, Serum: 0.03 uIU/mL (02-19-24 @ 14:17)  Thyroid Stimulating Hormone, Serum: 0.05 uIU/mL (02-08-24 @ 06:39)  Thyroid Stimulating Hormone, Serum: 0.15 uIU/mL (02-03-24 @ 08:37)  Thyroid Stimulating Hormone, Serum: 0.27 uIU/mL (02-02-24 @ 11:35)

## 2024-02-28 NOTE — PROGRESS NOTE ADULT - ASSESSMENT
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 Pt now s/p CSF leak repair.    Neuro:   neuro checks q 4hr  Pt S/P OR for TSP leak repair, replacement of dead nasoseptal flap with contralateral nasoseptal flap for CSF leak, right thigh fat graft. Nasal splints placed.   LD drain 5cc /hr with plan to clamp today (2/28)  Nasal splints in until 3/1  pain: oxy 5/10, Tylenol  activity: OOB    Respiratory:   RA   RVP negative  Chest PT  No IS - skull base precautions      CV:   -160 mmhg   c/w home statin    Renal:   IVF: IVL  DDAVP 0.1 mg BID    GI:   Diet: regular diet  PPx: hold  Zofran for nausea/vomiting  Reg: miralax/senna  LBM  2/27    Endocrine:   FS goal normoglycemia  PAWAN  Panhypopituitarism, c/w hydroCort, synthroid  DDAVP 0.1 BID    Heme:  DVT ppx: SCDs, Lovenox 40 Qd  LED 2/20: negative    ID:   Afebrile  UA negative, elevated leukocyte count-- likely in setting of steroid or post op   f/u blood + CSF cultures, no growth 24h    Social/Family: wife updated at bedside     Code Status: [x] Full Code [] DNR [] DNI [] Goals of Care:   Disposition: [x] ICU [] Stroke Unit [] RCU []PCU []Floor [] Discharge Home     Patient at high risk for neurologic deterioration, critical care time, excluding procedures: 30 minutes, csf rhinorrhea at risk for meningitis, brain sag, subdural hematoma           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 Pt now s/p CSF leak repair.    Neuro:   neuro checks q 4hr  Pt S/P OR for TSP leak repair, replacement of dead nasoseptal flap with contralateral nasoseptal flap for CSF leak, right thigh fat graft. Nasal splints placed.   LD drain 5cc /hr clamped as of (2/28)  Nasal splints in until 3/1  pain: oxy 5/10, Tylenol  activity: OOB    Respiratory:   RA   RVP negative  Chest PT  No IS - skull base precautions      CV:   -160 mmhg   c/w home statin    Renal:   IVF: IVL  DDAVP 0.1 mg BID    GI:   Diet: regular diet  PPx: hold  Zofran for nausea/vomiting  Reg: miralax/senna  LBM  2/27    Endocrine:   FS goal normoglycemia  PAWAN  Panhypopituitarism, c/w hydroCort, synthroid  DDAVP 0.1 BID    Heme:  DVT ppx: SCDs, Lovenox 40 Qd  LED 2/20: negative    ID:   Afebrile  UA negative, elevated leukocyte count-- likely in setting of steroid or post op   f/u blood + CSF cultures, no growth 24h    Social/Family: wife updated at bedside     Code Status: [x] Full Code [] DNR [] DNI [] Goals of Care:   Disposition: [x] ICU [] Stroke Unit [] RCU []PCU []Floor [] Discharge Home     Patient at high risk for neurologic deterioration, critical care time, excluding procedures: 30 minutes, csf rhinorrhea at risk for meningitis, brain sag, subdural hematoma

## 2024-02-28 NOTE — PROGRESS NOTE ADULT - SUBJECTIVE AND OBJECTIVE BOX
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 Admitted for postoperative CSF leak despite attempted lumbar drain. Pt now s/p replacement of necrotic nasoseptal flap with contralateral nasoseptal flap for CSF leak (right thigh fat graft) POD 5. Bilateral septal splints placed and LD x 5 days - clamped this am. Pt seen and examined at bedside. No acute events overnight. Pt c/o mild blood tinged mucous from B/L nares, but denies HAs, salty or metallic taste, fevers, clear nasal discharge.        PAST MEDICAL & SURGICAL HISTORY:  HLD (hyperlipidemia)      History of pituitary tumor      Deviated septum      History of dental surgery        Allergies    No Known Allergies    Intolerances      MEDICATIONS  (STANDING):  atorvastatin 40 milliGRAM(s) Oral at bedtime  chlorhexidine 4% Liquid 1 Application(s) Topical daily  desmopressin 0.1 milliGRAM(s) Oral <User Schedule>  enoxaparin Injectable 40 milliGRAM(s) SubCutaneous <User Schedule>  ergocalciferol 62029 Unit(s) Oral every week  hydrocortisone 10 milliGRAM(s) Oral <User Schedule>  hydrocortisone 20 milliGRAM(s) Oral <User Schedule>  levothyroxine 88 MICROGram(s) Oral daily  multivitamin 1 Tablet(s) Oral daily  polyethylene glycol 3350 17 Gram(s) Oral daily  senna 2 Tablet(s) Oral at bedtime  sodium chloride 0.45%. 1000 milliLiter(s) (1000 mL/Hr) IV Continuous <Continuous>  sodium chloride 0.65% Nasal 1 Spray(s) Both Nostrils two times a day    MEDICATIONS  (PRN):  acetaminophen     Tablet .. 650 milliGRAM(s) Oral every 6 hours PRN Mild Pain (1 - 3)  bisacodyl 5 milliGRAM(s) Oral daily PRN Constipation  melatonin 5 milliGRAM(s) Oral at bedtime PRN Insomnia  ondansetron Injectable 4 milliGRAM(s) IV Push every 6 hours PRN Nausea and/or Vomiting  oxyCODONE    IR 5 milliGRAM(s) Oral every 4 hours PRN Moderate Pain (4 - 6)      Social History: see consult note    Family history: see consult note    ROS:   ENT: all negative except as noted in HPI   Pulm: denies SOB, cough, hemoptysis  Neuro: denies numbness/tingling, loss of sensation  Endo: denies heat/cold intolerance, excessive sweating      Vital Signs Last 24 Hrs  T(C): 36.6 (28 Feb 2024 07:00), Max: 36.9 (27 Feb 2024 11:00)  T(F): 97.8 (28 Feb 2024 07:00), Max: 98.4 (27 Feb 2024 11:00)  HR: 91 (28 Feb 2024 07:00) (78 - 97)  BP: 109/71 (28 Feb 2024 07:00) (65/35 - 111/75)  BP(mean): 84 (28 Feb 2024 07:00) (45 - 87)  RR: 14 (28 Feb 2024 07:00) (14 - 25)  SpO2: 96% (28 Feb 2024 07:00) (94% - 100%)    Parameters below as of 28 Feb 2024 07:00  Patient On (Oxygen Delivery Method): room air                              10.9   11.92 )-----------( 314      ( 27 Feb 2024 21:37 )             33.0    02-27    137  |  102  |  12  ----------------------------<  106<H>  5.3   |  25  |  0.84    Ca    9.4      27 Feb 2024 21:37  Phos  4.3     02-27  Mg     2.5     02-27         PHYSICAL EXAM:  Gen: NAD  Skin: No rashes, bruises, or lesions  Head: Normocephalic, Atraumatic  Face: no edema, erythema, or fluctuance. Parotid glands soft without mass  Eyes: no scleral injection  Nose: Blood tinged mucous from B/L nares, no active bleeding, no thin clear discharge noted  Mouth: No Stridor / Drooling / Trismus.  Mucosa moist, tongue/uvula midline, oropharynx clear  Neck: Flat, supple, no lymphadenopathy, trachea midline, no masses  Lymphatic: No lymphadenopathy  Resp: breathing easily, no stridor  Neuro: facial nerve intact, no facial droop

## 2024-02-28 NOTE — PROGRESS NOTE ADULT - ASSESSMENT
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 Admitted for postoperative CSF leak despite attempted lumbar drain. Pt now s/p replacement of necrotic nasoseptal flap with contralateral nasoseptal flap for CSF leak (right thigh fat graft) POD 5. Bilateral septal splints placed and LD x 5 days - clamped this am. Pt c/o mild blood tinged mucous from B/L nares, but denies HAs, salty or metallic taste, fevers, clear nasal discharge. On exam, no evidence of CSF leak or epistaxis.

## 2024-02-28 NOTE — PROGRESS NOTE ADULT - PROBLEM SELECTOR PLAN 1
- Keep nasal splints in place for now - remove 3/1   - continue humidified face tent  - nasal saline, 2 sprays to b/l nares 4 times a day.  - monitor for signs of Epistaxis and CSF leak  - avoid nasal trauma, heavy lifting and bending at waist.  - Care a/p neurosurgery.

## 2024-02-28 NOTE — PROGRESS NOTE ADULT - ASSESSMENT
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 Pt now s/p CSF leak repair.    Neuro:   neuro checks q 4hr  Pt S/P OR for TSP leak repair, replacement of dead nasoseptal flap with contralateral nasoseptal flap for CSF leak, right thigh fat graft. Nasal splints placed.   LD drain 5cc /hr with plan to clamp today (2/28)  pain: oxy 5/10, Tylenol  activity: OOB    Respiratory:   RA   RVP negative  Chest PT  No IS - skull base precautions      CV:   -160 mmhg   c/w home statin    Renal:   IVF: IVL  DDAVP 0.1 mg BID    GI:   Diet: regular diet  PPx: hold  Zofran for nausea/vomiting  Reg: miralax/senna  LBM  2/27    Endocrine:   FS goal normoglycemia  PAWAN  Panhypopituitarism, c/w hydroCort, synthroid  DDAVP 0.1 BID    Heme:  DVT ppx: SCDs, Lovenox 40 Qd  LED 2/20: negative    ID:   Afebrile  UA negative, elevated leukocyte count-- likely in setting of steroid or post op   f/u blood + CSF cultures, no growth 24h    Social/Family: wife updated at bedside     Code Status: [x] Full Code [] DNR [] DNI [] Goals of Care:   Disposition: [x] ICU [] Stroke Unit [] RCU []PCU []Floor [] Discharge Home     Patient at high risk for neurologic deterioration, critical care time, excluding procedures: 30 minutes, csf rhinorrhea at risk for meningitis, brain sag, subdural hematoma           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 Pt now s/p CSF leak repair.    Neuro:   neuro checks q 4hr  Pt S/P OR for TSP leak repair, replacement of dead nasoseptal flap with contralateral nasoseptal flap for CSF leak, right thigh fat graft. Nasal splints placed.   LD drain 5cc /hr with plan to clamp today (2/28)  Nasal splints in until 3/1  pain: oxy 5/10, Tylenol  activity: OOB    Respiratory:   RA   RVP negative  Chest PT  No IS - skull base precautions      CV:   -160 mmhg   c/w home statin    Renal:   IVF: IVL  DDAVP 0.1 mg BID    GI:   Diet: regular diet  PPx: hold  Zofran for nausea/vomiting  Reg: miralax/senna  LBM  2/27    Endocrine:   FS goal normoglycemia  PAWAN  Panhypopituitarism, c/w hydroCort, synthroid  DDAVP 0.1 BID    Heme:  DVT ppx: SCDs, Lovenox 40 Qd  LED 2/20: negative    ID:   Afebrile  UA negative, elevated leukocyte count-- likely in setting of steroid or post op   f/u blood + CSF cultures, no growth 24h    Social/Family: wife updated at bedside     Code Status: [x] Full Code [] DNR [] DNI [] Goals of Care:   Disposition: [x] ICU [] Stroke Unit [] RCU []PCU []Floor [] Discharge Home     Patient at high risk for neurologic deterioration, critical care time, excluding procedures: 30 minutes, csf rhinorrhea at risk for meningitis, brain sag, subdural hematoma

## 2024-02-28 NOTE — PROGRESS NOTE ADULT - ASSESSMENT
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 Today, p/f leaking from nostrils (L>R) when he stands up or valsalvas. Endocrinology consulted for management of panhypopituitarism.    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 MRI 1/24 showed abnormal enhancing lesion involving the suprasellar region measuring approximately 2.1x2.3cm and previously measured 2.1x1.4cm. Patient had an endocrine workup in the past with Dr. Isidra Dao and was told he had a low testosterone level but not offered treatment prior to surgery. Had TSR 2/1/24. Post op TSH 0.15, free thyroxine 0.8, consistent with secondary hypothyroidism. ACTH 3.5, am cortisol 0.5, consistent with secondary AI. LH 0.4, FSH 1.2. 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 The patient was discharged on levothyroxine 75mcg daily, hydrocortisone 10mg/5mg and DDAVP 0.05mg prn. Reports did not take desmopressin  - patient dumping urine, Na 142, U osm <1.005 concerning for compensated DI  - IGF-1 46 low)  - pathology consistent with craniopharyngioma  - follows with Dr. Isidra Dao  PLAN  In terms of DI:   Goal Na 140-145  - daily BMP  - continue DDAVP 0.1mg bid  Recommendations:   - DI Watch: Please monitor strict I/O, sodium levels q8hrs (monitor for hypo or hypernatremia).   If urine output more than 500ml/h or 250ml/h for 2 consecutive hours get stat Na, if Na is increasing again compared to prior than bolus 1/2 NS to match half the output (for example, if net negative 2Liters can give 1Liter 1/2NS if patient is unable to keep up with output with PO intake)  - If Na > increases to 140 or higher would dose DDAVP 0.05mg and continue DI watch as above e   - Please make sure pt has access to water and encourage patient to drink to thirst   In terms of secondary AI  - continue hydrocortisone PO 20mg at 8am and 10mg PO at 3pm, plan to discharge on hydrocortisone 10mg at 8am and 5mg at 3pm, if HD unstable, start stress dose steroids, if patient going under a procedure requiring general anesthesia can get 50mg IV hydrocortisone on call to OR  In terms of secondary hypothyroidism  - given FT4 only increase to 0.9, increase levothyroxine to 88mcg once daily (maintain on empty stomach (at least 1 hour before meals), separate by 4 hours from PPI or calcium supplementation which can inhibit its absorption)  In terms of hypogonadism  - outpatient management  In terms of growth hormone deficiency  - outpatient management    Discussed with primary team.     Thank you for the interesting consult.    Joe Walker MD, Endocrinology Fellow  For non-urgent follow up questions, please email [email or web address] or [email or web address].   Pager [phone] from 9am to 5pm. After hours and on weekends, please call [phone]. 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 Today, p/f leaking from nostrils (L>R) when he stands up or valsalvas. Endocrinology consulted for management of panhypopituitarism.    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 MRI 1/24 showed abnormal enhancing lesion involving the suprasellar region measuring approximately 2.1x2.3cm and previously measured 2.1x1.4cm. Patient had an endocrine workup in the past with Dr. Isidra Dao and was told he had a low testosterone level but not offered treatment prior to surgery. Had TSR 2/1/24. Post op TSH 0.15, free thyroxine 0.8, consistent with secondary hypothyroidism. ACTH 3.5, am cortisol 0.5, consistent with secondary AI. LH 0.4, FSH 1.2. 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 The patient was discharged on levothyroxine 75mcg daily, hydrocortisone 10mg/5mg and DDAVP 0.05mg prn. Reports did not take desmopressin  - patient dumping urine, Na 142, U osm <1.005 concerning for compensated DI  - IGF-1 46 low)  - pathology consistent with craniopharyngioma  - follows with Dr. Isidra Dao  PLAN  In terms of DI:   Goal Na 140-145  - daily BMP, please check now  - continue DDAVP 0.1mg bid  Recommendations:   - DI Watch: Please monitor strict I/O, sodium levels q8hrs (monitor for hypo or hypernatremia).   If urine output more than 500ml/h or 250ml/h for 2 consecutive hours get stat Na, if Na is increasing again compared to prior than bolus 1/2 NS to match half the output (for example, if net negative 2Liters can give 1Liter 1/2NS if patient is unable to keep up with output with PO intake)  - If Na > increases to 140 or higher would dose DDAVP 0.05mg and continue DI watch as above e   - Please make sure pt has access to water and encourage patient to drink to thirst   In terms of secondary AI  - continue hydrocortisone PO 20mg at 8am and 10mg PO at 3pm, plan to discharge on hydrocortisone 10mg at 8am and 5mg at 3pm, if HD unstable, start stress dose steroids, if patient going under a procedure requiring general anesthesia can get 50mg IV hydrocortisone on call to OR  In terms of secondary hypothyroidism  - given FT4 only increase to 0.9, increase levothyroxine to 88mcg once daily (maintain on empty stomach (at least 1 hour before meals), separate by 4 hours from PPI or calcium supplementation which can inhibit its absorption), repeat free thyroxine on Friday 3/1  In terms of hypogonadism  - outpatient management  In terms of growth hormone deficiency  - outpatient management    Discussed with primary team.     Thank you for the interesting consult.    Joe Walker MD, Endocrinology Fellow  For non-urgent follow up questions, please email [email or web address] or [email or web address].   Pager [phone] from 9am to 5pm. After hours and on weekends, please call [phone].

## 2024-02-29 LAB
ANION GAP SERPL CALC-SCNC: 11 MMOL/L — SIGNIFICANT CHANGE UP (ref 5–17)
BUN SERPL-MCNC: 11 MG/DL — SIGNIFICANT CHANGE UP (ref 7–23)
CALCIUM SERPL-MCNC: 9.5 MG/DL — SIGNIFICANT CHANGE UP (ref 8.4–10.5)
CHLORIDE SERPL-SCNC: 97 MMOL/L — SIGNIFICANT CHANGE UP (ref 96–108)
CO2 SERPL-SCNC: 25 MMOL/L — SIGNIFICANT CHANGE UP (ref 22–31)
CREAT SERPL-MCNC: 0.82 MG/DL — SIGNIFICANT CHANGE UP (ref 0.5–1.3)
EGFR: 112 ML/MIN/1.73M2 — SIGNIFICANT CHANGE UP
GLUCOSE SERPL-MCNC: 112 MG/DL — HIGH (ref 70–99)
POTASSIUM SERPL-MCNC: 5.2 MMOL/L — SIGNIFICANT CHANGE UP (ref 3.5–5.3)
POTASSIUM SERPL-SCNC: 5.2 MMOL/L — SIGNIFICANT CHANGE UP (ref 3.5–5.3)
SODIUM SERPL-SCNC: 133 MMOL/L — LOW (ref 135–145)

## 2024-02-29 PROCEDURE — 99232 SBSQ HOSP IP/OBS MODERATE 35: CPT | Mod: GC

## 2024-02-29 PROCEDURE — 99233 SBSQ HOSP IP/OBS HIGH 50: CPT

## 2024-02-29 RX ADMIN — Medication 20 MILLIGRAM(S): at 08:05

## 2024-02-29 RX ADMIN — SENNA PLUS 2 TABLET(S): 8.6 TABLET ORAL at 21:59

## 2024-02-29 RX ADMIN — CHLORHEXIDINE GLUCONATE 1 APPLICATION(S): 213 SOLUTION TOPICAL at 12:38

## 2024-02-29 RX ADMIN — ENOXAPARIN SODIUM 40 MILLIGRAM(S): 100 INJECTION SUBCUTANEOUS at 18:03

## 2024-02-29 RX ADMIN — Medication 1 SPRAY(S): at 05:16

## 2024-02-29 RX ADMIN — DESMOPRESSIN ACETATE 0.1 MILLIGRAM(S): 0.1 TABLET ORAL at 10:30

## 2024-02-29 RX ADMIN — Medication 88 MICROGRAM(S): at 05:16

## 2024-02-29 RX ADMIN — Medication 1 TABLET(S): at 11:57

## 2024-02-29 RX ADMIN — Medication 10 MILLIGRAM(S): at 14:55

## 2024-02-29 RX ADMIN — Medication 1 SPRAY(S): at 18:03

## 2024-02-29 RX ADMIN — ATORVASTATIN CALCIUM 40 MILLIGRAM(S): 80 TABLET, FILM COATED ORAL at 21:59

## 2024-02-29 RX ADMIN — DESMOPRESSIN ACETATE 0.1 MILLIGRAM(S): 0.1 TABLET ORAL at 22:00

## 2024-02-29 NOTE — PROGRESS NOTE ADULT - ASSESSMENT
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 Admitted for postoperative CSF leak despite attempted lumbar drain. Pt now s/p replacement of necrotic nasoseptal flap with contralateral nasoseptal flap for CSF leak (right thigh fat graft) POD 6. Bilateral septal splints placed and LD x 5 days - currently clamped. Pt c/o mild blood tinged mucous from B/L nares, but denies HAs, salty or metallic taste, fevers, clear nasal discharge. On exam, no evidence of CSF leak or epistaxis.

## 2024-02-29 NOTE — PROGRESS NOTE ADULT - ASSESSMENT
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 Today, p/f leaking from nostrils (L>R) when he stands up or valsalvas. Endocrinology consulted for management of panhypopituitarism.    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 MRI 1/24 showed abnormal enhancing lesion involving the suprasellar region measuring approximately 2.1x2.3cm and previously measured 2.1x1.4cm. Patient had an endocrine workup in the past with Dr. Isidra Dao and was told he had a low testosterone level but not offered treatment prior to surgery. Had TSR 2/1/24. Post op TSH 0.15, free thyroxine 0.8, consistent with secondary hypothyroidism. ACTH 3.5, am cortisol 0.5, consistent with secondary AI. LH 0.4, FSH 1.2. 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 The patient was discharged on levothyroxine 75mcg daily, hydrocortisone 10mg/5mg and DDAVP 0.05mg prn. Reports did not take desmopressin  - patient dumping urine, Na 142, U osm <1.005 concerning for compensated DI  - IGF-1 46 low)  - pathology consistent with craniopharyngioma  - follows with Dr. Isidra Dao  PLAN  In terms of DI:   Goal Na 140-145  - daily BMP  - continue DDAVP 0.1mg bid  Recommendations:   - DI Watch: Please monitor strict I/O, sodium levels q8hrs (monitor for hypo or hypernatremia).   If urine output more than 500ml/h or 250ml/h for 2 consecutive hours get stat Na, if Na is increasing again compared to prior than bolus 1/2 NS to match half the output (for example, if net negative 2Liters can give 1Liter 1/2NS if patient is unable to keep up with output with PO intake)  - If Na > increases to 140 or higher would dose DDAVP 0.05mg and continue DI watch as above e   - Please make sure pt has access to water and encourage patient to drink to thirst   In terms of secondary AI  - continue hydrocortisone PO 20mg at 8am and 10mg PO at 3pm, plan to discharge on hydrocortisone 10mg at 8am and 5mg at 3pm, if HD unstable, start stress dose steroids, if patient going under a procedure requiring general anesthesia can get 50mg IV hydrocortisone on call to OR  In terms of secondary hypothyroidism  - given FT4 only increase to 0.9, increase levothyroxine to 88mcg once daily (maintain on empty stomach (at least 1 hour before meals), separate by 4 hours from PPI or calcium supplementation which can inhibit its absorption), repeat free thyroxine on Friday 3/1  In terms of hypogonadism  - outpatient management  In terms of growth hormone deficiency  - outpatient management    Discussed with primary team.     Thank you for the interesting consult.    Joe Walker MD, Endocrinology Fellow  For non-urgent follow up questions, please email [email or web address] or [email or web address].   Pager [phone] from 9am to 5pm. After hours and on weekends, please call [phone]. 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 Today, p/f leaking from nostrils (L>R) when he stands up or valsalvas. Endocrinology consulted for management of panhypopituitarism.    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 MRI 1/24 showed abnormal enhancing lesion involving the suprasellar region measuring approximately 2.1x2.3cm and previously measured 2.1x1.4cm. Patient had an endocrine workup in the past with Dr. Isidra Dao and was told he had a low testosterone level but not offered treatment prior to surgery. Had TSR 2/1/24. Post op TSH 0.15, free thyroxine 0.8, consistent with secondary hypothyroidism. ACTH 3.5, am cortisol 0.5, consistent with secondary AI. LH 0.4, FSH 1.2. 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 The patient was discharged on levothyroxine 75mcg daily, hydrocortisone 10mg/5mg and DDAVP 0.05mg prn. Reports did not take desmopressin  - patient dumping urine, Na 142, U osm <1.005 concerning for compensated DI  - IGF-1 46 low)  - pathology consistent with craniopharyngioma  - follows with Dr. Isidra Dao  PLAN  In terms of DI:   Goal Na 140-145  - continue DDAVP 0.1mg bid as dumping dilute urine still however as Na 133, would impose fluid restriction 1-1.5L and monitor BMP q12h  Recommendations:   - DI Watch: Please monitor strict I/O, sodium levels q8hrs (monitor for hypo or hypernatremia).   If urine output more than 500ml/h or 250ml/h for 2 consecutive hours get stat Na, if Na is increasing again compared to prior than bolus 1/2 NS to match half the output (for example, if net negative 2Liters can give 1Liter 1/2NS if patient is unable to keep up with output with PO intake)  - If Na > increases to 140 or higher would dose DDAVP 0.05mg and continue DI watch as above e   - Please make sure pt has access to water and encourage patient to drink to thirst   In terms of secondary AI  - continue hydrocortisone PO 20mg at 8am and 10mg PO at 3pm, plan to discharge on hydrocortisone 10mg at 8am and 5mg at 3pm, if HD unstable, start stress dose steroids, if patient going under a procedure requiring general anesthesia can get 50mg IV hydrocortisone on call to OR  In terms of secondary hypothyroidism  - given FT4 only increase to 0.9, increase levothyroxine to 88mcg once daily (maintain on empty stomach (at least 1 hour before meals), separate by 4 hours from PPI or calcium supplementation which can inhibit its absorption), repeat free thyroxine on Friday 3/1  In terms of hypogonadism  - outpatient management  In terms of growth hormone deficiency  - outpatient management    Discussed with primary team.     Thank you for the interesting consult.    Joe Walker MD, Endocrinology Fellow  For non-urgent follow up questions, please email [email or web address] or [email or web address].   Pager [phone] from 9am to 5pm. After hours and on weekends, please call [phone].

## 2024-02-29 NOTE — PROGRESS NOTE ADULT - SUBJECTIVE AND OBJECTIVE BOX
ENT ISSUE/POD: s/p CSF leak repair w/new septal flap and fat graft from right thigh POD 6    HPI: 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 Admitted for postoperative CSF leak despite attempted lumbar drain. Pt now s/p replacement of necrotic nasoseptal flap with contralateral nasoseptal flap for CSF leak (right thigh fat graft) POD 6. Bilateral septal splints placed and LD x 5 days - currently clamped. Pt seen and examined at bedside. No acute events overnight. Pt c/o mild blood tinged mucous from B/L nares, but denies HAs, salty or metallic taste, fevers, clear nasal discharge.        PAST MEDICAL & SURGICAL HISTORY:  HLD (hyperlipidemia)      History of pituitary tumor      Deviated septum      History of dental surgery        Allergies    No Known Allergies    Intolerances      MEDICATIONS  (STANDING):  atorvastatin 40 milliGRAM(s) Oral at bedtime  chlorhexidine 4% Liquid 1 Application(s) Topical daily  desmopressin 0.1 milliGRAM(s) Oral <User Schedule>  enoxaparin Injectable 40 milliGRAM(s) SubCutaneous <User Schedule>  ergocalciferol 59419 Unit(s) Oral every week  hydrocortisone 10 milliGRAM(s) Oral <User Schedule>  hydrocortisone 20 milliGRAM(s) Oral <User Schedule>  levothyroxine 88 MICROGram(s) Oral daily  multivitamin 1 Tablet(s) Oral daily  polyethylene glycol 3350 17 Gram(s) Oral daily  senna 2 Tablet(s) Oral at bedtime  sodium chloride 0.65% Nasal 1 Spray(s) Both Nostrils two times a day    MEDICATIONS  (PRN):  acetaminophen     Tablet .. 650 milliGRAM(s) Oral every 6 hours PRN Mild Pain (1 - 3)  bisacodyl 5 milliGRAM(s) Oral daily PRN Constipation  melatonin 5 milliGRAM(s) Oral at bedtime PRN Insomnia  ondansetron Injectable 4 milliGRAM(s) IV Push every 6 hours PRN Nausea and/or Vomiting  oxyCODONE    IR 5 milliGRAM(s) Oral every 4 hours PRN Moderate Pain (4 - 6)      Social History: see consult    Family history: see consult    ROS:   ENT: all negative except as noted in HPI   Pulm: denies SOB, cough, hemoptysis  Neuro: denies numbness/tingling, loss of sensation  Endo: denies heat/cold intolerance, excessive sweating      Vital Signs Last 24 Hrs  T(C): 36.8 (29 Feb 2024 07:00), Max: 36.9 (28 Feb 2024 19:00)  T(F): 98.3 (29 Feb 2024 07:00), Max: 98.4 (28 Feb 2024 19:00)  HR: 97 (29 Feb 2024 07:00) (84 - 105)  BP: 112/80 (29 Feb 2024 07:00) (101/65 - 112/80)  BP(mean): 85 (29 Feb 2024 03:00) (82 - 89)  RR: 17 (29 Feb 2024 07:00) (15 - 21)  SpO2: 96% (29 Feb 2024 07:00) (94% - 97%)    Parameters below as of 29 Feb 2024 07:00  Patient On (Oxygen Delivery Method): room air                              10.5   12.50 )-----------( 293      ( 28 Feb 2024 22:40 )             31.3    02-28    136  |  101  |  14  ----------------------------<  106<H>  4.7   |  22  |  0.85    Ca    9.3      28 Feb 2024 22:40  Phos  3.7     02-28  Mg     2.0     02-28         PHYSICAL EXAM:  Gen: NAD  Skin: No rashes, bruises, or lesions  Head: Normocephalic, Atraumatic  Face: no edema, erythema, or fluctuance. Parotid glands soft without mass  Eyes: no scleral injection  Nose: Blood tinged mucous from B/L nares, no active bleeding, no thin clear discharge noted  Mouth: No Stridor / Drooling / Trismus.  Mucosa moist, tongue/uvula midline, oropharynx clear  Neck: Flat, supple, no lymphadenopathy, trachea midline, no masses  Lymphatic: No lymphadenopathy  Resp: breathing easily, no stridor  Neuro: facial nerve intact, no facial droop

## 2024-02-29 NOTE — PROGRESS NOTE ADULT - ASSESSMENT
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 Pt now s/p CSF leak repair.    Neuro:   neuro checks q 4hr  Pt S/P OR for TSP leak repair, replacement of dead nasoseptal flap with contralateral nasoseptal flap for CSF leak, right thigh fat graft. Nasal splints placed.   LD drain 5cc /hr clamped as of (2/28), to be removed tomorrow (3/1)  Nasal splints in until 3/1  pain: oxy 5/10, Tylenol  activity: OOB    Respiratory:   RA   RVP negative  Chest PT  No IS - skull base precautions      CV:   -160 mmhg   c/w home statin    Renal:   IVF: IVL  DDAVP 0.1 mg BID    GI:   Diet: regular diet  PPx: hold  Zofran for nausea/vomiting  Reg: miralax/senna  LBM  2/27    Endocrine:   FS goal normoglycemia  PAWAN  Panhypopituitarism, c/w hydroCort, synthroid  DDAVP 0.1 BID    Heme:  DVT ppx: SCDs, Lovenox 40 Qd  LED 2/20: negative    ID:   Afebrile  UA negative, elevated leukocyte count-- likely in setting of steroid or post op   f/u blood + CSF cultures, no growth 24h    Social/Family: wife updated at bedside     Code Status: [x] Full Code [] DNR [] DNI [] Goals of Care:   Disposition: [x] ICU [] Stroke Unit [] RCU []PCU []Floor [] Discharge Home     Patient at high risk for neurologic deterioration, critical care time, excluding procedures: 30 minutes, csf rhinorrhea at risk for meningitis, brain sag, subdural hematoma           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 Pt now s/p CSF leak repair.    Neuro:   neuro checks q 4hr  Pt S/P OR for TSP leak repair, replacement of dead nasoseptal flap with contralateral nasoseptal flap for CSF leak, right thigh fat graft. Nasal splints placed.   LD drain 5cc /hr clamped as of (2/28), to be removed tomorrow (3/1)  Nasal splints in until 3/1  pain: oxy 5/10, Tylenol  activity: OOB    Respiratory:   RA   RVP negative  Chest PT  No IS - skull base precautions      CV:   -160 mmhg   c/w home statin    Renal:   IVF: IVL  DDAVP 0.1 mg BID    GI:   Diet: regular diet  PPx: hold  Zofran for nausea/vomiting  Reg: miralax/senna  LBM  2/29    Endocrine:   FS goal normoglycemia  PAWAN  Panhypopituitarism, c/w hydroCort, synthroid  DDAVP 0.1 BID    Heme:  DVT ppx: SCDs, Lovenox 40 Qd  LED 2/20: negative    ID:   Afebrile  UA negative, elevated leukocyte count-- likely in setting of steroid or post op     Social/Family: wife updated at bedside     Code Status: [x] Full Code [] DNR [] DNI [] Goals of Care:   Disposition: [x] ICU [] Stroke Unit [] RCU []PCU []Floor [] Discharge Home     Patient at high risk for neurologic deterioration, critical care time, excluding procedures: 30 minutes, csf rhinorrhea at risk for meningitis, brain sag, subdural hematoma

## 2024-02-29 NOTE — PROGRESS NOTE ADULT - SUBJECTIVE AND OBJECTIVE BOX
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 Presented with ~4 days clear fluid leaking from nostrils (L>R) on standing or valsalva. Reports no h/as. Afebrile.      24H Events: No acute events. Requiring oxygen via face tent. Pre op for OR today. No examination changes.   2/24- Patient s/p replacement of necrotic nasoseptal flap with contralateral nasoseptal flap for CSF leak (right thigh fat graft). Reports no salty, metallic taste in the mouth.  2/25: No acute events overnight   2/26- Patient denies any salty taste from his mouth, denies headaches.  2/27- No acute overnight events  2/28- No acute events overnight. Patient denies salty, metallic taste in mouth.  2/29- No acute events overnight. Lumbar drain clamped yesterday, to be removed tomorrow.     Allergies    No Known Allergies    Intolerances        REVIEW OF SYSTEMS: [ ] Unable to Assess due to neurologic exam   [x] All ROS addressed below are non-contributory, except:  Neuro: [ ] Headache [ ] Back pain [ ] Numbness [ ] Weakness [ ] Ataxia [ ] Dizziness [ ] Aphasia [ ] Dysarthria [ ] Visual disturbance  Resp: [ ] Shortness of breath/dyspnea, [ ] Orthopnea [ ] Cough  CV: [ ] Chest pain [ ] Palpitation [ ] Lightheadedness [ ] Syncope  Renal: [ ] Thirst [ ] Edema  GI: [ ] Nausea [ ] Emesis [ ] Abdominal pain [ ] Constipation [ ] Diarrhea  Hem: [ ] Hematemesis [ ] bright red blood per rectum  ID: [ ] Fever [ ] Chills [ ] Dysuria  ENT: [ ] Rhinorrhea      DEVICES:   [ ] Restraints [ ] ET tube [ ] central line [ ] arterial line [ ] carlson [ ] NGT/OGT [ ] EVD [ ] LD [ ] LUCINDA/HMV [ ] Trach [ ] PEG [ ] Chest Tube     VITALS:   Vital Signs Last 24 Hrs  T(C): 36.9 (28 Feb 2024 19:00), Max: 36.9 (28 Feb 2024 19:00)  T(F): 98.4 (28 Feb 2024 19:00), Max: 98.4 (28 Feb 2024 19:00)  HR: 84 (29 Feb 2024 03:00) (84 - 105)  BP: 108/72 (29 Feb 2024 03:00) (101/65 - 113/72)  BP(mean): 85 (29 Feb 2024 03:00) (82 - 89)  RR: 15 (29 Feb 2024 03:00) (15 - 21)  SpO2: 94% (29 Feb 2024 03:00) (94% - 97%)    Parameters below as of 29 Feb 2024 03:00  Patient On (Oxygen Delivery Method): room air      CAPILLARY BLOOD GLUCOSE        I&O's Summary    28 Feb 2024 07:01  -  29 Feb 2024 07:00  --------------------------------------------------------  IN: 1440 mL / OUT: 4700 mL / NET: -3260 mL        Respiratory:        LABS:                        10.5   12.50 )-----------( 293      ( 28 Feb 2024 22:40 )             31.3     02-28    136  |  101  |  14  ----------------------------<  106<H>  4.7   |  22  |  0.85             MEDICATION LEVELS:     IVF FLUIDS/MEDICATIONS:   MEDICATIONS  (STANDING):  atorvastatin 40 milliGRAM(s) Oral at bedtime  chlorhexidine 4% Liquid 1 Application(s) Topical daily  desmopressin 0.1 milliGRAM(s) Oral <User Schedule>  enoxaparin Injectable 40 milliGRAM(s) SubCutaneous <User Schedule>  ergocalciferol 86248 Unit(s) Oral every week  hydrocortisone 10 milliGRAM(s) Oral <User Schedule>  hydrocortisone 20 milliGRAM(s) Oral <User Schedule>  levothyroxine 88 MICROGram(s) Oral daily  multivitamin 1 Tablet(s) Oral daily  polyethylene glycol 3350 17 Gram(s) Oral daily  senna 2 Tablet(s) Oral at bedtime  sodium chloride 0.65% Nasal 1 Spray(s) Both Nostrils two times a day    MEDICATIONS  (PRN):  acetaminophen     Tablet .. 650 milliGRAM(s) Oral every 6 hours PRN Mild Pain (1 - 3)  bisacodyl 5 milliGRAM(s) Oral daily PRN Constipation  melatonin 5 milliGRAM(s) Oral at bedtime PRN Insomnia  ondansetron Injectable 4 milliGRAM(s) IV Push every 6 hours PRN Nausea and/or Vomiting  oxyCODONE    IR 5 milliGRAM(s) Oral every 4 hours PRN Moderate Pain (4 - 6)        IMAGING:      EXAMINATION:  PHYSICAL EXAM:    Constitutional: No Acute Distress     Neurological: Awake, alert oriented to person, place and time, Following Commands, PERRL, EOMI, No Gaze Preference, Face Symmetrical, Speech Fluent.    Motor exam:          Upper extremity                         Delt     Bicep     Tricep    HG                                                 R         5/5        5/5        5/5       5/5                                               L          5/5        5/5        5/5       5/5          Lower extremity                        HF         KF        KE       DF         PF                                                  R        5/5        5/5        5/5       5/5         5/5                                               L         5/5        5/5       5/5       5/5          5/5                                                   Pulmonary: Clear to Auscultation, No rales, No rhonchi, No wheezes     Cardiovascular: S1, S2, Regular rate and rhythm     Gastrointestinal: Soft, Non-tender, Non-distended     Extremities: No calf tenderness     Incision:

## 2024-02-29 NOTE — PROGRESS NOTE ADULT - ASSESSMENT
LD clamped   -160  bmp q6  On hydrocort 20/10, DDAVP PO  Endo following  Has 2 splints in to be managed by [facility]  thigh incision c/d/i  monitor bmps

## 2024-02-29 NOTE — PROGRESS NOTE ADULT - SUBJECTIVE AND OBJECTIVE BOX
ENDOCRINE FOLLOW UP     Chief Complaint: craniopharyngioma    History:     MEDICATIONS  (STANDING):  atorvastatin 40 milliGRAM(s) Oral at bedtime  chlorhexidine 4% Liquid 1 Application(s) Topical daily  desmopressin 0.1 milliGRAM(s) Oral <User Schedule>  enoxaparin Injectable 40 milliGRAM(s) SubCutaneous <User Schedule>  ergocalciferol 45314 Unit(s) Oral every week  hydrocortisone 10 milliGRAM(s) Oral <User Schedule>  hydrocortisone 20 milliGRAM(s) Oral <User Schedule>  levothyroxine 88 MICROGram(s) Oral daily  multivitamin 1 Tablet(s) Oral daily  polyethylene glycol 3350 17 Gram(s) Oral daily  senna 2 Tablet(s) Oral at bedtime  sodium chloride 0.65% Nasal 1 Spray(s) Both Nostrils two times a day    MEDICATIONS  (PRN):  acetaminophen     Tablet .. 650 milliGRAM(s) Oral every 6 hours PRN Mild Pain (1 - 3)  bisacodyl 5 milliGRAM(s) Oral daily PRN Constipation  melatonin 5 milliGRAM(s) Oral at bedtime PRN Insomnia  ondansetron Injectable 4 milliGRAM(s) IV Push every 6 hours PRN Nausea and/or Vomiting  oxyCODONE    IR 5 milliGRAM(s) Oral every 4 hours PRN Moderate Pain (4 - 6)      Allergies    No Known Allergies    Intolerances        ROS: All other systems reviewed and negative    PHYSICAL EXAM:  VITALS: T(C): 36.8 (02-29-24 @ 07:00)  T(F): 98.3 (02-29-24 @ 07:00), Max: 98.4 (02-28-24 @ 19:00)  HR: 97 (02-29-24 @ 07:00) (84 - 105)  BP: 112/80 (02-29-24 @ 07:00) (101/65 - 113/72)  RR:  (15 - 21)  SpO2:  (94% - 97%)  Wt(kg): --  GENERAL: NAD, resting comfortably   EYES: No proptosis,  anicteric  HEENT:  Atraumatic, Normocephalic, moist mucous membranes  RESPIRATORY: Nonlabored respirations on room air, normal rate/effort   CARDIOVASCULAR: Did not appear cyanotic, no lower extremity swelling visualized  GI: did not appear distended  NEURO: Answering questions appropriately, moves extremities spontaneously  PSYCH:  reactive affect, euthymic mood        02-28    136  |  101  |  14  ----------------------------<  106<H>  4.7   |  22  |  0.85    eGFR: 111    Ca    9.3      02-28  Mg     2.0     02-28  Phos  3.7     02-28        A1C with Estimated Average Glucose Result: 5.4 % (02-19-24 @ 15:14)      Thyroid Stimulating Hormone, Serum: 0.03 uIU/mL (02-19-24 @ 14:17)  Thyroid Stimulating Hormone, Serum: 0.05 uIU/mL (02-08-24 @ 06:39)  Thyroid Stimulating Hormone, Serum: 0.15 uIU/mL (02-03-24 @ 08:37)  Thyroid Stimulating Hormone, Serum: 0.27 uIU/mL (02-02-24 @ 11:35)   ENDOCRINE FOLLOW UP     Chief Complaint: craniopharyngioma    History:   He reports he dumped a lot of urine, still able to drink to thirst. He denies nausea, vomiting, reports energy levels "okay for hospital". Reports will have drain removed tomorrow.    MEDICATIONS  (STANDING):  atorvastatin 40 milliGRAM(s) Oral at bedtime  chlorhexidine 4% Liquid 1 Application(s) Topical daily  desmopressin 0.1 milliGRAM(s) Oral <User Schedule>  enoxaparin Injectable 40 milliGRAM(s) SubCutaneous <User Schedule>  ergocalciferol 93996 Unit(s) Oral every week  hydrocortisone 10 milliGRAM(s) Oral <User Schedule>  hydrocortisone 20 milliGRAM(s) Oral <User Schedule>  levothyroxine 88 MICROGram(s) Oral daily  multivitamin 1 Tablet(s) Oral daily  polyethylene glycol 3350 17 Gram(s) Oral daily  senna 2 Tablet(s) Oral at bedtime  sodium chloride 0.65% Nasal 1 Spray(s) Both Nostrils two times a day    MEDICATIONS  (PRN):  acetaminophen     Tablet .. 650 milliGRAM(s) Oral every 6 hours PRN Mild Pain (1 - 3)  bisacodyl 5 milliGRAM(s) Oral daily PRN Constipation  melatonin 5 milliGRAM(s) Oral at bedtime PRN Insomnia  ondansetron Injectable 4 milliGRAM(s) IV Push every 6 hours PRN Nausea and/or Vomiting  oxyCODONE    IR 5 milliGRAM(s) Oral every 4 hours PRN Moderate Pain (4 - 6)      Allergies    No Known Allergies    Intolerances        ROS: All other systems reviewed and negative    PHYSICAL EXAM:  VITALS: T(C): 36.8 (02-29-24 @ 07:00)  T(F): 98.3 (02-29-24 @ 07:00), Max: 98.4 (02-28-24 @ 19:00)  HR: 97 (02-29-24 @ 07:00) (84 - 105)  BP: 112/80 (02-29-24 @ 07:00) (101/65 - 113/72)  RR:  (15 - 21)  SpO2:  (94% - 97%)  Wt(kg): --  GENERAL: NAD, resting comfortably   EYES: No proptosis,  anicteric  HEENT:  nasal packing in place, dry mucous membranes  RESPIRATORY: Nonlabored respirations on room air, normal rate/effort   CARDIOVASCULAR: Did not appear cyanotic, no lower extremity swelling visualized  GI: did not appear distended  NEURO: Answering questions appropriately, moves extremities spontaneously  PSYCH:  reactive affect, euthymic mood        02-28    136  |  101  |  14  ----------------------------<  106<H>  4.7   |  22  |  0.85    eGFR: 111    Ca    9.3      02-28  Mg     2.0     02-28  Phos  3.7     02-28        A1C with Estimated Average Glucose Result: 5.4 % (02-19-24 @ 15:14)      Thyroid Stimulating Hormone, Serum: 0.03 uIU/mL (02-19-24 @ 14:17)  Thyroid Stimulating Hormone, Serum: 0.05 uIU/mL (02-08-24 @ 06:39)  Thyroid Stimulating Hormone, Serum: 0.15 uIU/mL (02-03-24 @ 08:37)  Thyroid Stimulating Hormone, Serum: 0.27 uIU/mL (02-02-24 @ 11:35)

## 2024-02-29 NOTE — PROGRESS NOTE ADULT - SUBJECTIVE AND OBJECTIVE BOX
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 Presented with ~4 days clear fluid leaking from nostrils (L>R) on standing or valsalva. Reports no h/as. Afebrile.      24H Events: No acute events. Requiring oxygen via face tent. Pre op for OR today. No examination changes.   2/24- Patient s/p replacement of necrotic nasoseptal flap with contralateral nasoseptal flap for CSF leak (right thigh fat graft). Reports no salty, metallic taste in the mouth.  2/25: No acute events overnight   2/26- Patient denies any salty taste from his mouth, denies headaches.  2/27- No acute overnight events  2/28- No acute events overnight. Patient denies salty, metallic taste in mouth.  2/29- No acute events overnight. Lumbar drain clamped yesterday, to be removed tomorrow.     Allergies    No Known Allergies    Intolerances        REVIEW OF SYSTEMS: [ ] Unable to Assess due to neurologic exam   [x] All ROS addressed below are non-contributory, except:  Neuro: [ ] Headache [ ] Back pain [ ] Numbness [ ] Weakness [ ] Ataxia [ ] Dizziness [ ] Aphasia [ ] Dysarthria [ ] Visual disturbance  Resp: [ ] Shortness of breath/dyspnea, [ ] Orthopnea [ ] Cough  CV: [ ] Chest pain [ ] Palpitation [ ] Lightheadedness [ ] Syncope  Renal: [ ] Thirst [ ] Edema  GI: [ ] Nausea [ ] Emesis [ ] Abdominal pain [ ] Constipation [ ] Diarrhea  Hem: [ ] Hematemesis [ ] bright red blood per rectum  ID: [ ] Fever [ ] Chills [ ] Dysuria  ENT: [ ] Rhinorrhea      DEVICES:   [ ] Restraints [ ] ET tube [ ] central line [ ] arterial line [ ] carlson [ ] NGT/OGT [ ] EVD [ ] LD [ ] LUCINDA/HMV [ ] Trach [ ] PEG [ ] Chest Tube     VITALS:   Vital Signs Last 24 Hrs  T(C): 36.9 (28 Feb 2024 19:00), Max: 36.9 (28 Feb 2024 19:00)  T(F): 98.4 (28 Feb 2024 19:00), Max: 98.4 (28 Feb 2024 19:00)  HR: 84 (29 Feb 2024 03:00) (84 - 105)  BP: 108/72 (29 Feb 2024 03:00) (101/65 - 113/72)  BP(mean): 85 (29 Feb 2024 03:00) (82 - 89)  RR: 15 (29 Feb 2024 03:00) (15 - 21)  SpO2: 94% (29 Feb 2024 03:00) (94% - 97%)    Parameters below as of 29 Feb 2024 03:00  Patient On (Oxygen Delivery Method): room air      CAPILLARY BLOOD GLUCOSE        I&O's Summary    28 Feb 2024 07:01  -  29 Feb 2024 07:00  --------------------------------------------------------  IN: 1440 mL / OUT: 4700 mL / NET: -3260 mL        Respiratory:        LABS:                        10.5   12.50 )-----------( 293      ( 28 Feb 2024 22:40 )             31.3     02-28    136  |  101  |  14  ----------------------------<  106<H>  4.7   |  22  |  0.85             MEDICATION LEVELS:     IVF FLUIDS/MEDICATIONS:   MEDICATIONS  (STANDING):  atorvastatin 40 milliGRAM(s) Oral at bedtime  chlorhexidine 4% Liquid 1 Application(s) Topical daily  desmopressin 0.1 milliGRAM(s) Oral <User Schedule>  enoxaparin Injectable 40 milliGRAM(s) SubCutaneous <User Schedule>  ergocalciferol 43879 Unit(s) Oral every week  hydrocortisone 10 milliGRAM(s) Oral <User Schedule>  hydrocortisone 20 milliGRAM(s) Oral <User Schedule>  levothyroxine 88 MICROGram(s) Oral daily  multivitamin 1 Tablet(s) Oral daily  polyethylene glycol 3350 17 Gram(s) Oral daily  senna 2 Tablet(s) Oral at bedtime  sodium chloride 0.65% Nasal 1 Spray(s) Both Nostrils two times a day    MEDICATIONS  (PRN):  acetaminophen     Tablet .. 650 milliGRAM(s) Oral every 6 hours PRN Mild Pain (1 - 3)  bisacodyl 5 milliGRAM(s) Oral daily PRN Constipation  melatonin 5 milliGRAM(s) Oral at bedtime PRN Insomnia  ondansetron Injectable 4 milliGRAM(s) IV Push every 6 hours PRN Nausea and/or Vomiting  oxyCODONE    IR 5 milliGRAM(s) Oral every 4 hours PRN Moderate Pain (4 - 6)        IMAGING:      EXAMINATION:  PHYSICAL EXAM:    Constitutional: No Acute Distress     Neurological: Awake, alert oriented to person, place and time, Following Commands, PERRL, EOMI, No Gaze Preference, Face Symmetrical, Speech Fluent.    Motor exam:          Upper extremity                         Delt     Bicep     Tricep    HG                                                 R         5/5        5/5        5/5       5/5                                               L          5/5        5/5        5/5       5/5          Lower extremity                        HF         KF        KE       DF         PF                                                  R        5/5        5/5        5/5       5/5         5/5                                               L         5/5        5/5       5/5       5/5          5/5                                                   Pulmonary: Clear to Auscultation, No rales, No rhonchi, No wheezes     Cardiovascular: S1, S2, Regular rate and rhythm     Gastrointestinal: Soft, Non-tender, Non-distended     Extremities: No calf tenderness     Incision:

## 2024-02-29 NOTE — PROGRESS NOTE ADULT - ASSESSMENT
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 Pt now s/p CSF leak repair.    Neuro:   neuro checks q 4hr  Pt S/P OR for TSP leak repair, replacement of dead nasoseptal flap with contralateral nasoseptal flap for CSF leak, right thigh fat graft. Nasal splints placed.   LD drain 5cc /hr clamped as of (2/28), to be removed tomorrow (3/1)  Nasal splints in until 3/1  pain: oxy 5/10, Tylenol  activity: OOB    Respiratory:   RA   RVP negative  Chest PT  No IS - skull base precautions      CV:   -160 mmhg   c/w home statin    Renal:   IVF: IVL  DDAVP 0.1 mg BID    GI:   Diet: regular diet  PPx: hold  Zofran for nausea/vomiting  Reg: miralax/senna  LBM  2/29    Endocrine:   FS goal normoglycemia  PAWAN  Panhypopituitarism, c/w hydroCort, synthroid  DDAVP 0.1 BID    Heme:  DVT ppx: SCDs, Lovenox 40 Qd  LED 2/20: negative    ID:   Afebrile  UA negative, elevated leukocyte count-- likely in setting of steroid or post op     Social/Family: wife updated at bedside     Code Status: [x] Full Code [] DNR [] DNI [] Goals of Care:   Disposition: [x] ICU [] Stroke Unit [] RCU []PCU []Floor [] Discharge Home     Patient at high risk for neurologic deterioration, critical care time, excluding procedures: 30 minutes, csf rhinorrhea at risk for meningitis, brain sag, subdural hematoma           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 Pt now s/p CSF leak repair.    Neuro:   neuro checks q 4hr  Pt S/P OR for TSP leak repair, replacement of dead nasoseptal flap with contralateral nasoseptal flap for CSF leak, right thigh fat graft. Nasal splints placed.   LD drain 5cc /hr clamped as of (2/28), to be removed tomorrow (3/1)  Nasal splints in until 3/1  pain: oxy 5/10, Tylenol  activity: OOB    Respiratory:   RA   RVP negative  Chest PT  No IS - skull base precautions      CV:   -160 mmhg   c/w home statin    Renal:   IVF: IVL  DDAVP 0.1 mg BID  ENdo reccs appreciated.   Hyponatremia, BMP Q 12    GI:   Diet: regular diet  PPx: hold  Zofran for nausea/vomiting  Reg: miralax/senna  LBM  2/29    Endocrine:   FS goal normoglycemia  PAWAN  Panhypopituitarism, c/w hydroCort, synthroid  DDAVP 0.1 BID    Heme:  DVT ppx: SCDs, Lovenox 40 Qd  LED 2/20: negative    ID:   Afebrile  UA negative, elevated leukocyte count-- likely in setting of steroid or post op     Social/Family: wife updated at bedside     Code Status: [x] Full Code [] DNR [] DNI [] Goals of Care:   Disposition: [x] ICU [] Stroke Unit [] RCU []PCU []Floor [] Discharge Home     Patient at high risk for neurologic deterioration, critical care time, excluding procedures: 30 minutes, csf rhinorrhea at risk for meningitis, brain sag, subdural hematoma

## 2024-03-01 LAB
ANION GAP SERPL CALC-SCNC: 11 MMOL/L — SIGNIFICANT CHANGE UP (ref 5–17)
ANION GAP SERPL CALC-SCNC: 12 MMOL/L — SIGNIFICANT CHANGE UP (ref 5–17)
BUN SERPL-MCNC: 10 MG/DL — SIGNIFICANT CHANGE UP (ref 7–23)
BUN SERPL-MCNC: 11 MG/DL — SIGNIFICANT CHANGE UP (ref 7–23)
CALCIUM SERPL-MCNC: 7.9 MG/DL — LOW (ref 8.4–10.5)
CALCIUM SERPL-MCNC: 8.3 MG/DL — LOW (ref 8.4–10.5)
CHLORIDE SERPL-SCNC: 104 MMOL/L — SIGNIFICANT CHANGE UP (ref 96–108)
CHLORIDE SERPL-SCNC: 106 MMOL/L — SIGNIFICANT CHANGE UP (ref 96–108)
CO2 SERPL-SCNC: 22 MMOL/L — SIGNIFICANT CHANGE UP (ref 22–31)
CO2 SERPL-SCNC: 23 MMOL/L — SIGNIFICANT CHANGE UP (ref 22–31)
CREAT SERPL-MCNC: 0.7 MG/DL — SIGNIFICANT CHANGE UP (ref 0.5–1.3)
CREAT SERPL-MCNC: 0.72 MG/DL — SIGNIFICANT CHANGE UP (ref 0.5–1.3)
EGFR: 116 ML/MIN/1.73M2 — SIGNIFICANT CHANGE UP
EGFR: 117 ML/MIN/1.73M2 — SIGNIFICANT CHANGE UP
GLUCOSE SERPL-MCNC: 103 MG/DL — HIGH (ref 70–99)
GLUCOSE SERPL-MCNC: 79 MG/DL — SIGNIFICANT CHANGE UP (ref 70–99)
HCT VFR BLD CALC: 34.8 % — LOW (ref 39–50)
HGB BLD-MCNC: 11.2 G/DL — LOW (ref 13–17)
MAGNESIUM SERPL-MCNC: 2.1 MG/DL — SIGNIFICANT CHANGE UP (ref 1.6–2.6)
MCHC RBC-ENTMCNC: 28.1 PG — SIGNIFICANT CHANGE UP (ref 27–34)
MCHC RBC-ENTMCNC: 32.2 GM/DL — SIGNIFICANT CHANGE UP (ref 32–36)
MCV RBC AUTO: 87.4 FL — SIGNIFICANT CHANGE UP (ref 80–100)
NRBC # BLD: 0 /100 WBCS — SIGNIFICANT CHANGE UP (ref 0–0)
PHOSPHATE SERPL-MCNC: 4.2 MG/DL — SIGNIFICANT CHANGE UP (ref 2.5–4.5)
PLATELET # BLD AUTO: 294 K/UL — SIGNIFICANT CHANGE UP (ref 150–400)
POTASSIUM SERPL-MCNC: 3.7 MMOL/L — SIGNIFICANT CHANGE UP (ref 3.5–5.3)
POTASSIUM SERPL-MCNC: 3.9 MMOL/L — SIGNIFICANT CHANGE UP (ref 3.5–5.3)
POTASSIUM SERPL-SCNC: 3.7 MMOL/L — SIGNIFICANT CHANGE UP (ref 3.5–5.3)
POTASSIUM SERPL-SCNC: 3.9 MMOL/L — SIGNIFICANT CHANGE UP (ref 3.5–5.3)
RBC # BLD: 3.98 M/UL — LOW (ref 4.2–5.8)
RBC # FLD: 12.7 % — SIGNIFICANT CHANGE UP (ref 10.3–14.5)
SODIUM SERPL-SCNC: 137 MMOL/L — SIGNIFICANT CHANGE UP (ref 135–145)
SODIUM SERPL-SCNC: 141 MMOL/L — SIGNIFICANT CHANGE UP (ref 135–145)
T4 FREE SERPL-MCNC: 1 NG/DL — SIGNIFICANT CHANGE UP (ref 0.9–1.8)
WBC # BLD: 12.13 K/UL — HIGH (ref 3.8–10.5)
WBC # FLD AUTO: 12.13 K/UL — HIGH (ref 3.8–10.5)

## 2024-03-01 PROCEDURE — 99232 SBSQ HOSP IP/OBS MODERATE 35: CPT | Mod: GC

## 2024-03-01 PROCEDURE — 99231 SBSQ HOSP IP/OBS SF/LOW 25: CPT

## 2024-03-01 RX ORDER — POTASSIUM CHLORIDE 20 MEQ
40 PACKET (EA) ORAL ONCE
Refills: 0 | Status: COMPLETED | OUTPATIENT
Start: 2024-03-01 | End: 2024-03-01

## 2024-03-01 RX ORDER — LEVOTHYROXINE SODIUM 125 MCG
100 TABLET ORAL DAILY
Refills: 0 | Status: DISCONTINUED | OUTPATIENT
Start: 2024-03-01 | End: 2024-03-03

## 2024-03-01 RX ORDER — CALCIUM GLUCONATE 100 MG/ML
2 VIAL (ML) INTRAVENOUS ONCE
Refills: 0 | Status: COMPLETED | OUTPATIENT
Start: 2024-03-01 | End: 2024-03-01

## 2024-03-01 RX ORDER — CEFAZOLIN SODIUM 1 G
2000 VIAL (EA) INJECTION EVERY 8 HOURS
Refills: 0 | Status: DISCONTINUED | OUTPATIENT
Start: 2024-03-01 | End: 2024-03-03

## 2024-03-01 RX ADMIN — Medication 1 SPRAY(S): at 05:04

## 2024-03-01 RX ADMIN — SENNA PLUS 2 TABLET(S): 8.6 TABLET ORAL at 21:28

## 2024-03-01 RX ADMIN — Medication 20 MILLIGRAM(S): at 09:25

## 2024-03-01 RX ADMIN — DESMOPRESSIN ACETATE 0.1 MILLIGRAM(S): 0.1 TABLET ORAL at 23:21

## 2024-03-01 RX ADMIN — Medication 88 MICROGRAM(S): at 05:04

## 2024-03-01 RX ADMIN — Medication 10 MILLIGRAM(S): at 15:08

## 2024-03-01 RX ADMIN — Medication 200 GRAM(S): at 21:24

## 2024-03-01 RX ADMIN — Medication 40 MILLIEQUIVALENT(S): at 17:05

## 2024-03-01 RX ADMIN — DESMOPRESSIN ACETATE 0.1 MILLIGRAM(S): 0.1 TABLET ORAL at 10:36

## 2024-03-01 RX ADMIN — ATORVASTATIN CALCIUM 40 MILLIGRAM(S): 80 TABLET, FILM COATED ORAL at 21:29

## 2024-03-01 RX ADMIN — Medication 40 MILLIEQUIVALENT(S): at 10:43

## 2024-03-01 RX ADMIN — Medication 100 MILLIGRAM(S): at 21:30

## 2024-03-01 RX ADMIN — Medication 1 TABLET(S): at 15:08

## 2024-03-01 RX ADMIN — Medication 1 SPRAY(S): at 17:06

## 2024-03-01 RX ADMIN — POLYETHYLENE GLYCOL 3350 17 GRAM(S): 17 POWDER, FOR SOLUTION ORAL at 17:06

## 2024-03-01 RX ADMIN — ENOXAPARIN SODIUM 40 MILLIGRAM(S): 100 INJECTION SUBCUTANEOUS at 17:06

## 2024-03-01 NOTE — PROGRESS NOTE ADULT - ASSESSMENT
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 Pt now s/p CSF leak repair.    Neuro:   neuro checks q 4hr  Pt S/P OR for TSP leak repair, replacement of dead nasoseptal flap with contralateral nasoseptal flap for CSF leak, right thigh fat graft. Nasal splints placed.   LD drain 5cc /hr clamped (2/28), to be removed tomorrow (3/1)  Nasal splints in until 3/1  pain: oxy 5/10, Tylenol  activity: OOB    Respiratory:   RA   RVP negative  Chest PT  No IS - skull base precautions      CV:   -160 mmhg   c/w home statin    Renal:   IVF: IVL  DDAVP 0.1 mg BID  ENdo reccs appreciated.   Hyponatremia, BMP Q 12    GI:   Diet: regular diet  PPx: hold  Zofran for nausea/vomiting  Reg: miralax/senna  LBM  2/29    Endocrine:   FS goal normoglycemia  PAWAN  Panhypopituitarism, c/w hydroCort, synthroid  DDAVP 0.1 BID    Heme:  DVT ppx: SCDs, Lovenox 40 Qd  LED 2/20: negative    ID:   Afebrile  UA negative, elevated leukocyte count-- likely in setting of steroid or post op     Social/Family: wife updated at bedside     Code Status: [x] Full Code [] DNR [] DNI [] Goals of Care:   Disposition: [x] ICU [] Stroke Unit [] RCU []PCU []Floor [] Discharge Home     Patient at high risk for neurologic deterioration, critical care time, excluding procedures: 30 minutes, csf rhinorrhea at risk for meningitis, brain sag, subdural hematoma           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 Pt now s/p CSF leak repair.    Neuro:   neuro checks q 4hr, flat x 2 hours post LD removal   Pt S/P OR for TSP leak repair, replacement of dead nasoseptal flap with contralateral nasoseptal flap for CSF leak, right thigh fat graft. Nasal splints placed.   LD drain 5cc /hr clamped (2/28), to be removed today (3/1)  Nasal splints in until today  pain: oxy 5/10, Tylenol  activity: OOB    Respiratory:   RA   RVP negative  Chest PT  No IS - skull base precautions      CV:   -160 mmhg   c/w home statin    Renal:   IVF: IVL  DDAVP 0.1 mg BID  ENdo reccs appreciated.   Hyponatremia, BMP Q 12    GI:   Diet: regular diet  PPx: hold  Zofran for nausea/vomiting  Reg: miralax/senna  LBM  2/29    Endocrine:   FS goal normoglycemia  PAWAN  Panhypopituitarism, c/w hydroCort, synthroid  DDAVP 0.1 BID  Follow up thyroid panel     Heme:  DVT ppx: SCDs, Lovenox 40 Qd  LED 2/20: negative    ID:   Afebrile  UA negative, elevated leukocyte count-- likely in setting of steroid or post op     Social/Family: wife updated at bedside     Code Status: [x] Full Code [] DNR [] DNI [] Goals of Care:   Disposition: [x] ICU [] Stroke Unit [] RCU []PCU []Floor [] Discharge Home     Patient at high risk for neurologic deterioration, critical care time, excluding procedures: 30 minutes, csf rhinorrhea at risk for meningitis, brain sag, subdural hematoma

## 2024-03-01 NOTE — PROGRESS NOTE ADULT - PROBLEM SELECTOR PLAN 1
- Keep nasal splints in place for now - remove 3/1 as outpatient  - continue humidified face tent  - nasal saline, 2 sprays to b/l nares 4 times a day.  - monitor for signs of Epistaxis and CSF leak  - avoid nasal trauma, heavy lifting and bending at waist.  - Care a/p neurosurgery. - Keep nasal splints in place for now - remove outpatient with Dr Brown.   - continue humidified face tent  - nasal saline, 2 sprays to b/l nares 4 times a day.  - monitor for signs of Epistaxis and CSF leak  - avoid nasal trauma, heavy lifting and bending at waist.  - Care a/p neurosurgery.

## 2024-03-01 NOTE — PROGRESS NOTE ADULT - ASSESSMENT
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 Today, p/f leaking from nostrils (L>R) when he stands up or valsalvas. Endocrinology consulted for management of panhypopituitarism.    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 MRI 1/24 showed abnormal enhancing lesion involving the suprasellar region measuring approximately 2.1x2.3cm and previously measured 2.1x1.4cm. Patient had an endocrine workup in the past with Dr. Isidra Dao and was told he had a low testosterone level but not offered treatment prior to surgery. Had TSR 2/1/24. Post op TSH 0.15, free thyroxine 0.8, consistent with secondary hypothyroidism. ACTH 3.5, am cortisol 0.5, consistent with secondary AI. LH 0.4, FSH 1.2. 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 The patient was discharged on levothyroxine 75mcg daily, hydrocortisone 10mg/5mg and DDAVP 0.05mg prn. Reports did not take desmopressin  - patient dumping urine, Na 142, U osm <1.005 concerning for compensated DI  - IGF-1 46 low)  - pathology consistent with craniopharyngioma  - follows with Dr. Isidra Dao  PLAN  In terms of DI:   Goal Na 140-145  - continue DDAVP 0.1mg BID and 1.5L fluid restriction, prior to fluid restruction patient with having dilute urine with hyponatremia (133) which is more suggestive of drinking excessive water as opposed to too much DDAVP (which would expect CONCETRATED urine)  Recommendations:   - DI Watch: Please monitor strict I/O, sodium levels q8hrs (monitor for hypo or hypernatremia).   If urine output more than 500ml/h or 250ml/h for 2 consecutive hours get stat Na, if Na is increasing again compared to prior than bolus 1/2 NS to match half the output (for example, if net negative 2Liters can give 1Liter 1/2NS if patient is unable to keep up with output with PO intake)  - If Na > increases to 140 or higher would dose DDAVP 0.05mg and continue DI watch as above e   - Please make sure pt has access to water and encourage patient to drink to thirst   In terms of secondary AI:  - continue hydrocortisone PO 20mg at 8am and 10mg PO at 3pm, plan to discharge on hydrocortisone 10mg at 8am and 5mg at 3pm, if HD unstable, start stress dose steroids, if patient going under a procedure requiring general anesthesia can get 50mg IV hydrocortisone on call to OR  In terms of secondary hypothyroidism:  - FT4 1.0 in 3/1/24, recommend increase levothyroxine from 88mcg to 100mcg, recheck Free T4 level on 2/9/24 if patient is still hospitalized, otherwise can check outpatient. For levothyroxine  - maintain on empty stomach (at least 1 hour before meals), separate by 4 hours from PPI or calcium supplementation which can inhibit its absorption),   In terms of hypogonadism  - outpatient management  In terms of growth hormone deficiency  - outpatient management    Discussed with primary team.     Gautam Swann MD  Endocrine Fellow  Can be reached via [employer] teams.    For follow up questions, discharge recommendations, or new consults, please email [email or web address] ([email or web address]) or [email or web address] ([email or web address]) or call answering service at [phone] (weekdays); [phone] (nights/weekends).  For emergiencies please page fellow on call.

## 2024-03-01 NOTE — PROGRESS NOTE ADULT - SUBJECTIVE AND OBJECTIVE BOX
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 Admitted for postoperative CSF leak despite attempted lumbar drain. Pt now s/p replacement of necrotic nasoseptal flap with contralateral nasoseptal flap for CSF leak (right thigh fat graft) POD 7. Bilateral septal splints placed and LD x 5 days - now removed. Pt seen and examined at bedside. No acute events overnight. Pt c/o mild blood tinged mucous from B/L nares, but denies HAs, salty or metallic taste, fevers, clear nasal discharge.        PAST MEDICAL & SURGICAL HISTORY:  HLD (hyperlipidemia)      History of pituitary tumor      Deviated septum      History of dental surgery        Allergies    No Known Allergies    Intolerances      MEDICATIONS  (STANDING):  atorvastatin 40 milliGRAM(s) Oral at bedtime  chlorhexidine 4% Liquid 1 Application(s) Topical daily  desmopressin 0.1 milliGRAM(s) Oral <User Schedule>  enoxaparin Injectable 40 milliGRAM(s) SubCutaneous <User Schedule>  ergocalciferol 02646 Unit(s) Oral every week  hydrocortisone 10 milliGRAM(s) Oral <User Schedule>  hydrocortisone 20 milliGRAM(s) Oral <User Schedule>  levothyroxine 88 MICROGram(s) Oral daily  multivitamin 1 Tablet(s) Oral daily  polyethylene glycol 3350 17 Gram(s) Oral daily  senna 2 Tablet(s) Oral at bedtime  sodium chloride 0.65% Nasal 1 Spray(s) Both Nostrils two times a day    MEDICATIONS  (PRN):  acetaminophen     Tablet .. 650 milliGRAM(s) Oral every 6 hours PRN Mild Pain (1 - 3)  bisacodyl 5 milliGRAM(s) Oral daily PRN Constipation  melatonin 5 milliGRAM(s) Oral at bedtime PRN Insomnia  ondansetron Injectable 4 milliGRAM(s) IV Push every 6 hours PRN Nausea and/or Vomiting  oxyCODONE    IR 5 milliGRAM(s) Oral every 4 hours PRN Moderate Pain (4 - 6)      Social History: see consult    Family history: see consult    ROS:   ENT: all negative except as noted in HPI   Pulm: denies SOB, cough, hemoptysis  Neuro: denies numbness/tingling, loss of sensation  Endo: denies heat/cold intolerance, excessive sweating      Vital Signs Last 24 Hrs  T(C): 36.7 (01 Mar 2024 08:00), Max: 36.7 (01 Mar 2024 08:00)  T(F): 98 (01 Mar 2024 08:00), Max: 98 (01 Mar 2024 08:00)  HR: 93 (01 Mar 2024 08:00) (82 - 105)  BP: 122/79 (01 Mar 2024 08:00) (110/71 - 122/79)  BP(mean): 94 (01 Mar 2024 08:00) (85 - 94)  RR: 26 (01 Mar 2024 08:00) (16 - 26)  SpO2: 97% (01 Mar 2024 08:00) (95% - 97%)    Parameters below as of 01 Mar 2024 08:00  Patient On (Oxygen Delivery Method): room air                              11.2   12.13 )-----------( 294      ( 01 Mar 2024 03:25 )             34.8    03-01    141  |  106  |  10  ----------------------------<  79  3.9   |  23  |  0.70    Ca    8.3<L>      01 Mar 2024 03:25  Phos  4.2     03-01  Mg     2.1     03-01         PHYSICAL EXAM:  Gen: NAD  Skin: No rashes, bruises, or lesions  Head: Normocephalic, Atraumatic  Face: no edema, erythema, or fluctuance. Parotid glands soft without mass  Eyes: no scleral injection  Nose: Blood tinged mucous from B/L nares, no active bleeding, no thin clear discharge noted  Mouth: No Stridor / Drooling / Trismus.  Mucosa moist, tongue/uvula midline, oropharynx clear  Neck: Flat, supple, no lymphadenopathy, trachea midline, no masses  Lymphatic: No lymphadenopathy  Resp: breathing easily, no stridor  Neuro: facial nerve intact, no facial droop

## 2024-03-01 NOTE — PROGRESS NOTE ADULT - ASSESSMENT
LD clamped   -160  bmp q12  On hydrocort 20/10, DDAVP PO  Endo following  Has 2 splints in to be managed by [facility]  thigh incision c/d/i  monitor bmps

## 2024-03-01 NOTE — PROGRESS NOTE ADULT - SUBJECTIVE AND OBJECTIVE BOX
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 Presented with ~4 days clear fluid leaking from nostrils (L>R) on standing or valsalva. Reports no h/as. Afebrile.      24H Events: No acute events. Requiring oxygen via face tent. Pre op for OR today. No examination changes.   2/24- Patient s/p replacement of necrotic nasoseptal flap with contralateral nasoseptal flap for CSF leak (right thigh fat graft). Reports no salty, metallic taste in the mouth.  2/25: No acute events overnight   2/26- Patient denies any salty taste from his mouth, denies headaches.  2/27- No acute overnight events  2/28- No acute events overnight. Patient denies salty, metallic taste in mouth.  2/29- No acute events overnight. Lumbar drain clamped yesterday, to be removed tomorrow.   3/1- No events.    Allergies    No Known Allergies    Intolerances        REVIEW OF SYSTEMS: [ ] Unable to Assess due to neurologic exam   [x ] All ROS addressed below are non-contributory, except:  Neuro: [ ] Headache [ ] Back pain [ ] Numbness [ ] Weakness [ ] Ataxia [ ] Dizziness [ ] Aphasia [ ] Dysarthria [ ] Visual disturbance  Resp: [ ] Shortness of breath/dyspnea, [ ] Orthopnea [ ] Cough  CV: [ ] Chest pain [ ] Palpitation [ ] Lightheadedness [ ] Syncope  Renal: [ ] Thirst [ ] Edema  GI: [ ] Nausea [ ] Emesis [ ] Abdominal pain [ ] Constipation [ ] Diarrhea  Hem: [ ] Hematemesis [ ] bright red blood per rectum  ID: [ ] Fever [ ] Chills [ ] Dysuria  ENT: [ ] Rhinorrhea      DEVICES:   [ ] Restraints [ ] ET tube [ ] central line [ ] arterial line [ ] carlson [ ] NGT/OGT [ ] EVD [ ] LD [ ] LUCINDA/HMV [ ] Trach [ ] PEG [ ] Chest Tube     VITALS:   Vital Signs Last 24 Hrs  T(C): 36.7 (01 Mar 2024 08:00), Max: 36.7 (01 Mar 2024 08:00)  T(F): 98 (01 Mar 2024 08:00), Max: 98 (01 Mar 2024 08:00)  HR: 93 (01 Mar 2024 08:00) (82 - 105)  BP: 122/79 (01 Mar 2024 08:00) (110/71 - 122/79)  BP(mean): 94 (01 Mar 2024 08:00) (85 - 94)  RR: 26 (01 Mar 2024 08:00) (16 - 26)  SpO2: 97% (01 Mar 2024 08:00) (95% - 97%)    Parameters below as of 01 Mar 2024 08:00  Patient On (Oxygen Delivery Method): room air      CAPILLARY BLOOD GLUCOSE        I&O's Summary    29 Feb 2024 07:01  -  01 Mar 2024 07:00  --------------------------------------------------------  IN: 1400 mL / OUT: 5710 mL / NET: -4310 mL        Respiratory:        LABS:                        11.2   12.13 )-----------( 294      ( 01 Mar 2024 03:25 )             34.8     03-01    141  |  106  |  10  ----------------------------<  79  3.9   |  23  |  0.70             MEDICATION LEVELS:     IVF FLUIDS/MEDICATIONS:   MEDICATIONS  (STANDING):  atorvastatin 40 milliGRAM(s) Oral at bedtime  chlorhexidine 4% Liquid 1 Application(s) Topical daily  desmopressin 0.1 milliGRAM(s) Oral <User Schedule>  enoxaparin Injectable 40 milliGRAM(s) SubCutaneous <User Schedule>  ergocalciferol 04305 Unit(s) Oral every week  hydrocortisone 10 milliGRAM(s) Oral <User Schedule>  hydrocortisone 20 milliGRAM(s) Oral <User Schedule>  levothyroxine 88 MICROGram(s) Oral daily  multivitamin 1 Tablet(s) Oral daily  polyethylene glycol 3350 17 Gram(s) Oral daily  senna 2 Tablet(s) Oral at bedtime  sodium chloride 0.65% Nasal 1 Spray(s) Both Nostrils two times a day    MEDICATIONS  (PRN):  acetaminophen     Tablet .. 650 milliGRAM(s) Oral every 6 hours PRN Mild Pain (1 - 3)  bisacodyl 5 milliGRAM(s) Oral daily PRN Constipation  melatonin 5 milliGRAM(s) Oral at bedtime PRN Insomnia  ondansetron Injectable 4 milliGRAM(s) IV Push every 6 hours PRN Nausea and/or Vomiting  oxyCODONE    IR 5 milliGRAM(s) Oral every 4 hours PRN Moderate Pain (4 - 6)        IMAGING:      EXAMINATION:  PHYSICAL EXAM:    Constitutional: No Acute Distress     Neurological: Awake, alert oriented to person, place and time, Following Commands, PERRL, EOMI, No Gaze Preference, Face Symmetrical, Speech Fluent, No dysmetria, No ataxia, No nystagmus     Motor exam:          Upper extremity                         Delt     Bicep     Tricep    HG                                                 R         5/5        5/5        5/5       5/5                                               L          5/5        5/5        5/5       5/5          Lower extremity                        HF         KF        KE       DF         PF                                                  R        5/5 5/5        5/5       5/5         5/5                                               L         5/5        5/5       5/5       5/5          5/5                                                 Sensation: [ ] intact to light touch  [ ] decreased:     Reflexes: Deep Tendon Reflexes Intact     Pulmonary: Clear to Auscultation, No rales, No rhonchi, No wheezes     Cardiovascular: S1, S2, Regular rate and rhythm     Gastrointestinal: Soft, Non-tender, Non-distended     Extremities: No calf tenderness     Incision:    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 Presented with ~4 days clear fluid leaking from nostrils (L>R) on standing or valsalva. Reports no h/as. Afebrile.      24H Events: No acute events. Requiring oxygen via face tent. Pre op for OR today. No examination changes.   2/24- Patient s/p replacement of necrotic nasoseptal flap with contralateral nasoseptal flap for CSF leak (right thigh fat graft). Reports no salty, metallic taste in the mouth.  2/25: No acute events overnight   2/26- Patient denies any salty taste from his mouth, denies headaches.  2/27- No acute overnight events  2/28- No acute events overnight. Patient denies salty, metallic taste in mouth.  2/29- No acute events overnight. Lumbar drain clamped yesterday, to be removed tomorrow.   3/1- No events. Patient clinically stable, LD removed today.    Allergies    No Known Allergies    Intolerances        REVIEW OF SYSTEMS: [ ] Unable to Assess due to neurologic exam   [x ] All ROS addressed below are non-contributory, except:  Neuro: [ ] Headache [ ] Back pain [ ] Numbness [ ] Weakness [ ] Ataxia [ ] Dizziness [ ] Aphasia [ ] Dysarthria [ ] Visual disturbance  Resp: [ ] Shortness of breath/dyspnea, [ ] Orthopnea [ ] Cough  CV: [ ] Chest pain [ ] Palpitation [ ] Lightheadedness [ ] Syncope  Renal: [ ] Thirst [ ] Edema  GI: [ ] Nausea [ ] Emesis [ ] Abdominal pain [ ] Constipation [ ] Diarrhea  Hem: [ ] Hematemesis [ ] bright red blood per rectum  ID: [ ] Fever [ ] Chills [ ] Dysuria  ENT: [ ] Rhinorrhea      DEVICES:   [ ] Restraints [ ] ET tube [ ] central line [ ] arterial line [ ] carlson [ ] NGT/OGT [ ] EVD [ ] LD [ ] LUCINDA/HMV [ ] Trach [ ] PEG [ ] Chest Tube     VITALS:   Vital Signs Last 24 Hrs  T(C): 36.7 (01 Mar 2024 08:00), Max: 36.7 (01 Mar 2024 08:00)  T(F): 98 (01 Mar 2024 08:00), Max: 98 (01 Mar 2024 08:00)  HR: 93 (01 Mar 2024 08:00) (82 - 105)  BP: 122/79 (01 Mar 2024 08:00) (110/71 - 122/79)  BP(mean): 94 (01 Mar 2024 08:00) (85 - 94)  RR: 26 (01 Mar 2024 08:00) (16 - 26)  SpO2: 97% (01 Mar 2024 08:00) (95% - 97%)    Parameters below as of 01 Mar 2024 08:00  Patient On (Oxygen Delivery Method): room air      CAPILLARY BLOOD GLUCOSE        I&O's Summary    29 Feb 2024 07:01  -  01 Mar 2024 07:00  --------------------------------------------------------  IN: 1400 mL / OUT: 5710 mL / NET: -4310 mL        Respiratory:        LABS:                        11.2   12.13 )-----------( 294      ( 01 Mar 2024 03:25 )             34.8     03-01    141  |  106  |  10  ----------------------------<  79  3.9   |  23  |  0.70             MEDICATION LEVELS:     IVF FLUIDS/MEDICATIONS:   MEDICATIONS  (STANDING):  atorvastatin 40 milliGRAM(s) Oral at bedtime  chlorhexidine 4% Liquid 1 Application(s) Topical daily  desmopressin 0.1 milliGRAM(s) Oral <User Schedule>  enoxaparin Injectable 40 milliGRAM(s) SubCutaneous <User Schedule>  ergocalciferol 82010 Unit(s) Oral every week  hydrocortisone 10 milliGRAM(s) Oral <User Schedule>  hydrocortisone 20 milliGRAM(s) Oral <User Schedule>  levothyroxine 88 MICROGram(s) Oral daily  multivitamin 1 Tablet(s) Oral daily  polyethylene glycol 3350 17 Gram(s) Oral daily  senna 2 Tablet(s) Oral at bedtime  sodium chloride 0.65% Nasal 1 Spray(s) Both Nostrils two times a day    MEDICATIONS  (PRN):  acetaminophen     Tablet .. 650 milliGRAM(s) Oral every 6 hours PRN Mild Pain (1 - 3)  bisacodyl 5 milliGRAM(s) Oral daily PRN Constipation  melatonin 5 milliGRAM(s) Oral at bedtime PRN Insomnia  ondansetron Injectable 4 milliGRAM(s) IV Push every 6 hours PRN Nausea and/or Vomiting  oxyCODONE    IR 5 milliGRAM(s) Oral every 4 hours PRN Moderate Pain (4 - 6)        IMAGING:      EXAMINATION:  PHYSICAL EXAM:    Constitutional: No Acute Distress     Neurological: Awake, alert oriented to person, place and time, Following Commands, PERRL, EOMI, No Gaze Preference, Face Symmetrical, Speech Fluent, No dysmetria, No ataxia, No nystagmus     Motor exam:          Upper extremity                         Delt     Bicep     Tricep    HG                                                 R         5/5 5/5 5/5       5/5                                               L          5/5 5/5        5/5       5/5          Lower extremity                        HF         KF        KE       DF         PF                                                  R        5/5 5/5 5/5 5/5         5/5                                               L         5/5 5/5 5/5       5/5          5/5                                                 Sensation: [x] intact to light touch  [ ] decreased:     Pulmonary: Clear to Auscultation, No rales, No rhonchi, No wheezes     Cardiovascular: S1, S2, Regular rate and rhythm     Gastrointestinal: Soft, Non-tender, Non-distended     Extremities: No calf tenderness     Incision:

## 2024-03-01 NOTE — PROGRESS NOTE ADULT - SUBJECTIVE AND OBJECTIVE BOX
Gautam Swann MD, PGY-4  Endocrinology fellow  Available on [employer] Teams    Interval Events: No acute overnight events. Pt seen and examined. Tolerating PO, no nausea/vomitting. No hypoglycemia symptoms. Denies fevers, chills, CP, SOB, Abdominal pain, constipation, Diarrhea. Nocutira 1x overngiht. No polyuria      MEDICATIONS  (STANDING):  atorvastatin 40 milliGRAM(s) Oral at bedtime  calcium gluconate IVPB 2 Gram(s) IV Intermittent once  desmopressin 0.1 milliGRAM(s) Oral <User Schedule>  enoxaparin Injectable 40 milliGRAM(s) SubCutaneous <User Schedule>  ergocalciferol 88641 Unit(s) Oral every week  hydrocortisone 10 milliGRAM(s) Oral <User Schedule>  hydrocortisone 20 milliGRAM(s) Oral <User Schedule>  levothyroxine 100 MICROGram(s) Oral daily  multivitamin 1 Tablet(s) Oral daily  polyethylene glycol 3350 17 Gram(s) Oral daily  senna 2 Tablet(s) Oral at bedtime    MEDICATIONS  (PRN):  acetaminophen     Tablet .. 650 milliGRAM(s) Oral every 6 hours PRN Mild Pain (1 - 3)  bisacodyl 5 milliGRAM(s) Oral daily PRN Constipation  melatonin 5 milliGRAM(s) Oral at bedtime PRN Insomnia  ondansetron Injectable 4 milliGRAM(s) IV Push every 6 hours PRN Nausea and/or Vomiting  oxyCODONE    IR 5 milliGRAM(s) Oral every 4 hours PRN Moderate Pain (4 - 6)      Allergies    No Known Allergies    Intolerances          ================PHYSICAL EXAM======================  VITALS: T(C): 36.6 (03-01-24 @ 16:30)  T(F): 97.8 (03-01-24 @ 16:30), Max: 98.1 (03-01-24 @ 15:00)  HR: 92 (03-01-24 @ 16:30) (82 - 105)  BP: 107/74 (03-01-24 @ 16:30) (107/74 - 122/79)  RR:  (15 - 26)  SpO2:  (95% - 97%)  Wt(kg): --  GENERAL: NAD, appears comfortable  EYES: No proptosis, no lid lag, anicteric  HEENT:  Atraumatic, Normocephalic, moist mucous membranes  RESPIRATORY: nonlabored respirations, no wheezing  PSYCH: Alert and awake  ===================================================    CAPILLARY BLOOD GLUCOSE          03-01    137  |  104  |  11  ----------------------------<  103<H>  3.7   |  22  |  0.72    eGFR: 116    Ca    7.9<L>      03-01  Mg     2.1     03-01  Phos  4.2     03-01        A1C with Estimated Average Glucose Result: 5.4 % (02-19-24 @ 15:14)      Thyroid Function Tests:  03-01 @ 03:24 TSH -- FreeT4 1.0 T3 -- Anti TPO -- Anti Thyroglobulin Ab -- TSI --  02-21 @ 00:52 TSH -- FreeT4 0.9 T3 -- Anti TPO -- Anti Thyroglobulin Ab -- TSI --

## 2024-03-01 NOTE — PROGRESS NOTE ADULT - SUBJECTIVE AND OBJECTIVE BOX
Patient seen and examined at bedside.    Vital Signs Last 24 Hrs  T(C): 36.6 (29 Feb 2024 23:00), Max: 36.8 (29 Feb 2024 07:00)  T(F): 97.9 (29 Feb 2024 23:00), Max: 98.3 (29 Feb 2024 07:00)  HR: 82 (29 Feb 2024 23:00) (82 - 100)  BP: 112/74 (29 Feb 2024 19:00) (108/72 - 118/76)  BP(mean): 86 (29 Feb 2024 19:00) (85 - 86)  RR: 21 (29 Feb 2024 23:00) (15 - 21)  SpO2: 95% (29 Feb 2024 23:00) (94% - 96%)    Parameters below as of 29 Feb 2024 19:00  Patient On (Oxygen Delivery Method): room air      Exam: AOx3, PERRL, EOMI, no facial, RED 5/5

## 2024-03-01 NOTE — PROGRESS NOTE ADULT - ASSESSMENT
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 Admitted for postoperative CSF leak despite attempted lumbar drain. Pt now s/p replacement of necrotic nasoseptal flap with contralateral nasoseptal flap for CSF leak (right thigh fat graft) POD 7. Bilateral septal splints placed and LD x 5 days - now removed. Pt c/o mild blood tinged mucous from B/L nares, but denies HAs, salty or metallic taste, fevers, clear nasal discharge. On exam, no evidence of CSF leak or epistaxis.

## 2024-03-01 NOTE — PROGRESS NOTE ADULT - ATTENDING COMMENTS
43 yo man with h/o HLD, found to have a suprasellar mass s/p TSP on 2/1, diagnosed with a craniopharyngioma. Now re-admitted for CSF leak, s/p LD. Now s/p nasal septal flap replacement on 2/23.     Afebrile overnight. On face ten.      Patient denies any symptoms of CSF leak.   Neurologically intact.     LD at 5cc/hr  hydrocort  endo following, on ddAVP 0.1 mg at night   f/u CSF labs from 2/22  CXR with atelectasis, mobilize as tolerated  regular diet  restart lov tonight   off abx (CSF 2/2 with elevated WBC but normal glucose and PCR neg, continue to follow cultures).     Patient seen and examined by attending on 2/24/2023.    Patient is not critically ill but is medically complex due to CSF leak.
Agree with above.
41 yo man with h/o HLD, found to have a suprasellar mass s/p TSP on 2/1, diagnosed with a craniopharyngioma. Now re-admitted for CSF leak, s/p LD.     Yesterday with high grade fevers, more lethargic.   Febrile to 39.3. UA neg 2/21. On venturi mask at 34%.   CSF sent today.     Neurologically intact.   No wheezing.     f/u MRI   hydrocort taper  endo following, on ddAVP  f/u CSF labs   CXR and RVP   Lov ppx     Patient seen and examined by attending on 2/22/2023.    Patient is not critically ill but is medically complex due to CSF leak and fever w/u.
43 yo man with h/o HLD, found to have a suprasellar mass s/p TSP on 2/1, diagnosed with a craniopharyngioma. Now re-admitted for CSF leak, s/p LD. Now s/p nasal septal flap replacement on 2/23. With DI on desmopressin.     On RA.   With high UO this AM, s/p 01.mg of desmopressin PO this AM.     Patient denies any symptoms of CSF leak.   Neurologically intact.     LD at 5cc/hr  hydrocort  endo following, on ddAVP increased to 0.1 mg BID   regular diet  Lov ppx   off abx (CSF 2/2 with elevated WBC but normal glucose and PCR neg, continue to follow cultures).     Patient seen and examined by attending on 2/25/2023.    Patient is not critically ill but is medically complex due to CSF leak.
Agree with above.
Agree with above.
41 yo man with h/o HLD, found to have a suprasellar mass s/p TSP on 2/1, diagnosed with a craniopharyngioma. Now re-admitted for CSF leak, s/p LD.     Afebrile overnight. Face Tent at 50%. Not tachy.   CSF from 2/22 with pleocytosis with neutrophilic predominance but CSF cultures and PCR neg, with normal CSF glucose.     Neurologically intact.     plan for OR today   hydrocort  endo following, on ddAVP  f/u CSF labs from 2/22  CXR with atelectasis, mobilize as tolerated  regular diet  Lov ppx held for OR    Patient seen and examined by attending on 2/23/2023.    Patient is not critically ill but is medically complex due to CSF leak and fever w/u.
Agree with above.
Agree with above.
Agree with assessment and plan as above by Dr. Swann. Reviewed all pertinent labs, glucose values, and imaging studies. Modifications made as indicated above. Pt. with hx of craniopharyngioma s/p resection with panhypopituitarism. Continue with hydrocortisone 20mg in the morning and 10mg in the afternoon while in-patient then plan to lower to 10mg and 5mg upon discharge. Increase levothyroxine to 100 mcg. Can repeat Free T4 3/9/2024. DI improving now with stable sodium with some fluid restriction. Monitor for now on current dose of DDAVP. Check sodium. Gonadal axis evaluation and treatment as outpatient. optho follow-up.     Lazaro Hicks D.O  [phone].
Agree with assessment and plan as above by Dr. Walker. Reviewed all pertinent labs, glucose values, and imaging studies. Modifications made as indicated above. Pt. with hx of craniopharyngioma s/p resection with panhypopituitarism. Continue with hydrocortisone 20mg in the morning and 10mg in the afternoon while in-patient then plan to lower to 10mg and 5mg upon discharge. Increased levothyroxine to 88 mcg. Can repeat TFTs in 4-6 weeks. DI improving now with stable sodium. Monitor on current dose of DDAVP. Check sodium. Gonadal axis evaluation and treatment as outpatient. optho follow-up.     Lazaro Hicks D.O  [phone].
43M hx HLD, chronic h/as, 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 admitted for CSF leak.  Endocrinology consulted for management of panhypopituitarism.  Pt was not compliant with DDAVP at home and received 0.05 DDAVP around 6 PM, sodium has been stable, however, pt is having increased urinary output.  Pt is also having fevers and with the drain might not be able to compensate,  recommend 0.05 DDAVP at night and monitor I&Os closely.  c/w HC 20/10 for now and consider 50 mg IV x 1 if pt decompensates. Inc Lt4 88 mcg daily.  Rest of the plan as per fellow's note.
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 Today, p/f leaking from nostrils (L>R) when he stands up or valsalvas. Endocrinology consulted for management of panhypopituitarism.  Agree with keeping patient on DDAVP. Can increase based on patient's urine output tomorrow.  Patient to go to OR tomorrow- will need HC 100mg on call to the OR given AI.
Agree with assessment and plan as above by Dr. Walker. Reviewed all pertinent labs, glucose values, and imaging studies. Modifications made as indicated above. Pt. with hx of craniopharyngioma s/p resection with panhypopituitarism. Continue with hydrocortisone 20mg in the morning and 10mg in the afternoon while in-patient then plan to lower to 10mg and 5mg upon discharge. Increased levothyroxine to 88 mcg. Can repeat Free T4 on Friday. DI improving now with stable sodium, but with increased urine output today. Monitor for now on current dose of DDAVP. Check sodium. Gonadal axis evaluation and treatment as outpatient. optho follow-up.     Lazaro Hicks D.O  [phone].
Agree with assessment and plan as above by Dr. Walker. Reviewed all pertinent labs, glucose values, and imaging studies. Modifications made as indicated above. Pt. with hx of craniopharyngioma s/p resection with panhypopituitarism. Continue with hydrocortisone 20mg in the morning and 10mg in the afternoon while in-patient then plan to lower to 10mg and 5mg upon discharge. Increased levothyroxine to 88 mcg. Can repeat Free T4 on Friday. DI improving now with stable sodium, but with increased urine output today. Monitor for now on current dose of DDAVP. Check sodium. Gonadal axis evaluation and treatment as outpatient. optho follow-up.     Lazaro Hicks D.O  [phone].
Agree with assessment and plan as above by Dr. Walker. Reviewed all pertinent labs, glucose values, and imaging studies. Modifications made as indicated above. Pt. with hx of craniopharyngioma s/p resection with panhypopituitarism. Continue with hydrocortisone 20mg in the morning and 10mg in the afternoon while in-patient then plan to lower to 10mg and 5mg upon discharge. Increased levothyroxine to 88 mcg. Can repeat TFTs in 4-6 weeks. DI improving now with stable sodium. Monitor on current dose of DDAVP. Gonadal axis evaluation and treatment as outpatient. optho follow-up.     Lazaro Hicks D.O  [phone]

## 2024-03-02 LAB
ANION GAP SERPL CALC-SCNC: 12 MMOL/L — SIGNIFICANT CHANGE UP (ref 5–17)
ANION GAP SERPL CALC-SCNC: 14 MMOL/L — SIGNIFICANT CHANGE UP (ref 5–17)
BUN SERPL-MCNC: 13 MG/DL — SIGNIFICANT CHANGE UP (ref 7–23)
BUN SERPL-MCNC: 14 MG/DL — SIGNIFICANT CHANGE UP (ref 7–23)
CALCIUM SERPL-MCNC: 10 MG/DL — SIGNIFICANT CHANGE UP (ref 8.4–10.5)
CALCIUM SERPL-MCNC: 9.4 MG/DL — SIGNIFICANT CHANGE UP (ref 8.4–10.5)
CHLORIDE SERPL-SCNC: 102 MMOL/L — SIGNIFICANT CHANGE UP (ref 96–108)
CHLORIDE SERPL-SCNC: 103 MMOL/L — SIGNIFICANT CHANGE UP (ref 96–108)
CO2 SERPL-SCNC: 26 MMOL/L — SIGNIFICANT CHANGE UP (ref 22–31)
CO2 SERPL-SCNC: 26 MMOL/L — SIGNIFICANT CHANGE UP (ref 22–31)
CREAT SERPL-MCNC: 0.86 MG/DL — SIGNIFICANT CHANGE UP (ref 0.5–1.3)
CREAT SERPL-MCNC: 1 MG/DL — SIGNIFICANT CHANGE UP (ref 0.5–1.3)
EGFR: 110 ML/MIN/1.73M2 — SIGNIFICANT CHANGE UP
EGFR: 96 ML/MIN/1.73M2 — SIGNIFICANT CHANGE UP
GLUCOSE SERPL-MCNC: 126 MG/DL — HIGH (ref 70–99)
GLUCOSE SERPL-MCNC: 75 MG/DL — SIGNIFICANT CHANGE UP (ref 70–99)
HCT VFR BLD CALC: 38.3 % — LOW (ref 39–50)
HGB BLD-MCNC: 12.1 G/DL — LOW (ref 13–17)
MCHC RBC-ENTMCNC: 28.2 PG — SIGNIFICANT CHANGE UP (ref 27–34)
MCHC RBC-ENTMCNC: 31.6 GM/DL — LOW (ref 32–36)
MCV RBC AUTO: 89.3 FL — SIGNIFICANT CHANGE UP (ref 80–100)
NRBC # BLD: 0 /100 WBCS — SIGNIFICANT CHANGE UP (ref 0–0)
PLATELET # BLD AUTO: 344 K/UL — SIGNIFICANT CHANGE UP (ref 150–400)
POTASSIUM SERPL-MCNC: 3.9 MMOL/L — SIGNIFICANT CHANGE UP (ref 3.5–5.3)
POTASSIUM SERPL-MCNC: 4.1 MMOL/L — SIGNIFICANT CHANGE UP (ref 3.5–5.3)
POTASSIUM SERPL-SCNC: 3.9 MMOL/L — SIGNIFICANT CHANGE UP (ref 3.5–5.3)
POTASSIUM SERPL-SCNC: 4.1 MMOL/L — SIGNIFICANT CHANGE UP (ref 3.5–5.3)
RBC # BLD: 4.29 M/UL — SIGNIFICANT CHANGE UP (ref 4.2–5.8)
RBC # FLD: 13 % — SIGNIFICANT CHANGE UP (ref 10.3–14.5)
SODIUM SERPL-SCNC: 140 MMOL/L — SIGNIFICANT CHANGE UP (ref 135–145)
SODIUM SERPL-SCNC: 143 MMOL/L — SIGNIFICANT CHANGE UP (ref 135–145)
WBC # BLD: 11.03 K/UL — HIGH (ref 3.8–10.5)
WBC # FLD AUTO: 11.03 K/UL — HIGH (ref 3.8–10.5)

## 2024-03-02 PROCEDURE — 99024 POSTOP FOLLOW-UP VISIT: CPT

## 2024-03-02 RX ORDER — DESMOPRESSIN ACETATE 0.1 MG/1
0.05 TABLET ORAL
Refills: 0 | Status: DISCONTINUED | OUTPATIENT
Start: 2024-03-02 | End: 2024-03-03

## 2024-03-02 RX ADMIN — ERGOCALCIFEROL 50000 UNIT(S): 1.25 CAPSULE ORAL at 13:44

## 2024-03-02 RX ADMIN — Medication 100 MILLIGRAM(S): at 05:14

## 2024-03-02 RX ADMIN — Medication 10 MILLIGRAM(S): at 16:13

## 2024-03-02 RX ADMIN — Medication 100 MICROGRAM(S): at 05:13

## 2024-03-02 RX ADMIN — POLYETHYLENE GLYCOL 3350 17 GRAM(S): 17 POWDER, FOR SOLUTION ORAL at 17:31

## 2024-03-02 RX ADMIN — Medication 1 TABLET(S): at 13:44

## 2024-03-02 RX ADMIN — Medication 20 MILLIGRAM(S): at 08:17

## 2024-03-02 RX ADMIN — ENOXAPARIN SODIUM 40 MILLIGRAM(S): 100 INJECTION SUBCUTANEOUS at 17:31

## 2024-03-02 RX ADMIN — SENNA PLUS 2 TABLET(S): 8.6 TABLET ORAL at 21:05

## 2024-03-02 RX ADMIN — Medication 100 MILLIGRAM(S): at 15:00

## 2024-03-02 RX ADMIN — DESMOPRESSIN ACETATE 0.1 MILLIGRAM(S): 0.1 TABLET ORAL at 10:08

## 2024-03-02 RX ADMIN — DESMOPRESSIN ACETATE 0.05 MILLIGRAM(S): 0.1 TABLET ORAL at 21:05

## 2024-03-02 RX ADMIN — ATORVASTATIN CALCIUM 40 MILLIGRAM(S): 80 TABLET, FILM COATED ORAL at 21:05

## 2024-03-02 RX ADMIN — Medication 100 MILLIGRAM(S): at 21:09

## 2024-03-02 NOTE — PROGRESS NOTE ADULT - PROBLEM SELECTOR PROBLEM 5
Hypogonadism male

## 2024-03-02 NOTE — PROGRESS NOTE ADULT - SUBJECTIVE AND OBJECTIVE BOX
ENT ISSUE/POD: s/p CSF leak repair w/new septal flap and fat graft from right thigh POD 8    HPI: 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 Admitted for postoperative CSF leak despite attempted lumbar drain. Pt now s/p replacement of necrotic nasoseptal flap with contralateral nasoseptal flap for CSF leak (right thigh fat graft) POD 7. Bilateral septal splints in place. Pt seen and examined at bedside. No acute events overnight. Denies HAs, salty or metallic taste, fevers, clear nasal discharge.    PAST MEDICAL & SURGICAL HISTORY:  HLD (hyperlipidemia)      History of pituitary tumor      Deviated septum      History of dental surgery        Allergies    No Known Allergies    Intolerances      MEDICATIONS  (STANDING):  atorvastatin 40 milliGRAM(s) Oral at bedtime  ceFAZolin   IVPB 2000 milliGRAM(s) IV Intermittent every 8 hours  desmopressin 0.1 milliGRAM(s) Oral <User Schedule>  enoxaparin Injectable 40 milliGRAM(s) SubCutaneous <User Schedule>  ergocalciferol 39964 Unit(s) Oral every week  hydrocortisone 10 milliGRAM(s) Oral <User Schedule>  hydrocortisone 20 milliGRAM(s) Oral <User Schedule>  levothyroxine 100 MICROGram(s) Oral daily  multivitamin 1 Tablet(s) Oral daily  polyethylene glycol 3350 17 Gram(s) Oral daily  senna 2 Tablet(s) Oral at bedtime    MEDICATIONS  (PRN):  acetaminophen     Tablet .. 650 milliGRAM(s) Oral every 6 hours PRN Mild Pain (1 - 3)  bisacodyl 5 milliGRAM(s) Oral daily PRN Constipation  melatonin 5 milliGRAM(s) Oral at bedtime PRN Insomnia  ondansetron Injectable 4 milliGRAM(s) IV Push every 6 hours PRN Nausea and/or Vomiting    social history: see consult     family history: see consult     ROS:   ENT: all negative except as noted in HPI   Pulm: denies SOB, cough, hemoptysis  Neuro: denies numbness/tingling, loss of sensation  Endo: denies heat/cold intolerance, excessive sweating      Vital Signs Last 24 Hrs  T(C): 36.6 (02 Mar 2024 05:00), Max: 36.7 (01 Mar 2024 08:00)  T(F): 97.9 (02 Mar 2024 05:00), Max: 98.1 (01 Mar 2024 15:00)  HR: 90 (02 Mar 2024 05:00) (90 - 100)  BP: 120/84 (02 Mar 2024 05:00) (107/74 - 122/79)  BP(mean): 89 (01 Mar 2024 12:00) (89 - 94)  RR: 18 (02 Mar 2024 05:00) (15 - 26)  SpO2: 98% (02 Mar 2024 05:00) (95% - 98%)    Parameters below as of 02 Mar 2024 05:00  Patient On (Oxygen Delivery Method): room air                              11.2   12.13 )-----------( 294      ( 01 Mar 2024 03:25 )             34.8    03-01    137  |  104  |  11  ----------------------------<  103<H>  3.7   |  22  |  0.72    Ca    7.9<L>      01 Mar 2024 15:43  Phos  4.2     03-01  Mg     2.1     03-01       PHYSICAL EXAM:  Gen: NAD  Skin: No rashes, bruises, or lesions  Head: Normocephalic, Atraumatic  Face: no edema, erythema, or fluctuance. Parotid glands soft without mass  Eyes: no scleral injection  Nose: Blood tinged mucous from B/L nares, no active bleeding, no thin clear discharge noted  Mouth: No Stridor / Drooling / Trismus.  Mucosa moist, tongue/uvula midline, oropharynx clear  Neck: Flat, supple, no lymphadenopathy, trachea midline, no masses  Lymphatic: No lymphadenopathy  Resp: breathing easily, no stridor  Neuro: facial nerve intact, no facial droop

## 2024-03-02 NOTE — PROGRESS NOTE ADULT - ASSESSMENT
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 Admitted for postoperative CSF leak despite attempted lumbar drain. Pt now s/p replacement of necrotic nasoseptal flap with contralateral nasoseptal flap for CSF leak (right thigh fat graft) POD 8.  Bilateral septal splints in place. On exam, no evidence of CSF leak or epistaxis.

## 2024-03-02 NOTE — PROGRESS NOTE ADULT - ASSESSMENT
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 Today, p/f leaking from nostrils (L>R) when he stands up or valsalvas. Endocrinology consulted for management of panhypopituitarism.    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 MRI 1/24 showed abnormal enhancing lesion involving the suprasellar region measuring approximately 2.1x2.3cm and previously measured 2.1x1.4cm. Patient had an endocrine workup in the past with Dr. Isidra Dao and was told he had a low testosterone level but not offered treatment prior to surgery. Had TSR 2/1/24. Post op TSH 0.15, free thyroxine 0.8, consistent with secondary hypothyroidism. ACTH 3.5, am cortisol 0.5, consistent with secondary AI. LH 0.4, FSH 1.2. 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 The patient was discharged on levothyroxine 75mcg daily, hydrocortisone 10mg/5mg and DDAVP 0.05mg prn. Reports did not take desmopressin  - patient dumping urine, Na 142, U osm <1.005 concerning for compensated DI  - IGF-1 46 low)  - pathology consistent with craniopharyngioma  - follows with Dr. Isidra Dao  PLAN  In terms of DI:   Goal Na 140-145  - continue DDAVP 0.1mg BID   - can DC fluid restriction since patient is intensely uncomfortable and Na is up to 147 today  - please check BMP for Na tonight, patient agrees  Recommendations:   - DI Watch: Please monitor strict I/O, sodium levels q8hrs (monitor for hypo or hypernatremia).   If urine output more than 500ml/h or 250ml/h for 2 consecutive hours get stat Na, if Na is increasing again compared to prior than bolus 1/2 NS to match half the output (for example, if net negative 2Liters can give 1Liter 1/2NS if patient is unable to keep up with output with PO intake)  - If Na > increases to 140 or higher would dose DDAVP 0.05mg and continue DI watch as above   - Please make sure pt has access to water and encourage patient to drink to thirst   In terms of secondary AI:  - continue hydrocortisone PO 20mg at 8am and 10mg PO at 3pm, plan to discharge on hydrocortisone 10mg at 8am and 5mg at 3pm, if HD unstable, start stress dose steroids, if patient going under a procedure requiring general anesthesia can get 50mg IV hydrocortisone on call to OR  In terms of secondary hypothyroidism:  - FT4 1.0 in 3/1/24, recommend increase levothyroxine from 88mcg to 100mcg, recheck Free T4 level on 3/9/24 if patient is still hospitalized, otherwise can check outpatient. For levothyroxine  - maintain on empty stomach (at least 1 hour before meals), separate by 4 hours from PPI or calcium supplementation which can inhibit its absorption),   In terms of hypogonadism  - outpatient management  In terms of growth hormone deficiency  - outpatient management    Discussed with primary team.     Max Donald MD  Endocrine Fellow  For nonurgent matters, please email [email or web address] or [email or web address]. For urgent matters only, please call answering service at [phone] (weekdays), [phone] (nights/weekends).      HPI43M hx HLD, chronic h/as, craniopharyngioma s/p EEA w/ Dr. Conwya 2/1/24. Today, p/f leaking from nostrils (L>R) when he stands up or valsalvas. Endocrinology consulted for management of panhypopituitarism.    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 MRI 1/24 showed abnormal enhancing lesion involving the suprasellar region measuring approximately 2.1x2.3cm and previously measured 2.1x1.4cm. Patient had an endocrine workup in the past with Dr. Isidra Dao and was told he had a low testosterone level but not offered treatment prior to surgery. Had TSR 2/1/24. Post op TSH 0.15, free thyroxine 0.8, consistent with secondary hypothyroidism. ACTH 3.5, am cortisol 0.5, consistent with secondary AI. LH 0.4, FSH 1.2. 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 The patient was discharged on levothyroxine 75mcg daily, hydrocortisone 10mg/5mg and DDAVP 0.05mg prn. Reports did not take desmopressin  - patient dumping urine, Na 142, U osm <1.005 concerning for compensated DI  - IGF-1 46 low)  - pathology consistent with craniopharyngioma  - follows with Dr. Isidra Dao  PLAN  In terms of DI:   Goal Na 140-145  - continue DDAVP 0.1mg BID   - can DC fluid restriction since patient is intensely uncomfortable and Na is up to 147 today  - please check BMP for Na tonight and in the AM, patient agrees  Recommendations:   - DI Watch: Please monitor strict I/O, sodium levels q8hrs (monitor for hypo or hypernatremia).   If urine output more than 500ml/h or 250ml/h for 2 consecutive hours get stat Na, if Na is increasing again compared to prior than bolus 1/2 NS to match half the output (for example, if net negative 2Liters can give 1Liter 1/2NS if patient is unable to keep up with output with PO intake)  - If Na > increases to 140 or higher would dose DDAVP 0.05mg and continue DI watch as above   - Please make sure pt has access to water and encourage patient to drink to thirst   In terms of secondary AI:  - continue hydrocortisone PO 20mg at 8am and 10mg PO at 3pm, plan to discharge on hydrocortisone 10mg at 8am and 5mg at 3pm, if HD unstable, start stress dose steroids, if patient going under a procedure requiring general anesthesia can get 50mg IV hydrocortisone on call to OR  In terms of secondary hypothyroidism:  - FT4 1.0 in 3/1/24, recommend increase levothyroxine from 88mcg to 100mcg, recheck Free T4 level on 3/9/24 if patient is still hospitalized, otherwise can check outpatient. For levothyroxine  - maintain on empty stomach (at least 1 hour before meals), separate by 4 hours from PPI or calcium supplementation which can inhibit its absorption),   In terms of hypogonadism  - outpatient management  In terms of growth hormone deficiency  - outpatient management    Discussed with primary team.     Max Dnoald MD  Endocrine Fellow  For nonurgent matters, please email [email or web address] or [email or web address]. For urgent matters only, please call answering service at [phone] (weekdays), [phone] (nights/weekends).      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 Today, p/f leaking from nostrils (L>R) when he stands up or valsalvas. Endocrinology consulted for management of panhypopituitarism.    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 MRI 1/24 showed abnormal enhancing lesion involving the suprasellar region measuring approximately 2.1x2.3cm and previously measured 2.1x1.4cm. Patient had an endocrine workup in the past with Dr. Isidra Dao and was told he had a low testosterone level but not offered treatment prior to surgery. Had TSR 2/1/24. Post op TSH 0.15, free thyroxine 0.8, consistent with secondary hypothyroidism. ACTH 3.5, am cortisol 0.5, consistent with secondary AI. LH 0.4, FSH 1.2. 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 The patient was discharged on levothyroxine 75mcg daily, hydrocortisone 10mg/5mg and DDAVP 0.05mg prn. Reports did not take desmopressin  - patient dumping urine, Na 142, U osm <1.005 concerning for compensated DI  - IGF-1 46 low)  - pathology consistent with craniopharyngioma  - follows with Dr. Isidra Dao  PLAN  In terms of DI:   Goal Na 140-145  - decrease DDAVP to 0.05mg BID   - can DC fluid restriction since patient is intensely uncomfortable and Na is up to 147 today  - please check BMP for Na tonight and in the AM, patient agrees  Recommendations:   - DI Watch: Please monitor strict I/O, sodium levels q8hrs (monitor for hypo or hypernatremia).   If urine output more than 500ml/h or 250ml/h for 2 consecutive hours get stat Na, if Na is increasing again compared to prior than bolus 1/2 NS to match half the output (for example, if net negative 2Liters can give 1Liter 1/2NS if patient is unable to keep up with output with PO intake)  - If Na > increases to 140 or higher would dose DDAVP 0.05mg and continue DI watch as above   - Please make sure pt has access to water and encourage patient to drink to thirst   In terms of secondary AI:  - continue hydrocortisone PO 20mg at 8am and 10mg PO at 3pm, plan to discharge on hydrocortisone 10mg at 8am and 5mg at 3pm, if HD unstable, start stress dose steroids, if patient going under a procedure requiring general anesthesia can get 50mg IV hydrocortisone on call to OR  In terms of secondary hypothyroidism:  - FT4 1.0 in 3/1/24, recommend increase levothyroxine from 88mcg to 100mcg, recheck Free T4 level on 3/9/24 if patient is still hospitalized, otherwise can check outpatient. For levothyroxine  - maintain on empty stomach (at least 1 hour before meals), separate by 4 hours from PPI or calcium supplementation which can inhibit its absorption),   In terms of hypogonadism  - outpatient management  In terms of growth hormone deficiency  - outpatient management    Discussed with attending and primary team.     Max Donald MD  Endocrine Fellow  For nonurgent matters, please email [email or web address] or [email or web address]. For urgent matters only, please call answering service at [phone] (weekdays), [phone] (nights/weekends).

## 2024-03-02 NOTE — PROGRESS NOTE ADULT - ASSESSMENT
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 Today, p/f leaking from nostrils (L>R) when he stands up or valsalvas. Reports no h/as. Afebrile per nurse in ED.  WBC 11.92. Last Na (2/8/24 136).    PROCEDURE: Adm 2/19 CSF Rhinorrhea s/p TSP for craniopharyngioma on 2/1/24, 2/20 LD placed in Neurorad. 2/23 Replaced necrotic nasoseptal flap w/contra-lataral  nasoseptal flap for CSF leak and Rt thigh fat graft.    POD#8    PLAN:  Neuro: Cont Ancef Q8hr x 7 days for Rt fat graft site cellulitis. Endo following-on DDAVP 0.1mg BID, Hydrocortisone 20AM and 10PM, Fluid Restrict 1.5 L/day. Ck BMP AM.  Nasal Splint to be DC'd Monday outpt.  TSP precautions. Leukocytosis from steroids now trending downwards, Inc activity/OOB. Plan for DC Home 3/3 once Endo clear. PT/OT recomm no needs.    ENT note of 3/2- Problem/Plan - 1: ·  Problem: CSF leak. ·  Plan: - Keep nasal splints in place for now, to be removed in Dr. Fierro office monday 3/4- continue humidified face tent  - nasal saline, 2 sprays to b/l nares 4 times a day.- monitor for signs of Epistaxis and CSF leak- avoid nasal trauma, heavy lifting and bending at waist.    Endo note of 3/1-Agree with assessment and plan by Dr. Swann. Reviewed all pertinent labs, glucose values, and imaging studies. Modifications made as indicated above. Pt. with hx of craniopharyngioma s/p resection with panhypopituitarism. Continue with hydrocortisone 20mg in the morning and 10mg in the afternoon while in-patient then plan to lower to 10mg and 5mg upon discharge. Increase levothyroxine to 100 mcg. Can repeat Free T4 3/9/2024. DI improving now with stable sodium with some fluid restriction. Monitor for now on current dose of DDAVP. Check sodium. Gonadal axis evaluation and treatment as outpatient. optho follow-up. Lazaro Hicks D.O [phone].     Respiratory: Patient instructed to use incentive spirometer [ ] YES [ X] NO              DVT ppx: [X ] SQL [ ] SQH and Venodynes [ ] Left [ ] Right [ X] Bilateral    Discharge Planning:  The patient was evaluated by PT/OT and recommended no needs.   She was subsequently DC on >>> in stable condition.    More than 30 minutes spent on total encounter: more than 50% of the visit was spent on educating the patient and family regarding condition, medications, follow up plans, signs and symptoms to be concerned with, preparing paperwork, and questions answered regarding discharge.

## 2024-03-02 NOTE — PROGRESS NOTE ADULT - PROBLEM SELECTOR PROBLEM 3
Secondary hypothyroidism

## 2024-03-02 NOTE — PROGRESS NOTE ADULT - SUBJECTIVE AND OBJECTIVE BOX
SUBJECTIVE: This is my initial visit with this pt. Doing well w/o complaints. No c/o CSF Rhinorrhea or abnormal taste.     OVERNIGHT EVENTS: None    Vital Signs Last 24 Hrs  T(C): 36.7 (02 Mar 2024 09:17), Max: 36.7 (01 Mar 2024 15:00)  T(F): 98 (02 Mar 2024 09:17), Max: 98.1 (01 Mar 2024 15:00)  HR: 99 (02 Mar 2024 09:17) (90 - 100)  BP: 117/79 (02 Mar 2024 09:17) (107/74 - 120/84)  BP(mean): --  RR: 18 (02 Mar 2024 09:17) (18 - 20)  SpO2: 97% (02 Mar 2024 09:17) (95% - 98%)    Parameters below as of 02 Mar 2024 09:17  Patient On (Oxygen Delivery Method): room air    IVF: [ X] IVL [ ] NS+K@ Fluid Restrict 1.5L/day  DIET: [X ] Regular [ ] CCD [ ] Renal [ ] Puree [ ] Dysphagia [ ] Tube Feeds:   PCA: [ ] YES [X ] NO   ARTHUR: [ ] YES [ X] NO [X ] VOID <500<800cc  BM: [X ] YES-on 3/1    DRAINS: None    PHYSICAL EXAM:    General: No Acute Distress     Neurological: Awake, alert oriented to person, place and time, Following Commands, PERRL, VFF. EOMI, Face Symmetrical, Speech Fluent, Moving all extremities, Muscle Strength normal in all four extremities, No Drift, Sensation to Light Touch Intact    Pulmonary: Clear to Auscultation, No Rales, No Rhonchi, No Wheezes     Cardiovascular: S1, S2, Regular Rate and Rhythm     Gastrointestinal: Soft, Nontender, Nondistended     Incision: +Nasal packing bilat. Rt thigh fat graft w/SQ closure-no drainage, cellulitis improving    LABS:                        12.1   11.03 )-----------( 344      ( 02 Mar 2024 07:40 )             38.3    03-02    143  |  103  |  13  ----------------------------<  75  3.9   |  26  |  0.86    Ca    10.0      02 Mar 2024 07:40  Phos  4.2     03-01  Mg     2.1     03-01 03-01 @ 07:01  -  03-02 @ 07:00  --------------------------------------------------------  IN: 1057 mL / OUT: 4200 mL / NET: -3143 mL    IMAGING:   < from: Xray Chest 1 View- PORTABLE-Urgent (Xray Chest 1 View- PORTABLE-Urgent .) (02.23.24 @ 23:08) >    IMPRESSION:  Clear lungs.    < end of copied text >    < from: MR Head w/wo IV Cont (02.21.24 @ 22:05) >  IMPRESSION:  Status post transsphenoidal resection with postoperative changes.    Decreased soft tissue thickening within the postoperative bed and along   the tuberculum sella compared with themost recent MR scan likely   representing evolving postoperative changes.    Focal areas of nonenhancement along the sellar floor anteriorly which may   represent dural defects. Clinical correlation is advised.    < end of copied text >    < from: VA Duplex Lower Ext Vein Scan, Bilat (02.20.24 @ 13:06) >  No evidence of deep venous thrombosis in either lower extremity.    < end of copied text >    MEDICATIONS  (STANDING):  atorvastatin 40 milliGRAM(s) Oral at bedtime  ceFAZolin   IVPB 2000 milliGRAM(s) IV Intermittent every 8 hours  desmopressin 0.1 milliGRAM(s) Oral <User Schedule>  enoxaparin Injectable 40 milliGRAM(s) SubCutaneous <User Schedule>  ergocalciferol 21884 Unit(s) Oral every week  hydrocortisone 10 milliGRAM(s) Oral <User Schedule>  hydrocortisone 20 milliGRAM(s) Oral <User Schedule>  levothyroxine 100 MICROGram(s) Oral daily  multivitamin 1 Tablet(s) Oral daily  polyethylene glycol 3350 17 Gram(s) Oral daily  senna 2 Tablet(s) Oral at bedtime    MEDICATIONS  (PRN):  acetaminophen     Tablet .. 650 milliGRAM(s) Oral every 6 hours PRN Mild Pain (1 - 3)  bisacodyl 5 milliGRAM(s) Oral daily PRN Constipation  melatonin 5 milliGRAM(s) Oral at bedtime PRN Insomnia  ondansetron Injectable 4 milliGRAM(s) IV Push every 6 hours PRN Nausea and/or Vomiting

## 2024-03-02 NOTE — PROGRESS NOTE ADULT - PROBLEM SELECTOR PLAN 1
- Keep nasal splints in place for now, to be removed in Dr. Fierro office monday 3/4  - continue humidified face tent  - nasal saline, 2 sprays to b/l nares 4 times a day.  - monitor for signs of Epistaxis and CSF leak  - avoid nasal trauma, heavy lifting and bending at waist.  - Care a/p neurosurgery.

## 2024-03-02 NOTE — PROGRESS NOTE ADULT - PROBLEM SELECTOR PROBLEM 2
Secondary adrenal insufficiency

## 2024-03-02 NOTE — PROGRESS NOTE ADULT - PROBLEM SELECTOR PROBLEM 4
Postoperative central diabetes insipidus

## 2024-03-02 NOTE — PROGRESS NOTE ADULT - SUBJECTIVE AND OBJECTIVE BOX
ENDOCRINE FOLLOW UP     Interval Events: No acute overnight events. Pt seen and examined. Complaining of intense thirst to the point he cannot sleep. Very frustrated with fluid restriction. I/Os not documented in chart but pts own records below. VSS. No missed doses of HC.    Output:   3/1  6PM 650  7:20PM 450  8:25   10:55PM 550    3/2  12:15PM 400  10:45AM 550    MEDICATIONS  (STANDING):  atorvastatin 40 milliGRAM(s) Oral at bedtime  ceFAZolin   IVPB 2000 milliGRAM(s) IV Intermittent every 8 hours  desmopressin 0.1 milliGRAM(s) Oral <User Schedule>  enoxaparin Injectable 40 milliGRAM(s) SubCutaneous <User Schedule>  ergocalciferol 12518 Unit(s) Oral every week  hydrocortisone 10 milliGRAM(s) Oral <User Schedule>  hydrocortisone 20 milliGRAM(s) Oral <User Schedule>  levothyroxine 100 MICROGram(s) Oral daily  multivitamin 1 Tablet(s) Oral daily  polyethylene glycol 3350 17 Gram(s) Oral daily  senna 2 Tablet(s) Oral at bedtime    MEDICATIONS  (PRN):  acetaminophen     Tablet .. 650 milliGRAM(s) Oral every 6 hours PRN Mild Pain (1 - 3)  bisacodyl 5 milliGRAM(s) Oral daily PRN Constipation  melatonin 5 milliGRAM(s) Oral at bedtime PRN Insomnia  ondansetron Injectable 4 milliGRAM(s) IV Push every 6 hours PRN Nausea and/or Vomiting      Allergies    No Known Allergies    Intolerances        ROS: All other systems reviewed and negative    PHYSICAL EXAM:  VITALS: T(C): 36.7 (03-02-24 @ 09:17)  T(F): 98 (03-02-24 @ 09:17), Max: 98.1 (03-01-24 @ 15:00)  HR: 99 (03-02-24 @ 09:17) (90 - 100)  BP: 117/79 (03-02-24 @ 09:17) (107/74 - 120/84)  RR:  (18 - 20)  SpO2:  (95% - 98%)  Wt(kg): --  GENERAL: NAD, appears comfortable  EYES: No proptosis, no lid lag, anicteric  HEENT:  Atraumatic, Normocephalic, moist mucous membranes  RESPIRATORY: nonlabored respirations, no wheezing  PSYCH: Alert and awake        03-02    143  |  103  |  13  ----------------------------<  75  3.9   |  26  |  0.86    eGFR: 110    Ca    10.0      03-02  Mg     2.1     03-01  Phos  4.2     03-01        A1C with Estimated Average Glucose Result: 5.4 % (02-19-24 @ 15:14)      Thyroid Stimulating Hormone, Serum: 0.03 uIU/mL (02-19-24 @ 14:17)  Thyroid Stimulating Hormone, Serum: 0.05 uIU/mL (02-08-24 @ 06:39)  Thyroid Stimulating Hormone, Serum: 0.15 uIU/mL (02-03-24 @ 08:37)  Thyroid Stimulating Hormone, Serum: 0.27 uIU/mL (02-02-24 @ 11:35)   ENDOCRINE FOLLOW UP     Interval Events: No acute overnight events. Pt seen and examined. Complaining of intense thirst to the point he cannot sleep. Very frustrated with fluid restriction. Na 147. I/Os not documented in chart but pts own records below. VSS. No missed doses of HC.    Output:   3/1  6PM 650  7:20PM 450  8:25   10:55PM 550    3/2  12:15PM 400  10:45AM 550    MEDICATIONS  (STANDING):  atorvastatin 40 milliGRAM(s) Oral at bedtime  ceFAZolin   IVPB 2000 milliGRAM(s) IV Intermittent every 8 hours  desmopressin 0.1 milliGRAM(s) Oral <User Schedule>  enoxaparin Injectable 40 milliGRAM(s) SubCutaneous <User Schedule>  ergocalciferol 35288 Unit(s) Oral every week  hydrocortisone 10 milliGRAM(s) Oral <User Schedule>  hydrocortisone 20 milliGRAM(s) Oral <User Schedule>  levothyroxine 100 MICROGram(s) Oral daily  multivitamin 1 Tablet(s) Oral daily  polyethylene glycol 3350 17 Gram(s) Oral daily  senna 2 Tablet(s) Oral at bedtime    MEDICATIONS  (PRN):  acetaminophen     Tablet .. 650 milliGRAM(s) Oral every 6 hours PRN Mild Pain (1 - 3)  bisacodyl 5 milliGRAM(s) Oral daily PRN Constipation  melatonin 5 milliGRAM(s) Oral at bedtime PRN Insomnia  ondansetron Injectable 4 milliGRAM(s) IV Push every 6 hours PRN Nausea and/or Vomiting      Allergies    No Known Allergies    Intolerances        ROS: All other systems reviewed and negative    PHYSICAL EXAM:  VITALS: T(C): 36.7 (03-02-24 @ 09:17)  T(F): 98 (03-02-24 @ 09:17), Max: 98.1 (03-01-24 @ 15:00)  HR: 99 (03-02-24 @ 09:17) (90 - 100)  BP: 117/79 (03-02-24 @ 09:17) (107/74 - 120/84)  RR:  (18 - 20)  SpO2:  (95% - 98%)  Wt(kg): --  GENERAL: NAD, appears comfortable  EYES: No proptosis, no lid lag, anicteric  HEENT:  Atraumatic, Normocephalic, moist mucous membranes  RESPIRATORY: nonlabored respirations, no wheezing  PSYCH: Alert and awake        03-02    143  |  103  |  13  ----------------------------<  75  3.9   |  26  |  0.86    eGFR: 110    Ca    10.0      03-02  Mg     2.1     03-01  Phos  4.2     03-01        A1C with Estimated Average Glucose Result: 5.4 % (02-19-24 @ 15:14)      Thyroid Stimulating Hormone, Serum: 0.03 uIU/mL (02-19-24 @ 14:17)  Thyroid Stimulating Hormone, Serum: 0.05 uIU/mL (02-08-24 @ 06:39)  Thyroid Stimulating Hormone, Serum: 0.15 uIU/mL (02-03-24 @ 08:37)  Thyroid Stimulating Hormone, Serum: 0.27 uIU/mL (02-02-24 @ 11:35)

## 2024-03-03 ENCOUNTER — TRANSCRIPTION ENCOUNTER (OUTPATIENT)
Age: 44
End: 2024-03-03

## 2024-03-03 ENCOUNTER — NON-APPOINTMENT (OUTPATIENT)
Age: 44
End: 2024-03-03

## 2024-03-03 VITALS
RESPIRATION RATE: 17 BRPM | DIASTOLIC BLOOD PRESSURE: 75 MMHG | SYSTOLIC BLOOD PRESSURE: 104 MMHG | TEMPERATURE: 98 F | OXYGEN SATURATION: 98 % | HEART RATE: 89 BPM

## 2024-03-03 LAB
ANION GAP SERPL CALC-SCNC: 11 MMOL/L — SIGNIFICANT CHANGE UP (ref 5–17)
BUN SERPL-MCNC: 14 MG/DL — SIGNIFICANT CHANGE UP (ref 7–23)
CALCIUM SERPL-MCNC: 9.2 MG/DL — SIGNIFICANT CHANGE UP (ref 8.4–10.5)
CHLORIDE SERPL-SCNC: 103 MMOL/L — SIGNIFICANT CHANGE UP (ref 96–108)
CO2 SERPL-SCNC: 28 MMOL/L — SIGNIFICANT CHANGE UP (ref 22–31)
CREAT SERPL-MCNC: 0.92 MG/DL — SIGNIFICANT CHANGE UP (ref 0.5–1.3)
EGFR: 106 ML/MIN/1.73M2 — SIGNIFICANT CHANGE UP
GLUCOSE SERPL-MCNC: 76 MG/DL — SIGNIFICANT CHANGE UP (ref 70–99)
POTASSIUM SERPL-MCNC: 3.9 MMOL/L — SIGNIFICANT CHANGE UP (ref 3.5–5.3)
POTASSIUM SERPL-SCNC: 3.9 MMOL/L — SIGNIFICANT CHANGE UP (ref 3.5–5.3)
SODIUM SERPL-SCNC: 142 MMOL/L — SIGNIFICANT CHANGE UP (ref 135–145)

## 2024-03-03 PROCEDURE — 89051 BODY FLUID CELL COUNT: CPT

## 2024-03-03 PROCEDURE — 86335 IMMUNFIX E-PHORSIS/URINE/CSF: CPT

## 2024-03-03 PROCEDURE — 82962 GLUCOSE BLOOD TEST: CPT

## 2024-03-03 PROCEDURE — 86900 BLOOD TYPING SEROLOGIC ABO: CPT

## 2024-03-03 PROCEDURE — 99285 EMERGENCY DEPT VISIT HI MDM: CPT | Mod: 25

## 2024-03-03 PROCEDURE — 72131 CT LUMBAR SPINE W/O DYE: CPT

## 2024-03-03 PROCEDURE — 81003 URINALYSIS AUTO W/O SCOPE: CPT

## 2024-03-03 PROCEDURE — 87640 STAPH A DNA AMP PROBE: CPT

## 2024-03-03 PROCEDURE — 84436 ASSAY OF TOTAL THYROXINE: CPT

## 2024-03-03 PROCEDURE — 84157 ASSAY OF PROTEIN OTHER: CPT

## 2024-03-03 PROCEDURE — C1889: CPT

## 2024-03-03 PROCEDURE — 81005 URINALYSIS: CPT

## 2024-03-03 PROCEDURE — 83615 LACTATE (LD) (LDH) ENZYME: CPT

## 2024-03-03 PROCEDURE — 80048 BASIC METABOLIC PNL TOTAL CA: CPT

## 2024-03-03 PROCEDURE — 87476 LYME DIS DNA AMP PROBE: CPT

## 2024-03-03 PROCEDURE — 97168 OT RE-EVAL EST PLAN CARE: CPT

## 2024-03-03 PROCEDURE — 84480 ASSAY TRIIODOTHYRONINE (T3): CPT

## 2024-03-03 PROCEDURE — 84439 ASSAY OF FREE THYROXINE: CPT

## 2024-03-03 PROCEDURE — 93970 EXTREMITY STUDY: CPT

## 2024-03-03 PROCEDURE — 83930 ASSAY OF BLOOD OSMOLALITY: CPT

## 2024-03-03 PROCEDURE — 70486 CT MAXILLOFACIAL W/O DYE: CPT | Mod: MA

## 2024-03-03 PROCEDURE — 84100 ASSAY OF PHOSPHORUS: CPT

## 2024-03-03 PROCEDURE — 83935 ASSAY OF URINE OSMOLALITY: CPT

## 2024-03-03 PROCEDURE — 97116 GAIT TRAINING THERAPY: CPT

## 2024-03-03 PROCEDURE — 97530 THERAPEUTIC ACTIVITIES: CPT

## 2024-03-03 PROCEDURE — 87040 BLOOD CULTURE FOR BACTERIA: CPT

## 2024-03-03 PROCEDURE — 84443 ASSAY THYROID STIM HORMONE: CPT

## 2024-03-03 PROCEDURE — 62329 THER SPI PNXR CSF FLUOR/CT: CPT

## 2024-03-03 PROCEDURE — 36415 COLL VENOUS BLD VENIPUNCTURE: CPT

## 2024-03-03 PROCEDURE — 83036 HEMOGLOBIN GLYCOSYLATED A1C: CPT

## 2024-03-03 PROCEDURE — 87102 FUNGUS ISOLATION CULTURE: CPT

## 2024-03-03 PROCEDURE — 84145 PROCALCITONIN (PCT): CPT

## 2024-03-03 PROCEDURE — 85730 THROMBOPLASTIN TIME PARTIAL: CPT

## 2024-03-03 PROCEDURE — 86403 PARTICLE AGGLUT ANTBDY SCRN: CPT

## 2024-03-03 PROCEDURE — 86850 RBC ANTIBODY SCREEN: CPT

## 2024-03-03 PROCEDURE — 87641 MR-STAPH DNA AMP PROBE: CPT

## 2024-03-03 PROCEDURE — 87070 CULTURE OTHR SPECIMN AEROBIC: CPT

## 2024-03-03 PROCEDURE — 85610 PROTHROMBIN TIME: CPT

## 2024-03-03 PROCEDURE — 82945 GLUCOSE OTHER FLUID: CPT

## 2024-03-03 PROCEDURE — 87483 CNS DNA AMP PROBE TYPE 12-25: CPT

## 2024-03-03 PROCEDURE — 70450 CT HEAD/BRAIN W/O DYE: CPT | Mod: MA

## 2024-03-03 PROCEDURE — 97161 PT EVAL LOW COMPLEX 20 MIN: CPT

## 2024-03-03 PROCEDURE — 87205 SMEAR GRAM STAIN: CPT

## 2024-03-03 PROCEDURE — C9399: CPT

## 2024-03-03 PROCEDURE — 80053 COMPREHEN METABOLIC PANEL: CPT

## 2024-03-03 PROCEDURE — 85025 COMPLETE CBC W/AUTO DIFF WBC: CPT

## 2024-03-03 PROCEDURE — 97165 OT EVAL LOW COMPLEX 30 MIN: CPT

## 2024-03-03 PROCEDURE — 84305 ASSAY OF SOMATOMEDIN: CPT

## 2024-03-03 PROCEDURE — 96374 THER/PROPH/DIAG INJ IV PUSH: CPT

## 2024-03-03 PROCEDURE — 87637 SARSCOV2&INF A&B&RSV AMP PRB: CPT

## 2024-03-03 PROCEDURE — 85027 COMPLETE CBC AUTOMATED: CPT

## 2024-03-03 PROCEDURE — A9585: CPT

## 2024-03-03 PROCEDURE — 86788 WEST NILE VIRUS AB IGM: CPT

## 2024-03-03 PROCEDURE — 70553 MRI BRAIN STEM W/O & W/DYE: CPT

## 2024-03-03 PROCEDURE — 71045 X-RAY EXAM CHEST 1 VIEW: CPT

## 2024-03-03 PROCEDURE — 86901 BLOOD TYPING SEROLOGIC RH(D): CPT

## 2024-03-03 PROCEDURE — 86789 WEST NILE VIRUS ANTIBODY: CPT

## 2024-03-03 PROCEDURE — 97164 PT RE-EVAL EST PLAN CARE: CPT

## 2024-03-03 PROCEDURE — 83735 ASSAY OF MAGNESIUM: CPT

## 2024-03-03 PROCEDURE — 86617 LYME DISEASE ANTIBODY: CPT

## 2024-03-03 PROCEDURE — 72128 CT CHEST SPINE W/O DYE: CPT

## 2024-03-03 PROCEDURE — 87799 DETECT AGENT NOS DNA QUANT: CPT

## 2024-03-03 RX ORDER — DESMOPRESSIN ACETATE 0.1 MG/1
0.05 TABLET ORAL
Qty: 60 | Refills: 0
Start: 2024-03-03 | End: 2024-04-01

## 2024-03-03 RX ORDER — HYDROCORTISONE 20 MG
1 TABLET ORAL
Qty: 30 | Refills: 0
Start: 2024-03-03 | End: 2024-04-01

## 2024-03-03 RX ORDER — CEPHALEXIN 500 MG
1 CAPSULE ORAL
Qty: 10 | Refills: 0
Start: 2024-03-03 | End: 2024-03-07

## 2024-03-03 RX ORDER — LEVOTHYROXINE SODIUM 125 MCG
1 TABLET ORAL
Qty: 30 | Refills: 0
Start: 2024-03-03 | End: 2024-04-01

## 2024-03-03 RX ADMIN — Medication 100 MICROGRAM(S): at 05:10

## 2024-03-03 RX ADMIN — Medication 100 MILLIGRAM(S): at 05:10

## 2024-03-03 RX ADMIN — Medication 1 TABLET(S): at 11:51

## 2024-03-03 RX ADMIN — Medication 20 MILLIGRAM(S): at 08:11

## 2024-03-03 RX ADMIN — DESMOPRESSIN ACETATE 0.05 MILLIGRAM(S): 0.1 TABLET ORAL at 09:22

## 2024-03-03 NOTE — PROGRESS NOTE ADULT - PROBLEM SELECTOR PLAN 1
- Keep nasal splints in place for now, to be removed in ENT Dr. Nery Fierro outpatient office tomorrow (3/4/23)  - continue humidified face tent  - nasal saline, 2 sprays to b/l nares 4 times a day.  - monitor for signs of Epistaxis and CSF leak  - avoid nasal trauma, heavy lifting and bending at waist.  - Care a/p neurosurgery.

## 2024-03-03 NOTE — DISCHARGE NOTE PROVIDER - NSDCFUADDINST_GEN_ALL_CORE_FT
Return to Emergency Department or contact your Neurosurgeon if any changes in mental status, weakness, numbness or tingling of extremities; difficulty swallowing; drainage or redness of wound, fever; pain in legs; difficulty urinating or constipation.  Donot restart your Aspirin or take any Motrin/NSAIDS until checking with your Neurosurgeon.  No strenous activity. No heavy lifting. Do not return to work until cleared by physician. No driving until cleared by physician.  Remove dressing on 3rd day after your surgery and leave incision open to air. You may shower on the 3rd day after you surgery.  No soaking in tub,  Donot remove steri strips if present.  Donot apply any ointements to incision.  Keep Right thing wound cover  Please avoid nasal trauma, donot put anything up the nose, do not blow your nose, avoid sneezing, donot use straws or incentive spirometers. If you notice nasal drainage, post nasal drainage, or metallic/salty taste please notify your neurosurgeon and/or ENT.

## 2024-03-03 NOTE — PROGRESS NOTE ADULT - SUBJECTIVE AND OBJECTIVE BOX
SUBJECTIVE:  Doing well w/o complaints. No thirst, drinking fluids. No c/o CSF Rhinorrhea or abnormal taste.     OVERNIGHT EVENTS: None  Vital Signs Last 24 Hrs  T(C): 36.7 (03 Mar 2024 05:07), Max: 36.7 (02 Mar 2024 09:17)  T(F): 98.1 (03 Mar 2024 05:07), Max: 98.1 (03 Mar 2024 05:07)  HR: 87 (03 Mar 2024 05:07) (84 - 99)  BP: 111/74 (03 Mar 2024 05:07) (102/71 - 117/79)  BP(mean): --  RR: 18 (03 Mar 2024 05:07) (18 - 18)  SpO2: 96% (03 Mar 2024 05:07) (96% - 99%)    Parameters below as of 03 Mar 2024 05:07  Patient On (Oxygen Delivery Method): room air    IVF: [ X] IVL [ ] NS+K@ Fluid Restrict 1.5L/day DC'd 3/2  DIET: [X ] Regular [ ] CCD [ ] Renal [ ] Puree [ ] Dysphagia [ ] Tube Feeds:   PCA: [ ] YES [X ] NO   ARTHUR: [ ] YES [ X] NO [X ] VOID   BM: [X ] YES-on 3/1    DRAINS: None    PHYSICAL EXAM:    General: No Acute Distress     Neurological: No interval chnage. Awake, alert oriented to person, place and time, Following Commands, PERRL, VFF. EOMI, Face Symmetrical, Speech Fluent, Moving all extremities, Muscle Strength normal in all four extremities, No Drift, Sensation to Light Touch Intact    Pulmonary: Clear to Auscultation, No Rales, No Rhonchi, No Wheezes     Cardiovascular: S1, S2, Regular Rate and Rhythm     Gastrointestinal: Soft, Nontender, Nondistended     Incision: +Nasal packing bilat. Rt thigh fat graft w/SQ closure-? clear to rosea drainage on dsg only, maybe from med honey dsg, however almost no cellulitis today and less indurated    LABS:                        12.1   11.03 )-----------( 344      ( 02 Mar 2024 07:40 )             38.3    03-03    142  |  103  |  14  ----------------------------<  76  3.9   |  28  |  0.92    Ca    9.2      03 Mar 2024 06:00    I&O's Summary    02 Mar 2024 07:01  -  03 Mar 2024 07:00  --------------------------------------------------------  IN: 920 mL / OUT: 850 mL / NET: 70 mL      IMAGING:   < from: Xray Chest 1 View- PORTABLE-Urgent (Xray Chest 1 View- PORTABLE-Urgent .) (02.23.24 @ 23:08) >    IMPRESSION:  Clear lungs.    < end of copied text >    < from: MR Head w/wo IV Cont (02.21.24 @ 22:05) >  IMPRESSION:  Status post transsphenoidal resection with postoperative changes.    Decreased soft tissue thickening within the postoperative bed and along   the tuberculum sella compared with themost recent MR scan likely   representing evolving postoperative changes.    Focal areas of nonenhancement along the sellar floor anteriorly which may   represent dural defects. Clinical correlation is advised.    < end of copied text >    < from: VA Duplex Lower Ext Vein Scan, Bilat (02.20.24 @ 13:06) >  No evidence of deep venous thrombosis in either lower extremity.    < end of copied text >    MEDICATIONS  (STANDING):  atorvastatin 40 milliGRAM(s) Oral at bedtime  ceFAZolin   IVPB 2000 milliGRAM(s) IV Intermittent every 8 hours  desmopressin 0.05 milliGRAM(s) Oral <User Schedule>  enoxaparin Injectable 40 milliGRAM(s) SubCutaneous <User Schedule>  ergocalciferol 53423 Unit(s) Oral every week  hydrocortisone 10 milliGRAM(s) Oral <User Schedule>  hydrocortisone 20 milliGRAM(s) Oral <User Schedule>  levothyroxine 100 MICROGram(s) Oral daily  multivitamin 1 Tablet(s) Oral daily  polyethylene glycol 3350 17 Gram(s) Oral daily  senna 2 Tablet(s) Oral at bedtime    MEDICATIONS  (PRN):  acetaminophen     Tablet .. 650 milliGRAM(s) Oral every 6 hours PRN Mild Pain (1 - 3)  bisacodyl 5 milliGRAM(s) Oral daily PRN Constipation  melatonin 5 milliGRAM(s) Oral at bedtime PRN Insomnia  ondansetron Injectable 4 milliGRAM(s) IV Push every 6 hours PRN Nausea and/or Vomiting

## 2024-03-03 NOTE — DISCHARGE NOTE NURSING/CASE MANAGEMENT/SOCIAL WORK - NSDCFUADDAPPT_GEN_ALL_CORE_FT
Please call your Neurosurgeon for an appointment within 1 week after your hospital discharge for wound check and further recommendations.    Followup with your Roxi MD/Endocrinologist in 1 week

## 2024-03-03 NOTE — DISCHARGE NOTE PROVIDER - CARE PROVIDER_API CALL
Virgilio Conway)  Neurosurgery  [address], [address] [facility] [address]  [place] [address]  Phone: [phone]  Fax: [phone]  Follow Up Time:     Nery Brown  Otolaryngology  [address] [address]  [place] [address]  Phone: [phone]  Fax: [phone]  Follow Up Time:

## 2024-03-03 NOTE — DISCHARGE NOTE PROVIDER - NSDCFUADDAPPT_GEN_ALL_CORE_FT
Please call your Neurosurgeon for an appointment within 1 week after your hospital discharge for wound check and further recommendations.    Followup with your Roxi MD/Endocrinologist in 1 week   Please call your Neurosurgeon for an appointment for 3/7 or 3/8/24 for wound check and further recommendations.    Followup with your Roxi MD/Endocrinologist in 1 week

## 2024-03-03 NOTE — PROGRESS NOTE ADULT - ASSESSMENT
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 Today, p/f leaking from nostrils (L>R) when he stands up or valsalvas. Reports no h/as. Afebrile per nurse in ED.  WBC 11.92. Last Na (2/8/24 136).    PROCEDURE: Adm 2/19 CSF Rhinorrhea s/p TSP for craniopharyngioma on 2/1/24, 2/20 LD placed in [facility]. 2/23 Replaced necrotic nasoseptal flap w/contra-lataral  nasoseptal flap for CSF leak and Rt thigh fat graft.    POD#9    PLAN:  Neuro: Cont Ancef Q8hr x 7 days for Rt fat graft site cellulitis-DC on Keflex 500mg BID x 5days. Endo following-on DDAVP 0.05mg BID, Hydrocortisone 20AM and 10PM-on DC change to 10mg AM and 5mg PM per [place] of 3/2., Fluid Restrict 1.5 L/day-stopped 3/2 and Na OK.  Nasal Splint to be DC'd Monday outpt with ENT/Dr Brown.  TSP precautions. Leukocytosis from steroids now trending downwards, He has a Pvt Endo to FU with. Dressing given to keep Rt thigh wound cover. Inc activity/OOB.  PT/OT recomm no needs. DC Home now.    ENT note of 3/2- Problem/Plan - 1: ·  Problem: CSF leak. ·  Plan: - Keep nasal splints in place for now, to be removed in Dr. Fierro office monday 3/4- continue humidified face tent  - nasal saline, 2 sprays to b/l nares 4 times a day.- monitor for signs of Epistaxis and CSF leak- avoid nasal trauma, heavy lifting and bending at waist.    Endo note of 3/1-Agree with assessment and plan by Dr. Swann. Reviewed all pertinent labs, glucose values, and imaging studies. Modifications made as indicated above. Pt. with hx of craniopharyngioma s/p resection with panhypopituitarism. Continue with hydrocortisone 20mg in the morning and 10mg in the afternoon while in-patient then plan to lower to 10mg and 5mg upon discharge. Increase levothyroxine to 100 mcg. Can repeat Free T4 3/9/2024. DI improving now with stable sodium with some fluid restriction. Monitor for now on current dose of DDAVP. Check sodium. Gonadal axis evaluation and treatment as outpatient. optho follow-up. Lazaro Hicks D.O [phone].     Respiratory: Patient instructed to use incentive spirometer [ ] YES [ X] NO              DVT ppx: [X ] SQL [ ] SQH and Venodynes [ ] Left [ ] Right [ X] Bilateral    Discharge Planning:  The patient was evaluated by PT/OT and recommended no needs.   He was subsequently DC on 3/3/24 in stable condition.    More than 30 minutes spent on total encounter: more than 50% of the visit was spent on educating the patient and family regarding condition, medications, follow up plans, signs and symptoms to be concerned with, preparing paperwork, and questions answered regarding discharge.

## 2024-03-03 NOTE — DISCHARGE NOTE PROVIDER - NSDCCPTREATMENT_GEN_ALL_CORE_FT
PRINCIPAL PROCEDURE  Procedure: Repair, CSF leak, using nasal septal flap or abdominal fat graft and frameless stereotaxy, after transsphenoidal surgery  Findings and Treatment:

## 2024-03-03 NOTE — PROGRESS NOTE ADULT - ASSESSMENT
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 Admitted for postoperative CSF leak despite attempted lumbar drain. Pt now s/p replacement of necrotic nasoseptal flap with contralateral nasoseptal flap for CSF leak (right thigh fat graft) POD 9. Bilateral septal splints in place and patent. On exam, no evidence of CSF leak or epistaxis.

## 2024-03-03 NOTE — PROGRESS NOTE ADULT - THIS PATIENT HAS THE FOLLOWING CONDITION(S)/DIAGNOSES ON THIS ADMISSION:
None
Acute Blood Loss Anemia
None
Acute Blood Loss Anemia
None

## 2024-03-03 NOTE — DISCHARGE NOTE PROVIDER - CARE PROVIDERS DIRECT ADDRESSES
[email or web address]

## 2024-03-03 NOTE — PROGRESS NOTE ADULT - SUBJECTIVE AND OBJECTIVE BOX
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 Admitted for postoperative CSF leak despite attempted lumbar drain. Pt now s/p replacement of necrotic nasoseptal flap with contralateral nasoseptal flap for CSF leak (right thigh fat graft) POD 9. Bilateral septal splints in place. Pt seen and examined at bedside. No acute events overnight. Denies HAs, salty or metallic taste, fevers, clear nasal discharge. pt wishes to go home today.     PAST MEDICAL & SURGICAL HISTORY:  HLD (hyperlipidemia)    History of pituitary tumor    Deviated septum    History of dental surgery    Allergies    No Known Allergies    Intolerances    MEDICATIONS  (STANDING):  atorvastatin 40 milliGRAM(s) Oral at bedtime  ceFAZolin   IVPB 2000 milliGRAM(s) IV Intermittent every 8 hours  desmopressin 0.05 milliGRAM(s) Oral <User Schedule>  enoxaparin Injectable 40 milliGRAM(s) SubCutaneous <User Schedule>  ergocalciferol 08584 Unit(s) Oral every week  hydrocortisone 10 milliGRAM(s) Oral <User Schedule>  hydrocortisone 20 milliGRAM(s) Oral <User Schedule>  levothyroxine 100 MICROGram(s) Oral daily  multivitamin 1 Tablet(s) Oral daily  polyethylene glycol 3350 17 Gram(s) Oral daily  senna 2 Tablet(s) Oral at bedtime    MEDICATIONS  (PRN):  acetaminophen     Tablet .. 650 milliGRAM(s) Oral every 6 hours PRN Mild Pain (1 - 3)  bisacodyl 5 milliGRAM(s) Oral daily PRN Constipation  melatonin 5 milliGRAM(s) Oral at bedtime PRN Insomnia  ondansetron Injectable 4 milliGRAM(s) IV Push every 6 hours PRN Nausea and/or Vomiting    Social History: see consult note    Family history: see consult note    ROS:   ENT: all negative except as noted in HPI   Pulm: denies SOB, cough, hemoptysis  Neuro: denies numbness/tingling, loss of sensation  Endo: denies heat/cold intolerance, excessive sweating    Vital Signs Last 24 Hrs  T(C): 36.7 (03 Mar 2024 05:07), Max: 36.7 (02 Mar 2024 09:17)  T(F): 98.1 (03 Mar 2024 05:07), Max: 98.1 (03 Mar 2024 05:07)  HR: 87 (03 Mar 2024 05:07) (84 - 99)  BP: 111/74 (03 Mar 2024 05:07) (102/71 - 117/79)  BP(mean): --  RR: 18 (03 Mar 2024 05:07) (18 - 18)  SpO2: 96% (03 Mar 2024 05:07) (96% - 99%)    Parameters below as of 03 Mar 2024 05:07  Patient On (Oxygen Delivery Method): room air                        12.1   11.03 )-----------( 344      ( 02 Mar 2024 07:40 )             38.3    03-02    140  |  102  |  14  ----------------------------<  126<H>  4.1   |  26  |  1.00    Ca    9.4      02 Mar 2024 19:32    PHYSICAL EXAM:  Gen: NAD  Skin: No rashes, bruises, or lesions  Head: Normocephalic, Atraumatic  Face: no edema, erythema, or fluctuance. Parotid glands soft without mass  Eyes: no scleral injection  Nose: bilaterl nasal splints patent. No abnormal drainage or bleeding.   Mouth: No Stridor / Drooling / Trismus.  Mucosa moist, tongue/uvula midline, oropharynx clear  Neck: Flat, supple, no lymphadenopathy, trachea midline, no masses  Lymphatic: No lymphadenopathy  Resp: breathing easily, no stridor  Neuro: facial nerve intact, no facial droop

## 2024-03-03 NOTE — PROGRESS NOTE ADULT - REASON FOR ADMISSION
CSF leak
CSF leak s/p endo TSP resection craniopharyngioma
postoperative CSF leak
CSF leak
CSF leak s/p resection craniopharyngioma
CSF leak
CSF leak repair
r/o csf leak
CSF Leak

## 2024-03-03 NOTE — DISCHARGE NOTE PROVIDER - HOSPITAL COURSE
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 Today, p/f leaking from nostrils (L>R) when he stands up or valsalvas. Reports no h/as. Afebrile per nurse in ED.  WBC 11.92. Last Na (2/8/24 136).    PROCEDURE: Adm 2/19 CSF Rhinorrhea s/p TSP for craniopharyngioma on 2/1/24, 2/20 LD placed in Neurorad. 2/23 Replaced necrotic nasoseptal flap w/contra-lataral  nasoseptal flap for CSF leak and Rt thigh fat graft.    POD#9    PLAN:  Neuro: Cont Ancef Q8hr x 7 days for Rt fat graft site cellulitis-DC on Keflex 500mg BID x 5days. Endo following-on DDAVP 0.05mg BID, Hydrocortisone 20AM and 10PM-on DC change to 10mg AM and 5mg PM per [place] of 3/2., Fluid Restrict 1.5 L/day-stopped 3/2 and Na OK.  Nasal Splint to be DC'd Monday outpt with ENT/Dr Brown.  TSP precautions. Leukocytosis from steroids now trending downwards, He has a Pvt Endo to FU with. Dressing given to keep Rt thigh wound cover. Inc activity/OOB.  PT/OT recomm no needs. DC Home now.    ENT note of 3/2- Problem/Plan - 1: ·  Problem: CSF leak. ·  Plan: - Keep nasal splints in place for now, to be removed in Dr. Fierro office monday 3/4- continue humidified face tent  - nasal saline, 2 sprays to b/l nares 4 times a day.- monitor for signs of Epistaxis and CSF leak- avoid nasal trauma, heavy lifting and bending at waist.    Endo note of 3/1-Agree with assessment and plan by Dr. Swann. Reviewed all pertinent labs, glucose values, and imaging studies. Modifications made as indicated above. Pt. with hx of craniopharyngioma s/p resection with panhypopituitarism. Continue with hydrocortisone 20mg in the morning and 10mg in the afternoon while in-patient then plan to lower to 10mg and 5mg upon discharge. Increase levothyroxine to 100 mcg. Can repeat Free T4 3/9/2024. DI improving now with stable sodium with some fluid restriction. Monitor for now on current dose of DDAVP. Check sodium. Gonadal axis evaluation and treatment as outpatient. optho follow-up. Lazaro Hicks D.O [phone].     Respiratory: Patient instructed to use incentive spirometer [ ] YES [ X] NO              DVT ppx: [X ] SQL [ ] SQH and Venodynes [ ] Left [ ] Right [ X] Bilateral    Discharge Planning:  The patient was evaluated by PT/OT and recommended no needs.   He was subsequently DC on 3/3/24 in stable condition.

## 2024-03-03 NOTE — DISCHARGE NOTE PROVIDER - NSDCMRMEDTOKEN_GEN_ALL_CORE_FT
acetaminophen 325 mg oral tablet: 3 tab(s) orally every 6 hours As needed Temp greater or equal to 38C (100.4F), Mild Pain (1 - 3)  cephalexin 500 mg oral tablet: 1 tab(s) orally 2 times a day  Crestor 10 mg oral tablet: 1 tab(s) orally once a day (at bedtime)  desmopressin 0.1 mg oral tablet: 0.05 milligram(s) orally 2 times a day at 10am and 10pm daily  ergocalciferol 1.25 mg (50,000 intl units) oral capsule: 1 cap(s) orally every 7 days  hydrocortisone 10 mg oral tablet: 1 tab(s) orally once a day (in the morning) at 8 AM  hydrocortisone 5 mg oral tablet: 1 tab(s) orally once a day (in the afternoon) at 3 PM  levothyroxine 100 mcg (0.1 mg) oral tablet: 1 tab(s) orally once a day  melatonin 5 mg oral tablet: 1 tab(s) orally once a day (at bedtime) As needed Sleep  Multiple Vitamins oral tablet: 1 tab(s) orally once a day  polyethylene glycol 3350 oral powder for reconstitution: 17 gram(s) orally every 12 hours  senna leaf extract oral tablet: 2 tab(s) orally once a day (at bedtime)  sodium chloride 0.65% nasal spray: nasal every 4 hours

## 2024-03-03 NOTE — DISCHARGE NOTE PROVIDER - NSDCFUSCHEDAPPT_GEN_ALL_CORE_FT
Nery Brown  [facility]  [facility] [address] [facility] [place]  Scheduled Appointment: 03/04/2024

## 2024-03-03 NOTE — PROGRESS NOTE ADULT - PROBLEM SELECTOR PROBLEM 1
CSF leak
CSF leak
Craniopharyngioma
CSF leak
CSF leak
Craniopharyngioma
CSF leak
CSF leak
Craniopharyngioma
CSF leak
Craniopharyngioma
Craniopharyngioma
CSF leak
Craniopharyngioma
Craniopharyngioma

## 2024-03-03 NOTE — DISCHARGE NOTE NURSING/CASE MANAGEMENT/SOCIAL WORK - PATIENT PORTAL LINK FT
You can access the FollowMyHealth Patient Portal offered by [facility] by registering at the following website: [email or web address]. By joining [facility]’s FollowMyHealth portal, you will also be able to view your health information using other applications (apps) compatible with our system.

## 2024-03-03 NOTE — PROGRESS NOTE ADULT - PROVIDER SPECIALTY LIST ADULT
Endocrinology
NSICU
Neuroradiology
Neurosurgery
ENT
Endocrinology
NSICU
Neurosurgery
ENT
ENT
NSICU
Neurosurgery
Neurosurgery
Endocrinology
NSICU
Neurosurgery
ENT
ENT
Endocrinology
NSICU
ENT
Endocrinology

## 2024-03-04 ENCOUNTER — APPOINTMENT (OUTPATIENT)
Dept: OTOLARYNGOLOGY | Facility: CLINIC | Age: 44
End: 2024-03-04
Payer: COMMERCIAL

## 2024-03-04 VITALS
HEART RATE: 103 BPM | BODY MASS INDEX: 28.66 KG/M2 | HEIGHT: 65 IN | DIASTOLIC BLOOD PRESSURE: 76 MMHG | SYSTOLIC BLOOD PRESSURE: 110 MMHG | OXYGEN SATURATION: 99 % | WEIGHT: 172 LBS

## 2024-03-04 PROCEDURE — 31237 NSL/SINS NDSC SURG BX POLYPC: CPT | Mod: 50,58

## 2024-03-04 PROCEDURE — 99024 POSTOP FOLLOW-UP VISIT: CPT

## 2024-03-04 RX ORDER — AZELASTINE HYDROCHLORIDE 137 UG/1
137 SPRAY, METERED NASAL
Refills: 0 | Status: COMPLETED | COMMUNITY
End: 2024-03-04

## 2024-03-04 NOTE — PHYSICAL EXAM
[Nasal Endoscopy Performed] : nasal endoscopy was performed, see procedure section for findings [Normal] : external appearance is normal [de-identified] : Cervantes splints removed / large septal perforation

## 2024-03-04 NOTE — ASSESSMENT
[FreeTextEntry1] : Mr. GONZALEZ is a 43 year male s/p TSRP 2/1/24 with CSF leak repair 2/23/24 with Dr. Wren discharged yesterday and doing well. bilateral splints removed today, septal perforation noted - would start Oscar med sinus rinse daily and can use saline nasal spray throughout the day - no nose blowing, heavy lifting or bearing down for 8 weeks from second surgery  - would rec f/u with Neuro Ophthalmologist for R vision changes this AM (which resolved after 1 hour) - f/u 1 month

## 2024-03-04 NOTE — HISTORY OF PRESENT ILLNESS
[de-identified] : 43 year old male presents for post op visit  s/p Endoscopic transnasal transsphenoidal approach to sella and anterior skull base, Repair of cerebrospinal fluid leak with vascularized nasal mucosal flap, [place] of fat graft 2/23/24 s/p Endoscopic transphenoidal or transnasal resection of pituitary tumor 02/01/24 Patient reports feeling well since surgery and discharged yesterday States having a lot of nasal discharge, recently had a cold. No sinus pain/pressure, pt reports one hour of tunnel vision R eye this morning, no pain has never experienced this in the past, last seen by Optho in November

## 2024-03-04 NOTE — PROCEDURE
[FreeTextEntry6] : reason for exam: anterior rhinoscopy insufficient for symptom evaluation   Fiberoptic nasal endoscopy was performed.  R/b/a of procedure was explained to the patient and they agreed to proceed with procedure.  Nasal cavities were treated with afrin and lidocaine prior to nasal endoscopy to make the patient more comfortable.  Significant factors noted: NASOPORE IN PLACE at b/l sphenoidotomies - able to pass scope superiorly into sphenoid and flap is visualized and appears well vascularized with transmitted pulsations but no evidence for CSF leak.  Otherwise normal mucosa, normal b/l inferior, middle and superior turbinates, inferior, middle and superior meati and sphenoethmoidal recess.  No nasal polyps.  Large septal perforation with exposed cartilage superiorly       scope #: [id number]

## 2024-03-04 NOTE — REASON FOR VISIT
[Post-Operative Visit] : a post-operative visit [Spouse] : spouse [FreeTextEntry2] : s/p  Endoscopic transnasal transsphenoidal approach to sella and anterior skull base.  Repair of cerebrospinal fluid leak with vascularized nasal mucosal flap.  [place] of fat graft.

## 2024-03-04 NOTE — END OF VISIT
[FreeTextEntry3] : I personally saw and examined JANNETTE GONZALEZ in detail.  I spoke to JEFFERSON Lianres regarding the assessment and plan of care. I performed the procedures and relevant physical exam.  I have reviewed the above assessment and plan of care and I agree.  I have made changes to the body of the note wherever necessary and appropriate.

## 2024-03-05 NOTE — CHART NOTE - NSCHARTNOTEFT_GEN_A_CORE
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 S/p CSF leak repair 2/23 yesterday evening.  Endocrine following for panhypopituitarism.    - Significant high UOP since yesterday evening, agree with DDAVP 0.1mg BID.  - BP stable. continue hydrocortisone 20mg at 8am and 10mg at 3pm.    Ron Rene MD  Endocrine Fellow  Can be reached via teams. For follow up questions, discharge recommendations, or new consults, please call answering service at [phone] (weekdays); [phone] (nights/weekends).
CAPRINI SCORE [CLOT] Score on Admission for     AGE RELATED RISK FACTORS                                                       MOBILITY RELATED FACTORS  [X ] Age 41-60 years                                            (1 Point)                  [X ] Bed rest                                                        (1 Point)  [ ] Age: 61-74 years                                           (2 Points)                 [ ] Plaster cast                                                   (2 Points)  [ ] Age= 75 years                                              (3 Points)                 [ ] Bed bound for more than 72 hours                 (2 Points)    DISEASE RELATED RISK FACTORS                                               GENDER SPECIFIC FACTORS  [ ] Edema in the lower extremities                       (1 Point)                  [ ] Pregnancy                                                     (1 Point)  [ ] Varicose veins                                               (1 Point)                  [ ] Post-partum < 6 weeks                                   (1 Point)             [ ] BMI > 25 Kg/m2                                            (1 Point)                  [ ] Hormonal therapy  or oral contraception          (1 Point)                 [ ] Sepsis (in the previous month)                        (1 Point)                  [ ] History of pregnancy complications                 (1 point)  [ ] Pneumonia or serious lung disease                                               [ ] Unexplained or recurrent                     (1 Point)           (in the previous month)                               (1 Point)  [ ] Abnormal pulmonary function test                     (1 Point)                 SURGERY RELATED RISK FACTORS (include planned surgeries)  [ ] Acute myocardial infarction                              (1 Point)                 [ ]  Section                                             (1 Point)  [ ] Congestive heart failure (in the previous month)  (1 Point)         [ ] Minor surgery                                                  (1 Point)   [ ] Inflammatory bowel disease                             (1 Point)                 [ ] Arthroscopic surgery                                        (2 Points)  [ ] Central venous access                                      (2 Points)                [X ] General surgery lasting more than 45 minutes   (2 Points)       [ ] Stroke (in the previous month)                          (5 Points)               [ ] Elective arthroplasty                                         (5 Points)            [ ] current or past malignancy                              (2 Points)                                                                                                       HEMATOLOGY RELATED FACTORS                                                 TRAUMA RELATED RISK FACTORS  [ ] Prior episodes of VTE                                     (3 Points)                [ ] Fracture of the hip, pelvis, or leg                       (5 Points)  [ ] Positive family history for VTE                         (3 Points)                 [ ] Acute spinal cord injury (in the previous month)  (5 Points)  [ ] Prothrombin 50362 A                                     (3 Points)                 [ ] Paralysis  (less than 1 month)                             (5 Points)  [ ] Factor V Leiden                                             (3 Points)                  [ ] Multiple Trauma within 1 month                        (5 Points)  [ ] Lupus anticoagulants                                     (3 Points)                                                           [ ] Anticardiolipin antibodies                               (3 Points)                                                       [ ] High homocysteine in the blood                      (3 Points)                                             [ ] Other congenital or acquired thrombophilia      (3 Points)                                                [ ] Heparin induced thrombocytopenia                  (3 Points)                                          Total Score [   4       ]    Risk:  Very low 0   Low 1 to 2   Moderate 3 to 4   High =5       VTE Prophylasix Recommednations:  [X ] mechanical pneumatic compression devices                                      [ ] contraindicated: _____________________  [X ] chemo prophylasix                                                                                   [ ] contraindicated _____________________    **** HIGH LIKELIHOOD DVT PRESENT ON ADMISSION  [ ] (please order LE dopplers within 24 hours of admission)
Called patients endocrinologist Dr. Dao for update on discharge rec. Patient will need repeat sodium level in 1 week given he was discharged on BID ddAVP. Left message w/ , Dr Dao currently in room with patient and will call me back.    Max Donald MD  Endocrine Fellow  For nonurgent matters, please email [email or web address] or [email or web address]. For urgent matters only, please call answering service at [phone] (weekdays), [phone] (nights/weekends).
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 Today, p/f leaking from nostrils (L>R) when he stands up or valsalvas. Endocrinology consulted for management of panhypopituitarism.    #Craniopharyngioma s/p TSR 2/1/24  #Secondary AI  #Secondary hypothyroidism  #Central DI  - pathology consistent with craniopharyngioma  - follows with Dr. Isidra Dao  PLAN  In terms of DI:   Goal Na 140-145  - Patient's Na noted to be 147, he is now having >1L output in the last 2 hours  - Please give another dose of DDAVP 0.05mg PO now   - q12h BMP  - continue DDAVP 0.05mg PO daily at bedtime, will consider increasing to 0.1mg daily at bedtime pending sodium and output on 2/24  Recommendations:   - DI Watch: Please monitor strict I/O, sodium levels q8hrs (monitor for hypo or hypernatremia).   If urine output more than 500ml/h or 250ml/h for 2 consecutive hours get stat Na, if Na is increasing again compared to prior than bolus 1/2 NS to match half the output (for example, if net negative 2Liters can give 1Liter 1/2NS if patient is unable to keep up with output with PO intake)  - If Na > increases to 140 or higher would dose DDAVP 0.05mg and continue DI watch as above e   - Please make sure pt has access to water and encourage patient to drink to thirst   In terms of secondary AI  - please give patient 100mg IV hydrocortisone on call to OR on 2/23  - continue hydrocortisone PO 20mg at 8am and 10mg PO at 3pm, plan to discharge on hydrocortisone 10mg at 8am and 5mg at 3pm, if HD unstable, start stress dose steroids  In terms of secondary hypothyroidism  - given FT4 only increase to 0.9, increase levothyroxine to 88mcg once daily (maintain on empty stomach (at least 1 hour before meals), separate by 4 hours from PPI or calcium supplementation which can inhibit its absorption)  In terms of hypogonadism  - outpatient management  In terms of growth hormone deficiency  - outpatient management    Gretta Rodriguez DO   Attending Physician   Department of Endocrinology, Diabetes and Metabolism     If before 9AM or after 5PM, or on weekends/holidays, please call the Endocrine answering service for assistance ([phone]).  For nonurgent matters, please email [email or web address] for assistance.
Plan was already discussed with primary team earlier this afternoon.    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 S/p CSF leak repair 2/23 yesterday evening.  Endocrine following for panhypopituitarism.  - Patient did not receive any desmopressin after 0.1mg dose yesterday evening. Recommended stat dose of 0.05mg early afternoon and then BID going forward.  - BP stable. continue hydrocortisone 20mg at 8am and 10mg at 3pm.    Ron Rene MD  Endocrine Fellow  Can be reached via teams. For follow up questions, discharge recommendations, or new consults, please call answering service at [phone] (weekdays); [phone] (nights/weekends).
Nutrition Follow Up Note  Patient seen for: nutrition follow-up on NSCU. Chart reviewed, events noted.    Per chart, pt is a 43 year old 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 Pt now s/p CSF leak repair.    Source: [x] Patient       [x] electronic medical record        [] RN        [x] Family at bedside       [] Other:    -If unable to interview patient: [] Trach/Vent/BiPAP  [] Disoriented/confused/inappropriate to interview    Nutrition-Related Events:   - Pressors:  [] Yes    [x] No   - Propofol:  [] Yes    [x] No         - Rate: __mL/hr. If maintained x 24 hours, propofol will provide:     Diet Order:   Diet, Regular (02-23-24)    Is current diet order appropriate/adequate? [x] Yes  []  No:     PO intake :   [x] >75%  Adequate    [] 50-75%  Fair       [] <50%  Poor    Nutrition-related concerns:  - Pt reports improved appetite/PO intake since previous RD assessment. States he has been receiving Mighty Shakes on his trays but declines need for them as he is now eating well. Has a Fairlife protein shake at bedside, 100% finished, brought in by family member at the bedside. Pt declines need to receive oral nutrition supplements in-house.  - Ordered for ergocalciferol and a multivitamin   - Ordered for PO synthroid  - Ordered for oral hydrocortisone. BG readings 102-193 mg/dl x last 48 hours.    GI: Denies nausea, vomiting, constipation, diarrhea. Reports loose stool this AM in setting of bowel regimen. Ordered for miralax, dulcolax, & senna.    Weights:   Dosing wt: 77.1 kg (02-23)  Daily weight: 78.7 kg (02-19)  RD to continue to monitor weight trends as able/available.     MEDICATIONS  (STANDING):  atorvastatin  desmopressin  ergocalciferol  hydrocortisone  hydrocortisone  levothyroxine  multivitamin  polyethylene glycol 3350  senna  sodium chloride 0.45%.    Pertinent Labs:   A1C with Estimated Average Glucose Result: 5.4 % (02-19-24 @ 15:14)    Skin per nursing documentation: none noted  Edema per nursing documentation: none noted    Estimated Energy Needs: 23-28 kcal/kg = 7376-0408 kcal/day   Estimated Protein Needs: 1-1.2 g pro/kg = 77-93 g pro/day  Estimated Fluid Needs: defer to team  based on dosing wt 77.1 kg (02-23)    Previous Nutrition Diagnosis: None  Nutrition Diagnosis is: [] ongoing  [] resolved [x] not applicable     New Nutrition Diagnosis: [x] Not applicable    Nutrition Care Plan:  [x] In Progress  [] Achieved  [] Not applicable    Nutrition Interventions:     Education Provided:       [x] Yes: Discussed importance of adequate protein-energy consumption to meet estimated nutrient needs. Encouraged continued good intake of meals as tolerated. Briefly discussed diet for HLD per pt request: discussed sources of saturated fat in the diet, encouraged balanced meals with lean sources of protein and increased intake of non-starchy vegetables. Pt noted with good comprehension and made aware RD remains available for further questions/concerns.         Recommendations:      1) Continue current diet order: Regular  2) Continue current micronutrient supplementation as indicated: multivitamin and ergocalciferol   3) Monitor PO intake, GI tolerance, skin integrity, labs, weight, and bowel movement regularity. Consider reducing bowel regimen in setting of reported loose stool.  4) Honor food preferences as feasible. Assist with meals PRN and encourage PO intake.    Monitoring and Evaluation:   Continue to monitor nutritional intake, tolerance to diet prescription, weights, labs, skin integrity    RD remains available upon request and will follow up per protocol  Dolly Avilez RDN, CDN (Available via [facility] TEAMS)

## 2024-03-05 NOTE — CHART NOTE - NSCHARTNOTESELECT_GEN_ALL_CORE
Nutrition Services
Endocrine
Endocrine
Endocrinology/Event Note
VTE RISK ASSESSMENT/Event Note
endocrinology/Event Note

## 2024-03-07 ENCOUNTER — APPOINTMENT (OUTPATIENT)
Dept: SPINE | Facility: CLINIC | Age: 44
End: 2024-03-07
Payer: COMMERCIAL

## 2024-03-07 LAB
CULTURE RESULTS: SIGNIFICANT CHANGE UP
SPECIMEN SOURCE: SIGNIFICANT CHANGE UP

## 2024-03-07 PROCEDURE — 99024 POSTOP FOLLOW-UP VISIT: CPT

## 2024-03-07 RX ORDER — LEVOTHYROXINE SODIUM 0.1 MG/1
100 TABLET ORAL
Qty: 30 | Refills: 0 | Status: ACTIVE | COMMUNITY
Start: 2024-03-03

## 2024-03-07 RX ORDER — MULTIVIT-MIN/FOLIC/VIT K/LYCOP 400-300MCG
50 MCG TABLET ORAL
Refills: 0 | Status: ACTIVE | COMMUNITY

## 2024-03-07 RX ORDER — HYDROCORTISONE 10 MG/1
10 TABLET ORAL
Qty: 30 | Refills: 0 | Status: ACTIVE | COMMUNITY
Start: 2024-03-03

## 2024-03-07 RX ORDER — HYDROCORTISONE 5 MG/1
5 TABLET ORAL
Qty: 30 | Refills: 0 | Status: ACTIVE | COMMUNITY
Start: 2024-03-03

## 2024-03-07 NOTE — ASSESSMENT
[FreeTextEntry1] : Esteban Sullivan is a 43 year old gentleman who underwent a trans nasal transsphenoidal approach for removal of a craniopharyngioma on 02/01/2024.  He was readmitted with a cerebral spinal fluid leak and underwent a trans nasal transsphenoidal approach to the sella for repair of a CSF leak and harvest of a fat graft.  There is no evidence of CSF leak.  The right thigh wound is healing well.  Wound care was discussed.  The patient may shower, use soap and water and is to rinse and pat the incision dry.  He may leave it uncovered if it is not draining.  Activity levels were discussed.  The patient is to avoid bending and lifting.  It was advised to take stool softeners such as Colace to avoid constipation and should take Marilee Lax if he has not had a bowel movement in 3 days.  The patient was instructed to not let any objects be put up his nostrils.  If he needs a COVID or flu test, h is to call Dr. Brown's office.  The patient is to continue his follow up with Dr. Dao and is to make an appointment with Dr. Alex Paredes for an eye exam.  He is to return to this office in 2 weeks for evaluation and in 3 months with a new MRI for Dr. Conway to review.

## 2024-03-07 NOTE — PHYSICAL EXAM
[General Appearance - Alert] : alert [General Appearance - In No Acute Distress] : in no acute distress [General Appearance - Well Nourished] : well nourished [] : normal voice and communication [General Appearance - Well-Appearing] : healthy appearing [General Appearance - Well Developed] : well developed [Dry] : dry [Clean] : clean [Intact] : intact [Healing Well] : healing well [Normal Skin] : normal [No Drainage] : without drainage [Normal Skin Turgor] : skin turgor was normal [Oriented To Time, Place, And Person] : oriented to person, place, and time [Impaired Insight] : insight and judgment were intact [Affect] : the affect was normal [Memory Recent] : recent memory was not impaired [Mood] : the mood was normal [Memory Remote] : remote memory was not impaired [Person] : oriented to person [Time] : oriented to time [Place] : oriented to place [Short Term Intact] : short term memory intact [Remote Intact] : remote memory intact [Span Intact] : the attention span was normal [Concentration Intact] : normal concentrating ability [Fluency] : fluency intact [Comprehension] : comprehension intact [Past History] : adequate knowledge of personal past history [Current Events] : adequate knowledge of current events [Vocabulary] : adequate range of vocabulary [Cranial Nerves Oculomotor (III)] : extraocular motion intact [Cranial Nerves Optic (II)] : visual acuity intact bilaterally,  pupils equal round and reactive to light [Cranial Nerves Trigeminal (V)] : facial sensation intact symmetrically [Cranial Nerves Facial (VII)] : face symmetrical [Cranial Nerves Vestibulocochlear (VIII)] : hearing was intact bilaterally [Cranial Nerves Accessory (XI - Cranial And Spinal)] : head turning and shoulder shrug symmetric [Cranial Nerves Glossopharyngeal (IX)] : tongue and palate midline [Cranial Nerves Hypoglossal (XII)] : there was no tongue deviation with protrusion [Motor Tone] : muscle tone was normal in all four extremities [No Muscle Atrophy] : normal bulk in all four extremities [Motor Strength] : muscle strength was normal in all four extremities [Sensation Tactile Decrease] : light touch was intact [Balance] : balance was intact [Abnormal Walk] : normal gait [2+] : Patella left 2+ [Erythema] : not erythematous [Warm] : not warm [FreeTextEntry1] : right thigh incision. [FreeTextEntry6] : Covered with a gauze dressing. [Past-pointing] : there was no past-pointing [Tremor] : no tremor present

## 2024-03-07 NOTE — HISTORY OF PRESENT ILLNESS
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  This has been followed since 2020 and had been stable and he denied any symptoms and had normal visual fields at his appointment with Dr. Paredes in October of 2023.  He related at his appointment on 12/13/2023 that he has had polyuria and polydipsia for the past 10 years which he did not mention in this office or to his endocrinologist, Dr. Angela Dao.  The patient's bloodwork was consistent with being panhypopit and DI.  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  He was discharged, however was readmitted to the hospital on 02/19/2024

## 2024-03-07 NOTE — REASON FOR VISIT
[Spouse] : spouse [de-identified] : 02/01/2024 1.  Endoscopic transnasal transsphenoidal approach to craniopharyngioma.  2.  Complete resection of craniopharyngioma.  3. Use of stereotactic guidance.  4.  Placement of a lumbar drain. Surgeon Dr. Virgilio Conway and Co Surgeon Dr. Nery Brown.   Followed by 02/23/2024:  1. Endoscopic transnasal transsphenoidal approach to the sella and anterior skull base.  2.  Repair of cerebrospinal fluid leak with vascularized nasal mucosal flap. 3. [place] of a fat graft.  Surgeon Virgilio Conway.  Co Surgeon Dr. Sebastian Wren. [de-identified] : 02/01/2024 and 02/23/2024. [de-identified] : 13 [de-identified] : 2 [de-identified] : The patient stated that he is feeling better.  He had a slight headache yesterday and has mild pain of his right thigh incision.  H is incision has not had any drainage today and he completes his antibiotics today.  He denies any evidence of CSF leak.  The patient was seen by Dr. Brown of 03/04/2024.  He continues to take Desmopressin .5 mg 2 x day and stated that he has been able to go 3 hours without urinating and does not feel excessively thirsty.  He is also taking Levothyroxine and Hydrocortisone 10 mg in the am and 5 mg in the afternoon.

## 2024-03-20 ENCOUNTER — APPOINTMENT (OUTPATIENT)
Dept: OTOLARYNGOLOGY | Facility: CLINIC | Age: 44
End: 2024-03-20
Payer: COMMERCIAL

## 2024-03-20 VITALS — HEIGHT: 65 IN | BODY MASS INDEX: 28.66 KG/M2 | WEIGHT: 172 LBS

## 2024-03-20 DIAGNOSIS — M96.842 POSTPROCEDURAL SEROMA OF A MUSCULOSKELETAL STRUCTURE FOLLOWING A MUSCULOSKELETAL SYSTEM PROCEDURE: ICD-10-CM

## 2024-03-20 DIAGNOSIS — L76.33 POSTPROCEDURAL SEROMA OF SKIN AND SUBCUTANEOUS TISSUE FOLLOWING A DERMATOLOGIC PROCEDURE: ICD-10-CM

## 2024-03-20 PROCEDURE — 31237 NSL/SINS NDSC SURG BX POLYPC: CPT | Mod: 50,79

## 2024-03-20 PROCEDURE — 99024 POSTOP FOLLOW-UP VISIT: CPT

## 2024-03-20 NOTE — REASON FOR VISIT
[Post-Operative Visit] : a post-operative visit [Spouse] : spouse [FreeTextEntry2] : s/p  Endoscopic transnasal transsphenoidal approach to sella and anterior skull base.  Repair of cerebrospinal fluid leak with vascularized nasal mucosal flap.  [place] of fat graft.

## 2024-03-20 NOTE — HISTORY OF PRESENT ILLNESS
[de-identified] : 43 year old male presents for post op visit  s/p Endoscopic transnasal transsphenoidal approach to sella and anterior skull base, Repair of cerebrospinal fluid leak with vascularized nasal mucosal flap, [place] of fat graft 2/23/24 s/p Endoscopic transphenoidal or transnasal resection of pituitary tumor 02/01/24 Patient reports feeling well. [FreeTextEntry1] : Notes he is using rinse twice a day. Notes it seems to come straight out of the other nostril. Doesnt seem to be helping the congestion much.

## 2024-03-20 NOTE — ASSESSMENT
[FreeTextEntry1] : Mr. GONZALEZ is a 43 year male s/p TSRP 2/1/24 with CSF leak repair 2/23/24 with Dr. Wren discharged yesterday and doing well., large septal perforation: - debrided completely today and he is able to breathe much better - cont saline rinses BID - cont no nose blowing, heavy lifting or bearing down for another month - f/u 1 month  right thigh seroma at fat graft site: - drained and pressure dressing placed - d/w Dr. Conway and will start keflex empirically - advised to change pressure dressing daily (showed pt and wife how to do) and will call if any pain, redness, pus or fever - f/u with Dr. Conway in 1 week

## 2024-03-20 NOTE — PHYSICAL EXAM
[Nasal Endoscopy Performed] : nasal endoscopy was performed, see procedure section for findings [Normal] : external appearance is normal [de-identified] : no dorsal depression noted [de-identified] : large septal perforation [de-identified] : right thigh incision with dehiscence inferiorly, draining oily yellow fluid with no purulence appreciated, fluctuant so all fluid was expressed until completely decompressed. no erythema/induration/purulence or tenderness noted; gauze fluffs used to make pressure dressing which was secured with 2inch paper tape

## 2024-03-20 NOTE — PROCEDURE
[FreeTextEntry6] : reason for exam: anterior rhinoscopy insufficient for symptom evaluation   Fiberoptic nasal endoscopy was performed.  R/b/a of procedure was explained to the patient and they agreed to proceed with procedure.  Nasal cavities were treated with afrin and lidocaine prior to nasal endoscopy to make the patient more comfortable.  Significant factors noted: NASOPORE IN PLACE at b/l sphenoidotomies - nasopore hanging down to floor and obstructing both choana. Rigid scope #19 used after lidocaine sprayed and suctioned nasopore and crusting until completely clear. Flap is well visualized and appears well vascularized with transmitted pulsations and no evidence for CSF leak. Large septal perforation with mostly healed mucosa.       scope #: 214

## 2024-03-21 ENCOUNTER — TRANSCRIPTION ENCOUNTER (OUTPATIENT)
Age: 44
End: 2024-03-21

## 2024-03-23 LAB
CULTURE RESULTS: SIGNIFICANT CHANGE UP
SPECIMEN SOURCE: SIGNIFICANT CHANGE UP

## 2024-03-26 ENCOUNTER — NON-APPOINTMENT (OUTPATIENT)
Age: 44
End: 2024-03-26

## 2024-03-27 ENCOUNTER — APPOINTMENT (OUTPATIENT)
Dept: SPINE | Facility: CLINIC | Age: 44
End: 2024-03-27
Payer: COMMERCIAL

## 2024-03-27 VITALS
SYSTOLIC BLOOD PRESSURE: 108 MMHG | DIASTOLIC BLOOD PRESSURE: 77 MMHG | WEIGHT: 172 LBS | HEIGHT: 65 IN | OXYGEN SATURATION: 97 % | BODY MASS INDEX: 28.66 KG/M2 | HEART RATE: 78 BPM

## 2024-03-27 PROCEDURE — 99024 POSTOP FOLLOW-UP VISIT: CPT

## 2024-03-27 NOTE — HISTORY OF PRESENT ILLNESS
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 This has been followed since 2020 and had been stable and he denied any symptoms and had normal visual fields at his appointment with Dr. Paredes in October of 2023. He related at his appointment on 12/13/2023 that he has had polyuria and polydipsia for the past 10 years which he did not mention in this office or to his endocrinologist, Dr. Angela Dao. The patient's bloodwork was consistent with being panhypopit and DI. 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 He was discharged, however was readmitted to the hospital on 02/19/2024 with a cerebral spinal fluid leak and underwent a trans nasal transsphenoidal approach to the sella for repair of a CSF leak and [place] of a fat graft.

## 2024-03-27 NOTE — ASSESSMENT
[FreeTextEntry1] : 43 year old male 8 weeks s/p endoscopic transnasal transsphenoidal approach to craniopharyngioma. 2. Complete resection of craniopharyngioma and 5 weeks s/p trans nasal transsphenoidal approach to the sella for repair of a CSF leak and [place] of a fat graft. Activity levels were discussed. The patient is to avoid bending and lifting. He will follow in June .2024 with MRI of Pituitary w/wo contrast. The patient was referred to plastic surgeon Dr. Diallo Raphael for management of right thigh seroma. The patient will continue to follow up with endocrinology and Dr. Brown

## 2024-03-27 NOTE — REASON FOR VISIT
[Spouse] : spouse [de-identified] : 2/1/2024 and 2/23/2024 [de-identified] :  Endoscopic transnasal transsphenoidal approach to craniopharyngioma. 2. Complete resection of craniopharyngioma. 3. Use of stereotactic guidance. 4. Placement of a lumbar drain. [de-identified] : 8 [de-identified] :  The patient states he noted swelling and drainage from the right thigh incision. He saw Dr. Brown on 3/20/2024 and was prescribed a 7 day course of Keflex. Denies any drainage since starting antibiotics, but there is mild swelling. He reports 5 days ago he had a thick large clear mucus ball from his nose. He continues to do saline wash daily.  He is scheduled to see endocrinologist Dr. Dao on 5/1/2024 and is going to schedule an appointment with neuro-ophthalmologist Dr. Alex Paredes . He has returned to remote work.  Denies any fever or chills, or rhinorrhea

## 2024-03-27 NOTE — PHYSICAL EXAM
[General Appearance - Alert] : alert [Longitudinal] : longitudinal [General Appearance - In No Acute Distress] : in no acute distress [Intact] : intact [Dry] : dry [Clean] : clean [No Drainage] : without drainage [Normal Skin] : normal [FreeTextEntry1] : right thigh [FreeTextEntry6] : right thigh seroma  [Oriented To Time, Place, And Person] : oriented to person, place, and time [Cranial Nerves Trigeminal (V)] : facial sensation intact symmetrically [Impaired Insight] : insight and judgment were intact [Cranial Nerves Facial (VII)] : face symmetrical [Cranial Nerves Glossopharyngeal (IX)] : tongue and palate midline [Cranial Nerves Accessory (XI - Cranial And Spinal)] : head turning and shoulder shrug symmetric [Cranial Nerves Vestibulocochlear (VIII)] : hearing was intact bilaterally [Motor Strength] : muscle strength was normal in all four extremities [Cranial Nerves Hypoglossal (XII)] : there was no tongue deviation with protrusion [Motor Tone] : muscle tone was normal in all four extremities [Sensation Tactile Decrease] : light touch was intact [Abnormal Walk] : normal gait [Balance] : balance was intact

## 2024-04-01 ENCOUNTER — APPOINTMENT (OUTPATIENT)
Dept: PLASTIC SURGERY | Facility: CLINIC | Age: 44
End: 2024-04-01
Payer: COMMERCIAL

## 2024-04-01 ENCOUNTER — TRANSCRIPTION ENCOUNTER (OUTPATIENT)
Age: 44
End: 2024-04-01

## 2024-04-01 VITALS
BODY MASS INDEX: 28.66 KG/M2 | DIASTOLIC BLOOD PRESSURE: 86 MMHG | HEART RATE: 90 BPM | WEIGHT: 172 LBS | TEMPERATURE: 98.1 F | OXYGEN SATURATION: 99 % | HEIGHT: 65 IN | SYSTOLIC BLOOD PRESSURE: 135 MMHG

## 2024-04-01 PROCEDURE — 10140 I&D HMTMA SEROMA/FLUID COLLJ: CPT

## 2024-04-01 PROCEDURE — 99204 OFFICE O/P NEW MOD 45 MIN: CPT | Mod: 25

## 2024-04-01 NOTE — PROCEDURE
[FreeTextEntry6] : thigh prepped. infiltrated with lidocaine. seroma aspirated with 21 gauage angiocath. sent for culture. dressed kaden.

## 2024-04-01 NOTE — ASSESSMENT
[FreeTextEntry1] : well appearing male. small seroma at the donor site on right thigh. not infected appearing now. aspirated at bedside. will need to drain in the OR potentially if this does not resolve.

## 2024-04-01 NOTE — HISTORY OF PRESENT ILLNESS
[FreeTextEntry1] : The patient is a 43 year old male who presents today to discuss right thigh seroma. The patient was referred here today by Dr. Virgilio Conway (NeuroSurg).  several cc's of yellow/clear fluid was collected today from the right thigh and was sent off to the lab to be processed. The patient was accompanied by his  Nirali. has reported it had peviously been infected. no fevers now.

## 2024-04-02 ENCOUNTER — APPOINTMENT (OUTPATIENT)
Dept: OPHTHALMOLOGY | Facility: CLINIC | Age: 44
End: 2024-04-02
Payer: COMMERCIAL

## 2024-04-02 ENCOUNTER — NON-APPOINTMENT (OUTPATIENT)
Age: 44
End: 2024-04-02

## 2024-04-02 PROCEDURE — 99214 OFFICE O/P EST MOD 30 MIN: CPT

## 2024-04-02 PROCEDURE — 92133 CPTRZD OPH DX IMG PST SGM ON: CPT

## 2024-04-02 PROCEDURE — 92083 EXTENDED VISUAL FIELD XM: CPT

## 2024-04-10 ENCOUNTER — APPOINTMENT (OUTPATIENT)
Dept: PLASTIC SURGERY | Facility: CLINIC | Age: 44
End: 2024-04-10
Payer: COMMERCIAL

## 2024-04-10 VITALS
HEART RATE: 90 BPM | DIASTOLIC BLOOD PRESSURE: 76 MMHG | OXYGEN SATURATION: 99 % | BODY MASS INDEX: 28.66 KG/M2 | WEIGHT: 172 LBS | TEMPERATURE: 98 F | SYSTOLIC BLOOD PRESSURE: 108 MMHG | HEIGHT: 65 IN

## 2024-04-10 PROCEDURE — 99213 OFFICE O/P EST LOW 20 MIN: CPT | Mod: 24

## 2024-04-15 NOTE — REASON FOR VISIT
[Post Op: _________] : a [unfilled] post op visit [Family Member] : family member [FreeTextEntry1] : s/p right thigh seroma, dos;2/23/24. Patient reports he is doing well he denies having pain or discomfort, fever or chills.

## 2024-04-15 NOTE — ASSESSMENT
[FreeTextEntry1] : JANNETTE GONZALEZ is being seen for a post op visit, s/p right thigh seroma, dos;2/23/24. Patient reports he is doing well he denies having pain or discomfort, fever or chills.   Patient accompanied by family membe reports no issues, has been applying aquaphor, denies drainage, pain, erythema, fever  plan - cw aquaphor daily - scar massages - well healed, RTC PRN

## 2024-04-15 NOTE — PHYSICAL EXAM
[de-identified] : RLE: thigh wound cdi, well healed, no collections, no drainage  no further seroma

## 2024-04-15 NOTE — HISTORY OF PRESENT ILLNESS
[FreeTextEntry1] : JANNETTE GONZALEZ is being seen for a post op visit, s/p right thigh seroma, dos;2/23/24. Patient reports he is doing well he denies having pain or discomfort, fever or chills.   Patient accompanied by family membe reports no issues, has been applying aquaphor, denies drainage, pain, erythema, fever

## 2024-04-19 ENCOUNTER — NON-APPOINTMENT (OUTPATIENT)
Age: 44
End: 2024-04-19

## 2024-04-24 ENCOUNTER — APPOINTMENT (OUTPATIENT)
Dept: OTOLARYNGOLOGY | Facility: CLINIC | Age: 44
End: 2024-04-24
Payer: COMMERCIAL

## 2024-04-24 VITALS — TEMPERATURE: 98.3 F | HEART RATE: 85 BPM | DIASTOLIC BLOOD PRESSURE: 72 MMHG | SYSTOLIC BLOOD PRESSURE: 107 MMHG

## 2024-04-24 VITALS — WEIGHT: 175 LBS | BODY MASS INDEX: 29.16 KG/M2 | HEIGHT: 65 IN

## 2024-04-24 DIAGNOSIS — J34.89 OTHER SPECIFIED DISORDERS OF NOSE AND NASAL SINUSES: ICD-10-CM

## 2024-04-24 DIAGNOSIS — J34.2 DEVIATED NASAL SEPTUM: ICD-10-CM

## 2024-04-24 DIAGNOSIS — R09.81 NASAL CONGESTION: ICD-10-CM

## 2024-04-24 PROCEDURE — 99024 POSTOP FOLLOW-UP VISIT: CPT

## 2024-04-24 PROCEDURE — 31237 NSL/SINS NDSC SURG BX POLYPC: CPT | Mod: 50,58

## 2024-04-24 NOTE — HISTORY OF PRESENT ILLNESS
[de-identified] : 43 year old male presents for post op visit  s/p Endoscopic transnasal transsphenoidal approach to sella and anterior skull base, Repair of cerebrospinal fluid leak with vascularized nasal mucosal flap, [place] of fat graft 2/23/24 s/p Endoscopic transphenoidal or transnasal resection of pituitary tumor 02/01/24  [FreeTextEntry1] : Notes he is using rinse twice a day. Has some crusting in the front of the nose. Notes left lower leg swelling over the past week. Also with bilateral lower extremity muscle weakness and pain that has been constant since surgery.

## 2024-04-24 NOTE — ASSESSMENT
[FreeTextEntry1] : Mr. GONZALEZ is a 43 year male s/p TSRP 2/1/24 with CSF leak repair 2/23/24 with Dr. Wren;, large septal perforation: - debrided completely today and he is able to breathe much better - cont saline rinses daily and PRN - cleared to resume normal activity  LLE edema: - vitals WNL and no SOB/cough - no erythema/TTP of left calf but discussed concern for DVT so he is going to call his PCP and endo now to discuss next steps

## 2024-04-24 NOTE — PROCEDURE
[FreeTextEntry6] : Reason for nasal endoscopy: anterior rhinoscopy insufficient to account for symptoms.  Flexible scope #2 was used. Right nasal passage with normal inferior, middle and superior turbinates. Nasal passage patent with clear middle meatus. Large septal perforation with crusting anteriorly which was debrided with forceps. Sphenoid is well healed with flap in place. Left nasal passage with normal inferior, middle and superior turbinates. Nasal passage patent with clear middle meatus. Large septal perforation with crusting anteriorly which was debrided with forceps. Sphenoid is well healed with flap in place.  Nasopharynx clear.

## 2024-04-24 NOTE — REASON FOR VISIT
[Post-Operative Visit] : a post-operative visit [FreeTextEntry2] : s/p  Endoscopic transnasal transsphenoidal approach to sella and anterior skull base.  Repair of cerebrospinal fluid leak with vascularized nasal mucosal flap.  [place] of fat graft. [Spouse] : spouse

## 2024-04-24 NOTE — PHYSICAL EXAM
[Nasal Endoscopy Performed] : nasal endoscopy was performed, see procedure section for findings [Normal] : external appearance is normal [de-identified] : no dorsal depression noted [de-identified] : large septal perforation with crusting [de-identified] : Left lower extremity with 2+ pitting edema, no erythema or tenderness to palpation

## 2024-05-28 ENCOUNTER — APPOINTMENT (OUTPATIENT)
Dept: MRI IMAGING | Facility: IMAGING CENTER | Age: 44
End: 2024-05-28
Payer: COMMERCIAL

## 2024-05-28 ENCOUNTER — OUTPATIENT (OUTPATIENT)
Dept: OUTPATIENT SERVICES | Facility: HOSPITAL | Age: 44
LOS: 1 days | End: 2024-05-28
Payer: COMMERCIAL

## 2024-05-28 DIAGNOSIS — Z00.8 ENCOUNTER FOR OTHER GENERAL EXAMINATION: ICD-10-CM

## 2024-05-28 DIAGNOSIS — Z92.89 PERSONAL HISTORY OF OTHER MEDICAL TREATMENT: Chronic | ICD-10-CM

## 2024-05-28 DIAGNOSIS — D44.4 NEOPLASM OF UNCERTAIN BEHAVIOR OF CRANIOPHARYNGEAL DUCT: ICD-10-CM

## 2024-05-28 PROCEDURE — 70553 MRI BRAIN STEM W/O & W/DYE: CPT

## 2024-05-28 PROCEDURE — A9585: CPT

## 2024-05-28 PROCEDURE — 70553 MRI BRAIN STEM W/O & W/DYE: CPT | Mod: 26

## 2024-06-05 ENCOUNTER — APPOINTMENT (OUTPATIENT)
Dept: SPINE | Facility: CLINIC | Age: 44
End: 2024-06-05
Payer: COMMERCIAL

## 2024-06-05 VITALS
SYSTOLIC BLOOD PRESSURE: 125 MMHG | BODY MASS INDEX: 29.16 KG/M2 | HEIGHT: 65 IN | DIASTOLIC BLOOD PRESSURE: 82 MMHG | HEART RATE: 93 BPM | OXYGEN SATURATION: 97 % | WEIGHT: 175 LBS

## 2024-06-05 DIAGNOSIS — D44.4 NEOPLASM OF UNCERTAIN BEHAVIOR OF CRANIOPHARYNGEAL DUCT: ICD-10-CM

## 2024-06-05 DIAGNOSIS — D35.2 BENIGN NEOPLASM OF PITUITARY GLAND: ICD-10-CM

## 2024-06-05 PROCEDURE — 99213 OFFICE O/P EST LOW 20 MIN: CPT

## 2024-06-05 RX ORDER — DESMOPRESSIN ACETATE 0.1 MG/1
0.1 TABLET ORAL
Refills: 0 | Status: DISCONTINUED | COMMUNITY
Start: 2024-03-03 | End: 2024-06-05

## 2024-06-05 RX ORDER — LEVOTHYROXINE SODIUM 0.09 MG/1
88 TABLET ORAL
Refills: 0 | Status: ACTIVE | COMMUNITY

## 2024-06-05 RX ORDER — CEPHALEXIN 500 MG/1
500 CAPSULE ORAL 3 TIMES DAILY
Qty: 21 | Refills: 0 | Status: DISCONTINUED | COMMUNITY
Start: 2024-03-20 | End: 2024-06-05

## 2024-06-05 RX ORDER — CEPHALEXIN 500 MG/1
500 TABLET ORAL
Qty: 10 | Refills: 0 | Status: DISCONTINUED | COMMUNITY
Start: 2024-03-03 | End: 2024-06-05

## 2024-06-05 RX ORDER — DESMOPRESSIN ACETATE 0.1 MG/1
0.1 TABLET ORAL TWICE DAILY
Refills: 0 | Status: ACTIVE | COMMUNITY

## 2024-07-12 ENCOUNTER — APPOINTMENT (OUTPATIENT)
Dept: OTOLARYNGOLOGY | Facility: CLINIC | Age: 44
End: 2024-07-12
Payer: COMMERCIAL

## 2024-07-12 VITALS — SYSTOLIC BLOOD PRESSURE: 111 MMHG | DIASTOLIC BLOOD PRESSURE: 79 MMHG | HEART RATE: 87 BPM

## 2024-07-12 VITALS — WEIGHT: 185 LBS | TEMPERATURE: 98.1 F | BODY MASS INDEX: 30.79 KG/M2

## 2024-07-12 DIAGNOSIS — D35.2 BENIGN NEOPLASM OF PITUITARY GLAND: ICD-10-CM

## 2024-07-12 DIAGNOSIS — Z86.018 OTHER SPECIFIED POSTPROCEDURAL STATES: ICD-10-CM

## 2024-07-12 DIAGNOSIS — Z98.890 OTHER SPECIFIED POSTPROCEDURAL STATES: ICD-10-CM

## 2024-07-12 PROCEDURE — 99213 OFFICE O/P EST LOW 20 MIN: CPT | Mod: 25

## 2024-07-12 PROCEDURE — 31231 NASAL ENDOSCOPY DX: CPT

## 2024-07-30 NOTE — CHART NOTE - NSCHARTNOTESELECT_GEN_ALL_CORE
Lumbar Drain removal/Event Note
VTE Risk assessment/Event Note
[de-identified] : My review and interpretation of the radiologic studies: AP and Lateral scoliosis X-Rays were obtained and reviewed today with family. There is a 13-degree lumbar curve noted on AP films. Normal kyphosis and lordosis on lateral. No spondylolysis or spondylolisthesis noted.

## 2024-11-01 ENCOUNTER — APPOINTMENT (OUTPATIENT)
Dept: OTOLARYNGOLOGY | Facility: CLINIC | Age: 44
End: 2024-11-01
Payer: COMMERCIAL

## 2024-11-01 VITALS
DIASTOLIC BLOOD PRESSURE: 77 MMHG | HEART RATE: 79 BPM | SYSTOLIC BLOOD PRESSURE: 113 MMHG | BODY MASS INDEX: 31.95 KG/M2 | WEIGHT: 192 LBS | TEMPERATURE: 98 F

## 2024-11-01 DIAGNOSIS — Z98.890 OTHER SPECIFIED POSTPROCEDURAL STATES: ICD-10-CM

## 2024-11-01 DIAGNOSIS — R09.81 NASAL CONGESTION: ICD-10-CM

## 2024-11-01 DIAGNOSIS — Z86.018 OTHER SPECIFIED POSTPROCEDURAL STATES: ICD-10-CM

## 2024-11-01 PROCEDURE — 31231 NASAL ENDOSCOPY DX: CPT

## 2024-11-01 PROCEDURE — 99213 OFFICE O/P EST LOW 20 MIN: CPT | Mod: 25

## 2024-11-11 NOTE — PHYSICAL THERAPY INITIAL EVALUATION ADULT - DID THE PATIENT HAVE SURGERY?
Stable on current medications  Continue meds and exercise.   Hypertension Plan  Continue current treatment regimen.  On listed meds for BP and doing well at this time.  Medication changes per orders.  Dietary sodium restriction.  Regular aerobic exercise.  Weight loss.  Reduce stress.  Patient Education: Reviewed risks of hypertension and principles of   treatment.       TSP for tumor resection (craniopharyngioma), expected CSF leak, LD in place/yes

## 2024-12-25 PROBLEM — F10.90 ALCOHOL USE: Status: ACTIVE | Noted: 2020-10-28

## 2025-02-06 ENCOUNTER — OUTPATIENT (OUTPATIENT)
Dept: OUTPATIENT SERVICES | Facility: HOSPITAL | Age: 45
LOS: 1 days | End: 2025-02-06
Payer: COMMERCIAL

## 2025-02-06 ENCOUNTER — APPOINTMENT (OUTPATIENT)
Dept: MRI IMAGING | Facility: IMAGING CENTER | Age: 45
End: 2025-02-06

## 2025-02-06 DIAGNOSIS — Z00.8 ENCOUNTER FOR OTHER GENERAL EXAMINATION: ICD-10-CM

## 2025-02-06 DIAGNOSIS — D35.2 BENIGN NEOPLASM OF PITUITARY GLAND: ICD-10-CM

## 2025-02-06 DIAGNOSIS — Z92.89 PERSONAL HISTORY OF OTHER MEDICAL TREATMENT: Chronic | ICD-10-CM

## 2025-02-06 PROCEDURE — 70553 MRI BRAIN STEM W/O & W/DYE: CPT

## 2025-02-06 PROCEDURE — A9585: CPT

## 2025-02-06 PROCEDURE — 70553 MRI BRAIN STEM W/O & W/DYE: CPT | Mod: 26

## 2025-03-19 ENCOUNTER — APPOINTMENT (OUTPATIENT)
Dept: SPINE | Facility: CLINIC | Age: 45
End: 2025-03-19
Payer: COMMERCIAL

## 2025-03-19 ENCOUNTER — NON-APPOINTMENT (OUTPATIENT)
Age: 45
End: 2025-03-19

## 2025-03-19 VITALS
HEIGHT: 65 IN | WEIGHT: 192 LBS | OXYGEN SATURATION: 99 % | DIASTOLIC BLOOD PRESSURE: 83 MMHG | SYSTOLIC BLOOD PRESSURE: 114 MMHG | BODY MASS INDEX: 31.99 KG/M2 | HEART RATE: 86 BPM

## 2025-03-19 DIAGNOSIS — Z98.890 OTHER SPECIFIED POSTPROCEDURAL STATES: ICD-10-CM

## 2025-03-19 DIAGNOSIS — D35.2 BENIGN NEOPLASM OF PITUITARY GLAND: ICD-10-CM

## 2025-03-19 DIAGNOSIS — D44.4 NEOPLASM OF UNCERTAIN BEHAVIOR OF CRANIOPHARYNGEAL DUCT: ICD-10-CM

## 2025-03-19 DIAGNOSIS — Z86.018 OTHER SPECIFIED POSTPROCEDURAL STATES: ICD-10-CM

## 2025-03-19 PROCEDURE — 99213 OFFICE O/P EST LOW 20 MIN: CPT

## 2025-03-19 RX ORDER — TESTOSTERONE ENANTHATE 75 MG/.5ML
75 INJECTION SUBCUTANEOUS
Refills: 0 | Status: ACTIVE | COMMUNITY

## 2025-04-28 ENCOUNTER — APPOINTMENT (OUTPATIENT)
Dept: OPHTHALMOLOGY | Facility: CLINIC | Age: 45
End: 2025-04-28
Payer: COMMERCIAL

## 2025-04-28 ENCOUNTER — NON-APPOINTMENT (OUTPATIENT)
Age: 45
End: 2025-04-28

## 2025-04-28 PROCEDURE — 92133 CPTRZD OPH DX IMG PST SGM ON: CPT

## 2025-04-28 PROCEDURE — 99214 OFFICE O/P EST MOD 30 MIN: CPT

## 2025-04-28 PROCEDURE — 92083 EXTENDED VISUAL FIELD XM: CPT

## 2025-05-13 NOTE — DIETITIAN INITIAL EVALUATION ADULT - ADD RECOMMEND
Yes
1) Continue regular diet as medically feasible   2) Monitor PO intake and add PO nutrition supplements PRN   3) Continue multivitamin daily   4) Monitor PO intake, diet tolerance, weight trends, labs, GI function, and skin integrity    Karime Ott MS, RDN, CDN ([employer])

## (undated) DEVICE — MIDAS REX MR8 CLEARVIEW TN MATCH HEAD 3MM X 13CM DIAMOND

## (undated) DEVICE — STAPLER SKIN VISI-STAT 35 WIDE

## (undated) DEVICE — SPECIMEN CONTAINER 100ML

## (undated) DEVICE — DRAPE 1/2 SHEET 40X57"

## (undated) DEVICE — DRAPE MINOR PROCEDURE

## (undated) DEVICE — TUBING IRRIGATION NASAL BURR

## (undated) DEVICE — MARKING PEN W RULER

## (undated) DEVICE — GOWN TRIMAX LG

## (undated) DEVICE — DRAIN JACKSON PRATT 7MM FLAT FULL NO TROCAR

## (undated) DEVICE — ELCTR 4-DISC 20MM 49" (RED, BLUE, GREEN, BLACK)

## (undated) DEVICE — ELCTR PEDICLE SCREW PROBE 3MM BALL 1.8MM X 100MM

## (undated) DEVICE — GLV 6.5 PROTEXIS (WHITE)

## (undated) DEVICE — DRSG TELFA 3 X 8

## (undated) DEVICE — WOUND IRR SURGIPHOR

## (undated) DEVICE — ELCTR BOVIE TIP NEEDLE INSULATED 2.8" EDGE

## (undated) DEVICE — DRSG TAPE HYPAFIX 4"

## (undated) DEVICE — SOL IRR POUR H2O 250ML

## (undated) DEVICE — DRAPE TOWEL BLUE 17" X 24"

## (undated) DEVICE — VENODYNE/SCD SLEEVE CALF LARGE

## (undated) DEVICE — STORZ DURA MICRO KNIFE INSERT POINTED

## (undated) DEVICE — DRAPE CAMERA VIDEO 7"X96"

## (undated) DEVICE — DRAPE INSTRUMENT POUCH 6.75" X 11"

## (undated) DEVICE — Device

## (undated) DEVICE — DRSG MEROCEL STANDARD NO STRING 8CM X 2CM

## (undated) DEVICE — ELCTR MONOPOLAR STIMULATOR PROBE FLUSH-TIP

## (undated) DEVICE — MEDICATION LABELS W MARKER

## (undated) DEVICE — ELCTR GROUNDING PAD ADULT COVIDIEN

## (undated) DEVICE — MINI DOPPLER PROBE

## (undated) DEVICE — WARMING BLANKET LOWER ADULT

## (undated) DEVICE — SOL IRR POUR NS 0.9% 500ML

## (undated) DEVICE — DRAPE 3/4 SHEET W REINFORCEMENT 56X77"

## (undated) DEVICE — CLEANING SHEATH ENDO-SCRUB FOR STORZ 7210AA TELESCOPE 4MM 0 DEGREE

## (undated) DEVICE — CLEANING SHEATH ENDO-SCRUB FOR STORZ 7230CA ARTHROSCOPE 4MM 70 DEGREE

## (undated) DEVICE — DRSG SPLINT INTRA NASAL .25MM STANDARD THIN

## (undated) DEVICE — FOLEY TRAY 16FR LF URINE METER SURESTEP

## (undated) DEVICE — SUT CHROMIC 3-0 30" V-20

## (undated) DEVICE — SYR LUER LOK 10CC

## (undated) DEVICE — GLV 7.5 PROTEXIS (WHITE)

## (undated) DEVICE — MIDAS REX MR8 CLEARVIEW TN BALL 4.5MM X 13CM COARSE

## (undated) DEVICE — DRAPE SURGICAL #1010

## (undated) DEVICE — DRSG NASOPORE 8CM FIRM

## (undated) DEVICE — SUCTION COAGULATOR HAND CONTROL 10FR X 6"

## (undated) DEVICE — GLV 8.5 PROTEXIS (WHITE)

## (undated) DEVICE — BUR MEDTRONIC ENT TRANSNASAL SKULL BASE DIAMOND ROUND 15 DEGREE 4.5MM X 13CM

## (undated) DEVICE — STORZ DURA MICRO KNIFE INSERT SICKLE-SHAPED

## (undated) DEVICE — SUT CHROMIC GUT 4-0 18" P-13

## (undated) DEVICE — DRSG CURITY GAUZE SPONGE 4 X 4" 12-PLY

## (undated) DEVICE — ELCTR SUBDERMAL CORKSCREW NDL 1.2MM

## (undated) DEVICE — ELCTR BIPOLAR PROBE

## (undated) DEVICE — NDL SPINAL 18G X 3.5" (PINK)

## (undated) DEVICE — BUR MEDTRONIC ENT TRANSNASAL SKULL BASE FLUTED 15 DEGREE 3.0MM X 13CM

## (undated) DEVICE — PREP BETADINE SPONGE STICKS

## (undated) DEVICE — VENODYNE/SCD SLEEVE CALF MEDIUM

## (undated) DEVICE — ELCTR SUBDERMAL NDL 27G X 1/2" WITH TWISTED PAIR

## (undated) DEVICE — ELCTR BOVIE TIP BLADE INSULATED 2.75" EDGE

## (undated) DEVICE — SUT MONOCRYL 4-0 18" PS-2

## (undated) DEVICE — POSITIONER FOAM EGG CRATE ULNAR 2PCS (PINK)

## (undated) DEVICE — PACK LUMBAR LAMI

## (undated) DEVICE — BLADE MEDTRONIC ENT TRICUT ROTATABLE STRAIGHT 4MM X 11CM

## (undated) DEVICE — GLV 7 PROTEXIS (WHITE)

## (undated) DEVICE — CLEANING SHEATH ENDO-SCRUB FOR STORZ 7210FA ARTHROSCOPE 4MM 45 DEGREE

## (undated) DEVICE — PREP CHLORAPREP HI-LITE ORANGE 26ML

## (undated) DEVICE — ELCTR SUBDERMAL NDL CLASSIC 1.5M X 59" (6 COLOR)

## (undated) DEVICE — DRAPE MAYO STAND 30"

## (undated) DEVICE — APPLICATOR Q TIP 6" WOOD STEM

## (undated) DEVICE — APPLICATOR EXTENDED TIP 8CM

## (undated) DEVICE — SPHERE MARKER (5 SPHERES)

## (undated) DEVICE — POSITIONER FOAM HEADREST (PINK)

## (undated) DEVICE — SUT VICRYL 3-0 18" X-1 (POP-OFF)

## (undated) DEVICE — DRAIN RESERVOIR FOR JACKSON PRATT 100CC CARDINAL

## (undated) DEVICE — GLV 8 PROTEXIS (WHITE)

## (undated) DEVICE — NDL HYPO REGULAR BEVEL 25G X 1.5" (BLUE)

## (undated) DEVICE — DRSG XEROFORM 1 X 8"

## (undated) DEVICE — TUBING SUCTION 20FT

## (undated) DEVICE — NDL COUNTER DBL BLADEGUARD

## (undated) DEVICE — DRSG STERISTRIPS 0.5 X 4"

## (undated) DEVICE — WARMING BLANKET UPPER ADULT

## (undated) DEVICE — VISITEC 4X4

## (undated) DEVICE — MIDAS REX MR8 CLEARVIEW TN BALL 4MM X 13CM

## (undated) DEVICE — POSITIONER PATIENT SAFETY STRAP 3X60"

## (undated) DEVICE — BIPOLAR FORCEP SYMMETRY BAYONET 7" X 1.5MM SMOOTH (SILVER)

## (undated) DEVICE — SOL ANTI FOG

## (undated) DEVICE — NDL SPINAL 25G X 3.5" (BLUE)

## (undated) DEVICE — TUBING MR8 IRRIGATION CV LOW-PRFL

## (undated) DEVICE — IRRIGATION TRAY W PISTON SYRINGE 60ML

## (undated) DEVICE — ENDO SCRUB